# Patient Record
Sex: MALE | Race: WHITE | NOT HISPANIC OR LATINO | URBAN - METROPOLITAN AREA
[De-identification: names, ages, dates, MRNs, and addresses within clinical notes are randomized per-mention and may not be internally consistent; named-entity substitution may affect disease eponyms.]

---

## 2017-04-12 ENCOUNTER — INPATIENT (INPATIENT)
Facility: HOSPITAL | Age: 62
LOS: 0 days | Discharge: HOME | End: 2017-04-13
Attending: EMERGENCY MEDICINE

## 2017-06-27 DIAGNOSIS — Z74.3 NEED FOR CONTINUOUS SUPERVISION: ICD-10-CM

## 2017-06-27 DIAGNOSIS — J42 UNSPECIFIED CHRONIC BRONCHITIS: ICD-10-CM

## 2017-06-27 DIAGNOSIS — Z04.8 ENCOUNTER FOR EXAMINATION AND OBSERVATION FOR OTHER SPECIFIED REASONS: ICD-10-CM

## 2017-06-27 DIAGNOSIS — Z22.322 CARRIER OR SUSPECTED CARRIER OF METHICILLIN RESISTANT STAPHYLOCOCCUS AUREUS: ICD-10-CM

## 2017-06-27 DIAGNOSIS — Z99.81 DEPENDENCE ON SUPPLEMENTAL OXYGEN: ICD-10-CM

## 2017-12-12 ENCOUNTER — OUTPATIENT (OUTPATIENT)
Dept: OUTPATIENT SERVICES | Facility: HOSPITAL | Age: 62
LOS: 1 days | Discharge: HOME | End: 2017-12-12

## 2017-12-12 DIAGNOSIS — E78.5 HYPERLIPIDEMIA, UNSPECIFIED: ICD-10-CM

## 2017-12-12 DIAGNOSIS — J44.1 CHRONIC OBSTRUCTIVE PULMONARY DISEASE WITH (ACUTE) EXACERBATION: ICD-10-CM

## 2017-12-12 DIAGNOSIS — J44.9 CHRONIC OBSTRUCTIVE PULMONARY DISEASE, UNSPECIFIED: ICD-10-CM

## 2017-12-12 DIAGNOSIS — K21.9 GASTRO-ESOPHAGEAL REFLUX DISEASE WITHOUT ESOPHAGITIS: ICD-10-CM

## 2017-12-12 DIAGNOSIS — N18.3 CHRONIC KIDNEY DISEASE, STAGE 3 (MODERATE): ICD-10-CM

## 2017-12-12 DIAGNOSIS — J93.9 PNEUMOTHORAX, UNSPECIFIED: ICD-10-CM

## 2017-12-12 DIAGNOSIS — F41.9 ANXIETY DISORDER, UNSPECIFIED: ICD-10-CM

## 2018-01-19 ENCOUNTER — EMERGENCY (EMERGENCY)
Facility: HOSPITAL | Age: 63
LOS: 0 days | Discharge: HOME | End: 2018-01-19
Admitting: INTERNAL MEDICINE

## 2018-01-19 DIAGNOSIS — E78.00 PURE HYPERCHOLESTEROLEMIA, UNSPECIFIED: ICD-10-CM

## 2018-01-19 DIAGNOSIS — R06.02 SHORTNESS OF BREATH: ICD-10-CM

## 2018-01-19 DIAGNOSIS — J44.1 CHRONIC OBSTRUCTIVE PULMONARY DISEASE WITH (ACUTE) EXACERBATION: ICD-10-CM

## 2018-01-19 DIAGNOSIS — F41.9 ANXIETY DISORDER, UNSPECIFIED: ICD-10-CM

## 2018-01-19 DIAGNOSIS — K21.9 GASTRO-ESOPHAGEAL REFLUX DISEASE WITHOUT ESOPHAGITIS: ICD-10-CM

## 2018-01-19 DIAGNOSIS — J44.9 CHRONIC OBSTRUCTIVE PULMONARY DISEASE, UNSPECIFIED: ICD-10-CM

## 2018-01-19 DIAGNOSIS — Z79.51 LONG TERM (CURRENT) USE OF INHALED STEROIDS: ICD-10-CM

## 2018-01-19 DIAGNOSIS — R05 COUGH: ICD-10-CM

## 2018-01-19 DIAGNOSIS — Z79.899 OTHER LONG TERM (CURRENT) DRUG THERAPY: ICD-10-CM

## 2018-01-19 DIAGNOSIS — J93.9 PNEUMOTHORAX, UNSPECIFIED: ICD-10-CM

## 2018-02-07 ENCOUNTER — INPATIENT (INPATIENT)
Facility: HOSPITAL | Age: 63
LOS: 3 days | Discharge: HOME HEALTH | End: 2018-02-11
Attending: INTERNAL MEDICINE | Admitting: INTERNAL MEDICINE

## 2018-02-07 VITALS
OXYGEN SATURATION: 98 % | RESPIRATION RATE: 18 BRPM | TEMPERATURE: 98 F | SYSTOLIC BLOOD PRESSURE: 155 MMHG | HEART RATE: 60 BPM | DIASTOLIC BLOOD PRESSURE: 80 MMHG

## 2018-02-07 LAB
ALBUMIN SERPL ELPH-MCNC: 3.6 G/DL — SIGNIFICANT CHANGE UP (ref 3–5.5)
ALP SERPL-CCNC: 70 U/L — SIGNIFICANT CHANGE UP (ref 30–115)
ALT FLD-CCNC: 34 U/L — SIGNIFICANT CHANGE UP (ref 0–41)
ANION GAP SERPL CALC-SCNC: 11 MMOL/L — SIGNIFICANT CHANGE UP (ref 7–14)
APTT BLD: 20.9 SEC — CRITICAL LOW (ref 27–39.2)
AST SERPL-CCNC: 39 U/L — SIGNIFICANT CHANGE UP (ref 0–41)
B-TYPE NATRIURETIC PEPTIDE BNP RESULT: 32 PG/ML — SIGNIFICANT CHANGE UP (ref 0–99)
BASE EXCESS BLDV CALC-SCNC: 7.8 MMOL/L — HIGH (ref -2–2)
BASOPHILS # BLD AUTO: 0.04 K/UL — SIGNIFICANT CHANGE UP (ref 0–0.2)
BASOPHILS NFR BLD AUTO: 0.3 % — SIGNIFICANT CHANGE UP (ref 0–1)
BILIRUB SERPL-MCNC: 1.3 MG/DL — HIGH (ref 0.2–1.2)
BUN SERPL-MCNC: 13 MG/DL — SIGNIFICANT CHANGE UP (ref 10–20)
CA-I SERPL-SCNC: 1.15 MMOL/L — SIGNIFICANT CHANGE UP (ref 1.12–1.3)
CALCIUM SERPL-MCNC: 8.7 MG/DL — SIGNIFICANT CHANGE UP (ref 8.5–10.1)
CHLORIDE SERPL-SCNC: 94 MMOL/L — LOW (ref 98–110)
CK MB CFR SERPL CALC: 5.1 NG/ML — SIGNIFICANT CHANGE UP (ref 0.6–6.3)
CO2 SERPL-SCNC: 28 MMOL/L — SIGNIFICANT CHANGE UP (ref 17–32)
CREAT SERPL-MCNC: 1 MG/DL — SIGNIFICANT CHANGE UP (ref 0.7–1.5)
EOSINOPHIL # BLD AUTO: 0 K/UL — SIGNIFICANT CHANGE UP (ref 0–0.7)
EOSINOPHIL NFR BLD AUTO: 0 % — SIGNIFICANT CHANGE UP (ref 0–8)
FLU A RESULT: NEGATIVE — SIGNIFICANT CHANGE UP
FLU A RESULT: NEGATIVE — SIGNIFICANT CHANGE UP
FLUAV AG NPH QL: NEGATIVE — SIGNIFICANT CHANGE UP
FLUBV AG NPH QL: NEGATIVE — SIGNIFICANT CHANGE UP
GAS PNL BLDV: 132 MMOL/L — LOW (ref 136–145)
GAS PNL BLDV: SIGNIFICANT CHANGE UP
GLUCOSE SERPL-MCNC: 128 MG/DL — HIGH (ref 70–110)
HCO3 BLDV-SCNC: 34 MMOL/L — HIGH (ref 22–29)
HCT VFR BLD CALC: 40.5 % — LOW (ref 42–52)
HGB BLD-MCNC: 12.5 G/DL — LOW (ref 14–18)
IMM GRANULOCYTES NFR BLD AUTO: 4.7 % — HIGH (ref 0.1–0.3)
INR BLD: 0.95 RATIO — SIGNIFICANT CHANGE UP (ref 0.65–1.3)
LACTATE BLDV-MCNC: 1.6 MMOL/L — SIGNIFICANT CHANGE UP (ref 0.5–1.6)
LYMPHOCYTES # BLD AUTO: 0.32 K/UL — LOW (ref 1.2–3.4)
LYMPHOCYTES # BLD AUTO: 2.3 % — LOW (ref 20.5–51.1)
MAGNESIUM SERPL-MCNC: 2.4 MG/DL — SIGNIFICANT CHANGE UP (ref 1.8–2.4)
MCHC RBC-ENTMCNC: 27.1 PG — SIGNIFICANT CHANGE UP (ref 27–31)
MCHC RBC-ENTMCNC: 30.9 G/DL — LOW (ref 32–37)
MCV RBC AUTO: 87.7 FL — SIGNIFICANT CHANGE UP (ref 80–94)
MONOCYTES # BLD AUTO: 0.77 K/UL — HIGH (ref 0.1–0.6)
MONOCYTES NFR BLD AUTO: 5.5 % — SIGNIFICANT CHANGE UP (ref 1.7–9.3)
NEUTROPHILS # BLD AUTO: 12.13 K/UL — HIGH (ref 1.4–6.5)
NEUTROPHILS NFR BLD AUTO: 87.2 % — HIGH (ref 42.2–75.2)
NRBC # BLD: 0 /100 WBCS — SIGNIFICANT CHANGE UP (ref 0–0)
PCO2 BLDV: 51 MMHG — SIGNIFICANT CHANGE UP (ref 41–51)
PH BLDV: 7.42 — SIGNIFICANT CHANGE UP (ref 7.26–7.43)
PLATELET # BLD AUTO: 377 K/UL — SIGNIFICANT CHANGE UP (ref 130–400)
PO2 BLDV: 67 MMHG — HIGH (ref 20–40)
POTASSIUM BLDV-SCNC: 5 MMOL/L — SIGNIFICANT CHANGE UP (ref 3.3–5.6)
POTASSIUM SERPL-MCNC: 6.5 MMOL/L — CRITICAL HIGH (ref 3.5–5)
POTASSIUM SERPL-SCNC: 6.5 MMOL/L — CRITICAL HIGH (ref 3.5–5)
PROT SERPL-MCNC: 5.9 G/DL — LOW (ref 6–8)
PROTHROM AB SERPL-ACNC: 10.2 SEC — SIGNIFICANT CHANGE UP (ref 9.95–12.87)
RBC # BLD: 4.62 M/UL — LOW (ref 4.7–6.1)
RBC # FLD: 14.8 % — HIGH (ref 11.5–14.5)
SAO2 % BLDV: 93 % — SIGNIFICANT CHANGE UP
SODIUM SERPL-SCNC: 133 MMOL/L — LOW (ref 135–146)
TROPONIN I SERPL-MCNC: <0.02 NG/ML — SIGNIFICANT CHANGE UP (ref 0–0.05)
WBC # BLD: 13.91 K/UL — HIGH (ref 4.8–10.8)
WBC # FLD AUTO: 13.91 K/UL — HIGH (ref 4.8–10.8)

## 2018-02-07 RX ORDER — IPRATROPIUM/ALBUTEROL SULFATE 18-103MCG
3 AEROSOL WITH ADAPTER (GRAM) INHALATION
Qty: 0 | Refills: 0 | Status: COMPLETED | OUTPATIENT
Start: 2018-02-07 | End: 2018-02-07

## 2018-02-07 RX ORDER — HEPARIN SODIUM 5000 [USP'U]/ML
5000 INJECTION INTRAVENOUS; SUBCUTANEOUS EVERY 8 HOURS
Qty: 0 | Refills: 0 | Status: DISCONTINUED | OUTPATIENT
Start: 2018-02-07 | End: 2018-02-11

## 2018-02-07 RX ORDER — ALPRAZOLAM 0.25 MG
0 TABLET ORAL
Qty: 0 | Refills: 0 | COMMUNITY

## 2018-02-07 RX ORDER — AMLODIPINE BESYLATE 2.5 MG/1
10 TABLET ORAL DAILY
Qty: 0 | Refills: 0 | Status: DISCONTINUED | OUTPATIENT
Start: 2018-02-07 | End: 2018-02-11

## 2018-02-07 RX ORDER — SIMVASTATIN 20 MG/1
10 TABLET, FILM COATED ORAL AT BEDTIME
Qty: 0 | Refills: 0 | Status: DISCONTINUED | OUTPATIENT
Start: 2018-02-07 | End: 2018-02-11

## 2018-02-07 RX ORDER — TIOTROPIUM BROMIDE 18 UG/1
1 CAPSULE ORAL; RESPIRATORY (INHALATION) DAILY
Qty: 0 | Refills: 0 | Status: DISCONTINUED | OUTPATIENT
Start: 2018-02-07 | End: 2018-02-11

## 2018-02-07 RX ORDER — IPRATROPIUM BROMIDE 0.2 MG/ML
0 SOLUTION, NON-ORAL INHALATION
Qty: 0 | Refills: 0 | COMMUNITY

## 2018-02-07 RX ORDER — BUDESONIDE AND FORMOTEROL FUMARATE DIHYDRATE 160; 4.5 UG/1; UG/1
2 AEROSOL RESPIRATORY (INHALATION)
Qty: 0 | Refills: 0 | Status: DISCONTINUED | OUTPATIENT
Start: 2018-02-07 | End: 2018-02-11

## 2018-02-07 RX ORDER — ALPRAZOLAM 0.25 MG
1 TABLET ORAL
Qty: 0 | Refills: 0 | Status: DISCONTINUED | OUTPATIENT
Start: 2018-02-07 | End: 2018-02-09

## 2018-02-07 RX ORDER — IPRATROPIUM/ALBUTEROL SULFATE 18-103MCG
3 AEROSOL WITH ADAPTER (GRAM) INHALATION EVERY 6 HOURS
Qty: 0 | Refills: 0 | Status: DISCONTINUED | OUTPATIENT
Start: 2018-02-07 | End: 2018-02-11

## 2018-02-07 RX ADMIN — Medication 3 MILLILITER(S): at 23:43

## 2018-02-07 RX ADMIN — Medication 3 MILLILITER(S): at 15:06

## 2018-02-07 RX ADMIN — Medication 3 MILLILITER(S): at 14:43

## 2018-02-07 RX ADMIN — Medication 125 MILLIGRAM(S): at 14:43

## 2018-02-07 RX ADMIN — Medication 3 MILLILITER(S): at 14:46

## 2018-02-07 NOTE — H&P ADULT - NSHPPHYSICALEXAM_GEN_ALL_CORE
Vital Signs Last 24 Hrs  T(C): 35.9 (07 Feb 2018 23:22), Max: 36.7 (07 Feb 2018 13:13)  T(F): 96.6 (07 Feb 2018 23:22), Max: 98.1 (07 Feb 2018 13:13)  HR: 71 (07 Feb 2018 23:22) (60 - 88)  BP: 102/54 (07 Feb 2018 23:22) (102/54 - 155/80)  BP(mean): --  RR: 19 (07 Feb 2018 23:22) (18 - 19)  SpO2: 99% (07 Feb 2018 23:22) (97% - 99%)    Gen: NAD, AAOx3, appears older than stated age  CV:  RRR  Pulm: mild expiratory wheezes on right lung field  Abd: soft, ND, NT  Ext: no c/c/e Vital Signs Last 24 Hrs  T(C): 35.9 (07 Feb 2018 23:22), Max: 36.7 (07 Feb 2018 13:13)  T(F): 96.6 (07 Feb 2018 23:22), Max: 98.1 (07 Feb 2018 13:13)  HR: 71 (07 Feb 2018 23:22) (60 - 88)  BP: 102/54 (07 Feb 2018 23:22) (102/54 - 155/80)  BP(mean): --  RR: 19 (07 Feb 2018 23:22) (18 - 19)  SpO2: 99% (07 Feb 2018 23:22) (97% - 99%)    Gen: NAD, AAOx3, appears older than stated age  CV:  RRR  Pulm: mild expiratory wheezes on right lung field  Abd: soft, ND, NT  Ext: no c/c/e  : left scrotal swelling, nontender

## 2018-02-07 NOTE — H&P ADULT - ASSESSMENT
61 yo M PMHx COPD on home O2, HTN, HLD, anxiety sent in by VNA for wheezing.    1. COPD:   - cw prednisone taper, inhalers and nebulizers  - no need for Abtx  - cw supplemental oxygen to maintain SaO2 at or above 90%, no more than 92%  - BIPAP PRN    2. leukocytosis:  - likely 2/2 steroid use  - f/w cbc in am     2. left hydrocoele:  - scrotal ultrasound with about 350 cc fluid  - urology consult placed    3. HLD:   - cw zocor    4. HTN:  - cw norvasc     4. anxiety  - cw xanax prn

## 2018-02-07 NOTE — H&P ADULT - HISTORY OF PRESENT ILLNESS
63 yo M PMHx COPD on 4L NC/BIPAP PRN at home, HTN, HLD, anxiety presenting to the ED because his VNA found him to be wheezing on auscultation.  Patient was recently discharged from [facility] where he had a month long stay for COPD.  He was treated with antibiotics, steroids and sent home on a steroid taper.  At baseline, he has SOB on exertion, and a chronic cough with whitish (sometimes yellowish) mucoid sputum.  He denies any changes from his baseline since discharge as well as F/chills/palpitations/CP/orthopnea/LE edema.      He does note that his BIPAP was fixed yesterday at home and is working well.  And at [facility], he had a work up for left scrotal swelling, and was told to follow up with urologist as outpatient.  This scrotal swelling has been occurring for the past 6 months, and has been increasing in size.  Denies scrotal pain.  Reports intermittent urinary incontinence without dysuria.      He is an ex-smoker, quit 8 years ago, with a 60 pack year history. 63 yo M PMHx COPD on 4L NC/BIPAP PRN at home, HTN, HLD, anxiety presenting to the ED because his VNA found him to be wheezing on auscultation.  Patient was recently discharged from [facility] where he had a month long stay for COPD.  He was treated with antibiotics, steroids and sent home on a steroid taper.  At baseline, he has SOB on exertion, and a chronic cough with whitish (sometimes yellowish) mucoid sputum.  He denies any changes from his baseline since discharge as well as F/chills/palpitations/CP/orthopnea/LE edema, although when the visiting nurse told him he was wheezing and that she was concerned, he became very anxious and felt increasing SOB but for that moment.       He does note that his BIPAP was fixed yesterday at home and is working well.  And at [facility], he had a work up for left scrotal swelling, and was told to follow up with urologist as outpatient.  This scrotal swelling has been occurring for the past 6 months, and has been increasing in size.  Denies scrotal pain.  Reports intermittent urinary incontinence without dysuria.      He is an ex-smoker, quit 8 years ago, with a 60 pack year history.

## 2018-02-07 NOTE — H&P ADULT - ATTENDING COMMENTS
pt seen and examined independently, I have read and agree with above resident exam and plan of care  continue steroids, abx, inhalation therapy, check pullse ox  dvt proph  pud proph  pulm eval dr worthington

## 2018-02-07 NOTE — ED PROVIDER NOTE - NS ED ROS FT
Constitutional:  no fevers, no chills, + generalized malaise  Eyes:  No visual changes  ENMT: No neck pain or stiffness, no nasal congestion, no ear pain, no throat pain  Cardiac:  No chest pain  Respiratory:  see hpi  GI:  No nausea, vomiting, diarrhea or abdominal pain.  :  No dysuria, frequency or burning.  L scrotal swelling as in HPI  MS:  No back pain, no joint pain.  Neuro:  No headache, no dizziness, no change in mental status  Skin:  No skin rash  Except as documented in the HPI,  all other systems are negative

## 2018-02-07 NOTE — ED PROVIDER NOTE - PROGRESS NOTE DETAILS
Pt states he usually uses bipap from 1-3 pm daily, requesting bipap use now. respiratory to come and place bipap. Pt feeling much better after bipap, has now taken it off, using 4L O2.

## 2018-02-07 NOTE — H&P ADULT - NSHPLABSRESULTS_GEN_ALL_CORE
12.5   13.91 )-----------( 377      ( 07 Feb 2018 14:18 )             40.5     02-07    133<L>  |  94<L>  |  13  ----------------------------<  128<H>  6.5<HH>   |  28  |  1.0    Ca    8.7      07 Feb 2018 14:18  Mg     2.4     02-07    TPro  5.9<L>  /  Alb  3.6  /  TBili  1.3<H>  /  DBili  x   /  AST  39  /  ALT  34  /  AlkPhos  70  02-07      14:18 - VBG - pH: 7.42  | pCO2: 51    | pO2: 67    | Lactate: 1.6        CXR: ROBERT, f/u official report

## 2018-02-07 NOTE — ED PROVIDER NOTE - PHYSICAL EXAMINATION
CONSTITUTIONAL: Well-developed; well-nourished; in no acute distress, nontoxic appearing  SKIN: skin exam is warm and dry,  HEAD: Normocephalic; atraumatic.  EYES: PERRL, 3 mm bilateral, no nystagmus, EOM intact; conjunctiva and sclera clear.  ENT: MMM, no nasal congestion  NECK: Supple; non tender.+ full passive ROM in all directions. No JVD  CARD: S1, S2 normal, no murmur  RESP: No resp distress, speaking full sentences, + decreased bs b/l.   ABD: soft; non-distended; non-tender. No Rebound, No guarding  : + swelling to L scrotum, no tenderness  EXT: Normal ROM. No cyanosis or edema. Dp Pulses intact.   NEURO: awake, alert, following commands, oriented, grossly unremarkable. No Focal deficits. GCS 15.   PSYCH: Cooperative, appropriate.

## 2018-02-07 NOTE — H&P ADULT - PMH
Anxiety disorder, unspecified type    Chronic obstructive pulmonary disease, unspecified COPD type    High cholesterol    Hypertension, unspecified type    Other hydrocele

## 2018-02-07 NOTE — ED PROVIDER NOTE - OBJECTIVE STATEMENT
63 y/o male with h/o HTN, anxiety, COPD on 4L home O2 in ER with c/o SOB.  Pt states has been admitted multiple times over the past month or so to [facility], most recenlty d/c'd ~ a few days ago. states completed course of abx.  + cough with increased mucous production, white.  Pt uses biPAP at home at night and to nap during the day,states his machine broke ~ 4 am and has been having worsening sob since then. Denies f/c. no ha/dizziness/loc. no n/v/d. no abd pain.  no dysuria. pt states has had scrotal swelling x the past 6 months, had sono at [facility] ~ 10 days ago and was told there is fluid there, was supposed to f/u with gu as outpt, but has not.  denies any recent increase in swelling, no pain.

## 2018-02-08 RX ADMIN — Medication 60 MILLIGRAM(S): at 06:25

## 2018-02-08 RX ADMIN — Medication 3 MILLILITER(S): at 13:14

## 2018-02-08 RX ADMIN — SIMVASTATIN 10 MILLIGRAM(S): 20 TABLET, FILM COATED ORAL at 21:41

## 2018-02-08 RX ADMIN — HEPARIN SODIUM 5000 UNIT(S): 5000 INJECTION INTRAVENOUS; SUBCUTANEOUS at 13:14

## 2018-02-08 RX ADMIN — Medication 3 MILLILITER(S): at 08:53

## 2018-02-08 RX ADMIN — Medication 1 MILLIGRAM(S): at 06:25

## 2018-02-08 RX ADMIN — Medication 3 MILLILITER(S): at 02:48

## 2018-02-08 RX ADMIN — HEPARIN SODIUM 5000 UNIT(S): 5000 INJECTION INTRAVENOUS; SUBCUTANEOUS at 06:24

## 2018-02-08 RX ADMIN — Medication 600 MILLIGRAM(S): at 17:44

## 2018-02-08 RX ADMIN — TIOTROPIUM BROMIDE 1 CAPSULE(S): 18 CAPSULE ORAL; RESPIRATORY (INHALATION) at 08:53

## 2018-02-08 RX ADMIN — AMLODIPINE BESYLATE 10 MILLIGRAM(S): 2.5 TABLET ORAL at 06:24

## 2018-02-08 RX ADMIN — Medication 3 MILLILITER(S): at 22:41

## 2018-02-08 RX ADMIN — BUDESONIDE AND FORMOTEROL FUMARATE DIHYDRATE 2 PUFF(S): 160; 4.5 AEROSOL RESPIRATORY (INHALATION) at 08:53

## 2018-02-08 NOTE — CONSULT NOTE ADULT - ASSESSMENT
Chronic resp failure  sever copd  anxiety  htn  left complex symptomatic hydrocele    plan:  O2  nebs  chest pt  steroids  inhalers  guaifenesin  cont norvasc  pulm eval  scrotal support and compress  dvt prophylaxis   oob  admit to Dr. Wong's service

## 2018-02-08 NOTE — PROGRESS NOTE ADULT - ASSESSMENT
Assessment:  62y male with PMH listed presented for wheezing, after recent discharge from [facility] for COPD exacerbation requiring prolonged hospitalization.     Plan:  1.) COPD exacerbation      Michael Goldberg, MD.  Date/Time: 02-08-18 @ 11:04 Assessment:  62y male with PMH listed presented for wheezing, after recent discharge from [facility] for COPD exacerbation requiring prolonged hospitalization.     Plan:  1.) COPD exacerbation  - cw prednisone taper, inhalers and nebulizers  - no need for Abtx  - cw supplemental oxygen to maintain SaO2 at or above 90%, no more than 92%  - BIPAP PRN    2. leukocytosis:  - likely 2/2 steroid use  - f/w cbc in am     2. left hydrocoele:  - scrotal ultrasound with about 350 cc fluid  - urology consult placed    3. HLD:   - cw zocor    4. HTN:  - cw norvasc     4. anxiety  - cw xanax prn     continue steroids, abx, inhalation therapy, check pullse ox  dvt proph  pud proph  pulm eval dr worthington .    O2  nebs  chest pt  steroids  inhalers  guaifenesin  cont norvasc  pulm eval  scrotal support and compress  dvt prophylaxis   oob  admit to Dr. Wong's service    Michael Goldberg, MD.  Date/Time: 02-08-18 @ 11:04 Assessment:  62y male with PMH listed presented for wheezing, after recent discharge from [facility] for COPD exacerbation requiring prolonged hospitalization.     Plan:  1.) COPD exacerbation: Improving    - Continue home medication regimen: Prednisone taper, inhalers, and nebulizers.    - No indication for antibiotics at this time.     - Continue supplemental oxygen to maintain SaO2 90-94%.    - Continue bibpap at night and PRN    - Pulmonary consult pending    2.) Leukocytosis: secondary to steroids use.    - No clinical significance at this time.    3.) L. hydrocele:    - Elevate scrotum.    - Outpatient urology follow-up.    4.) HTN and HLD: Continue norvasc and simvastatin.    5.) Anxiety: xanax     6.) GI / DVT PPx: not indicated / heparin    7.) Disposition:    - Full Code.    - Anticipating discharge in 24-48 hours.        Michael Goldberg, MD.  Date/Time: 02-08-18 @ 11:04

## 2018-02-08 NOTE — PROGRESS NOTE ADULT - SUBJECTIVE AND OBJECTIVE BOX
ALETA SUAZO, male, 62y ([birth year]),   [id number]  Admit Date: 02-07-18 (1d)    Chief Complaint  Patient is a 62y old male who presents with a chief complaint of wheezing. (07 Feb 2018 22:59)    Past Medical and Surgical History  PAST MEDICAL & SURGICAL HISTORY:  Other hydrocele  Anxiety disorder, unspecified type  Hypertension, unspecified type  High cholesterol  Chronic obstructive pulmonary disease, unspecified COPD type  No significant past surgical history    Current Medications:  MEDICATIONS  (STANDING):  ALBUTerol/ipratropium for Nebulization 3 milliLiter(s) Nebulizer every 6 hours  amLODIPine   Tablet 10 milliGRAM(s) Oral daily  buDESOnide 160 MICROgram(s)/formoterol 4.5 MICROgram(s) Inhaler 2 Puff(s) Inhalation two times a day  guaiFENesin  milliGRAM(s) Oral every 12 hours  heparin  Injectable 5000 Unit(s) SubCutaneous every 8 hours  predniSONE   Tablet 60 milliGRAM(s) Oral daily  simvastatin 10 milliGRAM(s) Oral at bedtime  tiotropium 18 MICROgram(s) Capsule 1 Capsule(s) Inhalation daily    MEDICATIONS  (PRN):  ALPRAZolam 1 milliGRAM(s) Oral two times a day PRN anxiety    Interval History:  No acute events overnight. No complaints at this time.    Vital Signs:  T(F): 97.6 (02-08-18 @ 08:10), Max: 98.1 (02-07-18 @ 13:13)  HR: 80 (02-08-18 @ 08:10) (60 - 88)  BP: 121/65 (02-08-18 @ 08:10) (102/54 - 155/80)  RR: 18 (02-08-18 @ 08:10) (18 - 19)  SpO2: 100% (02-08-18 @ 08:10) (97% - 100%)    Physical Exam:  General: Not in distress.   HEENT: Moist mucus membranes. PERRLA.  Cardio: Regular rate and rhythm, S1, S2, no murmur, rub, or gallop.  Pulm: Clear to auscultation bilaterally. No wheezing, rales, or rhonchi  Abdomen: Soft, non-tender, non-distended. Normoactive bowel sounds  Extremities: No cyanosis or edema bilaterally. No calf tenderness to palpation  Neuro: A&O x3. No focal deficits. CN II - XII grossly intact.    Labs and Imaging:  CBC Full  -  ( 07 Feb 2018 14:18 )  WBC Count : 13.91 K/uL  Hemoglobin : 12.5 g/dL  Hematocrit : 40.5 %  Platelet Count - Automated : 377 K/uL  Mean Cell Volume : 87.7 fL  Mean Cell Hemoglobin : 27.1 pg  Mean Cell Hemoglobin Concentration : 30.9 g/dL  RDW: 14.8    PT/INR/PTT: 02-07-18 @ 14:18  10.20 | 20.9        ^      0.95    BMP: 02-07-18 @ 14:18  133 | 94 | 13   -----------------< 128  6.5  | 28 | 1.0  eGFR(AA): 88, eGFR (non-AA): 76  Ca 8.7, Mg 2.4, P --    LFTs: 02-07-18 @ 14:18  Ca  8.7  | 39 AST   -----------------  TP  5.9  | 34 ALT  -----------------  Alb 3.6  | 70 ALK             ^        1.3 TB    Home Medications:  Home Medications:  albuterol 2.5 mg/3 mL (0.083%) inhalation solution: inhaled every 6 hours, As Needed (07 Feb 2018 23:28)  ALPRAZolam 1 mg oral tablet: orally 2 times a day, As Needed (07 Feb 2018 23:28)  Brovana 15 mcg/2 mL inhalation solution: 2 milliliter(s) inhaled 2 times a day (07 Feb 2018 23:28)  Flovent  mcg/inh inhalation aerosol: inhaled once a day (07 Feb 2018 23:28)  ipratropium 18 mcg/inh inhalation aerosol: inhaled every 6 hours, As Needed (07 Feb 2018 23:28)  Mucinex 600 mg oral tablet, extended release: 1 tab(s) orally every 12 hours (07 Feb 2018 23:28)  Norvasc 10 mg oral tablet: 1 tab(s) orally once a day (07 Feb 2018 23:28)  predniSONE: 60 milligram(s) orally once a day (07 Feb 2018 23:28)  roflumilast 500 mcg oral tablet: 1 tab(s) orally once a day (07 Feb 2018 23:28)  simvastatin 10 mg oral tablet: 1 tab(s) orally once a day (at bedtime) (07 Feb 2018 23:28) ALETA SUAZO, male, 62y ([birth year]),   MRN-[id number]  Admit Date: 02-07-18 (1d)    Chief Complaint  Patient is a 62y old male who presents with a chief complaint of wheezing. (07 Feb 2018 22:59)    Past Medical and Surgical History  PAST MEDICAL & SURGICAL HISTORY:  Other hydrocele  Anxiety disorder, unspecified type  Hypertension, unspecified type  High cholesterol  Chronic obstructive pulmonary disease, unspecified COPD type  No significant past surgical history    Current Medications:  MEDICATIONS  (STANDING):  ALBUTerol/ipratropium for Nebulization 3 milliLiter(s) Nebulizer every 6 hours  amLODIPine   Tablet 10 milliGRAM(s) Oral daily  buDESOnide 160 MICROgram(s)/formoterol 4.5 MICROgram(s) Inhaler 2 Puff(s) Inhalation two times a day  guaiFENesin  milliGRAM(s) Oral every 12 hours  heparin  Injectable 5000 Unit(s) SubCutaneous every 8 hours  predniSONE   Tablet 60 milliGRAM(s) Oral daily  simvastatin 10 milliGRAM(s) Oral at bedtime  tiotropium 18 MICROgram(s) Capsule 1 Capsule(s) Inhalation daily    MEDICATIONS  (PRN):  ALPRAZolam 1 milliGRAM(s) Oral two times a day PRN anxiety    Interval History:  No acute events overnight. No complaints at this time.    Vital Signs:  T(F): 97.6 (02-08-18 @ 08:10), Max: 98.1 (02-07-18 @ 13:13)  HR: 80 (02-08-18 @ 08:10) (60 - 88)  BP: 121/65 (02-08-18 @ 08:10) (102/54 - 155/80)  RR: 18 (02-08-18 @ 08:10) (18 - 19)  SpO2: 100% (02-08-18 @ 08:10) (97% - 100%)    Physical Exam:  General: Not in distress.   HEENT: Moist mucus membranes. PERRLA.  Cardio: Regular rate and rhythm, S1, S2, no murmur, rub, or gallop.  Pulm: Clear to auscultation bilaterally. No wheezing, rales, or rhonchi  Abdomen: Soft, non-tender, non-distended. Normoactive bowel sounds  Extremities: No cyanosis or edema bilaterally. No calf tenderness to palpation  Neuro: A&O x3. No focal deficits. CN II - XII grossly intact.    Labs and Imaging:  CBC Full  -  ( 07 Feb 2018 14:18 )  WBC Count : 13.91 K/uL  Hemoglobin : 12.5 g/dL  Hematocrit : 40.5 %  Platelet Count - Automated : 377 K/uL  Mean Cell Volume : 87.7 fL  Mean Cell Hemoglobin : 27.1 pg  Mean Cell Hemoglobin Concentration : 30.9 g/dL  RDW: 14.8    PT/INR/PTT: 02-07-18 @ 14:18  10.20 | 20.9        ^      0.95    BMP: 02-07-18 @ 14:18  133 | 94 | 13   -----------------< 128  6.5  | 28 | 1.0  eGFR(AA): 88, eGFR (non-AA): 76  Ca 8.7, Mg 2.4, P --    LFTs: 02-07-18 @ 14:18  Ca  8.7  | 39 AST   -----------------  TP  5.9  | 34 ALT  -----------------  Alb 3.6  | 70 ALK             ^        1.3 TB    Home Medications:  albuterol 2.5 mg/3 mL (0.083%) inhalation solution: inhaled every 6 hours, As Needed (07 Feb 2018 23:28)  ALPRAZolam 1 mg oral tablet: orally 2 times a day, As Needed (07 Feb 2018 23:28)  Brovana 15 mcg/2 mL inhalation solution: 2 milliliter(s) inhaled 2 times a day (07 Feb 2018 23:28)  Flovent  mcg/inh inhalation aerosol: inhaled once a day (07 Feb 2018 23:28)  ipratropium 18 mcg/inh inhalation aerosol: inhaled every 6 hours, As Needed (07 Feb 2018 23:28)  Mucinex 600 mg oral tablet, extended release: 1 tab(s) orally every 12 hours (07 Feb 2018 23:28)  Norvasc 10 mg oral tablet: 1 tab(s) orally once a day (07 Feb 2018 23:28)  predniSONE: 60 milligram(s) orally once a day (07 Feb 2018 23:28)  roflumilast 500 mcg oral tablet: 1 tab(s) orally once a day (07 Feb 2018 23:28)  simvastatin 10 mg oral tablet: 1 tab(s) orally once a day (at bedtime) (07 Feb 2018 23:28) ALETA SUAZO, male, 62y ([birth year]),   [id number]  Admit Date: 02-07-18 (1d)    Chief Complaint  Patient is a 62y old male who presents with a chief complaint of wheezing. (07 Feb 2018 22:59)    Past Medical and Surgical History  PAST MEDICAL & SURGICAL HISTORY:  Other hydrocele  Anxiety disorder, unspecified type  Hypertension, unspecified type  High cholesterol  Chronic obstructive pulmonary disease, unspecified COPD type  No significant past surgical history    Current Medications:  MEDICATIONS  (STANDING):  ALBUTerol/ipratropium for Nebulization 3 milliLiter(s) Nebulizer every 6 hours  amLODIPine   Tablet 10 milliGRAM(s) Oral daily  buDESOnide 160 MICROgram(s)/formoterol 4.5 MICROgram(s) Inhaler 2 Puff(s) Inhalation two times a day  guaiFENesin  milliGRAM(s) Oral every 12 hours  heparin  Injectable 5000 Unit(s) SubCutaneous every 8 hours  predniSONE   Tablet 60 milliGRAM(s) Oral daily  simvastatin 10 milliGRAM(s) Oral at bedtime  tiotropium 18 MICROgram(s) Capsule 1 Capsule(s) Inhalation daily    MEDICATIONS  (PRN):  ALPRAZolam 1 milliGRAM(s) Oral two times a day PRN anxiety    Interval History:  No acute events overnight. No complaints at this time. States breathing has significantly improved from yesterday.    Vital Signs:  T(F): 97.6 (02-08-18 @ 08:10), Max: 98.1 (02-07-18 @ 13:13)  HR: 80 (02-08-18 @ 08:10) (60 - 88)  BP: 121/65 (02-08-18 @ 08:10) (102/54 - 155/80)  RR: 18 (02-08-18 @ 08:10) (18 - 19)  SpO2: 100% (02-08-18 @ 08:10) (97% - 100%)    Physical Exam:  General: Not in distress.   HEENT: Moist mucus membranes. PERRLA.  Cardio: Regular rate and rhythm, S1, S2, no murmur, rub, or gallop.  Pulm: Clear to auscultation bilaterally. Distant breath sounds. No wheezing, rales, or rhonchi  Abdomen: Soft, non-tender, non-distended. Normoactive bowel sounds  Extremities: No cyanosis or edema bilaterally. No calf tenderness to palpation  Neuro: A&O x3. No focal deficits. CN II - XII grossly intact.    Labs and Imaging:  CBC Full  -  ( 07 Feb 2018 14:18 )  WBC Count : 13.91 K/uL  Hemoglobin : 12.5 g/dL  Hematocrit : 40.5 %  Platelet Count - Automated : 377 K/uL  Mean Cell Volume : 87.7 fL  Mean Cell Hemoglobin : 27.1 pg  Mean Cell Hemoglobin Concentration : 30.9 g/dL  RDW: 14.8    PT/INR/PTT: 02-07-18 @ 14:18  10.20 | 20.9        ^      0.95    BMP: 02-07-18 @ 14:18  133 | 94 | 13   -----------------< 128  6.5  | 28 | 1.0  eGFR(AA): 88, eGFR (non-AA): 76  Ca 8.7, Mg 2.4, P --    LFTs: 02-07-18 @ 14:18  Ca  8.7  | 39 AST   -----------------  TP  5.9  | 34 ALT  -----------------  Alb 3.6  | 70 ALK             ^        1.3 TB    Home Medications:  albuterol 2.5 mg/3 mL (0.083%) inhalation solution: inhaled every 6 hours, As Needed (07 Feb 2018 23:28)  ALPRAZolam 1 mg oral tablet: orally 2 times a day, As Needed (07 Feb 2018 23:28)  Brovana 15 mcg/2 mL inhalation solution: 2 milliliter(s) inhaled 2 times a day (07 Feb 2018 23:28)  Flovent  mcg/inh inhalation aerosol: inhaled once a day (07 Feb 2018 23:28)  ipratropium 18 mcg/inh inhalation aerosol: inhaled every 6 hours, As Needed (07 Feb 2018 23:28)  Mucinex 600 mg oral tablet, extended release: 1 tab(s) orally every 12 hours (07 Feb 2018 23:28)  Norvasc 10 mg oral tablet: 1 tab(s) orally once a day (07 Feb 2018 23:28)  predniSONE: 60 milligram(s) orally once a day (07 Feb 2018 23:28)  roflumilast 500 mcg oral tablet: 1 tab(s) orally once a day (07 Feb 2018 23:28)  simvastatin 10 mg oral tablet: 1 tab(s) orally once a day (at bedtime) (07 Feb 2018 23:28) ALETA SUAZO, male, 62y ([birth year]),   MRN-[id number]  Admit Date: 02-07-18 (1d)    Chief Complaint  Patient is a 62y old male who presents with a chief complaint of wheezing. (07 Feb 2018 22:59)    Past Medical and Surgical History  PAST MEDICAL & SURGICAL HISTORY:  Other hydrocele  Anxiety disorder, unspecified type  Hypertension, unspecified type  High cholesterol  Chronic obstructive pulmonary disease, unspecified COPD type  No significant past surgical history    Current Medications:  MEDICATIONS  (STANDING):  ALBUTerol/ipratropium for Nebulization 3 milliLiter(s) Nebulizer every 6 hours  amLODIPine   Tablet 10 milliGRAM(s) Oral daily  buDESOnide 160 MICROgram(s)/formoterol 4.5 MICROgram(s) Inhaler 2 Puff(s) Inhalation two times a day  guaiFENesin  milliGRAM(s) Oral every 12 hours  heparin  Injectable 5000 Unit(s) SubCutaneous every 8 hours  predniSONE   Tablet 60 milliGRAM(s) Oral daily  simvastatin 10 milliGRAM(s) Oral at bedtime  tiotropium 18 MICROgram(s) Capsule 1 Capsule(s) Inhalation daily    MEDICATIONS  (PRN):  ALPRAZolam 1 milliGRAM(s) Oral two times a day PRN anxiety    Interval History:  No acute events overnight. No complaints at this time. States breathing has significantly improved from yesterday.    Vital Signs:  T(F): 97.6 (02-08-18 @ 08:10), Max: 98.1 (02-07-18 @ 13:13)  HR: 80 (02-08-18 @ 08:10) (60 - 88)  BP: 121/65 (02-08-18 @ 08:10) (102/54 - 155/80)  RR: 18 (02-08-18 @ 08:10) (18 - 19)  SpO2: 100% (02-08-18 @ 08:10) (97% - 100%)    Physical Exam:  General: Not in distress.   HEENT: Moist mucus membranes. PERRLA.  Cardio: Regular rate and rhythm, S1, S2, no murmur, rub, or gallop.  Pulm: Clear to auscultation bilaterally. Distant breath sounds. No wheezing, rales, or rhonchi  Abdomen: Soft, non-tender, non-distended. Normoactive bowel sounds  Extremities: No cyanosis or edema bilaterally. No calf tenderness to palpation  Neuro: A&O x3. No focal deficits. CN II - XII grossly intact.    Labs and Imaging:  CBC Full  -  ( 07 Feb 2018 14:18 )  WBC Count : 13.91 K/uL  Hemoglobin : 12.5 g/dL  Hematocrit : 40.5 %  Platelet Count - Automated : 377 K/uL  Mean Cell Volume : 87.7 fL  Mean Cell Hemoglobin : 27.1 pg  Mean Cell Hemoglobin Concentration : 30.9 g/dL  RDW: 14.8    PT/INR/PTT: 02-07-18 @ 14:18  10.20 | 20.9        ^      0.95    BMP: 02-07-18 @ 14:18  133 | 94 | 13   -----------------< 128  6.5  | 28 | 1.0  eGFR(AA): 88, eGFR (non-AA): 76  Ca 8.7, Mg 2.4, P --    LFTs: 02-07-18 @ 14:18  Ca  8.7  | 39 AST   -----------------  TP  5.9  | 34 ALT  -----------------  Alb 3.6  | 70 ALK             ^        1.3 TB    Influenza A/B negative      US Testicles (02.07.18 @ 20:46)  Large left-sided complex hydrocele measuring approximately 386 cc   containing mobile debris. Subcentimeter right epididymal cysts.     Xray Chest 1 View- PORTABLE-Urgent (02.07.18 @ 14:58)  No radiographic evidence of acute cardiopulmonary disease.      Home Medications:  albuterol 2.5 mg/3 mL (0.083%) inhalation solution: inhaled every 6 hours, As Needed (07 Feb 2018 23:28)  ALPRAZolam 1 mg oral tablet: orally 2 times a day, As Needed (07 Feb 2018 23:28)  Brovana 15 mcg/2 mL inhalation solution: 2 milliliter(s) inhaled 2 times a day (07 Feb 2018 23:28)  Flovent  mcg/inh inhalation aerosol: inhaled once a day (07 Feb 2018 23:28)  ipratropium 18 mcg/inh inhalation aerosol: inhaled every 6 hours, As Needed (07 Feb 2018 23:28)  Mucinex 600 mg oral tablet, extended release: 1 tab(s) orally every 12 hours (07 Feb 2018 23:28)  Norvasc 10 mg oral tablet: 1 tab(s) orally once a day (07 Feb 2018 23:28)  predniSONE: 60 milligram(s) orally once a day (07 Feb 2018 23:28)  roflumilast 500 mcg oral tablet: 1 tab(s) orally once a day (07 Feb 2018 23:28)  simvastatin 10 mg oral tablet: 1 tab(s) orally once a day (at bedtime) (07 Feb 2018 23:28)

## 2018-02-08 NOTE — CONSULT NOTE ADULT - SUBJECTIVE AND OBJECTIVE BOX
[facility]  INITIAL CONSULT NOTE  --------------------------------------------------------------------------------  63 yo wm, very well known to me as his pmd for almost 10 years, with chronic respiratory failure, copd on home o2, multiple exacerbations and very frequent hospitalizations and overutilization of inpatient resources presents with sob / cough / wheeze / issues with home bipap.  Pt just dced from [facility] after long hospitalization at that facility.  Pt now c/o sob, wheeze, cough, weakness and scrotal swelling.      PAST HISTORY  --------------------------------------------------------------------------------  PAST MEDICAL & SURGICAL HISTORY:  Other hydrocele  Anxiety disorder, unspecified type  Hypertension, unspecified type  High cholesterol  Chronic obstructive pulmonary disease, unspecified COPD type  No significant past surgical history    FAMILY HISTORY:  No pertinent family history in first degree relatives    PAST SOCIAL HISTORY:    ALLERGIES & MEDICATIONS  --------------------------------------------------------------------------------  Allergies    No Known Allergies    Intolerances      Standing Inpatient Medications  ALBUTerol/ipratropium for Nebulization 3 milliLiter(s) Nebulizer every 6 hours  amLODIPine   Tablet 10 milliGRAM(s) Oral daily  buDESOnide 160 MICROgram(s)/formoterol 4.5 MICROgram(s) Inhaler 2 Puff(s) Inhalation two times a day  guaiFENesin  milliGRAM(s) Oral every 12 hours  heparin  Injectable 5000 Unit(s) SubCutaneous every 8 hours  predniSONE   Tablet 60 milliGRAM(s) Oral daily  simvastatin 10 milliGRAM(s) Oral at bedtime  tiotropium 18 MICROgram(s) Capsule 1 Capsule(s) Inhalation daily    PRN Inpatient Medications  ALPRAZolam 1 milliGRAM(s) Oral two times a day PRN      REVIEW OF SYSTEMS  --------------------------------------------------------------------------------  As aboe, all else negative    All other systems were reviewed and are negative, except as noted.    VITALS/PHYSICAL EXAM  --------------------------------------------------------------------------------  T(C): 36.4 (02-08-18 @ 08:10), Max: 36.7 (02-07-18 @ 13:13)  HR: 80 (02-08-18 @ 08:10) (60 - 88)  BP: 121/65 (02-08-18 @ 08:10) (102/54 - 155/80)  RR: 18 (02-08-18 @ 08:10) (18 - 19)  SpO2: 94% (02-08-18 @ 11:36) (94% - 100%)  Wt(kg): --    Weight (kg): 136 (02-07-18 @ 21:07)      Physical Exam:  thin, ill appearing  disheveled  poor dentition  resp distress  decreased bl bs  distant hs   soft  no edema    LABS/STUDIES  --------------------------------------------------------------------------------              12.5   13.91 >-----------<  377      [02-07-18 @ 14:18]              40.5     133  |  94  |  13  ----------------------------<  128      [02-07-18 @ 14:18]  6.5   |  28  |  1.0        Ca     8.7     [02-07-18 @ 14:18]      Mg     2.4     [02-07-18 @ 14:18]    TPro  5.9  /  Alb  3.6  /  TBili  1.3  /  DBili  x   /  AST  39  /  ALT  34  /  AlkPhos  70  [02-07-18 @ 14:18]    PT/INR: PT 10.20, INR 0.95       [02-07-18 @ 14:18]  PTT: 20.9       [02-07-18 @ 14:18]    Troponin <0.02      [02-07-18 @ 14:18]    Creatinine Trend:  SCr 1.0 [02-07 @ 14:18]    xray - no infiltrate  scrotal sono - left complex hydrocele

## 2018-02-09 ENCOUNTER — TRANSCRIPTION ENCOUNTER (OUTPATIENT)
Age: 63
End: 2018-02-09

## 2018-02-09 DIAGNOSIS — N43.2 OTHER HYDROCELE: ICD-10-CM

## 2018-02-09 LAB
ANION GAP SERPL CALC-SCNC: 9 MMOL/L — SIGNIFICANT CHANGE UP (ref 7–14)
BUN SERPL-MCNC: 13 MG/DL — SIGNIFICANT CHANGE UP (ref 10–20)
CALCIUM SERPL-MCNC: 8.8 MG/DL — SIGNIFICANT CHANGE UP (ref 8.5–10.1)
CHLORIDE SERPL-SCNC: 97 MMOL/L — LOW (ref 98–110)
CO2 SERPL-SCNC: 30 MMOL/L — SIGNIFICANT CHANGE UP (ref 17–32)
CREAT SERPL-MCNC: 1 MG/DL — SIGNIFICANT CHANGE UP (ref 0.7–1.5)
GLUCOSE SERPL-MCNC: 100 MG/DL — SIGNIFICANT CHANGE UP (ref 70–110)
HCT VFR BLD CALC: 37.4 % — LOW (ref 42–52)
HGB BLD-MCNC: 11.6 G/DL — LOW (ref 14–18)
MCHC RBC-ENTMCNC: 26.9 PG — LOW (ref 27–31)
MCHC RBC-ENTMCNC: 31 G/DL — LOW (ref 32–37)
MCV RBC AUTO: 86.8 FL — SIGNIFICANT CHANGE UP (ref 80–94)
NRBC # BLD: 0 /100 WBCS — SIGNIFICANT CHANGE UP (ref 0–0)
PLATELET # BLD AUTO: 363 K/UL — SIGNIFICANT CHANGE UP (ref 130–400)
POTASSIUM SERPL-MCNC: 4.4 MMOL/L — SIGNIFICANT CHANGE UP (ref 3.5–5)
POTASSIUM SERPL-SCNC: 4.4 MMOL/L — SIGNIFICANT CHANGE UP (ref 3.5–5)
RBC # BLD: 4.31 M/UL — LOW (ref 4.7–6.1)
RBC # FLD: 14.9 % — HIGH (ref 11.5–14.5)
SODIUM SERPL-SCNC: 136 MMOL/L — SIGNIFICANT CHANGE UP (ref 135–146)
WBC # BLD: 19.43 K/UL — HIGH (ref 4.8–10.8)
WBC # FLD AUTO: 19.43 K/UL — HIGH (ref 4.8–10.8)

## 2018-02-09 RX ORDER — ALPRAZOLAM 0.25 MG
0.5 TABLET ORAL
Qty: 0 | Refills: 0 | Status: DISCONTINUED | OUTPATIENT
Start: 2018-02-09 | End: 2018-02-11

## 2018-02-09 RX ADMIN — HEPARIN SODIUM 5000 UNIT(S): 5000 INJECTION INTRAVENOUS; SUBCUTANEOUS at 13:36

## 2018-02-09 RX ADMIN — Medication 3 MILLILITER(S): at 07:46

## 2018-02-09 RX ADMIN — Medication 3 MILLILITER(S): at 19:36

## 2018-02-09 RX ADMIN — AMLODIPINE BESYLATE 10 MILLIGRAM(S): 2.5 TABLET ORAL at 05:21

## 2018-02-09 RX ADMIN — Medication 3 MILLILITER(S): at 13:03

## 2018-02-09 RX ADMIN — Medication 0.5 MILLIGRAM(S): at 11:31

## 2018-02-09 RX ADMIN — Medication 60 MILLIGRAM(S): at 05:21

## 2018-02-09 RX ADMIN — TIOTROPIUM BROMIDE 1 CAPSULE(S): 18 CAPSULE ORAL; RESPIRATORY (INHALATION) at 11:47

## 2018-02-09 RX ADMIN — Medication 200 MILLIGRAM(S): at 13:40

## 2018-02-09 NOTE — CONSULT NOTE ADULT - SUBJECTIVE AND OBJECTIVE BOX
HPI:  61 yo M PMHx COPD on 4L NC/BIPAP PRN at home, HTN, HLD, anxiety presenting to the ED due to be wheezing on auscultation.  Patient was recently discharged from [facility] where he had a month long stay for COPD.  He was treated with antibiotics, steroids and sent home on a steroid taper.  At baseline, he has SOB on exertion, and a chronic cough with whitish (sometimes yellowish) mucoid sputum.  He denies any changes from his baseline since discharge as well as F/chills/palpitations/CP/orthopnea/LE edema, although when the visiting nurse told him he was wheezing and that she was concerned, he became very anxious and felt increasing SOB.    P BIPAP was fixed yesterday at home and is working well.  And at [facility], he had a work up for left scrotal swelling, and was told to follow up with urologist as outpatient.  This scrotal swelling has been occurring for the past 6 months, and has been increasing in size.  Denies scrotal pain.  Reports intermittent urinary incontinence without dysuria.      He is an ex-smoker, quit 8 years ago, with a 60 pack year history. (07 Feb 2018 22:59)    PAST MEDICAL & SURGICAL HISTORY:  Other hydrocele  Anxiety disorder, unspecified type  Hypertension, unspecified type  High cholesterol  Chronic obstructive pulmonary disease, unspecified COPD type  No significant past surgical history        Home Medications:  albuterol 2.5 mg/3 mL (0.083%) inhalation solution: inhaled every 6 hours, As Needed (07 Feb 2018 23:28)  ALPRAZolam 1 mg oral tablet: orally 2 times a day, As Needed (07 Feb 2018 23:28)  Brovana 15 mcg/2 mL inhalation solution: 2 milliliter(s) inhaled 2 times a day (07 Feb 2018 23:28)  Flovent  mcg/inh inhalation aerosol: inhaled once a day (07 Feb 2018 23:28)  ipratropium 18 mcg/inh inhalation aerosol: inhaled every 6 hours, As Needed (07 Feb 2018 23:28)  Mucinex 600 mg oral tablet, extended release: 1 tab(s) orally every 12 hours (07 Feb 2018 23:28)  Norvasc 10 mg oral tablet: 1 tab(s) orally once a day (07 Feb 2018 23:28)  predniSONE: 60 milligram(s) orally once a day (07 Feb 2018 23:28)  roflumilast 500 mcg oral tablet: 1 tab(s) orally once a day (07 Feb 2018 23:28)  simvastatin 10 mg oral tablet: 1 tab(s) orally once a day (at bedtime) (07 Feb 2018 23:28)    Allergies    No Known Allergies  Vital Signs Last 24 Hrs    T(F): 97.4 (09 Feb 2018 13:13), Max: 98.6 (08 Feb 2018 21:16)  HR: 99 (09 Feb 2018 13:13) (64 - 99)  BP: 125/90 (09 Feb 2018 13:13) (109/57 - 139/77)    RR: 18 (09 Feb 2018 13:13) (17 - 22)  SpO2: 98% (09 Feb 2018 05:17) (97% - 98%)    PHYSICAL EXAM:      Constitutional:    Eyes:    ENMT:    Neck:    Breasts:    Back:    Respiratory:    Cardiovascular:    Gastrointestinal:    Genitourinary:    Rectal:    Extremities:    Vascular:    Neurological:    Skin:    Lymph Nodes:    Musculoskeletal:    Psychiatric:                            11.6   19.43 )-----------( 363      ( 09 Feb 2018 07:33 )             37.4     02-09    136  |  97<L>  |  13  ----------------------------<  100  4.4   |  30  |  1.0    Ca    8.8      09 Feb 2018 07:33  Mg     2.4     02-07    TPro  5.9<L>  /  Alb  3.6  /  TBili  1.3<H>  /  DBili  x   /  AST  39  /  ALT  34  /  AlkPhos  70  02-07      PT/INR - ( 07 Feb 2018 14:18 )   PT: 10.20 sec;   INR: 0.95 ratio         PTT - ( 07 Feb 2018 14:18 )  PTT:20.9     U/S SCROTUM    RIGHT: The right testis is homogeneous measuring 4.5 x 3.1 x 2.0 cm, without   an intrinsic mass. The right epididymal head measures 1.0 cm. and contains   multiple epididymal cysts measuring up to 0.4 cm. There is a complex   hydrocele measuring approximately 2 cc.     LEFT: The left testis is homogeneous measuring 5.7 x 3.5 x 2.2 cm, without   an intrinsic mass. The left epididymal head is not visualized on this exam.   There is a large left sided complex hydrocele measuring approximately 386 cc   containing mobile debris.     VASCULARITY: Normal symmetric vascular flow is identified in both testicles.     IMPRESSION:     Large left-sided complex hydrocele measuring approximately 386 cc containing   mobile debris.     Subcentimeter right epididymal cysts. HPI:  63 yo M PMHx COPD on 4L NC/BIPAP PRN at home, HTN, HLD, anxiety presenting to the ED due to be wheezing on auscultation.  Patient was recently discharged from [facility] where he had a month long stay for COPD.  He was treated with antibiotics, steroids and sent home on a steroid taper.  At baseline, he has SOB on exertion, and a chronic cough with whitish (sometimes yellowish) mucoid sputum.  He denies any changes from his baseline since discharge as well as F/chills/palpitations/CP/orthopnea/LE edema, although when the visiting nurse told him he was wheezing and that she was concerned, he became very anxious and felt increasing SOB.    P BIPAP was fixed yesterday at home and is working well.  And at [facility], he had a work up for left scrotal swelling, and was told to follow up with urologist as outpatient.  This scrotal swelling has been occurring for the past 6 months, and has been increasing in size.  Denies scrotal pain.  Reports intermittent urinary incontinence without dysuria.      He is an ex-smoker, quit 8 years ago, with a 60 pack year history. (07 Feb 2018 22:59)    PAST MEDICAL & SURGICAL HISTORY:  Other hydrocele  Anxiety disorder, unspecified type  Hypertension, unspecified type  High cholesterol  Chronic obstructive pulmonary disease, unspecified COPD type  No significant past surgical history        Home Medications:  albuterol 2.5 mg/3 mL (0.083%) inhalation solution: inhaled every 6 hours, As Needed (07 Feb 2018 23:28)  ALPRAZolam 1 mg oral tablet: orally 2 times a day, As Needed (07 Feb 2018 23:28)  Brovana 15 mcg/2 mL inhalation solution: 2 milliliter(s) inhaled 2 times a day (07 Feb 2018 23:28)  Flovent  mcg/inh inhalation aerosol: inhaled once a day (07 Feb 2018 23:28)  ipratropium 18 mcg/inh inhalation aerosol: inhaled every 6 hours, As Needed (07 Feb 2018 23:28)  Mucinex 600 mg oral tablet, extended release: 1 tab(s) orally every 12 hours (07 Feb 2018 23:28)  Norvasc 10 mg oral tablet: 1 tab(s) orally once a day (07 Feb 2018 23:28)  predniSONE: 60 milligram(s) orally once a day (07 Feb 2018 23:28)  roflumilast 500 mcg oral tablet: 1 tab(s) orally once a day (07 Feb 2018 23:28)  simvastatin 10 mg oral tablet: 1 tab(s) orally once a day (at bedtime) (07 Feb 2018 23:28)    Allergies    No Known Allergies  Vital Signs Last 24 Hrs    T(F): 97.4 (09 Feb 2018 13:13), Max: 98.6 (08 Feb 2018 21:16)  HR: 99 (09 Feb 2018 13:13) (64 - 99)  BP: 125/90 (09 Feb 2018 13:13) (109/57 - 139/77)    RR: 18 (09 Feb 2018 13:13) (17 - 22)  SpO2: 98% (09 Feb 2018 05:17) (97% - 98%)    PHYSICAL EXAM:      Constitutional:    Eyes:    ENMT:    Neck:    Breasts:    Back:    Respiratory:    Cardiovascular:    Gastrointestinal:    Genitourinary: safia sized left hydrocele no pain no tenderness    Rectal:    Extremities:    Vascular:    Neurological:    Skin:    Lymph Nodes:    Musculoskeletal:    Psychiatric:                            11.6   19.43 )-----------( 363      ( 09 Feb 2018 07:33 )             37.4     02-09    136  |  97<L>  |  13  ----------------------------<  100  4.4   |  30  |  1.0    Ca    8.8      09 Feb 2018 07:33  Mg     2.4     02-07    TPro  5.9<L>  /  Alb  3.6  /  TBili  1.3<H>  /  DBili  x   /  AST  39  /  ALT  34  /  AlkPhos  70  02-07      PT/INR - ( 07 Feb 2018 14:18 )   PT: 10.20 sec;   INR: 0.95 ratio         PTT - ( 07 Feb 2018 14:18 )  PTT:20.9     U/S SCROTUM    RIGHT: The right testis is homogeneous measuring 4.5 x 3.1 x 2.0 cm, without   an intrinsic mass. The right epididymal head measures 1.0 cm. and contains   multiple epididymal cysts measuring up to 0.4 cm. There is a complex   hydrocele measuring approximately 2 cc.     LEFT: The left testis is homogeneous measuring 5.7 x 3.5 x 2.2 cm, without   an intrinsic mass. The left epididymal head is not visualized on this exam.   There is a large left sided complex hydrocele measuring approximately 386 cc   containing mobile debris.     VASCULARITY: Normal symmetric vascular flow is identified in both testicles.           IMPRESSION:     Large left-sided complex hydrocele measuring approximately 386 cc containing   mobile debris.     Subcentimeter right epididymal cysts.

## 2018-02-09 NOTE — PROGRESS NOTE ADULT - ASSESSMENT
.) Acute on chronic COPD     - Continue home medication regimen: Prednisone taper, inhalers, and nebulizers.    - No indica.) HTN and HLD: Continue norvasc and simvastatin.ulmonary dr mcnamara appreciated  2.) Leukocytosis: secondary to steroids use.    - No clinical significance at this time.    3.) L. hydrocele:    - Elevate scrotum.    - Outpatient urology follow-up.    4.) HTN and HLD: Continue norvasc and simvastatin.      5.) Anxiety d/o NOS  -Xanax taper as inappropriate tx for long term anxiety and may cause reactive anxiety  -consider SSRI/ long term tx for anxietytion for antibiotics at this time.     - Continue supplemental oxygen to maintain SaO2 90-94%.    - Continue bibpap at night and PRN    - Pulmonary consult pending  6.) GI / DVT PPx: not indicated / heparin    7.) Disposition:    - Full Code.    - Anticipating discharge in 24-48 hour

## 2018-02-09 NOTE — PROGRESS NOTE ADULT - SUBJECTIVE AND OBJECTIVE BOX
61 yo M PMHx COPD on 4L NC/BIPAP PRN at home presenting to the ED because his VNA found him to be wheezing on auscultation.  Patient was recently discharged from [facility] where he had a month long stay for COPD.  He was treated with antibiotics, steroids and sent home on a steroid taper.  At baseline, he has SOB on exertion, and a chronic cough with sputum.  In addition pt has scrotal swelling occurring for the past 6 months, increasing in size that was evaluated at [facility], told to f/u OP.     Vital Signs Last 24 Hrs  T(C): 35.9 (09 Feb 2018 06:05), Max: 37 (08 Feb 2018 21:16)  T(F): 96.7 (09 Feb 2018 06:05), Max: 98.6 (08 Feb 2018 21:16)  HR: 91 (09 Feb 2018 06:05) (64 - 99)  BP: 139/77 (09 Feb 2018 06:05) (109/57 - 139/77)  BP(mean): --  RR: 17 (09 Feb 2018 06:05) (17 - 22)  SpO2: 98% (09 Feb 2018 05:17) (94% - 98%)  CAPILLARY BLOOD GLUCOSE    PAST MEDICAL & SURGICAL HISTORY:  Other hydrocele  Anxiety disorder, unspecified type  Hypertension, unspecified type  High cholesterol  Chronic obstructive pulmonary disease, unspecified COPD type  No significant past surgical history    MEDICATIONS  (STANDING):  ALBUTerol/ipratropium for Nebulization 3 milliLiter(s) Nebulizer every 6 hours  amLODIPine   Tablet 10 milliGRAM(s) Oral daily  buDESOnide 160 MICROgram(s)/formoterol 4.5 MICROgram(s) Inhaler 2 Puff(s) Inhalation two times a day  guaiFENesin  milliGRAM(s) Oral every 12 hours  heparin  Injectable 5000 Unit(s) SubCutaneous every 8 hours  predniSONE   Tablet 60 milliGRAM(s) Oral daily  simvastatin 10 milliGRAM(s) Oral at bedtime  tiotropium 18 MICROgram(s) Capsule 1 Capsule(s) Inhalation daily    MEDICATIONS  (PRN):  ALPRAZolam 1 milliGRAM(s) Oral two times a day PRN anxiety      Physical Exam:  General:  NAD  HEENT: PERRLA  Heart: RRR, S1, S2 noted  Lungs: decreased BS B/L   Abdomen: soft, non tender, + bowel sounds  Extremities: no C/C/E  Neuro: A&O x 3, no focal deficits  Skin: No rashes    Labs:                        12.5   13.91 )-----------( 377      ( 07 Feb 2018 14:18 )             40.5             02-07    133<L>  |  94<L>  |  13  ----------------------------<  128<H>  6.5<HH>   |  28  |  1.0    Ca    8.7      07 Feb 2018 14:18  Mg     2.4     02-07    TPro  5.9<L>  /  Alb  3.6  /  TBili  1.3<H>  /  DBili  x   /  AST  39  /  ALT  34  /  AlkPhos  70  02-07    LIVER FUNCTIONS - ( 07 Feb 2018 14:18 )  Alb: 3.6 g/dL / Pro: 5.9 g/dL / ALK PHOS: 70 U/L / ALT: 34 U/L / AST: 39 U/L / GGT: x                 PT/INR - ( 07 Feb 2018 14:18 )   PT: 10.20 sec;   INR: 0.95 ratio         PTT - ( 07 Feb 2018 14:18 )  PTT:20.9 sec  CARDIAC MARKERS ( 07 Feb 2018 14:18 )  <0.02 ng/mL / x     / x     / x     / 5.1 ng/mL    Culture - Blood (collected 07 Feb 2018 14:40)  Source: .Blood Blood  Preliminary Report (09 Feb 2018 03:01):    No growth to date.    Culture - Blood (collected 07 Feb 2018 13:00)  Source: .Blood Blood  Preliminary Report (09 Feb 2018 03:01):    No growth to date.    A/P    1.) Acute on chronic COPD     - Continue home medication regimen: Prednisone taper, inhalers, and nebulizers.    - No indication for antibiotics at this time.     - Continue supplemental oxygen to maintain SaO2 90-94%.    - Continue bibpap at night and PRN    - Pulmonary consult pending    2.) Leukocytosis: secondary to steroids use.    - No clinical significance at this time.    3.) L. hydrocele:    - Elevate scrotum.    - Outpatient urology follow-up.    4.) HTN and HLD: Continue norvasc and simvastatin.    5.) Anxiety d/o NOS  -Xanax taper as inappropriate tx for long term anxiety and may cause reactive anxiety  -consider SSRI/ long term tx for anxiety     6.) GI / DVT PPx: not indicated / heparin    7.) Disposition:    - Full Code.    - Anticipating discharge in 24-48 hours.

## 2018-02-09 NOTE — CONSULT NOTE ADULT - SUBJECTIVE AND OBJECTIVE BOX
Patient is a 62y old  Male who presents with a chief complaint of (told by VNA to come to ED) (07 Feb 2018 22:59)      HPI:  61 yo M PMHx COPD on 4L NC/BIPAP PRN at home, HTN, HLD, anxiety presenting to the ED because his VNA found him to be wheezing on auscultation.  Patient was recently discharged from [facility] where he had a month long stay for COPD.  He was treated with antibiotics, steroids and sent home on a steroid taper.  At baseline, he has SOB on exertion, and a chronic cough with whitish (sometimes yellowish) mucoid sputum.  He denies any changes from his baseline since discharge as well as F/chills/palpitations/CP/orthopnea/LE edema, although when the visiting nurse told him he was wheezing and that she was concerned, he became very anxious and felt increasing SOB but for that moment.       He does note that his BIPAP was fixed yesterday at home and is working well.  And at [facility], he had a work up for left scrotal swelling, and was told to follow up with urologist as outpatient.  This scrotal swelling has been occurring for the past 6 months, and has been increasing in size.  Denies scrotal pain.  Reports intermittent urinary incontinence without dysuria.      He is an ex-smoker, quit 8 years ago, with a 60 pack year history. (07 Feb 2018 22:59)      PAST MEDICAL & SURGICAL HISTORY:  Other hydrocele  Anxiety disorder, unspecified type  Hypertension, unspecified type  High cholesterol  Chronic obstructive pulmonary disease, unspecified COPD type  No significant past surgical history      Smoking History:  Ex smoker    Review of Systems:  Exertional dyspnea, daily cough and sputum  No chest pain  Denies fever chills  No GI or KATIUSKA complaints  No edema    Allergies:  No Known Allergies            MEDICATIONS  (STANDING):  ALBUTerol/ipratropium for Nebulization 3 milliLiter(s) Nebulizer every 6 hours  amLODIPine   Tablet 10 milliGRAM(s) Oral daily  buDESOnide 160 MICROgram(s)/formoterol 4.5 MICROgram(s) Inhaler 2 Puff(s) Inhalation two times a day  guaiFENesin  milliGRAM(s) Oral every 12 hours  heparin  Injectable 5000 Unit(s) SubCutaneous every 8 hours  predniSONE   Tablet 60 milliGRAM(s) Oral daily  simvastatin 10 milliGRAM(s) Oral at bedtime  tiotropium 18 MICROgram(s) Capsule 1 Capsule(s) Inhalation daily    MEDICATIONS  (PRN):  ALPRAZolam 1 milliGRAM(s) Oral two times a day PRN anxiety      PHYSICAL EXAM  Vital Signs Last 24 Hrs  T(C): 35.9 (09 Feb 2018 06:05), Max: 37 (08 Feb 2018 21:16)  T(F): 96.7 (09 Feb 2018 06:05), Max: 98.6 (08 Feb 2018 21:16)  HR: 91 (09 Feb 2018 06:05) (64 - 99)  BP: 139/77 (09 Feb 2018 06:05) (109/57 - 139/77)  BP(mean): --  RR: 17 (09 Feb 2018 06:05) (17 - 22)  SpO2: 98% (09 Feb 2018 05:17) (94% - 98%) on 4 lpm NC    Awake and alert NAD, on BIPAP  Neck supple, no LN  S1 and S2 no murmur  Lungs prolonged expiratory phase, mild expiratory wheeze  Abdomen is soft and non tender  There is no edema  Neurologically non focal            LABS:                        12.5   13.91 )-----------( 377      ( 07 Feb 2018 14:18 )             40.5                                               02-07    133<L>  |  94<L>  |  13  ----------------------------<  128<H>  6.5<HH>   |  28  |  1.0    Ca    8.7      07 Feb 2018 14:18  Mg     2.4     02-07    TPro  5.9<L>  /  Alb  3.6  /  TBili  1.3<H>  /  DBili  x   /  AST  39  /  ALT  34  /  AlkPhos  70  02-07      PT/INR - ( 07 Feb 2018 14:18 )   PT: 10.20 sec;   INR: 0.95 ratio         PTT - ( 07 Feb 2018 14:18 )  PTT:20.9 sec                                           CARDIAC MARKERS ( 07 Feb 2018 14:18 )  <0.02 ng/mL / x     / x     / x     / 5.1 ng/mL                                            LIVER FUNCTIONS - ( 07 Feb 2018 14:18 )  Alb: 3.6 g/dL / Pro: 5.9 g/dL / ALK PHOS: 70 U/L / ALT: 34 U/L / AST: 39 U/L / GGT: x                                                  Culture - Blood (collected 07 Feb 2018 14:40)  Source: .Blood Blood  Preliminary Report (09 Feb 2018 03:01):    No growth to date.    Culture - Blood (collected 07 Feb 2018 13:00)  Source: .Blood Blood  Preliminary Report (09 Feb 2018 03:01):    No growth to date.                                                      RADIOGRAPHS:  Chest 1 View- PORTABLE-Urgent (02.07.18 @ 14:58)   No radiographic evidence of acute cardiopulmonary disease.        IMPRESSION:  61 yo male with severe COPD, recently discharged from [facility] for COPD  Admitted with COPD exacerbation  No new CXR finidings  Chronic hypoxemia on O2    PLAN:  Continue O2 to maintain sat > 90%  Continue BiPAP qHS and prn  Steroids, taper   Continue bronchodilators  No indications for antibiotics at this time  Continue GI and DVT prophylaxis  OOB

## 2018-02-09 NOTE — DISCHARGE NOTE ADULT - PATIENT PORTAL LINK FT
You can access the [facility] Patient Portal, offered by [facility], by registering with the following website: [email or web address]

## 2018-02-09 NOTE — CONSULT NOTE ADULT - ASSESSMENT
400 cc left complex hydrocele NOT AN ACUTE ISSUE    if when medically cleared he can be scheduled ELECTIVELY for a repair off any bleeding induceing issues. it can be done under local with sedation. If he cant be cleared and is notr on bleeding inducing agents he can have it aspirated periodically and or wear a scrotal support.    Please recall when if he is a surgical candidate or follow up in the office / clinic as outpatient

## 2018-02-09 NOTE — DISCHARGE NOTE ADULT - CARE PLAN
Principal Discharge DX:	COPD exacerbation  Goal:	symptom management  Assessment and plan of treatment:	take all meds as prescribed, f/u with pmd and pulm

## 2018-02-09 NOTE — PROGRESS NOTE ADULT - SUBJECTIVE AND OBJECTIVE BOX
Patient was seen and examined. Spoke with RN. Chart reviewed.    No events overnight.  Vital Signs Last 24 Hrs  T(F): 96.7 (09 Feb 2018 06:05), Max: 98.6 (08 Feb 2018 21:16)  HR: 91 (09 Feb 2018 06:05) (64 - 99)  BP: 139/77 (09 Feb 2018 06:05) (109/57 - 139/77)  SpO2: 98% (09 Feb 2018 05:17) (97% - 98%)  MEDICATIONS  (STANDING):  ALBUTerol/ipratropium for Nebulization 3 milliLiter(s) Nebulizer every 6 hours  amLODIPine   Tablet 10 milliGRAM(s) Oral daily  buDESOnide 160 MICROgram(s)/formoterol 4.5 MICROgram(s) Inhaler 2 Puff(s) Inhalation two times a day  heparin  Injectable 5000 Unit(s) SubCutaneous every 8 hours  predniSONE   Tablet 60 milliGRAM(s) Oral daily  simvastatin 10 milliGRAM(s) Oral at bedtime  tiotropium 18 MICROgram(s) Capsule 1 Capsule(s) Inhalation daily    MEDICATIONS  (PRN):  ALPRAZolam 0.5 milliGRAM(s) Oral two times a day PRN anxiety, taper  guaiFENesin    Syrup 200 milliGRAM(s) Oral every 6 hours PRN Cough    Labs:                        11.6   19.43 )-----------( 363      ( 09 Feb 2018 07:33 )             37.4                         12.5   13.91 )-----------( 377      ( 07 Feb 2018 14:18 )             40.5     09 Feb 2018 07:33    136    |  97     |  13     ----------------------------<  100    4.4     |  30     |  1.0    07 Feb 2018 14:18    133    |  94     |  13     ----------------------------<  128    6.5     |  28     |  1.0      Ca    8.8        09 Feb 2018 07:33  Ca    8.7        07 Feb 2018 14:18  Mg     2.4       07 Feb 2018 14:18    TPro  5.9    /  Alb  3.6    /  TBili  1.3    /  DBili  x      /  AST  39     /  ALT  34     /  AlkPhos  70     07 Feb 2018 14:18    PT/INR - ( 07 Feb 2018 14:18 )   PT: 10.20 sec;   INR: 0.95 ratio         PTT - ( 07 Feb 2018 14:18 )  PTT:20.9 sec      Culture - Blood (collected 07 Feb 2018 14:40)  Source: .Blood Blood  Preliminary Report (09 Feb 2018 03:01):    No growth to date.    Culture - Blood (collected 07 Feb 2018 13:00)  Source: .Blood Blood  Preliminary Report (09 Feb 2018 03:01):    No growth to date.        Radiology:    General: comfortable, NAD using bipap  Neurology: A&Ox3, nonfocal  Head:  Normocephalic, atraumatic  ENT:  Mucosa moist, no ulcerations  Neck:  Supple, no JVD,   Skin: no breakdowns (as per RN)  Resp: bilateral exp rhonchi  CV: RRR, S1S2,   GI: Soft, NT, bowel sounds  MS: No edema, + peripheral pulses, FROM all 4 extremity

## 2018-02-09 NOTE — DISCHARGE NOTE ADULT - MEDICATION SUMMARY - MEDICATIONS TO TAKE
I will START or STAY ON the medications listed below when I get home from the hospital:    predniSONE  -- 60 milligram(s) by mouth once a day  -- Indication: For COPD EXACERBATION    simvastatin 10 mg oral tablet  -- 1 tab(s) by mouth once a day (at bedtime)  -- Indication: For High cholesterol    ALPRAZolam 1 mg oral tablet  -- orally 2 times a day, As Needed  -- Indication: For Anxiety disorder, unspecified type    albuterol 2.5 mg/3 mL (0.083%) inhalation solution  -- inhaled every 6 hours, As Needed  -- Indication: For COPD EXACERBATION    Brovana 15 mcg/2 mL inhalation solution  -- 2 milliliter(s) inhaled 2 times a day  -- Indication: For COPD EXACERBATION    Norvasc 10 mg oral tablet  -- 1 tab(s) by mouth once a day  -- Indication: For Hypertension, unspecified type    Mucinex 600 mg oral tablet, extended release  -- 1 tab(s) by mouth every 12 hours  -- Indication: For Chronic obstructive pulmonary disease, unspecified COPD type    Flovent  mcg/inh inhalation aerosol  -- inhaled once a day  -- Indication: For COPD EXACERBATION    roflumilast 500 mcg oral tablet  -- 1 tab(s) by mouth once a day  -- Indication: For COPD EXACERBATION

## 2018-02-10 RX ADMIN — Medication 3 MILLILITER(S): at 20:28

## 2018-02-10 RX ADMIN — AMLODIPINE BESYLATE 10 MILLIGRAM(S): 2.5 TABLET ORAL at 06:00

## 2018-02-10 RX ADMIN — HEPARIN SODIUM 5000 UNIT(S): 5000 INJECTION INTRAVENOUS; SUBCUTANEOUS at 05:05

## 2018-02-10 RX ADMIN — SIMVASTATIN 10 MILLIGRAM(S): 20 TABLET, FILM COATED ORAL at 22:49

## 2018-02-10 RX ADMIN — TIOTROPIUM BROMIDE 1 CAPSULE(S): 18 CAPSULE ORAL; RESPIRATORY (INHALATION) at 07:47

## 2018-02-10 RX ADMIN — Medication 60 MILLIGRAM(S): at 05:03

## 2018-02-10 RX ADMIN — Medication 0.5 MILLIGRAM(S): at 05:06

## 2018-02-10 RX ADMIN — Medication 0.5 MILLIGRAM(S): at 12:53

## 2018-02-10 NOTE — PROGRESS NOTE ADULT - SUBJECTIVE AND OBJECTIVE BOX
Patient was seen and examined. Spoke with RN. Chart reviewed. decreased sob, feels better    No events overnight.  Vital Signs Last 24 Hrs  T(F): 97.1 (10 Feb 2018 06:05), Max: 97.4 (09 Feb 2018 13:13)  HR: 68 (10 Feb 2018 06:05) (63 - 99)  BP: 109/50 (10 Feb 2018 06:05) (109/50 - 131/61)  SpO2: 98% (10 Feb 2018 05:40) (96% - 98%)  MEDICATIONS  (STANDING):  ALBUTerol/ipratropium for Nebulization 3 milliLiter(s) Nebulizer every 6 hours  amLODIPine   Tablet 10 milliGRAM(s) Oral daily  buDESOnide 160 MICROgram(s)/formoterol 4.5 MICROgram(s) Inhaler 2 Puff(s) Inhalation two times a day  heparin  Injectable 5000 Unit(s) SubCutaneous every 8 hours  predniSONE   Tablet 60 milliGRAM(s) Oral daily  simvastatin 10 milliGRAM(s) Oral at bedtime  tiotropium 18 MICROgram(s) Capsule 1 Capsule(s) Inhalation daily    MEDICATIONS  (PRN):  ALPRAZolam 0.5 milliGRAM(s) Oral two times a day PRN anxiety, taper  guaiFENesin    Syrup 200 milliGRAM(s) Oral every 6 hours PRN Cough    Labs:                        11.6   19.43 )-----------( 363      ( 09 Feb 2018 07:33 )             37.4     09 Feb 2018 07:33    136    |  97     |  13     ----------------------------<  100    4.4     |  30     |  1.0      Ca    8.8        09 Feb 2018 07:33            Culture - Blood (collected 07 Feb 2018 14:40)  Source: .Blood Blood  Preliminary Report (09 Feb 2018 03:01):    No growth to date.    Culture - Blood (collected 07 Feb 2018 13:00)  Source: .Blood Blood  Preliminary Report (09 Feb 2018 03:01):    No growth to date.        Radiology:    General: comfortable, NAD  Neurology: A&Ox3, nonfocal  Head:  Normocephalic, atraumatic  ENT:  Mucosa moist, no ulcerations  Neck:  Supple, no JVD,   Skin: no breakdowns (as per RN)  Resp: occ exp rhonchi  CV: RRR, S1S2,   GI: Soft, NT, bowel sounds  MS: No edema, + peripheral pulses, FROM all 4 extremity

## 2018-02-10 NOTE — PROGRESS NOTE ADULT - ASSESSMENT
acute on chronic hypoxic respiratory failure  copd exacerbation      continue low flow oxygen  AVAPS or BiPAP hs and prn  bronchodilators  prednisone taper  anxiolytic therapy  discharge planning  incentive spirometry  out of bed/dvt prophylaxis

## 2018-02-10 NOTE — PROGRESS NOTE ADULT - SUBJECTIVE AND OBJECTIVE BOX
ALETA SUAZO  62y, Male  Allergy: No Known Allergies      LAST 24-Hr EVENTS:  feels back to baseline  VITALS:  T(F): 97.1 (02-10-18 @ 06:05), Max: 97.1 (02-10-18 @ 06:05)  HR: 68 (02-10-18 @ 06:05)  BP: 109/50 (02-10-18 @ 06:05) (109/50 - 131/61)  RR: 18 (02-10-18 @ 06:05)  SpO2: 98% (02-10-18 @ 05:40)    PHYSICAL EXAM:    GENERAL: NAD, well-developed  HEAD:  Atraumatic, Normocephalic  NECK: Supple, No JVD  CHEST/LUNG: Clear to auscultation bilaterally; No wheeze  HEART: Regular rate and rhythm; No murmurs, rubs, or gallops  ABDOMEN: Soft, Nontender, Nondistended; Bowel sounds present  EXTREMITIES:  2+ Peripheral Pulses, No clubbing, cyanosis, or edema        TESTS & MEASUREMENTS:                          11.6   19.43 )-----------( 363      ( 09 Feb 2018 07:33 )             37.4       02-09    136  |  97<L>  |  13  ----------------------------<  100  4.4   |  30  |  1.0    Ca    8.8      09 Feb 2018 07:33              Culture - Blood (collected 02-07-18 @ 14:40)  Source: .Blood Blood  Preliminary Report (02-09-18 @ 03:01):    No growth to date.    Culture - Blood (collected 02-07-18 @ 13:00)  Source: .Blood Blood  Preliminary Report (02-09-18 @ 03:01):    No growth to date.          ABG & VENT SETTINGS: (when applicable)    n/a    RADIOLOGY & ADDITIONAL TESTS:  2/7 no acute disease  MEDICATIONS:  MEDICATIONS  (STANDING):  ALBUTerol/ipratropium for Nebulization 3 milliLiter(s) Nebulizer every 6 hours  amLODIPine   Tablet 10 milliGRAM(s) Oral daily  buDESOnide 160 MICROgram(s)/formoterol 4.5 MICROgram(s) Inhaler 2 Puff(s) Inhalation two times a day  heparin  Injectable 5000 Unit(s) SubCutaneous every 8 hours  predniSONE   Tablet 60 milliGRAM(s) Oral daily  simvastatin 10 milliGRAM(s) Oral at bedtime  tiotropium 18 MICROgram(s) Capsule 1 Capsule(s) Inhalation daily    MEDICATIONS  (PRN):  ALPRAZolam 0.5 milliGRAM(s) Oral two times a day PRN anxiety, taper  guaiFENesin    Syrup 200 milliGRAM(s) Oral every 6 hours PRN Cough

## 2018-02-11 VITALS
TEMPERATURE: 98 F | DIASTOLIC BLOOD PRESSURE: 72 MMHG | HEART RATE: 95 BPM | SYSTOLIC BLOOD PRESSURE: 126 MMHG | RESPIRATION RATE: 18 BRPM

## 2018-02-11 RX ORDER — INFLUENZA VIRUS VACCINE 15; 15; 15; 15 UG/.5ML; UG/.5ML; UG/.5ML; UG/.5ML
0.5 SUSPENSION INTRAMUSCULAR ONCE
Qty: 0 | Refills: 0 | Status: DISCONTINUED | OUTPATIENT
Start: 2018-02-11 | End: 2018-02-11

## 2018-02-11 RX ORDER — IPRATROPIUM BROMIDE 0.2 MG/ML
0 SOLUTION, NON-ORAL INHALATION
Qty: 0 | Refills: 0 | COMMUNITY

## 2018-02-11 RX ADMIN — Medication 60 MILLIGRAM(S): at 06:36

## 2018-02-11 RX ADMIN — AMLODIPINE BESYLATE 10 MILLIGRAM(S): 2.5 TABLET ORAL at 06:35

## 2018-02-11 RX ADMIN — Medication 3 MILLILITER(S): at 07:43

## 2018-02-11 RX ADMIN — Medication 3 MILLILITER(S): at 13:58

## 2018-02-11 RX ADMIN — Medication 200 MILLIGRAM(S): at 09:44

## 2018-02-11 RX ADMIN — Medication 0.5 MILLIGRAM(S): at 13:47

## 2018-02-11 NOTE — PROGRESS NOTE ADULT - SUBJECTIVE AND OBJECTIVE BOX
61 yo M PMHx COPD on 4L NC/BIPAP PRN at home presenting to the ED because his VNA found him to be wheezing on auscultation.  Patient was recently discharged from [facility] where he had a month long stay for COPD.  He was treated with antibiotics, steroids and sent home on a steroid taper.  At baseline, he has SOB on exertion, and a chronic cough with sputum.  In addition pt has scrotal swelling occurring for the past 6 months, increasing in size that was evaluated at [facility], told to f/u OP.     No acute events overnight.    Vital Signs Last 24 Hrs  T(C): 36.2 (11 Feb 2018 04:23), Max: 36.4 (10 Feb 2018 14:42)  T(F): 97.2 (11 Feb 2018 04:23), Max: 97.6 (10 Feb 2018 14:42)  HR: 75 (11 Feb 2018 04:23) (75 - 98)  BP: 119/56 (11 Feb 2018 04:23) (119/56 - 139/59)  BP(mean): --  RR: 18 (11 Feb 2018 04:23) (18 - 18)  SpO2: 98% (10 Feb 2018 14:42) (98% - 98%)  CAPILLARY BLOOD GLUCOSE    PAST MEDICAL & SURGICAL HISTORY:  Other hydrocele  Anxiety disorder, unspecified type  Hypertension, unspecified type  High cholesterol  Chronic obstructive pulmonary disease, unspecified COPD type  No significant past surgical history    MEDICATIONS  (STANDING):  ALBUTerol/ipratropium for Nebulization 3 milliLiter(s) Nebulizer every 6 hours  amLODIPine   Tablet 10 milliGRAM(s) Oral daily  buDESOnide 160 MICROgram(s)/formoterol 4.5 MICROgram(s) Inhaler 2 Puff(s) Inhalation two times a day  heparin  Injectable 5000 Unit(s) SubCutaneous every 8 hours  predniSONE   Tablet 60 milliGRAM(s) Oral daily  simvastatin 10 milliGRAM(s) Oral at bedtime  tiotropium 18 MICROgram(s) Capsule 1 Capsule(s) Inhalation daily    MEDICATIONS  (PRN):  ALPRAZolam 0.5 milliGRAM(s) Oral two times a day PRN anxiety, taper  guaiFENesin    Syrup 200 milliGRAM(s) Oral every 6 hours PRN Cough      Physical Exam:  General: WNWD, NAD  HEENT: Neck supple, no lymphadenopathy, PERRLA, EOMI  Heart: RRR, S1, S2 noted, no murmurs, rubs, gallops  Lungs: CTA b/l, no wheezes, rales, rhonchi   Abdomen: soft, non tender, non distended, + bowel sounds  Extremities: no C/C/E, full ROM in all extremities  Neuro: A&O x 3, no focal deficits  Skin: No rashes    A/P    1.) Acute on chronic COPD     - Continue home medication regimen: Prednisone taper, inhalers, and nebulizers.    - No indication for antibiotics at this time.     - Continue supplemental oxygen to maintain SaO2 90-94%.    - Continue bibpap at night and PRN    - Pulmonary consult appreciated    2.) Leukocytosis: secondary to steroids use.    - No clinical significance at this time.    3.) L. hydrocele:    - Elevate scrotum.    - Outpatient urology follow-up.    4.) HTN and HLD: Continue norvasc and simvastatin.    5.) Anxiety d/o NOS  -Xanax taper as inappropriate tx for long term anxiety and may cause reactive anxiety  -consider SSRI/ long term tx for anxiety     6.) GI / DVT PPx: not indicated / heparin    7.) Disposition:    - Full Code.

## 2018-02-11 NOTE — PROGRESS NOTE ADULT - ASSESSMENT
acute on chronic hypoxic respiratory failure, improved  copd exacerbation, improved        low flow oxygen at 4 lpm(has at home)  bipap hs and prn(has at home)  bronchodilators  inhaled corticosteroid  prednisone 40mg daily and taper by 10mg every 7 days but don't go below 20mg daily until reevaluated as outpatient  discharge okay from pulmonary viewpoint, outpatient pulmonary follow up with dr akira duffy in next 2 weeks or so acute on chronic hypoxic respiratory failure, improved  copd exacerbation, improved        low flow oxygen at 4 lpm(has at home)  bipap hs and prn(has at home)  bronchodilators  inhaled corticosteroid  prednisone 40mg daily and taper by 10mg every 7 days but don't go below 20mg daily until reevaluated as outpatient  increased wbc likely related to steroids, monitor as outpatient  discharge okay from pulmonary viewpoint, outpatient pulmonary follow up with dr akira duffy in next 2 weeks or so

## 2018-02-11 NOTE — PROGRESS NOTE ADULT - PROVIDER SPECIALTY LIST ADULT
Internal Medicine
Pulmonology
Pulmonology
Internal Medicine

## 2018-02-11 NOTE — PROGRESS NOTE ADULT - SUBJECTIVE AND OBJECTIVE BOX
ALETA SUAZO  62y, Male  Allergy: No Known Allergies      LAST 24-Hr EVENTS:  no complaints  VITALS:  T(F): 97.2 (02-11-18 @ 04:23), Max: 97.6 (02-10-18 @ 14:42)  HR: 75 (02-11-18 @ 04:23)  BP: 119/56 (02-11-18 @ 04:23) (119/56 - 139/59)  RR: 18 (02-11-18 @ 04:23)  SpO2: 97% (02-11-18 @ 09:43)    PHYSICAL EXAM:    GENERAL: NAD, well-developed  HEAD:  Atraumatic, Normocephalic  NECK: Supple, No JVD  CHEST/LUNG: Clear to auscultation bilaterally; No wheeze  HEART: Regular rate and rhythm; No murmurs, rubs, or gallops  ABDOMEN: Soft, Nontender, Nondistended; Bowel sounds present  EXTREMITIES:  2+ Peripheral Pulses, No clubbing, cyanosis, or edema        TESTS & MEASUREMENTS:                      Culture - Blood (collected 02-07-18 @ 14:40)  Source: .Blood Blood  Preliminary Report (02-09-18 @ 03:01):    No growth to date.    Culture - Blood (collected 02-07-18 @ 13:00)  Source: .Blood Blood  Preliminary Report (02-09-18 @ 03:01):    No growth to date.          ABG & VENT SETTINGS: (when applicable)  n/a      RADIOLOGY & ADDITIONAL TESTS:  n/a  MEDICATIONS:  MEDICATIONS  (STANDING):  ALBUTerol/ipratropium for Nebulization 3 milliLiter(s) Nebulizer every 6 hours  amLODIPine   Tablet 10 milliGRAM(s) Oral daily  buDESOnide 160 MICROgram(s)/formoterol 4.5 MICROgram(s) Inhaler 2 Puff(s) Inhalation two times a day  heparin  Injectable 5000 Unit(s) SubCutaneous every 8 hours  predniSONE   Tablet 40 milliGRAM(s) Oral daily  simvastatin 10 milliGRAM(s) Oral at bedtime  tiotropium 18 MICROgram(s) Capsule 1 Capsule(s) Inhalation daily    MEDICATIONS  (PRN):  ALPRAZolam 0.5 milliGRAM(s) Oral two times a day PRN anxiety, taper  guaiFENesin    Syrup 200 milliGRAM(s) Oral every 6 hours PRN Cough

## 2018-02-13 DIAGNOSIS — Z87.891 PERSONAL HISTORY OF NICOTINE DEPENDENCE: ICD-10-CM

## 2018-02-13 DIAGNOSIS — F41.9 ANXIETY DISORDER, UNSPECIFIED: ICD-10-CM

## 2018-02-13 DIAGNOSIS — N50.3 CYST OF EPIDIDYMIS: ICD-10-CM

## 2018-02-13 DIAGNOSIS — J44.1 CHRONIC OBSTRUCTIVE PULMONARY DISEASE WITH (ACUTE) EXACERBATION: ICD-10-CM

## 2018-02-13 DIAGNOSIS — N43.2 OTHER HYDROCELE: ICD-10-CM

## 2018-02-13 DIAGNOSIS — E78.5 HYPERLIPIDEMIA, UNSPECIFIED: ICD-10-CM

## 2018-02-13 DIAGNOSIS — T38.0X5A ADVERSE EFFECT OF GLUCOCORTICOIDS AND SYNTHETIC ANALOGUES, INITIAL ENCOUNTER: ICD-10-CM

## 2018-02-13 DIAGNOSIS — I10 ESSENTIAL (PRIMARY) HYPERTENSION: ICD-10-CM

## 2018-02-13 DIAGNOSIS — D72.828 OTHER ELEVATED WHITE BLOOD CELL COUNT: ICD-10-CM

## 2018-02-13 DIAGNOSIS — J96.21 ACUTE AND CHRONIC RESPIRATORY FAILURE WITH HYPOXIA: ICD-10-CM

## 2018-02-13 LAB
CULTURE RESULTS: SIGNIFICANT CHANGE UP
CULTURE RESULTS: SIGNIFICANT CHANGE UP
SPECIMEN SOURCE: SIGNIFICANT CHANGE UP
SPECIMEN SOURCE: SIGNIFICANT CHANGE UP

## 2018-06-01 ENCOUNTER — OUTPATIENT (OUTPATIENT)
Dept: OUTPATIENT SERVICES | Facility: HOSPITAL | Age: 63
LOS: 1 days | Discharge: HOME | End: 2018-06-01

## 2018-06-01 DIAGNOSIS — R05 COUGH: ICD-10-CM

## 2018-06-01 PROBLEM — E78.00 PURE HYPERCHOLESTEROLEMIA, UNSPECIFIED: Chronic | Status: ACTIVE | Noted: 2018-02-07

## 2018-06-01 PROBLEM — J44.9 CHRONIC OBSTRUCTIVE PULMONARY DISEASE, UNSPECIFIED: Chronic | Status: ACTIVE | Noted: 2018-02-07

## 2018-06-17 ENCOUNTER — INPATIENT (INPATIENT)
Facility: HOSPITAL | Age: 63
LOS: 7 days | Discharge: NURSING, REHAB OR LONG-TERM CARE | End: 2018-06-25
Attending: INTERNAL MEDICINE | Admitting: INTERNAL MEDICINE
Payer: MEDICARE

## 2018-06-17 VITALS
DIASTOLIC BLOOD PRESSURE: 74 MMHG | TEMPERATURE: 99 F | HEART RATE: 113 BPM | SYSTOLIC BLOOD PRESSURE: 153 MMHG | RESPIRATION RATE: 30 BRPM | OXYGEN SATURATION: 98 %

## 2018-06-17 DIAGNOSIS — Z93.1 GASTROSTOMY STATUS: Chronic | ICD-10-CM

## 2018-06-17 LAB
ALBUMIN SERPL ELPH-MCNC: 3.8 G/DL — SIGNIFICANT CHANGE UP (ref 3.5–5.2)
ALP SERPL-CCNC: 92 U/L — SIGNIFICANT CHANGE UP (ref 30–115)
ALT FLD-CCNC: 24 U/L — SIGNIFICANT CHANGE UP (ref 0–41)
ANION GAP SERPL CALC-SCNC: 16 MMOL/L — HIGH (ref 7–14)
AST SERPL-CCNC: 17 U/L — SIGNIFICANT CHANGE UP (ref 0–41)
BASE EXCESS BLDV CALC-SCNC: 6.3 MMOL/L — HIGH (ref -2–2)
BASOPHILS # BLD AUTO: 0.04 K/UL — SIGNIFICANT CHANGE UP (ref 0–0.2)
BASOPHILS NFR BLD AUTO: 0.3 % — SIGNIFICANT CHANGE UP (ref 0–1)
BILIRUB SERPL-MCNC: 0.2 MG/DL — SIGNIFICANT CHANGE UP (ref 0.2–1.2)
BLD GP AB SCN SERPL QL: SIGNIFICANT CHANGE UP
BUN SERPL-MCNC: 16 MG/DL — SIGNIFICANT CHANGE UP (ref 10–20)
CA-I SERPL-SCNC: 1.19 MMOL/L — SIGNIFICANT CHANGE UP (ref 1.12–1.3)
CALCIUM SERPL-MCNC: 9 MG/DL — SIGNIFICANT CHANGE UP (ref 8.5–10.1)
CHLORIDE SERPL-SCNC: 93 MMOL/L — LOW (ref 98–110)
CK MB CFR SERPL CALC: 2.3 NG/ML — SIGNIFICANT CHANGE UP (ref 0.6–6.3)
CK SERPL-CCNC: 31 U/L — SIGNIFICANT CHANGE UP (ref 0–225)
CO2 SERPL-SCNC: 28 MMOL/L — SIGNIFICANT CHANGE UP (ref 17–32)
CREAT SERPL-MCNC: 0.5 MG/DL — LOW (ref 0.7–1.5)
EOSINOPHIL # BLD AUTO: 0.01 K/UL — SIGNIFICANT CHANGE UP (ref 0–0.7)
EOSINOPHIL NFR BLD AUTO: 0.1 % — SIGNIFICANT CHANGE UP (ref 0–8)
GAS PNL BLDV: 137 MMOL/L — SIGNIFICANT CHANGE UP (ref 136–145)
GAS PNL BLDV: SIGNIFICANT CHANGE UP
GLUCOSE SERPL-MCNC: 112 MG/DL — HIGH (ref 70–99)
HCO3 BLDV-SCNC: 31 MMOL/L — HIGH (ref 22–29)
HCT VFR BLD CALC: 33.5 % — LOW (ref 42–52)
HCT VFR BLDA CALC: 13.1 % — LOW (ref 34–44)
HGB BLD CALC-MCNC: 4.3 G/DL — CRITICAL LOW (ref 14–18)
HGB BLD-MCNC: 10.5 G/DL — LOW (ref 14–18)
IMM GRANULOCYTES NFR BLD AUTO: 1.7 % — HIGH (ref 0.1–0.3)
LACTATE BLDV-MCNC: 0.8 MMOL/L — SIGNIFICANT CHANGE UP (ref 0.5–1.6)
LYMPHOCYTES # BLD AUTO: 0.7 K/UL — LOW (ref 1.2–3.4)
LYMPHOCYTES # BLD AUTO: 4.7 % — LOW (ref 20.5–51.1)
MCHC RBC-ENTMCNC: 27.3 PG — SIGNIFICANT CHANGE UP (ref 27–31)
MCHC RBC-ENTMCNC: 31.3 G/DL — LOW (ref 32–37)
MCV RBC AUTO: 87.2 FL — SIGNIFICANT CHANGE UP (ref 80–94)
MONOCYTES # BLD AUTO: 0.41 K/UL — SIGNIFICANT CHANGE UP (ref 0.1–0.6)
MONOCYTES NFR BLD AUTO: 2.7 % — SIGNIFICANT CHANGE UP (ref 1.7–9.3)
NEUTROPHILS # BLD AUTO: 13.55 K/UL — HIGH (ref 1.4–6.5)
NEUTROPHILS NFR BLD AUTO: 90.5 % — HIGH (ref 42.2–75.2)
NRBC # BLD: 0 /100 WBCS — SIGNIFICANT CHANGE UP (ref 0–0)
NT-PROBNP SERPL-SCNC: 72 PG/ML — SIGNIFICANT CHANGE UP (ref 0–300)
PCO2 BLDV: 47 MMHG — SIGNIFICANT CHANGE UP (ref 41–51)
PH BLDV: 7.43 — SIGNIFICANT CHANGE UP (ref 7.26–7.43)
PLATELET # BLD AUTO: 462 K/UL — HIGH (ref 130–400)
PO2 BLDV: 31 MMHG — SIGNIFICANT CHANGE UP (ref 20–40)
POTASSIUM BLDV-SCNC: 4.6 MMOL/L — SIGNIFICANT CHANGE UP (ref 3.3–5.6)
POTASSIUM SERPL-MCNC: 5 MMOL/L — SIGNIFICANT CHANGE UP (ref 3.5–5)
POTASSIUM SERPL-SCNC: 5 MMOL/L — SIGNIFICANT CHANGE UP (ref 3.5–5)
PROT SERPL-MCNC: 7 G/DL — SIGNIFICANT CHANGE UP (ref 6–8)
RBC # BLD: 3.84 M/UL — LOW (ref 4.7–6.1)
RBC # FLD: 16.2 % — HIGH (ref 11.5–14.5)
SAO2 % BLDV: 56 % — SIGNIFICANT CHANGE UP
SODIUM SERPL-SCNC: 137 MMOL/L — SIGNIFICANT CHANGE UP (ref 135–146)
TROPONIN T SERPL-MCNC: 0.03 NG/ML — CRITICAL HIGH
TYPE + AB SCN PNL BLD: SIGNIFICANT CHANGE UP
WBC # BLD: 14.97 K/UL — HIGH (ref 4.8–10.8)
WBC # FLD AUTO: 14.97 K/UL — HIGH (ref 4.8–10.8)

## 2018-06-17 RX ORDER — ZINC OXIDE 200 MG/G
1 OINTMENT TOPICAL DAILY
Qty: 0 | Refills: 0 | Status: DISCONTINUED | OUTPATIENT
Start: 2018-06-17 | End: 2018-06-25

## 2018-06-17 RX ORDER — ALPRAZOLAM 0.25 MG
1 TABLET ORAL
Qty: 0 | Refills: 0 | Status: DISCONTINUED | OUTPATIENT
Start: 2018-06-17 | End: 2018-06-17

## 2018-06-17 RX ORDER — SODIUM CHLORIDE 9 MG/ML
3 INJECTION INTRAMUSCULAR; INTRAVENOUS; SUBCUTANEOUS EVERY 8 HOURS
Qty: 0 | Refills: 0 | Status: DISCONTINUED | OUTPATIENT
Start: 2018-06-17 | End: 2018-06-25

## 2018-06-17 RX ORDER — AMLODIPINE BESYLATE 2.5 MG/1
10 TABLET ORAL DAILY
Qty: 0 | Refills: 0 | Status: DISCONTINUED | OUTPATIENT
Start: 2018-06-17 | End: 2018-06-25

## 2018-06-17 RX ORDER — AMLODIPINE BESYLATE 2.5 MG/1
10 TABLET ORAL DAILY
Qty: 0 | Refills: 0 | Status: DISCONTINUED | OUTPATIENT
Start: 2018-06-17 | End: 2018-06-17

## 2018-06-17 RX ORDER — ENOXAPARIN SODIUM 100 MG/ML
40 INJECTION SUBCUTANEOUS DAILY
Qty: 0 | Refills: 0 | Status: DISCONTINUED | OUTPATIENT
Start: 2018-06-18 | End: 2018-06-25

## 2018-06-17 RX ORDER — CEFTRIAXONE 500 MG/1
1 INJECTION, POWDER, FOR SOLUTION INTRAMUSCULAR; INTRAVENOUS EVERY 24 HOURS
Qty: 0 | Refills: 0 | Status: DISCONTINUED | OUTPATIENT
Start: 2018-06-17 | End: 2018-06-18

## 2018-06-17 RX ORDER — ALPRAZOLAM 0.25 MG
1 TABLET ORAL
Qty: 0 | Refills: 0 | Status: DISCONTINUED | OUTPATIENT
Start: 2018-06-17 | End: 2018-06-24

## 2018-06-17 RX ORDER — AZITHROMYCIN 500 MG/1
500 TABLET, FILM COATED ORAL ONCE
Qty: 0 | Refills: 0 | Status: COMPLETED | OUTPATIENT
Start: 2018-06-17 | End: 2018-06-17

## 2018-06-17 RX ORDER — SIMVASTATIN 20 MG/1
10 TABLET, FILM COATED ORAL AT BEDTIME
Qty: 0 | Refills: 0 | Status: DISCONTINUED | OUTPATIENT
Start: 2018-06-17 | End: 2018-06-25

## 2018-06-17 RX ORDER — ACETAMINOPHEN 500 MG
650 TABLET ORAL EVERY 6 HOURS
Qty: 0 | Refills: 0 | Status: DISCONTINUED | OUTPATIENT
Start: 2018-06-17 | End: 2018-06-25

## 2018-06-17 RX ORDER — SIMVASTATIN 20 MG/1
10 TABLET, FILM COATED ORAL AT BEDTIME
Qty: 0 | Refills: 0 | Status: DISCONTINUED | OUTPATIENT
Start: 2018-06-17 | End: 2018-06-17

## 2018-06-17 RX ORDER — IPRATROPIUM/ALBUTEROL SULFATE 18-103MCG
3 AEROSOL WITH ADAPTER (GRAM) INHALATION
Qty: 0 | Refills: 0 | Status: COMPLETED | OUTPATIENT
Start: 2018-06-17 | End: 2018-06-17

## 2018-06-17 RX ORDER — CEFEPIME 1 G/1
1000 INJECTION, POWDER, FOR SOLUTION INTRAMUSCULAR; INTRAVENOUS EVERY 12 HOURS
Qty: 0 | Refills: 0 | Status: DISCONTINUED | OUTPATIENT
Start: 2018-06-17 | End: 2018-06-18

## 2018-06-17 RX ORDER — BUDESONIDE AND FORMOTEROL FUMARATE DIHYDRATE 160; 4.5 UG/1; UG/1
2 AEROSOL RESPIRATORY (INHALATION)
Qty: 0 | Refills: 0 | Status: DISCONTINUED | OUTPATIENT
Start: 2018-06-17 | End: 2018-06-25

## 2018-06-17 RX ORDER — PANTOPRAZOLE SODIUM 20 MG/1
40 TABLET, DELAYED RELEASE ORAL
Qty: 0 | Refills: 0 | Status: DISCONTINUED | OUTPATIENT
Start: 2018-06-17 | End: 2018-06-20

## 2018-06-17 RX ORDER — IPRATROPIUM/ALBUTEROL SULFATE 18-103MCG
3 AEROSOL WITH ADAPTER (GRAM) INHALATION EVERY 6 HOURS
Qty: 0 | Refills: 0 | Status: COMPLETED | OUTPATIENT
Start: 2018-06-17 | End: 2018-06-22

## 2018-06-17 RX ORDER — SALICYLIC ACID 0.5 %
1 CLEANSER (GRAM) TOPICAL
Qty: 0 | Refills: 0 | Status: DISCONTINUED | OUTPATIENT
Start: 2018-06-17 | End: 2018-06-25

## 2018-06-17 RX ORDER — LORATADINE 10 MG/1
10 TABLET ORAL DAILY
Qty: 0 | Refills: 0 | Status: DISCONTINUED | OUTPATIENT
Start: 2018-06-17 | End: 2018-06-25

## 2018-06-17 RX ORDER — AZITHROMYCIN 500 MG/1
500 TABLET, FILM COATED ORAL EVERY 24 HOURS
Qty: 0 | Refills: 0 | Status: DISCONTINUED | OUTPATIENT
Start: 2018-06-17 | End: 2018-06-18

## 2018-06-17 RX ADMIN — Medication 60 MILLIGRAM(S): at 17:37

## 2018-06-17 RX ADMIN — Medication 60 MILLIGRAM(S): at 21:32

## 2018-06-17 RX ADMIN — Medication 3 MILLILITER(S): at 17:30

## 2018-06-17 RX ADMIN — Medication 3 MILLILITER(S): at 17:39

## 2018-06-17 RX ADMIN — SODIUM CHLORIDE 3 MILLILITER(S): 9 INJECTION INTRAMUSCULAR; INTRAVENOUS; SUBCUTANEOUS at 17:47

## 2018-06-17 RX ADMIN — AZITHROMYCIN 255 MILLIGRAM(S): 500 TABLET, FILM COATED ORAL at 17:38

## 2018-06-17 RX ADMIN — CEFTRIAXONE 100 GRAM(S): 500 INJECTION, POWDER, FOR SOLUTION INTRAMUSCULAR; INTRAVENOUS at 21:32

## 2018-06-17 RX ADMIN — CEFEPIME 100 MILLIGRAM(S): 1 INJECTION, POWDER, FOR SOLUTION INTRAMUSCULAR; INTRAVENOUS at 17:47

## 2018-06-17 NOTE — ED PROVIDER NOTE - MEDICAL DECISION MAKING DETAILS
hx of copd on 4L nc on bipap prn states for last 2 nights has not been able to use his bipap and states for this amount of time increased trouble breathing with coughing and sub fevers yesterday. he denies any blood in stool or vomit on exam initially he was tachypnic and sob, but not hypoxic, lungs were tight with accessory muscle use so bipap was started. On vbg he was actually found to be anemic considerably from his previous hgb. he was guaic negative, we will treat him for pneumonia and bipap for wob but also transfuse as his sob can likely be from severe anemia.

## 2018-06-17 NOTE — ED ADULT NURSE NOTE - OBJECTIVE STATEMENT
pt is a 63 y/o male c/o SOB. BIBA from [facility]. states he was asking for his bipap but wasn't provided it. denies chest pain. tachypneic, using accessory muscles, productive cough with moderate amount of productive clear sputum. wheezing noted. placed on bipap by respiratory and received Combivent.

## 2018-06-17 NOTE — ED PROVIDER NOTE - PHYSICAL EXAMINATION
Physical Exam    Vital Signs: I have reviewed the initial vital signs.  Constitutional: well-nourished, appears stated age, no acute distress  Eyes: PERRLA, EOM intact, RAPD absent, conjunctiva clear and symmetrical lids.  ENT: neck supple with no adenopathy, moist MM.  Cardiovascular: +S1/S2, no murmurs, regular rate, regular rhythm, well-perfused extremities  Respiratory: unlabored respiratory effort, clear to auscultation bilaterally, speaks in full sentences  Gastrointestinal: soft, non-tender abdomen, no pulsatile mass  Neurologic: awake, alert, cranial nerves II-XII grossly intact, extremities’ motor and sensory functions grossly intact Physical Exam    Vital Signs: I have reviewed the initial vital signs.  Constitutional: well-nourished, appears stated age, no acute distress  Eyes: PERRLA, EOM intact, RAPD absent, conjunctiva clear and symmetrical lids.  ENT: neck supple with no adenopathy, moist MM.  Cardiovascular: +S1/S2, no murmurs, regular rate, regular rhythm, well-perfused extremities  Respiratory: (+) late expiratory wheezing moving air poorly in the lungs b/l. 98% on NC. Tachypnea 32 bpm. Accessory muscle use scalenes.   Gastrointestinal: soft, non-tender abdomen, no pulsatile mass  Neurologic: awake, alert, cranial nerves II-XII grossly intact, extremities’ motor and sensory functions grossly intact

## 2018-06-17 NOTE — H&P ADULT - HISTORY OF PRESENT ILLNESS
62 m from [facility], comes for sob , cough with mild white sputum, and fever today , has COPD , on bipap at night, was sent to ED, [place] [id number], and was tachypneic, was given abx, iv steroids, and admitted for copd exacerbtion/pneumonia. cxr did not show consolidation or infiltrate, bnp was normal    patient reports that he had peg tube placed 2 months ago for difficulty swallowing, but no records here, called [facility] and was told that patient came to [facility] from [facility]. he gets peg feed for 16 hours and eats mechanical soft diet, pureed veg. he denies cp/palpitations,n/v/d/c/dizziness. 62 m from [facility], comes for sob , cough with mild white sputum, and subjective fever today , has COPD , on bipap at night, was sent to ED, [place] [id number], and was tachypneic, was given abx, iv steroids, and admitted for copd exacerbtion/pneumonia. cxr did not show consolidation or infiltrate, bnp was normal    patient reports that he had peg tube placed 2 months ago for difficulty swallowing, but no records here, called [facility] and was told that patient came to [facility] from [facility]. he gets peg feed for 16 hours and eats mechanical soft diet, pureed veg. he denies cp/palpitations,n/v/d/c/dizziness. 62 m from [facility], comes for sob , cough with mild white sputum, and subjective fever today , has COPD , on bipap at night, was sent to ED, [place] [id number], and was tachypneic, was given abx, iv steroids, and admitted for copd exacerbtion/pneumonia. cxr did not show consolidation or infiltrate, bnp was normal    patient reports that he had peg tube placed 2 months ago for difficulty swallowing, but no records here, [facility] and was told that patient came to [facility] from [facility]. he gets peg feed for 16 hours and eats mechanical soft diet, pureed veg. he denies cp/palpitations,n/v/d/c/dizziness. at SNF, patient reports thathe walks with a walker, per snf, he is 1 person assist

## 2018-06-17 NOTE — ED PROVIDER NOTE - NS ED ROS FT
Review of Systems    Constitutional: (-) fever  Eyes/ENT: (-) change in vision, (-) sore throat, (-) ear pain  Cardiovascular: (-) chest pain, (-) palpitation   Respiratory: See HPI  Gastrointestinal: (-) abdominal pain (-) vomiting, (-) diarrhea  Musculoskeletal: (-) neck pain, (-) back pain, (-) joint pain  Integumentary: (-) rash, (-) edema  Neurological: (-) headache, (-) altered mental status  Heme/Lymph: (-) easy bruising (-) easy bleeding  Allergic/Immunologic: (-) pruritus

## 2018-06-17 NOTE — ED PROVIDER NOTE - OBJECTIVE STATEMENT
61 yo M pmhx sig for HTN, COPD reports with SOB 2 d. in duration that began after assisted living facility he resides in did not provide Bi PAP for the pt. Reports that SOB is associated with a fever and a productive clear colored sputium cough. Denies CP, palpitations, SOB, N/V, abd pain.    I have reviewed available current nursing and previous documentation of past medical, surgical, family, and/or social history.

## 2018-06-17 NOTE — H&P ADULT - ASSESSMENT
62 M from [facility] admitted for cob, cough, sputum, leucocytosis    1- copd exacerbation/ possible pneumonia  abx- azax plus rocephin  bipap hs and prn  o2 prn  iv steroids solumedrol 60 q12  symbicort  duoneb prn  (hold brovana and flonase for now)    2- HTN  c/w amlodipine    3- dld- statin    4- anxiety-   alprax and loratidine    5- dvt ppx- lovenox 40 q24    out of bed to chair  peg tube feed- for 16 hrs 60 ml/hr from 6 pm to 10 am- [facility]  pureed diet po 62 M from [facility] admitted for cob, cough, sputum, leucocytosis    1- copd exacerbation/ possible pneumonia/acute on chronic respiratory failure on bipap for many eyars  abx- azax plus rocephin  bipap hs and prn- 12/5, 40 fio2 (snf settings)  o2 prn, goal >90 %  iv steroids solumedrol 60 q12  symbicort q12  duoneb q6hr  (hold brovana and flonase for now)    2- HTN  c/w amlodipine    3- dld- statin    4- anxiety-   alprax and loratidine    5- dvt ppx- lovenox 40 q24    out of bed to chair  peg tube feed- for 16 hrs 60 ml/hr from 6 pm to 10 am- [facility]  pureed diet with ice chips po(not on oral liquids as per snf, called and confirmed)  all meds via peg tube

## 2018-06-17 NOTE — H&P ADULT - NSHPPHYSICALEXAM_GEN_ALL_CORE
T(F): 100.2 (06-17-18 @ 17:17), Max: 100.2 (06-17-18 @ 17:17)  HR: 106 (06-17-18 @ 17:53) (106 - 116)  BP: 138/78 (06-17-18 @ 17:53) (138/78 - 153/74)  RR: 30 (06-17-18 @ 16:50) (30 - 30)  SpO2: 98% (06-17-18 @ 17:33) (98% - 98%)    PHYSICAL EXAM:  GENERAL: NAD, well-developed  HEAD:  Atraumatic, Normocephalic  EYES: EOMI, PERRLA, conjunctiva and sclera clear  NECK: Supple, No JVD  CHEST/LUNG: Clear to auscultation bilaterally; No wheeze  HEART: Regular rate and rhythm; No murmurs, rubs, or gallops  ABDOMEN: Soft, Nontender, Nondistended; Bowel sounds present  EXTREMITIES:  2+ Peripheral Pulses, No clubbing, cyanosis, or edema  PSYCH: AAOx3  NEUROLOGY: non-focal  SKIN: No rashes or lesions T(F): 100.2 (06-17-18 @ 17:17), Max: 100.2 (06-17-18 @ 17:17)  HR: 106 (06-17-18 @ 17:53) (106 - 116)  BP: 138/78 (06-17-18 @ 17:53) (138/78 - 153/74)  RR: 30 (06-17-18 @ 16:50) (30 - 30)  SpO2: 98% (06-17-18 @ 17:33) (98% - 98%)    PHYSICAL EXAM:  GENERAL: NAD, well-developed  HEAD:  Atraumatic, Normocephalic  EYESconjunctiva and sclera clear  NECK: Supple  CHEST/LUNG: insp wheezing heard, no crackles  HEART: Regular rate and rhythm; No murmurs, rubs, or gallops  ABDOMEN: Soft, Nontender, Nondistended; Bowel sounds present  EXTREMITIES:  2+ Peripheral Pulses, No clubbing, cyanosis, or edema  PSYCH: AAOx3  NEUROLOGY: non-focal  SKIN: No rashes or lesions

## 2018-06-17 NOTE — PATIENT PROFILE ADULT. - MEDICATIONS BROUGHT TO HOSPITAL, PROFILE
Spoke with patient, doing much better since course of prednisone. Did have some side effects to the prednisone, but pain much improved. Recommend no additional prednisone at this time. We'll give it a couple weeks and see how he does MRI scan showed moderate to severe right and moderate left neuroforaminal stenosis at L4-5. Other options include epidural injections will see how he does   no

## 2018-06-17 NOTE — H&P ADULT - NSHPLABSRESULTS_GEN_ALL_CORE
10.5   14.97 )-----------( 462      ( 17 Jun 2018 17:45 )             33.5       06-17    137  |  93<L>  |  16  ----------------------------<  112<H>  5.0   |  28  |  0.5<L>    Ca    9.0      17 Jun 2018 17:45    TPro  7.0  /  Alb  3.8  /  TBili  0.2  /  DBili  x   /  AST  17  /  ALT  24  /  AlkPhos  92  06-17          CARDIAC MARKERS ( 17 Jun 2018 17:45 )  x     / 0.03 ng/mL / 31 U/L / x     / 2.3 ng/mL

## 2018-06-18 ENCOUNTER — TRANSCRIPTION ENCOUNTER (OUTPATIENT)
Age: 63
End: 2018-06-18

## 2018-06-18 LAB
ANION GAP SERPL CALC-SCNC: 17 MMOL/L — HIGH (ref 7–14)
BUN SERPL-MCNC: 18 MG/DL — SIGNIFICANT CHANGE UP (ref 10–20)
CALCIUM SERPL-MCNC: 8.8 MG/DL — SIGNIFICANT CHANGE UP (ref 8.5–10.1)
CHLORIDE SERPL-SCNC: 94 MMOL/L — LOW (ref 98–110)
CO2 SERPL-SCNC: 27 MMOL/L — SIGNIFICANT CHANGE UP (ref 17–32)
CREAT SERPL-MCNC: 0.5 MG/DL — LOW (ref 0.7–1.5)
GLUCOSE SERPL-MCNC: 175 MG/DL — HIGH (ref 70–99)
HCT VFR BLD CALC: 31.6 % — LOW (ref 42–52)
HGB BLD-MCNC: 10.1 G/DL — LOW (ref 14–18)
MCHC RBC-ENTMCNC: 27.6 PG — SIGNIFICANT CHANGE UP (ref 27–31)
MCHC RBC-ENTMCNC: 32 G/DL — SIGNIFICANT CHANGE UP (ref 32–37)
MCV RBC AUTO: 86.3 FL — SIGNIFICANT CHANGE UP (ref 80–94)
NRBC # BLD: 0 /100 WBCS — SIGNIFICANT CHANGE UP (ref 0–0)
PLATELET # BLD AUTO: 440 K/UL — HIGH (ref 130–400)
POTASSIUM SERPL-MCNC: 4.8 MMOL/L — SIGNIFICANT CHANGE UP (ref 3.5–5)
POTASSIUM SERPL-SCNC: 4.8 MMOL/L — SIGNIFICANT CHANGE UP (ref 3.5–5)
RBC # BLD: 3.66 M/UL — LOW (ref 4.7–6.1)
RBC # FLD: 15.7 % — HIGH (ref 11.5–14.5)
SODIUM SERPL-SCNC: 138 MMOL/L — SIGNIFICANT CHANGE UP (ref 135–146)
WBC # BLD: 13.24 K/UL — HIGH (ref 4.8–10.8)
WBC # FLD AUTO: 13.24 K/UL — HIGH (ref 4.8–10.8)

## 2018-06-18 RX ADMIN — Medication 1 APPLICATION(S): at 18:43

## 2018-06-18 RX ADMIN — Medication 3 MILLILITER(S): at 14:11

## 2018-06-18 RX ADMIN — SODIUM CHLORIDE 3 MILLILITER(S): 9 INJECTION INTRAMUSCULAR; INTRAVENOUS; SUBCUTANEOUS at 22:30

## 2018-06-18 RX ADMIN — Medication 1 APPLICATION(S): at 05:25

## 2018-06-18 RX ADMIN — SODIUM CHLORIDE 3 MILLILITER(S): 9 INJECTION INTRAMUSCULAR; INTRAVENOUS; SUBCUTANEOUS at 05:25

## 2018-06-18 RX ADMIN — CEFEPIME 100 MILLIGRAM(S): 1 INJECTION, POWDER, FOR SOLUTION INTRAMUSCULAR; INTRAVENOUS at 05:43

## 2018-06-18 RX ADMIN — Medication 1 MILLIGRAM(S): at 23:15

## 2018-06-18 RX ADMIN — SIMVASTATIN 10 MILLIGRAM(S): 20 TABLET, FILM COATED ORAL at 00:10

## 2018-06-18 RX ADMIN — BUDESONIDE AND FORMOTEROL FUMARATE DIHYDRATE 2 PUFF(S): 160; 4.5 AEROSOL RESPIRATORY (INHALATION) at 07:50

## 2018-06-18 RX ADMIN — ENOXAPARIN SODIUM 40 MILLIGRAM(S): 100 INJECTION SUBCUTANEOUS at 15:51

## 2018-06-18 RX ADMIN — SODIUM CHLORIDE 3 MILLILITER(S): 9 INJECTION INTRAMUSCULAR; INTRAVENOUS; SUBCUTANEOUS at 15:23

## 2018-06-18 RX ADMIN — SIMVASTATIN 10 MILLIGRAM(S): 20 TABLET, FILM COATED ORAL at 23:15

## 2018-06-18 RX ADMIN — Medication 60 MILLIGRAM(S): at 06:07

## 2018-06-18 RX ADMIN — AZITHROMYCIN 255 MILLIGRAM(S): 500 TABLET, FILM COATED ORAL at 05:43

## 2018-06-18 RX ADMIN — ZINC OXIDE 1 APPLICATION(S): 200 OINTMENT TOPICAL at 15:56

## 2018-06-18 RX ADMIN — Medication 3 MILLILITER(S): at 20:12

## 2018-06-18 RX ADMIN — Medication 40 MILLIGRAM(S): at 15:55

## 2018-06-18 RX ADMIN — AMLODIPINE BESYLATE 10 MILLIGRAM(S): 2.5 TABLET ORAL at 05:43

## 2018-06-18 RX ADMIN — LORATADINE 10 MILLIGRAM(S): 10 TABLET ORAL at 15:49

## 2018-06-18 RX ADMIN — Medication 3 MILLILITER(S): at 08:13

## 2018-06-18 NOTE — DISCHARGE NOTE ADULT - HOSPITAL COURSE
The patient is a 62 yo M with COPD, chronic respiratory failure on BIPAP, HTN, DLD, who presented from [facility] with a COPD exacerbation. Since admission he has been managed with BIPAP, O2, prednisone taper and Symbicort, and COPD exacerbation has improved. Pt is s/p 7 days of Levaquin and Prednisone course. Pt can continue with 2L oxygen NC.    As per reports obtained from [facility], patient has had a PEG tube since 5/25/18 in the setting of resistant esophageal candidiasis and inability to swallow. As per Speech and Swallow evaluation and VFSS, patient has profound pharyngeal dysphagia and poor clearance through UES. Patient remains NPO.  Additionally, CT of chest reveals multiple left lung nodules. Pt is instructed to follow up with outpatient pulmonologist Dr. Almonte and is aware that he would need a PET/CT    Patient has been followed by GI, Pulm, and ENT, and no inpatient intervention has been recommended. The patient is stable for discharge to nursing home.

## 2018-06-18 NOTE — DISCHARGE NOTE ADULT - INSTRUCTIONS
DIET: NPO AND TUBE FEED. Pt is allowed single ice chips.  Tube feeding: Jevity 1.2. 250ml every 4 hours. 6am,10am, 2PM, 6PM and 10PM. Skip 2AM.  Please flash 50cc free fluid pre and post feed.

## 2018-06-18 NOTE — PROGRESS NOTE ADULT - ASSESSMENT
Assessment and Plan:   · Assessment		  62 M w/ PMHx COPD, HTN, DLD from [facility] admitted for cob, cough, sputum, leukocytosis admitted for COPD exacerbation. His problems are being managed as follows --     #COPD exacerbation  - pulm c/s appreciate recs   - deescalate steroids to pred 40 PO qd x 5d  - levaquin 500mg qd  - bipap hs and prn- 12/5, 40 fio2 (snf settings)  - o2 prn, goal >90 %  - symbicort q12, duoneb q6hr    #HTN  - c/w amlodipine    #DLD  - c/w statin    #anxiety-   - c/w alprax and loratidine    #dvt ppx- lovenox     #out of bed to chair    #Diet: pending speech and swallow recs, PEG tube feeds     #Dispo: back to NH pending resolution of COPD exacerbation

## 2018-06-18 NOTE — PROGRESS NOTE ADULT - SUBJECTIVE AND OBJECTIVE BOX
Patient was seen and examined. Spoke with RN. Chart reviewed.    No events overnight.  Vital Signs Last 24 Hrs  T(F): 96.7 (18 Jun 2018 04:44), Max: 100.2 (17 Jun 2018 17:17)  HR: 88 (18 Jun 2018 04:44) (88 - 116)  BP: 125/77 (18 Jun 2018 04:44) (119/75 - 153/74)  SpO2: 98% (17 Jun 2018 21:15) (98% - 98%)  MEDICATIONS  (STANDING):  ALBUTerol/ipratropium for Nebulization 3 milliLiter(s) Nebulizer every 6 hours  amLODIPine   Tablet 10 milliGRAM(s) Oral daily  buDESOnide 160 MICROgram(s)/formoterol 4.5 MICROgram(s) Inhaler 2 Puff(s) Inhalation two times a day  enoxaparin Injectable 40 milliGRAM(s) SubCutaneous daily  levoFLOXacin IVPB 500 milliGRAM(s) IV Intermittent every 24 hours  loratadine 10 milliGRAM(s) Oral daily  pantoprazole    Tablet 40 milliGRAM(s) Oral before breakfast  predniSONE   Tablet 40 milliGRAM(s) Oral daily  simvastatin 10 milliGRAM(s) Oral at bedtime  sodium chloride 0.9% lock flush 3 milliLiter(s) IV Push every 8 hours  vitamin A &amp; D Ointment 1 Application(s) Topical two times a day  zinc oxide 20% Ointment 1 Application(s) Topical daily    MEDICATIONS  (PRN):  acetaminophen   Tablet 650 milliGRAM(s) Oral every 6 hours PRN For Temp greater than 38 C (100.4 F)  ALPRAZolam 1 milliGRAM(s) Oral two times a day PRN anxiety  guaiFENesin    Syrup 200 milliGRAM(s) Oral every 6 hours PRN Cough    Labs:                        10.1   13.24 )-----------( 440      ( 18 Jun 2018 08:53 )             31.6                         10.5   14.97 )-----------( 462      ( 17 Jun 2018 17:45 )             33.5     18 Jun 2018 08:53    138    |  94     |  18     ----------------------------<  175    4.8     |  27     |  0.5    17 Jun 2018 17:45    137    |  93     |  16     ----------------------------<  112    5.0     |  28     |  0.5      Ca    8.8        18 Jun 2018 08:53  Ca    9.0        17 Jun 2018 17:45    TPro  7.0    /  Alb  3.8    /  TBili  0.2    /  DBili  x      /  AST  17     /  ALT  24     /  AlkPhos  92     17 Jun 2018 17:45            Radiology:    General: comfortable, NAD  Neurology: A&Ox3, nonfocal  Head:  Normocephalic, atraumatic  ENT:  Mucosa moist, no ulcerations  Neck:  Supple, no JVD,   Skin: no breakdowns (as per RN)  Resp: CTA B/L  CV: RRR, S1S2,   GI: Soft, NT, bowel sounds  MS: No edema, + peripheral pulses, FROM all 4 extremity

## 2018-06-18 NOTE — DISCHARGE NOTE ADULT - MEDICATION SUMMARY - MEDICATIONS TO TAKE
I will START or STAY ON the medications listed below when I get home from the hospital:    predniSONE 10 mg oral tablet  -- 1 tab(s) by gastrostomy tube once a day  -- Indication: For Chronic respiratory failure    acetaminophen 325 mg oral tablet  -- 2 tab(s) by mouth every 6 hours, As needed, For Temp greater than 38 C (100.4 F)  -- Indication: For mild pain    loratadine 10 mg oral tablet  -- 1 tab(s) by mouth once a day peg tube  -- Indication: For allergy    simvastatin 10 mg oral tablet  -- 1 tab(s) by mouth once a day (at bedtime)  -- Indication: For DLD    ALPRAZolam 1 mg oral tablet  -- orally 2 times a day, As Needed  -- Indication: For Anxiety    albuterol 2.5 mg/3 mL (0.083%) inhalation solution  -- inhaled every 6 hours, As Needed  -- Indication: For COPD    Brovana 15 mcg/2 mL inhalation solution  -- 2 milliliter(s) inhaled 2 times a day  -- Indication: For COPD    Norvasc 10 mg oral tablet  -- 1 tab(s) by mouth once a day  -- Indication: For HTN    vitamin A & D topical cream  -- Apply on skin to affected area 2 times a day legs  -- Indication: For skin lesion    Balmex Adult Care 11.3% topical cream  -- Apply on skin to affected area once a day sacrum  -- Indication: For skin lesion     nystatin 100,000 units/g topical cream  -- Apply on skin to affected area 2 times a day left abdomen  -- Indication: For skin lesion    Mucinex  -- 200 milligram(s) by mouth every 4 hours via peg tube  -- Indication: For COugh    docusate sodium 10 mg/mL oral liquid  -- 10 milliliter(s) by mouth 2 times a day  -- Indication: For CONSTIPATION     senna oral tablet  -- 2 tab(s) by mouth once a day (at bedtime)  -- Indication: For CONSTIPATION     omeprazole 20 mg oral delayed release capsule  -- 1 cap(s) by mouth once a day via peg  -- Indication: For GERD    Flovent  mcg/inh inhalation aerosol  -- inhaled once a day  -- Indication: For COPD    roflumilast 500 mcg oral tablet  -- 1 tab(s) by mouth once a day  -- Indication: For COPD

## 2018-06-18 NOTE — PROGRESS NOTE ADULT - ASSESSMENT
62 M w/ PMHx COPD, HTN, DLD from [facility] admitted for cob, cough, sputum, leukocytosis admitted for COPD exacerbation. His problems are being managed as follows --     #COPD exacerbation  - pulm c/s appreciate recs   - deescalate steroids to pred 40 PO qd x 5d  - levaquin 500mg qd  - bipap hs and prn- 12/5, 40 fio2 (snf settings)  - o2 prn, goal >90 %  - symbicort q12, duoneb q6hr    #HTN  - c/w amlodipine    #DLD  - c/w statin    #anxiety-   - c/w alprax and loratidine    #dvt ppx- lovenox     #out of bed to chair    #Diet: pending speech and swallow recs, PEG tube feeds     #Dispo: back to NH pending resolution of COPD exacerbation

## 2018-06-18 NOTE — PROGRESS NOTE ADULT - SUBJECTIVE AND OBJECTIVE BOX
SUBJECTIVE:    Patient is a 62y old Male who presents with a chief complaint of shortness of breath, cough and mild sputum, fever (17 Jun 2018 20:35)    Currently admitted to medicine with the primary diagnosis of COPD exacerbation     Today is hospital day 1d. This morning he is resting comfortably in bed and reports no new issues or overnight events.     PAST MEDICAL & SURGICAL HISTORY  Other hydrocele  Anxiety disorder, unspecified type  Hypertension, unspecified type  High cholesterol  Chronic obstructive pulmonary disease, unspecified COPD type  PEG (percutaneous endoscopic gastrostomy) status    SOCIAL HISTORY:  Negative for smoking/alcohol/drug use.     ALLERGIES:  No Known Allergies    MEDICATIONS:  STANDING MEDICATIONS  ALBUTerol/ipratropium for Nebulization 3 milliLiter(s) Nebulizer every 6 hours  amLODIPine   Tablet 10 milliGRAM(s) Oral daily  buDESOnide 160 MICROgram(s)/formoterol 4.5 MICROgram(s) Inhaler 2 Puff(s) Inhalation two times a day  enoxaparin Injectable 40 milliGRAM(s) SubCutaneous daily  levoFLOXacin IVPB 500 milliGRAM(s) IV Intermittent every 24 hours  loratadine 10 milliGRAM(s) Oral daily  pantoprazole    Tablet 40 milliGRAM(s) Oral before breakfast  predniSONE   Tablet 40 milliGRAM(s) Oral daily  simvastatin 10 milliGRAM(s) Oral at bedtime  sodium chloride 0.9% lock flush 3 milliLiter(s) IV Push every 8 hours  vitamin A &amp; D Ointment 1 Application(s) Topical two times a day  zinc oxide 20% Ointment 1 Application(s) Topical daily    PRN MEDICATIONS  acetaminophen   Tablet 650 milliGRAM(s) Oral every 6 hours PRN  ALPRAZolam 1 milliGRAM(s) Oral two times a day PRN  guaiFENesin    Syrup 200 milliGRAM(s) Oral every 6 hours PRN    VITALS:   T(F): 96.7  HR: 88  BP: 125/77  RR: 19  SpO2: 98%    LABS:                        10.1   13.24 )-----------( 440      ( 18 Jun 2018 08:53 )             31.6     06-18    138  |  94<L>  |  18  ----------------------------<  175<H>  4.8   |  27  |  0.5<L>    Ca    8.8      18 Jun 2018 08:53    TPro  7.0  /  Alb  3.8  /  TBili  0.2  /  DBili  x   /  AST  17  /  ALT  24  /  AlkPhos  92  06-17          Troponin T, Serum: 0.03 ng/mL <HH> (06-17-18 @ 17:45)  Creatine Kinase, Serum: 31 U/L (06-17-18 @ 17:45)      CARDIAC MARKERS ( 17 Jun 2018 17:45 )  x     / 0.03 ng/mL / 31 U/L / x     / 2.3 ng/mL    PHYSICAL EXAM:  GEN: No acute distress  LUNGS: Clear to auscultation bilaterally   HEART: S1/S2 present. RRR.   ABD: Soft, non-tender, non-distended. Bowel sounds present  EXT: NC/NC/NE/2+PP/LOONEY  NEURO: AAOX3

## 2018-06-18 NOTE — CONSULT NOTE ADULT - ASSESSMENT
CRF  COPD exac  Anxiety  Dysphagia s/p peg tube    02 via nc  albuterol prn  symbicort 160/4.5 2 puff bid (in lieu of brovana and pulmicort)  albuterol prn  prednisone 40mg for 5 days then taper to basline dose  cont daliresp, mucinex  s/s eval  dvt/gi px  d/w primary team

## 2018-06-18 NOTE — CONSULT NOTE ADULT - ASSESSMENT
chronic resp failure  COPD exac  HTN  anxiety  s/p PEG  debility  hoarse voice    plan:  transfer service to dr rosales  pulmoshe consult dr worthington  prednisone taper  abx  o2 / bipap / nebs  peg feeds  S + S eval  may need ent eval  con t norvasc  no renal issues, but will follow

## 2018-06-18 NOTE — DISCHARGE NOTE ADULT - CARE PLAN
Principal Discharge DX:	Chronic obstructive pulmonary disease, unspecified COPD type  Goal:	symptoms stabilization  Assessment and plan of treatment:	Please follow up with PMD and Pulm  Secondary Diagnosis:	Pulmonary nodule  Goal:	Maintenance  Assessment and plan of treatment:	Pt needs outpatient PET/CT. Please follow up with Pulmonologist, Dr. Almonte  Secondary Diagnosis:	Hypertension, unspecified type  Goal:	control BP  Assessment and plan of treatment:	Please take medications as directed.

## 2018-06-18 NOTE — DISCHARGE NOTE ADULT - PATIENT PORTAL LINK FT
You can access the [facility] Patient Portal, offered by [employer], by registering with the following website: [email or web address]

## 2018-06-18 NOTE — DISCHARGE NOTE ADULT - PLAN OF CARE
symptoms stabilization Please follow up with PMD and Pulm Maintenance Pt needs outpatient PET/CT. Please follow up with Pulmonologist, Dr. Almonte control BP Please take medications as directed.

## 2018-06-18 NOTE — CONSULT NOTE ADULT - SUBJECTIVE AND OBJECTIVE BOX
ALETA SUAZO  MRN-[id number]    HISTORY OF PRESENT ILLNESS:    62 m NH resident with known CRF/COPD/ dysohagia s/p peg tube sent from [facility] here with sob , cough with mild white sputum, and subjective fever today ,was sent to ED, [place] [id number], and was tachypneic, was given abx, iv steroids, a  patient reports that he had peg tube placed 2 months ago for difficulty swallowing, but no records here, called [facility] [place] and was told that patient came to [facility] from [facility]. he gets peg feed for 16 hours and eats mechanical soft diet, pureed veg. he denies cp/palpitations,n/v/d/c/dizziness. at SNF, patient reports thathe walks with a walker, per snf, he is 1 person assist (17 Jun 2018 20:35)      PMH/PSH:  PAST MEDICAL & SURGICAL HISTORY:  Other hydrocele  Anxiety disorder, unspecified type  Hypertension, unspecified type  High cholesterol  Chronic obstructive pulmonary disease, unspecified COPD type  PEG (percutaneous endoscopic gastrostomy) status    ALLERGIES:  Allergies    No Known Allergies    Intolerances      SOCIAL HABITS:  ex smoker    FAMILY HISTORY:     No pertinent family history in first degree relatives      REVIEW OF SYSTEM:  Elements of review of systems are negative or non-applicable except as noted above in HPI section.       HOME MEDICATIONS:  albuterol 2.5 mg/3 mL (0.083%) inhalation solution  ALPRAZolam 1 mg oral tablet  Balmex Adult Care 11.3% topical cream  Brovana 15 mcg/2 mL inhalation solution  Flovent  mcg/inh inhalation aerosol  loratadine 10 mg oral tablet  Mucinex  Norvasc 10 mg oral tablet  nystatin 100,000 units/g topical cream  omeprazole 20 mg oral delayed release capsule  predniSONE 10 mg oral tablet  roflumilast 500 mcg oral tablet  simvastatin 10 mg oral tablet  vitamin A &amp; D topical cream    MEDICATIONS:  MEDICATIONS  (STANDING):  ALBUTerol/ipratropium for Nebulization 3 milliLiter(s) Nebulizer every 6 hours  amLODIPine   Tablet 10 milliGRAM(s) Oral daily  buDESOnide 160 MICROgram(s)/formoterol 4.5 MICROgram(s) Inhaler 2 Puff(s) Inhalation two times a day  enoxaparin Injectable 40 milliGRAM(s) SubCutaneous daily  levoFLOXacin IVPB 500 milliGRAM(s) IV Intermittent every 24 hours  loratadine 10 milliGRAM(s) Oral daily  pantoprazole    Tablet 40 milliGRAM(s) Oral before breakfast  predniSONE   Tablet 40 milliGRAM(s) Oral daily  simvastatin 10 milliGRAM(s) Oral at bedtime  sodium chloride 0.9% lock flush 3 milliLiter(s) IV Push every 8 hours  vitamin A &amp; D Ointment 1 Application(s) Topical two times a day  zinc oxide 20% Ointment 1 Application(s) Topical daily    MEDICATIONS  (PRN):  acetaminophen   Tablet 650 milliGRAM(s) Oral every 6 hours PRN For Temp greater than 38 C (100.4 F)  ALPRAZolam 1 milliGRAM(s) Oral two times a day PRN anxiety  guaiFENesin    Syrup 200 milliGRAM(s) Oral every 6 hours PRN Cough        VITALS:   Vital Signs Last 24 Hrs  T(C): 35.9 (18 Jun 2018 04:44), Max: 37.9 (17 Jun 2018 17:17)  T(F): 96.7 (18 Jun 2018 04:44), Max: 100.2 (17 Jun 2018 17:17)  HR: 88 (18 Jun 2018 04:44) (88 - 116)  BP: 125/77 (18 Jun 2018 04:44) (119/75 - 153/74)  BP(mean): --  RR: 19 (18 Jun 2018 04:44) (18 - 30)  SpO2: 98% (17 Jun 2018 21:15) (98% - 98%)  170.18  52.4  18.1      PHYSICAL EXAM:    GENERAL: NAD, well-developed  HEAD:  Atraumatic, Normocephalic  NECK: Supple, No JVD  CHEST/LUNG: no wheeze  HEART: Regular rate and rhythm  ABDOMEN: Soft, Nontender, Nondistended; Bowel sounds present peg tube  EXTREMITIES:  Good peripheral Pulses, No clubbing, cyanosis, or edema      LABS:                        10.1   13.24 )-----------( 440      ( 18 Jun 2018 08:53 )             31.6     06-18    138  |  94<L>  |  18  ----------------------------<  175<H>  4.8   |  27  |  0.5<L>    Ca    8.8      18 Jun 2018 08:53    TPro  7.0  /  Alb  3.8  /  TBili  0.2  /  DBili  x   /  AST  17  /  ALT  24  /  AlkPhos  92  06-17    LIVER FUNCTIONS - ( 17 Jun 2018 17:45 )  Alb: 3.8 g/dL / Pro: 7.0 g/dL / ALK PHOS: 92 U/L / ALT: 24 U/L / AST: 17 U/L / GGT: x           CARDIAC MARKERS ( 17 Jun 2018 17:45 )  x     / 0.03 ng/mL / 31 U/L / x     / 2.3 ng/mL        DIAGNOSTIC STUDIES:      < from: Xray Chest 1 View AP/PA (06.17.18 @ 18:18) >  Impression:      No focal consolidation or pleural effusion.    < end of copied text >

## 2018-06-18 NOTE — CONSULT NOTE ADULT - SUBJECTIVE AND OBJECTIVE BOX
NEPHROLOGY CONSULTATION NOTE    61 yo wm with pmh as below, well known to me, my primary care patient for many years, recently hospitalized at [facility], s/p PEG, now at [facility], p/w fever.  Pt with severe copd and frequent exacerbations requiring hospitalizations.  Now c/o usual SOB, though no fevers.    PAST MEDICAL & SURGICAL HISTORY:  Other hydrocele  Anxiety disorder, unspecified type  Hypertension, unspecified type  High cholesterol  Chronic obstructive pulmonary disease, unspecified COPD type  PEG (percutaneous endoscopic gastrostomy) status    Allergies:  No Known Allergies    Home Medications Reviewed    SOCIAL HISTORY:  Denies ETOH,Smoking,   FAMILY HISTORY:  No pertinent family history in first degree relatives        REVIEW OF SYSTEMS:  All other review of systems is negative unless indicated above.    PHYSICAL EXAM:  NAD  moistmm  no jvd  cta b/l decreased bs bl  rrr  soft  + peg  no edema    Hospital Medications:   MEDICATIONS  (STANDING):  ALBUTerol/ipratropium for Nebulization 3 milliLiter(s) Nebulizer every 6 hours  amLODIPine   Tablet 10 milliGRAM(s) Oral daily  buDESOnide 160 MICROgram(s)/formoterol 4.5 MICROgram(s) Inhaler 2 Puff(s) Inhalation two times a day  enoxaparin Injectable 40 milliGRAM(s) SubCutaneous daily  levoFLOXacin IVPB 500 milliGRAM(s) IV Intermittent every 24 hours  loratadine 10 milliGRAM(s) Oral daily  pantoprazole    Tablet 40 milliGRAM(s) Oral before breakfast  predniSONE   Tablet 40 milliGRAM(s) Oral daily  simvastatin 10 milliGRAM(s) Oral at bedtime  sodium chloride 0.9% lock flush 3 milliLiter(s) IV Push every 8 hours  vitamin A &amp; D Ointment 1 Application(s) Topical two times a day  zinc oxide 20% Ointment 1 Application(s) Topical daily        VITALS:  T(F): 98.3 (06-18-18 @ 14:18), Max: 98.9 (06-17-18 @ 21:15)  HR: 97 (06-18-18 @ 14:18)  BP: 112/78 (06-18-18 @ 14:18)  RR: 18 (06-18-18 @ 14:18)  SpO2: 98% (06-17-18 @ 21:15)  Wt(kg): --    06-18 @ 07:01  -  06-18 @ 20:08  --------------------------------------------------------  IN: 160 mL / OUT: 850 mL / NET: -690 mL      Height (cm): 170.18 (06-17 @ 23:31)  Weight (kg): 52.4 (06-17 @ 23:31)  BMI (kg/m2): 18.1 (06-17 @ 23:31)  BSA (m2): 1.6 (06-17 @ 23:31)    LABS:  06-18    138  |  94<L>  |  18  ----------------------------<  175<H>  4.8   |  27  |  0.5<L>    Ca    8.8      18 Jun 2018 08:53    TPro  7.0  /  Alb  3.8  /  TBili  0.2  /  DBili      /  AST  17  /  ALT  24  /  AlkPhos  92  06-17                          10.1   13.24 )-----------( 440      ( 18 Jun 2018 08:53 )             31.6       Urine Studies:        RADIOLOGY & ADDITIONAL STUDIES:

## 2018-06-19 LAB
ANION GAP SERPL CALC-SCNC: 13 MMOL/L — SIGNIFICANT CHANGE UP (ref 7–14)
BUN SERPL-MCNC: 26 MG/DL — HIGH (ref 10–20)
CALCIUM SERPL-MCNC: 9.2 MG/DL — SIGNIFICANT CHANGE UP (ref 8.5–10.1)
CHLORIDE SERPL-SCNC: 96 MMOL/L — LOW (ref 98–110)
CO2 SERPL-SCNC: 29 MMOL/L — SIGNIFICANT CHANGE UP (ref 17–32)
CREAT SERPL-MCNC: 0.6 MG/DL — LOW (ref 0.7–1.5)
GLUCOSE SERPL-MCNC: 135 MG/DL — HIGH (ref 70–99)
HCT VFR BLD CALC: 28.9 % — LOW (ref 42–52)
HGB BLD-MCNC: 9.1 G/DL — LOW (ref 14–18)
MAGNESIUM SERPL-MCNC: 2.1 MG/DL — SIGNIFICANT CHANGE UP (ref 1.8–2.4)
MCHC RBC-ENTMCNC: 27.3 PG — SIGNIFICANT CHANGE UP (ref 27–31)
MCHC RBC-ENTMCNC: 31.5 G/DL — LOW (ref 32–37)
MCV RBC AUTO: 86.8 FL — SIGNIFICANT CHANGE UP (ref 80–94)
NRBC # BLD: 0 /100 WBCS — SIGNIFICANT CHANGE UP (ref 0–0)
PLATELET # BLD AUTO: 473 K/UL — HIGH (ref 130–400)
POTASSIUM SERPL-MCNC: 4.7 MMOL/L — SIGNIFICANT CHANGE UP (ref 3.5–5)
POTASSIUM SERPL-SCNC: 4.7 MMOL/L — SIGNIFICANT CHANGE UP (ref 3.5–5)
RBC # BLD: 3.33 M/UL — LOW (ref 4.7–6.1)
RBC # FLD: 15.9 % — HIGH (ref 11.5–14.5)
SODIUM SERPL-SCNC: 138 MMOL/L — SIGNIFICANT CHANGE UP (ref 135–146)
WBC # BLD: 17.19 K/UL — HIGH (ref 4.8–10.8)
WBC # FLD AUTO: 17.19 K/UL — HIGH (ref 4.8–10.8)

## 2018-06-19 PROCEDURE — 99223 1ST HOSP IP/OBS HIGH 75: CPT | Mod: 25

## 2018-06-19 PROCEDURE — 31575 DIAGNOSTIC LARYNGOSCOPY: CPT

## 2018-06-19 RX ADMIN — PANTOPRAZOLE SODIUM 40 MILLIGRAM(S): 20 TABLET, DELAYED RELEASE ORAL at 18:32

## 2018-06-19 RX ADMIN — Medication 3 MILLILITER(S): at 14:17

## 2018-06-19 RX ADMIN — BUDESONIDE AND FORMOTEROL FUMARATE DIHYDRATE 2 PUFF(S): 160; 4.5 AEROSOL RESPIRATORY (INHALATION) at 19:40

## 2018-06-19 RX ADMIN — LORATADINE 10 MILLIGRAM(S): 10 TABLET ORAL at 12:54

## 2018-06-19 RX ADMIN — Medication 1 APPLICATION(S): at 06:46

## 2018-06-19 RX ADMIN — Medication 3 MILLILITER(S): at 19:58

## 2018-06-19 RX ADMIN — SODIUM CHLORIDE 3 MILLILITER(S): 9 INJECTION INTRAMUSCULAR; INTRAVENOUS; SUBCUTANEOUS at 21:19

## 2018-06-19 RX ADMIN — SODIUM CHLORIDE 3 MILLILITER(S): 9 INJECTION INTRAMUSCULAR; INTRAVENOUS; SUBCUTANEOUS at 15:02

## 2018-06-19 RX ADMIN — ZINC OXIDE 1 APPLICATION(S): 200 OINTMENT TOPICAL at 12:53

## 2018-06-19 RX ADMIN — SODIUM CHLORIDE 3 MILLILITER(S): 9 INJECTION INTRAMUSCULAR; INTRAVENOUS; SUBCUTANEOUS at 06:42

## 2018-06-19 RX ADMIN — Medication 3 MILLILITER(S): at 09:25

## 2018-06-19 RX ADMIN — ENOXAPARIN SODIUM 40 MILLIGRAM(S): 100 INJECTION SUBCUTANEOUS at 12:54

## 2018-06-19 RX ADMIN — Medication 40 MILLIGRAM(S): at 06:46

## 2018-06-19 RX ADMIN — Medication 1 APPLICATION(S): at 18:32

## 2018-06-19 RX ADMIN — SIMVASTATIN 10 MILLIGRAM(S): 20 TABLET, FILM COATED ORAL at 21:24

## 2018-06-19 RX ADMIN — AMLODIPINE BESYLATE 10 MILLIGRAM(S): 2.5 TABLET ORAL at 06:46

## 2018-06-19 NOTE — DIETITIAN INITIAL EVALUATION ADULT. - NS AS NUTRI INTERV ENTERAL NUTRITION3
Continuous feeds of Osmolite 1.5 increase 10mL/hr to goal rate of 75mL/hr x 16 hrs per day. TF at goal rate will provide total volume 1200mL/day, 1800kcal/day, 75 g/day protein, 915 mL/day free H2O. Pre/post free H2O flush as per LIP. Continuous feeds of Osmolite 1.5 @ 60mL/hr increasing 10mL/hr to GOAL RATE of 75mL/hr x 16 hrs per day. TF at goal rate will provide total volume 1200mL/day, 1800kcal/day, 75 g/day protein, 915 mL/day free H2O. Pre/post free H2O flush as per LIP. Continuous feeds of Osmolite 1.5 @ 60mL/hr increasing 10mL/hr to GOAL RATE of 75mL/hr x 16 hrs per day. TF at goal rate will provide total volume 1200mL/day, 1800kcal/day, 75 g/day protein, 915 mL/day free H2O and 100% RDIs. Pre/post free H2O flush as per LIP.

## 2018-06-19 NOTE — DIETITIAN INITIAL EVALUATION ADULT. - OTHER INFO
Pt presented from NH with SOB, productive cough with sputum and fever. Primary dx: COPD exacerbation. Hospital course complicated by copd exacerbation/ possible pneumonia/acute on chronic respiratory failure-BiPAP for many years PTA, NC in place for supplemental O2 and BiPAP PRN. SLP following-recc continue solely PEG feeds, failed VFSS/MBS this morning. ENT consult for 2 week hx of voice change. HTN. DLD. Anxiety. Reason for assessment: TF in place on admission, BMI <18 and stage II pressure ulcer. Pt meets criteria for moderate malnutrition in the setting of chronic illness.

## 2018-06-19 NOTE — SWALLOW VFSS/MBS ASSESSMENT ADULT - ROSENBEK'S PENETRATION ASPIRATION SCALE
inconsistent cough response to aspiration/(7) contrast passes glottis, visible subglottic residue remains despite patient’s response (aspiration) (8) contrast passes glottis, visible subglottic residue remains, absent patient response (aspiration)/inconsistent response to aspiration (7) contrast passes glottis, visible subglottic residue remains despite patient’s response (aspiration)

## 2018-06-19 NOTE — SWALLOW VFSS/MBS ASSESSMENT ADULT - ESOPHAGEAL STAGE
Minimal bolus clearance through the UES, improves slightly w/ consecutive swallows, head turn chin tuck. Minimal UES opening w/ resistance to bolus clearance despite use of head turn/chin tuck and consecutive swallows. Minimal clearance of puree through the UES despite attempts to use chin tuck and head turns L/R w/ consecutive swallows, Strategies less effective w/ heavier bolus

## 2018-06-19 NOTE — PROGRESS NOTE ADULT - ASSESSMENT
CRF  COPD exac  Anxiety  Dysphagia s/p peg tube    02 via nc  albuterol prn  symbicort 160/4.5 2 puff bid (in lieu of brovana and pulmicort)  albuterol prn  prednisone 40mg for 5 days then taper to basline dose  cont daliresp, mucinex  dvt/gi px  oob - chair

## 2018-06-19 NOTE — PROGRESS NOTE ADULT - SUBJECTIVE AND OBJECTIVE BOX
NEPHROLOGY FOLLOW UP:    no fever, tolerating peg feeds, S&S input noted, still hoarse, no change in chronic dyspnea    PAST MEDICAL & SURGICAL HISTORY:  Other hydrocele  Anxiety disorder, unspecified type  Hypertension, unspecified type  High cholesterol  Chronic obstructive pulmonary disease, unspecified COPD type  PEG (percutaneous endoscopic gastrostomy) status    Allergies:  No Known Allergies    Home Medications Reviewed    SOCIAL HISTORY:  Denies ETOH,Smoking,   FAMILY HISTORY:  No pertinent family history in first degree relatives        REVIEW OF SYSTEMS:  All other review of systems is negative unless indicated above.    PHYSICAL EXAM:  NAD  moistmm  no jvd  cta b/l decreased bs bl  rrr  soft  + peg  no edema    Hospital Medications:   MEDICATIONS  (STANDING):  ALBUTerol/ipratropium for Nebulization 3 milliLiter(s) Nebulizer every 6 hours  amLODIPine   Tablet 10 milliGRAM(s) Oral daily  buDESOnide 160 MICROgram(s)/formoterol 4.5 MICROgram(s) Inhaler 2 Puff(s) Inhalation two times a day  enoxaparin Injectable 40 milliGRAM(s) SubCutaneous daily  levoFLOXacin IVPB 500 milliGRAM(s) IV Intermittent every 24 hours  loratadine 10 milliGRAM(s) Oral daily  pantoprazole    Tablet 40 milliGRAM(s) Oral before breakfast  predniSONE   Tablet 40 milliGRAM(s) Oral daily  simvastatin 10 milliGRAM(s) Oral at bedtime  sodium chloride 0.9% lock flush 3 milliLiter(s) IV Push every 8 hours  vitamin A &amp; D Ointment 1 Application(s) Topical two times a day  zinc oxide 20% Ointment 1 Application(s) Topical daily        VITALS:  T(F): 96.2 (06-19-18 @ 06:20), Max: 98.3 (06-18-18 @ 14:18)  HR: 79 (06-19-18 @ 06:20)  BP: 151/58 (06-19-18 @ 06:20)  RR: 20 (06-18-18 @ 21:19)  SpO2: 79% (06-19-18 @ 06:20)  Wt(kg): --    06-18 @ 07:01  -  06-19 @ 07:00  --------------------------------------------------------  IN: 880 mL / OUT: 1850 mL / NET: -970 mL          LABS:  06-18    138  |  94<L>  |  18  ----------------------------<  175<H>  4.8   |  27  |  0.5<L>    Ca    8.8      18 Jun 2018 08:53    TPro  7.0  /  Alb  3.8  /  TBili  0.2  /  DBili      /  AST  17  /  ALT  24  /  AlkPhos  92  06-17                          10.1   13.24 )-----------( 440      ( 18 Jun 2018 08:53 )             31.6       Urine Studies:        RADIOLOGY & ADDITIONAL STUDIES: NEPHROLOGY FOLLOW UP:    no fever, tolerating peg feeds, S&S input noted, still hoarse, no change in chronic dyspnea    PAST MEDICAL & SURGICAL HISTORY:  Other hydrocele  Anxiety disorder, unspecified type  Hypertension, unspecified type  High cholesterol  Chronic obstructive pulmonary disease, unspecified COPD type  PEG (percutaneous endoscopic gastrostomy) status    Allergies:  No Known Allergies    Home Medications Reviewed    SOCIAL HISTORY:  Denies ETOH,Smoking,   FAMILY HISTORY:  No pertinent family history in first degree relatives    advance care planning and advance care directives reviewed and d/w pt in detail    REVIEW OF SYSTEMS:  All other review of systems is negative unless indicated above.    PHYSICAL EXAM:  NAD  moistmm  no jvd  cta b/l decreased bs bl  rrr  soft  + peg  no edema    Hospital Medications:   MEDICATIONS  (STANDING):  ALBUTerol/ipratropium for Nebulization 3 milliLiter(s) Nebulizer every 6 hours  amLODIPine   Tablet 10 milliGRAM(s) Oral daily  buDESOnide 160 MICROgram(s)/formoterol 4.5 MICROgram(s) Inhaler 2 Puff(s) Inhalation two times a day  enoxaparin Injectable 40 milliGRAM(s) SubCutaneous daily  levoFLOXacin IVPB 500 milliGRAM(s) IV Intermittent every 24 hours  loratadine 10 milliGRAM(s) Oral daily  pantoprazole    Tablet 40 milliGRAM(s) Oral before breakfast  predniSONE   Tablet 40 milliGRAM(s) Oral daily  simvastatin 10 milliGRAM(s) Oral at bedtime  sodium chloride 0.9% lock flush 3 milliLiter(s) IV Push every 8 hours  vitamin A &amp; D Ointment 1 Application(s) Topical two times a day  zinc oxide 20% Ointment 1 Application(s) Topical daily        VITALS:  T(F): 96.2 (06-19-18 @ 06:20), Max: 98.3 (06-18-18 @ 14:18)  HR: 79 (06-19-18 @ 06:20)  BP: 151/58 (06-19-18 @ 06:20)  RR: 20 (06-18-18 @ 21:19)  SpO2: 79% (06-19-18 @ 06:20)  Wt(kg): --    06-18 @ 07:01  -  06-19 @ 07:00  --------------------------------------------------------  IN: 880 mL / OUT: 1850 mL / NET: -970 mL          LABS:  06-18    138  |  94<L>  |  18  ----------------------------<  175<H>  4.8   |  27  |  0.5<L>    Ca    8.8      18 Jun 2018 08:53    TPro  7.0  /  Alb  3.8  /  TBili  0.2  /  DBili      /  AST  17  /  ALT  24  /  AlkPhos  92  06-17                          10.1   13.24 )-----------( 440      ( 18 Jun 2018 08:53 )             31.6       Urine Studies:        RADIOLOGY & ADDITIONAL STUDIES:

## 2018-06-19 NOTE — CONSULT NOTE ADULT - SUBJECTIVE AND OBJECTIVE BOX
Patient is a 62y old  Male who presents with a chief complaint of shortness of breath, cough and mild sputum, fever (18 Jun 2018 16:40)    HPI:  62 m from [facility], comes for sob , cough with mild white sputum, and subjective fever today , has COPD , on bipap at night, was sent to ED, [place] [address], and was tachypneic, was given abx, iv steroids, and admitted for copd exacerbtion/pneumonia. cxr did not show consolidation or infiltrate, bnp was normal    patient reports that he had peg tube placed 2 months ago for difficulty swallowing, but no records here, [facility] and was told that patient came to [facility] from [facility]. he gets peg feed for 16 hours and eats mechanical soft diet, pureed veg. he denies cp/palpitations,n/v/d/c/dizziness. at SNF, patient reports thathe walks with a walker, per snf, he is 1 person assist (17 Jun 2018 20:35)      PAST MEDICAL & SURGICAL HISTORY:  Other hydrocele  Anxiety disorder, unspecified type  Hypertension, unspecified type  High cholesterol  Chronic obstructive pulmonary disease, unspecified COPD type  PEG (percutaneous endoscopic gastrostomy) status      Hospital Course: seen by speech- NPO- continue PEG feeds-  copd exacerbation/ possible pneumonia/acute on chronic respiratory failure on bipap for many years  abx- azax plus rocephin  bipap hs and prn- 12/5, 40 fio2 (snf settings)  o2 prn, goal >90 %  iv steroids solumedrol 60 q12  symbicort q12  duoneb q6hr  TODAY'S SUBJECTIVE & REVIEW OF SYMPTOMS:     Constitutional Weakness   Cardio WNL   Resp sob   GI WNL  Heme WNL  Endo WNL  Skin WNL  MSK WNL  Neuro tremors  Cognitive WNL  Psych WNL      MEDICATIONS  (STANDING):  ALBUTerol/ipratropium for Nebulization 3 milliLiter(s) Nebulizer every 6 hours  amLODIPine   Tablet 10 milliGRAM(s) Oral daily  buDESOnide 160 MICROgram(s)/formoterol 4.5 MICROgram(s) Inhaler 2 Puff(s) Inhalation two times a day  enoxaparin Injectable 40 milliGRAM(s) SubCutaneous daily  levoFLOXacin IVPB 500 milliGRAM(s) IV Intermittent every 24 hours  loratadine 10 milliGRAM(s) Oral daily  pantoprazole    Tablet 40 milliGRAM(s) Oral before breakfast  predniSONE   Tablet 40 milliGRAM(s) Oral daily  simvastatin 10 milliGRAM(s) Oral at bedtime  sodium chloride 0.9% lock flush 3 milliLiter(s) IV Push every 8 hours  vitamin A &amp; D Ointment 1 Application(s) Topical two times a day  zinc oxide 20% Ointment 1 Application(s) Topical daily    MEDICATIONS  (PRN):  acetaminophen   Tablet 650 milliGRAM(s) Oral every 6 hours PRN For Temp greater than 38 C (100.4 F)  ALPRAZolam 1 milliGRAM(s) Oral two times a day PRN anxiety  guaiFENesin    Syrup 200 milliGRAM(s) Oral every 6 hours PRN Cough      FAMILY HISTORY:  No pertinent family history in first degree relatives      Allergies    No Known Allergies    Intolerances        SOCIAL HISTORY:    [    ] Etoh  [    ] Smoking  [    ] Substance abuse     Home Environment:  [    ] Home Alone  [    ] Lives with Family  [    ] Home Health Aid    Dwelling:  [    ] Apartment  [    ] Private House  [    ] Adult Home  [   x ] Skilled Nursing Facility      [  x  ] Short Term  [  x  ] Long Term?  [    ] Stairs                           [    ] Elevator     FUNCTIONAL STATUS PTA: (Check all that apply)  Ambulation: [     ]Independent    [  x  ] Dependent     [    ] Non-Ambulatory  Arementssistive Device: [    ] SA Cane  [    ]  Q Cane  [    ] Walker  [ x   ]  Wheelchair  ADL : [    ] Independent  [x    ]  Dependent   secondary to O2 requirements    Vital Signs Last 24 Hrs  T(C): 36.3 (19 Jun 2018 12:00), Max: 36.3 (19 Jun 2018 12:00)  T(F): 97.3 (19 Jun 2018 12:00), Max: 97.3 (19 Jun 2018 12:00)  HR: 93 (19 Jun 2018 12:00) (79 - 93)  BP: 112/65 (19 Jun 2018 12:00) (112/65 - 151/58)  BP(mean): --  RR: 18 (19 Jun 2018 12:00) (18 - 20)  SpO2: 79% (19 Jun 2018 06:20) (79% - 100%)      PHYSICAL EXAM: Alert & Oriented X3 on O2 severe tremors ext  GENERAL: NAD, well-groomed, well-developed  HEAD:  Atraumatic, Normocephalic  EYES: EOMI, PERRLA, conjunctiva and sclera clear  NECK: Supple, No JVD, Normal thyroid  CHEST/LUNG: Diminished BS/ mild wheeze  HEART: Regular rate and rhythm; No murmurs, rubs, or gallops  ABDOMEN: Soft, Nontender, Nondistended; Bowel sounds present  EXTREMITIES:  2+ Peripheral Pulses, No clubbing, cyanosis, or edema    NERVOUS SYSTEM:  Cranial Nerves 2-12 intact [  x  ] Abnormal  [    ]  ROM: WFL all extremities [   x ]  Abnormal [     ]  Motor Strength: WFL all extremities  [  x  ]  Abnormal [    ] 4/5  Sensation: intact to light touch [  x  ] Abnormal [    ]  Reflexes: Symmetric [    ]  Abnormal [    ]    FUNCTIONAL STATUS:  Bed Mobility: [   ]  Independent [    ]  Supervision [x    ]  Needs Assistance [  ]  N/A  Transfers: [    ]  Independent [    ]  Supervision [ x   ]  Needs Assistance [    ]  N/A    Ambulation:  [    ]  Independent [    ]  Supervision [    ]  Needs Assistance [x    ]  N/A   ADL:  [    ]   Independent [  x  ] Requires Assistance [    ] N/A       LABS:                        9.1    17.19 )-----------( 473      ( 19 Jun 2018 11:29 )             28.9     06-19    138  |  96<L>  |  26<H>  ----------------------------<  135<H>  4.7   |  29  |  0.6<L>    Ca    9.2      19 Jun 2018 11:29  Mg     2.1     06-19    TPro  7.0  /  Alb  3.8  /  TBili  0.2  /  DBili  x   /  AST  17  /  ALT  24  /  AlkPhos  92  06-17          RADIOLOGY & ADDITIONAL STUDIES:

## 2018-06-19 NOTE — DIETITIAN INITIAL EVALUATION ADULT. - PHYSICAL APPEARANCE
BMI 18.1. Nutrition focused physical exam completed and observed mild-moderate subcutaneous fat loss in mid-upper arm region. Stage II pressure ulcer L hip.

## 2018-06-19 NOTE — CONSULT NOTE ADULT - ASSESSMENT
62 y.o male with change in voice x 2 weeks 62 y.o male with change in voice x 2 weeks - L vocal cord paralysis    ·	cont PEG feeds for nutrition  ·	Cont SLP recs  ·	w/d with attng

## 2018-06-19 NOTE — CONSULT NOTE ADULT - ASSESSMENT
IMPRESSION: Rehab of debilitation/ COPD/ Dysphagia    PRECAUTIONS: [   x ] Cardiac  [  x  ] Respiratory  [    ] Seizures [    ] Contact Isolation  [    ] Droplet Isolation  [    ] Other    Weight Bearing Status:     RECOMMENDATION:    Out of Bed to Chair     DVT/Decubiti Prophylaxis    REHAB PLAN:     [ x    ] Bedside P/T 3-5 times a week   [     ] Bedside O/T  2-3 times a week   [     ] No Rehab Therapy Indicated   [     ]  Speech Therapy   Conditioning/ROM                                 ADL  Bed Mobility                                            Conditioning/ROM  Transfers                                                  Bed Mobility  Sitting /Standing Balance                      Transfers                                        Gait Training                                            Sitting/Standing Balance  Stair Training [   ]Applicable                 Home equipment Eval                                                                     Splinting  [   ] Only      GOALS:   ADL   [    ]   Independent         Transfers  [    ] Independent            Ambulation  [     ] Independent     [    x ] With device                            [ x   ]  CG                                               [  x  ]  CG                                                    [ x    ] CG                            [    ] Min A                                          [    ] Min A                                                [     ] Min  A          DISCHARGE PLAN:   [     ]  Good candidate for Intensive Rehabilitation/Hospital based-[facility]A [facility]                                             Will tolerate 3hrs Intensive Rehab Daily                                       [   x   ]  Short Term Rehab in Skilled Nursing Facility MAY NOT WANT TO RETURN TO [facility]                                       [      ]  Home with Outpatient or VN services                                         [      ]  Possible Candidate for Intensive Hospital based Rehab    PATIENT WANTS TO EAT DIET AND GO HOME WITH 24X7 HHA, O2 WC AS BEFORE

## 2018-06-19 NOTE — PROGRESS NOTE ADULT - SUBJECTIVE AND OBJECTIVE BOX
Patient was seen and examined. Spoke with RN. Chart reviewed.    No events overnight.  Vital Signs Last 24 Hrs  T(F): 97.3 (19 Jun 2018 12:00), Max: 98.3 (18 Jun 2018 14:18)  HR: 93 (19 Jun 2018 12:00) (79 - 97)  BP: 112/65 (19 Jun 2018 12:00) (112/65 - 151/58)  SpO2: 79% (19 Jun 2018 06:20) (79% - 100%)  MEDICATIONS  (STANDING):  ALBUTerol/ipratropium for Nebulization 3 milliLiter(s) Nebulizer every 6 hours  amLODIPine   Tablet 10 milliGRAM(s) Oral daily  buDESOnide 160 MICROgram(s)/formoterol 4.5 MICROgram(s) Inhaler 2 Puff(s) Inhalation two times a day  enoxaparin Injectable 40 milliGRAM(s) SubCutaneous daily  levoFLOXacin IVPB 500 milliGRAM(s) IV Intermittent every 24 hours  loratadine 10 milliGRAM(s) Oral daily  pantoprazole    Tablet 40 milliGRAM(s) Oral before breakfast  predniSONE   Tablet 40 milliGRAM(s) Oral daily  simvastatin 10 milliGRAM(s) Oral at bedtime  sodium chloride 0.9% lock flush 3 milliLiter(s) IV Push every 8 hours  vitamin A &amp; D Ointment 1 Application(s) Topical two times a day  zinc oxide 20% Ointment 1 Application(s) Topical daily    MEDICATIONS  (PRN):  acetaminophen   Tablet 650 milliGRAM(s) Oral every 6 hours PRN For Temp greater than 38 C (100.4 F)  ALPRAZolam 1 milliGRAM(s) Oral two times a day PRN anxiety  guaiFENesin    Syrup 200 milliGRAM(s) Oral every 6 hours PRN Cough    Labs:                        9.1    17.19 )-----------( 473      ( 19 Jun 2018 11:29 )             28.9                         10.1   13.24 )-----------( 440      ( 18 Jun 2018 08:53 )             31.6     18 Jun 2018 08:53    138    |  94     |  18     ----------------------------<  175    4.8     |  27     |  0.5    17 Jun 2018 17:45    137    |  93     |  16     ----------------------------<  112    5.0     |  28     |  0.5      Ca    8.8        18 Jun 2018 08:53  Ca    9.0        17 Jun 2018 17:45    TPro  7.0    /  Alb  3.8    /  TBili  0.2    /  DBili  x      /  AST  17     /  ALT  24     /  AlkPhos  92     17 Jun 2018 17:45            Radiology:    General: comfortable, NAD  Neurology: A&Ox3, nonfocal  Head:  Normocephalic, atraumatic  ENT:  Mucosa moist, no ulcerations  Neck:  Supple, no JVD,   Skin: no breakdowns (as per RN)  Resp: coarse br  CV: RRR, S1S2,   GI: Soft, NT, bowel sounds  MS: No edema, + peripheral pulses, FROM all 4 extremity

## 2018-06-19 NOTE — CONSULT NOTE ADULT - ATTENDING COMMENTS
Flexible laryngoscopy: WNL with exception of left vocal fold immobility and vocal fold atrophy. Etiology unclear. Recommend CT neck with contrast.

## 2018-06-19 NOTE — SWALLOW VFSS/MBS ASSESSMENT ADULT - DIAGNOSTIC IMPRESSIONS
Severe to profound pharyngeal dysphagia w/o epiglottic inversion, decreased laryngeal elevation and absent laryngeal excursion. Minimal UES opening during the swallow resulting in significant pyriform sinus residue which increased w/ heavier bolus'. A head turn chin tuck L/R was most effective w/ thins in clearing residue and was not effective in clearing nectar/puree residue. Despite clearance of thins from pyriform sinus w/ strategies, aspiration was observed.

## 2018-06-19 NOTE — DIETITIAN INITIAL EVALUATION ADULT. - INDICATOR
RD will monitor tube feed regimen, energy intake, wt trends, NFPF (fat loss, GI abnormalities) RD will monitor tube feed regimen, energy intake, wt trends, NFPF (fat loss, skin integrity)

## 2018-06-19 NOTE — PROGRESS NOTE ADULT - ASSESSMENT
chronic resp failure  COPD exac  HTN  anxiety  s/p PEG  debility  hoarse voice / vocal cord dysfuntion    plan:    cont norvasc  steroid taper  abx  o2 / bipap / nebs  peg feeds  MBS / FEES  ENT consult    no renal issues, but will follow chronic resp failure  COPD exac  HTN  anxiety  s/p PEG  debility  hoarse voice / vocal cord dysfuntion    plan:    cont norvasc  steroid taper  abx  o2 / bipap / nebs  peg feeds  MBS / FEES  ENT consult  full code

## 2018-06-19 NOTE — SWALLOW VFSS/MBS ASSESSMENT ADULT - UNSUCCESSFUL STRATEGIES TRIALED DURING PROCEDURE
head turn to the right/head turn to the left/consecutive swallows/chin tuck head turn to the left/chin tuck/head turn to the right/less effective w/ heavier bolus head turn to the left/consecutive/chin tuck/head turn to the right

## 2018-06-19 NOTE — PROGRESS NOTE ADULT - SUBJECTIVE AND OBJECTIVE BOX
SUBJECTIVE:    Patient is a 62y old  Male who presents with a chief complaint of shortness of breath, cough and mild sputum, fever (18 Jun 2018 16:40)    Currently admitted to medicine with the primary diagnosis of COPD exacerbation     Today is hospital day 2d. No new issues or overnight events.     PAST MEDICAL & SURGICAL HISTORY  PAST MEDICAL & SURGICAL HISTORY:  Other hydrocele  Anxiety disorder, unspecified type  Hypertension, unspecified type  High cholesterol  Chronic obstructive pulmonary disease, unspecified COPD type  PEG (percutaneous endoscopic gastrostomy) status    SOCIAL HISTORY:    ALLERGIES:  No Known Allergies    MEDICATIONS:  STANDING MEDICATIONS  ALBUTerol/ipratropium for Nebulization 3 milliLiter(s) Nebulizer every 6 hours  amLODIPine   Tablet 10 milliGRAM(s) Oral daily  buDESOnide 160 MICROgram(s)/formoterol 4.5 MICROgram(s) Inhaler 2 Puff(s) Inhalation two times a day  enoxaparin Injectable 40 milliGRAM(s) SubCutaneous daily  levoFLOXacin IVPB 500 milliGRAM(s) IV Intermittent every 24 hours  loratadine 10 milliGRAM(s) Oral daily  pantoprazole    Tablet 40 milliGRAM(s) Oral before breakfast  predniSONE   Tablet 40 milliGRAM(s) Oral daily  simvastatin 10 milliGRAM(s) Oral at bedtime  sodium chloride 0.9% lock flush 3 milliLiter(s) IV Push every 8 hours  vitamin A &amp; D Ointment 1 Application(s) Topical two times a day  zinc oxide 20% Ointment 1 Application(s) Topical daily    PRN MEDICATIONS  acetaminophen   Tablet 650 milliGRAM(s) Oral every 6 hours PRN  ALPRAZolam 1 milliGRAM(s) Oral two times a day PRN  guaiFENesin    Syrup 200 milliGRAM(s) Oral every 6 hours PRN    VITALS:   T(F): 96.2  HR: 79  BP: 151/58  RR: 20  SpO2: 79%    HOME MEDS: </u  albuterol 2.5 mg/3 mL (0.083%) inhalation solution: inhaled every 6 hours, As Needed  ALPRAZolam 1 mg oral tablet: orally 2 times a day, As Needed  Balmex Adult Care 11.3% topical cream: Apply topically to affected area once a day sacrum  Brovana 15 mcg/2 mL inhalation solution: 2 milliliter(s) inhaled 2 times a day  Flovent  mcg/inh inhalation aerosol: inhaled once a day  loratadine 10 mg oral tablet: 1 tab(s) orally once a day peg tube  Mucinex: 200 milligram(s) orally every 4 hours via peg tube  Norvasc 10 mg oral tablet: 1 tab(s) orally once a day  nystatin 100,000 units/g topical cream: Apply topically to affected area 2 times a day left abdomen  omeprazole 20 mg oral delayed release capsule: 1 cap(s) orally once a day via peg  predniSONE 10 mg oral tablet: 1 tab(s) by gastrostomy tube once a day  roflumilast 500 mcg oral tablet: 1 tab(s) orally once a day  simvastatin 10 mg oral tablet: 1 tab(s) orally once a day (at bedtime)  vitamin A &amp; D topical cream: Apply topically to affected area 2 times a day legs      LABS:                        10.1   13.24 )-----------( 440      ( 18 Jun 2018 08:53 )             31.6     06-18    138  |  94<L>  |  18  ----------------------------<  175<H>  4.8   |  27  |  0.5<L>    Ca    8.8      18 Jun 2018 08:53    TPro  7.0  /  Alb  3.8  /  TBili  0.2  /  DBili  x   /  AST  17  /  ALT  24  /  AlkPhos  92  06-17      CARDIAC MARKERS ( 17 Jun 2018 17:45 )  x     / 0.03 ng/mL / 31 U/L / x     / 2.3 ng/mL      RADIOLOGY:  reviewed    PHYSICAL EXAM:  GEN: No acute distress  HEENT: on 2L N.C.  LUNGS: Clear to auscultation bilaterally   HEART: S1/S2 present. RRR.   ABD: Soft, non-tender, non-distended. Bowel sounds present  EXT: no LE edema  NEURO: AAOX3 SUBJECTIVE:    Patient is a 62y old  Male who presents with a chief complaint of shortness of breath, cough and mild sputum, fever (18 Jun 2018 16:40)    Currently admitted to medicine with the primary diagnosis of COPD exacerbation     Today is hospital day 2d. No new issues or overnight events.     PAST MEDICAL & SURGICAL HISTORY  PAST MEDICAL & SURGICAL HISTORY:  Other hydrocele  Anxiety disorder, unspecified type  Hypertension, unspecified type  High cholesterol  Chronic obstructive pulmonary disease, unspecified COPD type  PEG (percutaneous endoscopic gastrostomy) status    SOCIAL HISTORY:    ALLERGIES:  No Known Allergies    MEDICATIONS:  STANDING MEDICATIONS  ALBUTerol/ipratropium for Nebulization 3 milliLiter(s) Nebulizer every 6 hours  amLODIPine   Tablet 10 milliGRAM(s) Oral daily  buDESOnide 160 MICROgram(s)/formoterol 4.5 MICROgram(s) Inhaler 2 Puff(s) Inhalation two times a day  enoxaparin Injectable 40 milliGRAM(s) SubCutaneous daily  levoFLOXacin IVPB 500 milliGRAM(s) IV Intermittent every 24 hours  loratadine 10 milliGRAM(s) Oral daily  pantoprazole    Tablet 40 milliGRAM(s) Oral before breakfast  predniSONE   Tablet 40 milliGRAM(s) Oral daily  simvastatin 10 milliGRAM(s) Oral at bedtime  sodium chloride 0.9% lock flush 3 milliLiter(s) IV Push every 8 hours  vitamin A &amp; D Ointment 1 Application(s) Topical two times a day  zinc oxide 20% Ointment 1 Application(s) Topical daily    PRN MEDICATIONS  acetaminophen   Tablet 650 milliGRAM(s) Oral every 6 hours PRN  ALPRAZolam 1 milliGRAM(s) Oral two times a day PRN  guaiFENesin    Syrup 200 milliGRAM(s) Oral every 6 hours PRN    VITALS:   T(F): 96.2  HR: 79  BP: 151/58  RR: 20  SpO2: 79%    HOME MEDS: </u  albuterol 2.5 mg/3 mL (0.083%) inhalation solution: inhaled every 6 hours, As Needed  ALPRAZolam 1 mg oral tablet: orally 2 times a day, As Needed  Balmex Adult Care 11.3% topical cream: Apply topically to affected area once a day sacrum  Brovana 15 mcg/2 mL inhalation solution: 2 milliliter(s) inhaled 2 times a day  Flovent  mcg/inh inhalation aerosol: inhaled once a day  loratadine 10 mg oral tablet: 1 tab(s) orally once a day peg tube  Mucinex: 200 milligram(s) orally every 4 hours via peg tube  Norvasc 10 mg oral tablet: 1 tab(s) orally once a day  nystatin 100,000 units/g topical cream: Apply topically to affected area 2 times a day left abdomen  omeprazole 20 mg oral delayed release capsule: 1 cap(s) orally once a day via peg  predniSONE 10 mg oral tablet: 1 tab(s) by gastrostomy tube once a day  roflumilast 500 mcg oral tablet: 1 tab(s) orally once a day  simvastatin 10 mg oral tablet: 1 tab(s) orally once a day (at bedtime)  vitamin A &amp; D topical cream: Apply topically to affected area 2 times a day legs      LABS:                        10.1   13.24 )-----------( 440      ( 18 Jun 2018 08:53 )             31.6     06-18    138  |  94<L>  |  18  ----------------------------<  175<H>  4.8   |  27  |  0.5<L>    Ca    8.8      18 Jun 2018 08:53    TPro  7.0  /  Alb  3.8  /  TBili  0.2  /  DBili  x   /  AST  17  /  ALT  24  /  AlkPhos  92  06-17      CARDIAC MARKERS ( 17 Jun 2018 17:45 )  x     / 0.03 ng/mL / 31 U/L / x     / 2.3 ng/mL      RADIOLOGY:  reviewed    PHYSICAL EXAM:  GEN: No acute distress  HEENT: on 2L N.C.  LUNGS: mild b/l wheezing  HEART: S1/S2 present. RRR.   ABD: Soft, non-tender, non-distended. Bowel sounds present  EXT: no LE edema  NEURO: AAOX3

## 2018-06-19 NOTE — DIETITIAN INITIAL EVALUATION ADULT. - ORAL INTAKE PTA
Pt presented with PEG in place. Detailed nutrition hx obtained from [facility] paper chart. Pt was receiving continuous feeds of Pulmocare @ 60mL/hr x 16hrs/day with 150cc flushes Q4H  (1440kcal, 60g protein). As per records pt was also receiving PO feeds of mechanical soft, no liquids + 30mL/day prostat. ?ability to tolerate PO feeds as pt with h/o odynophagia 2/2 severe adrian-pharyngeal thrush. Reported # in February 2018. NKFA.

## 2018-06-19 NOTE — CHART NOTE - NSCHARTNOTEFT_GEN_A_CORE
Upon Nutritional Assessment by the Registered Dietitian your patient was determined to meet criteria / has evidence of the following diagnosis/diagnoses:          [ ]  Mild Protein Calorie Malnutrition        [x]  Moderate Protein Calorie Malnutrition        [ ] Severe Protein Calorie Malnutrition        [ ] Unspecified Protein Calorie Malnutrition        [x] Underweight / BMI <19        [ ] Morbid Obesity / BMI > 40    Underweight BMI: 18.1     Findings as based on:  •  Comprehensive nutrition assessment and consultation  •  Food and nutrition related history   •  Nutrition focused physical exam     Moderate Protein Calorie Malnutrition Indicators:  1. Mild-moderate subcutaneous fat loss (mid-upper arm region).  2. 14.7% (20#) unintentional weight loss x 4 months.     Treatment:    The following diet has been recommended:  Continuous feeds of Osmolite 1.5 @ 60mL/hr increasing 10mL/hr to GOAL RATE of 75mL/hr x 16 hrs per day.   -TF at goal rate will provide total volume 1200mL/day, 1800kcal/day, 75 g/day protein, 915 mL/day free H2O and 100% RDIs.   -Pre/post free H2O flush per LIP.    PROVIDER Section:     By signing this assessment you are acknowledging and agree with the diagnosis/diagnoses assigned by the Registered Dietitian    Comments:

## 2018-06-19 NOTE — DIETITIAN INITIAL EVALUATION ADULT. - SIGNS/SYMPTOMS
mild-moderate subcutaneous fat loss mid-upper arm region, 20# (14.7%) wt loss x 4 months current TF regimen meeting 78% calorie needs and  76% protein needs, 20# wt loss x 4 months

## 2018-06-19 NOTE — DIETITIAN INITIAL EVALUATION ADULT. - FACTORS AFF FOOD INTAKE
other (specify)/Significant cough with sputum. SLP reccs 6/18 NPO with tube feed via PEG. +LBM 6/19 per pt./difficulty swallowing

## 2018-06-19 NOTE — PROGRESS NOTE ADULT - ASSESSMENT
62 M from [facility] admitted for cob, cough, sputum, leucocytosis    # copd exacerbation/ possible pneumonia/acute on chronic respiratory failure on bipap for many years  abx- azax plus rocephin  bipap hs and prn- 12/5, 40 fio2 (snf settings)  o2 prn, goal >90 %  iv steroids solumedrol 60 q12  symbicort q12  duoneb q6hr  (hold brovana and flonase for now)  Speech/swallow: f/u  results of VFSS/MSB on 6/19, ENT consult for 2 week hx of voice change  pulm consult dr worthington  prednisone taper  f/u ENT eval      # HTN  c/w amlodipine    # dld- statin    # anxiety-   alprax and loratidine    dvt ppx- lovenox 40 q24  out of bed to chair  peg tube feed- for 16 hrs 60 ml/hr from 6 pm to 10 am- pulmocare  pureed diet with ice chips po(not on oral liquids as per snf, called and confirmed)  all meds via peg tube

## 2018-06-19 NOTE — PROGRESS NOTE ADULT - ASSESSMENT
2 M from [facility] admitted for cob, cough, sputum, leucocytosis    # copd exacerbation/ possible pneumonia/acute on chronic respiratory failure on bipap for many years  abx- azax plus rocephin  bipap hs and prn- 12/5, 40 fio2 (snf settings)  o2 prn, goal >90 %  iv steroids solumedrol 60 q12  symbicort q12  duoneb q6hr  (hold brovana and flonase for now)  Speech/swallow: f/u  results of VFSS/MSB on 6/19, ENT consult for 2 week hx of voice change  pulm consult dr worthington  prednisone taper  f/u ENT eval      # HTN  c/w amlodipine    # dld- statin    # anxiety-   alprax and loratidine    dvt ppx- lovenox 40 q24  out of bed to chair  peg tube feed- for 16 hrs 60 ml/hr from 6 pm to 10 am- pulmocare  pureed diet with ice chips po(not on oral liquids as per snf, called and confirmed)  all meds via peg tube

## 2018-06-19 NOTE — PROGRESS NOTE ADULT - SUBJECTIVE AND OBJECTIVE BOX
ALETA SUAZO  62y, Male  Allergy: No Known Allergies    PMH/PSH:  PAST MEDICAL & SURGICAL HISTORY:  Other hydrocele  Anxiety disorder, unspecified type  Hypertension, unspecified type  High cholesterol  Chronic obstructive pulmonary disease, unspecified COPD type  PEG (percutaneous endoscopic gastrostomy) status      LAST 24-Hr EVENTS:  pt seen examined  laying comfortable in bed  sob improved at baseline    VITALS:  T(F): 97.3 (06-19-18 @ 12:00), Max: 97.3 (06-19-18 @ 12:00)  HR: 93 (06-19-18 @ 12:00)  BP: 112/65 (06-19-18 @ 12:00) (112/65 - 151/58)  RR: 18 (06-19-18 @ 12:00)  SpO2: 98% (06-19-18 @ 16:55)    PHYSICAL EXAM:    GENERAL: NAD  NECK: Supple, No JVD  CHEST/LUNG: distant breath sounds  HEART: Regular rate and rhythm; No murmurs.  ABDOMEN: Soft, Nontender, Nondistended; Bowel sounds present peg tube  EXTREMITIES:  No clubbing, edema absent        TESTS & MEASUREMENTS:    IN: 880 mL / OUT: 1850 mL / NET: -970 mL                            9.1    17.19 )-----------( 473      ( 19 Jun 2018 11:29 )             28.9       06-19    138  |  96<L>  |  26<H>  ----------------------------<  135<H>  4.7   |  29  |  0.6<L>    Ca    9.2      19 Jun 2018 11:29  Mg     2.1     06-19                MEDICATIONS:  MEDICATIONS  (STANDING):  ALBUTerol/ipratropium for Nebulization 3 milliLiter(s) Nebulizer every 6 hours  amLODIPine   Tablet 10 milliGRAM(s) Oral daily  buDESOnide 160 MICROgram(s)/formoterol 4.5 MICROgram(s) Inhaler 2 Puff(s) Inhalation two times a day  enoxaparin Injectable 40 milliGRAM(s) SubCutaneous daily  levoFLOXacin IVPB 500 milliGRAM(s) IV Intermittent every 24 hours  loratadine 10 milliGRAM(s) Oral daily  pantoprazole    Tablet 40 milliGRAM(s) Oral before breakfast  predniSONE   Tablet 40 milliGRAM(s) Oral daily  simvastatin 10 milliGRAM(s) Oral at bedtime  sodium chloride 0.9% lock flush 3 milliLiter(s) IV Push every 8 hours  vitamin A &amp; D Ointment 1 Application(s) Topical two times a day  zinc oxide 20% Ointment 1 Application(s) Topical daily    MEDICATIONS  (PRN):  acetaminophen   Tablet 650 milliGRAM(s) Oral every 6 hours PRN For Temp greater than 38 C (100.4 F)  ALPRAZolam 1 milliGRAM(s) Oral two times a day PRN anxiety  guaiFENesin    Syrup 200 milliGRAM(s) Oral every 6 hours PRN Cough

## 2018-06-19 NOTE — SWALLOW VFSS/MBS ASSESSMENT ADULT - SLP PERTINENT HISTORY OF CURRENT PROBLEM
Pt admitted from [facility] for SOB, cough, +sputum. PMH: COPD on bipap at night. H/o PEG tube at [facility]. Conflicting info provided from [facility] SLP re: po intake. Pt reportedly w/ h/o odynophagia 2' severe adrian-pharyngeal thrush, resulting in decreased po intake and PEG placement.

## 2018-06-19 NOTE — SWALLOW VFSS/MBS ASSESSMENT ADULT - INTEGRITY OF CRICOPHARYNGEAL OPENING
minimal opening w/ poor clearance/No minimal UES opening w/ increased pyriform sinus residue w/ heavier consistencies/No No/minimal clearance of puree through the UES

## 2018-06-19 NOTE — CONSULT NOTE ADULT - SUBJECTIVE AND OBJECTIVE BOX
PT is a 62 y.o male admitted with COPD exacerbation - called to evaluate his change in voice.     PAST MEDICAL: Other hydrocele, Anxiety disorder, HTN,  High cholesterol, COPD   SURGICAL HISTORY: PEG (percutaneous endoscopic gastrostomy) status  MEDICATIONS (HOME): Albuterol , ALPRAZolam, Balmex, Brovana , Flovent HFA, Loratadine, Mucinex, Norvasc, Nystatin, Omeprazole, PredniSONe, Roflumilast, Simvastatin, Vitamin A&D   MEDICATIONS  (STANDING):  ALBUTerol/ipratropium for Nebulization 3 milliLiter(s) Nebulizer every 6 hours  amLODIPine Tablet 10 milliGRAM(s) Oral daily  buDESOnide 160 MICROgram(s)/formoterol 4.5 MICROgram(s) Inhaler 2 Puff(s) Inhalation two times a day  enoxaparin Injectable 40 milliGRAM(s) SubCutaneous daily  levoFLOXacin IVPB 500 milliGRAM(s) IV Intermittent every 24 hours  loratadine 10 milliGRAM(s) Oral daily  pantoprazole Tablet 40 milliGRAM(s) Oral before breakfast  predniSONE Tablet 40 milliGRAM(s) Oral daily  simvastatin 10 milliGRAM(s) Oral at bedtime  sodium chloride 0.9% lock flush 3 milliLiter(s) IV Push every 8 hours  vitamin A &amp; D Ointment 1 Application(s) Topical two times a day  zinc oxide 20% Ointment 1 Application(s) Topical daily  ALLERGIES: NKDA    Vital Signs: T(F): 97.3 (19 Jun 2018 12:00), Max: 97.3 (19 Jun 2018 12:00), HR: 93, BP: 112/65, RR: 18, SpO2: 79%  GEN:   HEENT:                          9.1    17.19 )-----------( 473      ( 19 Jun 2018 11:29 )             28.9     138  |  96<L>  |  26<H>  ----------------------------<  135<H>  4.7   |  29  |  0.6<L>    Ca    9.2      19 Jun 2018 11:29  Mg     2.1     06-19    TPro  7.0  /  Alb  3.8  /  TBili  0.2  /  DBili  x   /  AST  17  /  ALT  24  /  AlkPhos  92  06-17 PT is a 62 y.o male admitted with COPD exacerbation - called to evaluate his change in voice. Pt states he had PEG placed at [facility] and after that developed change in his voice. Pt wants to have PO, is upset that no one is giving him food/water. PT denies any pain to throat, no difficulty swallowing or painful swallowing. No active SOB - does wear bipap overnight.    PAST MEDICAL: Other hydrocele, Anxiety disorder, HTN,  High cholesterol, COPD   SURGICAL HISTORY: PEG (percutaneous endoscopic gastrostomy) status  MEDICATIONS (HOME): Albuterol , ALPRAZolam, Balmex, Brovana , Flovent HFA, Loratadine, Mucinex, Norvasc, Nystatin, Omeprazole, PredniSONe, Roflumilast, Simvastatin, Vitamin A&D   MEDICATIONS  (STANDING):  ALBUTerol/ipratropium for Nebulization 3 milliLiter(s) Nebulizer every 6 hours  amLODIPine Tablet 10 milliGRAM(s) Oral daily  buDESOnide 160 MICROgram(s)/formoterol 4.5 MICROgram(s) Inhaler 2 Puff(s) Inhalation two times a day  enoxaparin Injectable 40 milliGRAM(s) SubCutaneous daily  levoFLOXacin IVPB 500 milliGRAM(s) IV Intermittent every 24 hours  loratadine 10 milliGRAM(s) Oral daily  pantoprazole Tablet 40 milliGRAM(s) Oral before breakfast  predniSONE Tablet 40 milliGRAM(s) Oral daily  simvastatin 10 milliGRAM(s) Oral at bedtime  sodium chloride 0.9% lock flush 3 milliLiter(s) IV Push every 8 hours  vitamin A &amp; D Ointment 1 Application(s) Topical two times a day  zinc oxide 20% Ointment 1 Application(s) Topical daily  ALLERGIES: NKDA    Vital Signs: T(F): 97.3 (19 Jun 2018 12:00), Max: 97.3 (19 Jun 2018 12:00), HR: 93, BP: 112/65, RR: 18, SpO2: 79%  GEN: NAD, awake and alert  HEENT: NC/AT; oral mucosa pink, no erythema/edema, uvula midline; neck supple  FFL : airway patent, + L TVC paralysis noted.                          9.1    17.19 )-----------( 473      ( 19 Jun 2018 11:29 )             28.9     138  |  96<L>  |  26<H>  ----------------------------<  135<H>  4.7   |  29  |  0.6<L>    Ca    9.2      19 Jun 2018 11:29  Mg     2.1     06-19    TPro  7.0  /  Alb  3.8  /  TBili  0.2  /  DBili  x   /  AST  17  /  ALT  24  /  AlkPhos  92  06-17

## 2018-06-19 NOTE — DIETITIAN INITIAL EVALUATION ADULT. - DIET TYPE
NPO with tube feedings/Currently receiving continuous feeds of Osmolite 1.5 @ goal rate of 60mL/hr x 16hrs per day. TF at goal rate providing 1440kcal, 60g protein and 730 mL free H2O. Pt appears very anxious and expressing concern for PEG feeds. States "he doesn't need the tube and can eat regular food". Briefly discussed feeding recommendations with pt as pts RN also discussed case with pt at bedside. Pt requires tube feed to meet caloric needs at this time. SLP reccs NPO, non-oral means of nutrition and hydration. See Tube feeding recommendations below.

## 2018-06-19 NOTE — DIETITIAN INITIAL EVALUATION ADULT. - NS FNS REASON FOR WEIGHT CHANG
decreased po intake/other (specify)/2/2 h/o odynophagia 2/2 severe adrian-pharyngeal thrush, resulting in decreased po intake and PEG placement.

## 2018-06-20 LAB
ANION GAP SERPL CALC-SCNC: 13 MMOL/L — SIGNIFICANT CHANGE UP (ref 7–14)
BASOPHILS # BLD AUTO: 0.01 K/UL — SIGNIFICANT CHANGE UP (ref 0–0.2)
BASOPHILS NFR BLD AUTO: 0.1 % — SIGNIFICANT CHANGE UP (ref 0–1)
BUN SERPL-MCNC: 22 MG/DL — HIGH (ref 10–20)
CALCIUM SERPL-MCNC: 9.3 MG/DL — SIGNIFICANT CHANGE UP (ref 8.5–10.1)
CHLORIDE SERPL-SCNC: 97 MMOL/L — LOW (ref 98–110)
CO2 SERPL-SCNC: 30 MMOL/L — SIGNIFICANT CHANGE UP (ref 17–32)
CREAT SERPL-MCNC: 0.6 MG/DL — LOW (ref 0.7–1.5)
EOSINOPHIL # BLD AUTO: 0 K/UL — SIGNIFICANT CHANGE UP (ref 0–0.7)
EOSINOPHIL NFR BLD AUTO: 0 % — SIGNIFICANT CHANGE UP (ref 0–8)
GLUCOSE SERPL-MCNC: 149 MG/DL — HIGH (ref 70–99)
HCT VFR BLD CALC: 29.2 % — LOW (ref 42–52)
HGB BLD-MCNC: 9.1 G/DL — LOW (ref 14–18)
IMM GRANULOCYTES NFR BLD AUTO: 1.2 % — HIGH (ref 0.1–0.3)
LYMPHOCYTES # BLD AUTO: 0.59 K/UL — LOW (ref 1.2–3.4)
LYMPHOCYTES # BLD AUTO: 3.8 % — LOW (ref 20.5–51.1)
MAGNESIUM SERPL-MCNC: 2 MG/DL — SIGNIFICANT CHANGE UP (ref 1.8–2.4)
MCHC RBC-ENTMCNC: 27.6 PG — SIGNIFICANT CHANGE UP (ref 27–31)
MCHC RBC-ENTMCNC: 31.2 G/DL — LOW (ref 32–37)
MCV RBC AUTO: 88.5 FL — SIGNIFICANT CHANGE UP (ref 80–94)
MONOCYTES # BLD AUTO: 0.54 K/UL — SIGNIFICANT CHANGE UP (ref 0.1–0.6)
MONOCYTES NFR BLD AUTO: 3.4 % — SIGNIFICANT CHANGE UP (ref 1.7–9.3)
NEUTROPHILS # BLD AUTO: 14.39 K/UL — HIGH (ref 1.4–6.5)
NEUTROPHILS NFR BLD AUTO: 91.5 % — HIGH (ref 42.2–75.2)
NRBC # BLD: 0 /100 WBCS — SIGNIFICANT CHANGE UP (ref 0–0)
PLATELET # BLD AUTO: 448 K/UL — HIGH (ref 130–400)
POTASSIUM SERPL-MCNC: 4.4 MMOL/L — SIGNIFICANT CHANGE UP (ref 3.5–5)
POTASSIUM SERPL-SCNC: 4.4 MMOL/L — SIGNIFICANT CHANGE UP (ref 3.5–5)
RBC # BLD: 3.3 M/UL — LOW (ref 4.7–6.1)
RBC # FLD: 16.1 % — HIGH (ref 11.5–14.5)
SODIUM SERPL-SCNC: 140 MMOL/L — SIGNIFICANT CHANGE UP (ref 135–146)
WBC # BLD: 15.72 K/UL — HIGH (ref 4.8–10.8)
WBC # FLD AUTO: 15.72 K/UL — HIGH (ref 4.8–10.8)

## 2018-06-20 RX ORDER — PANTOPRAZOLE SODIUM 20 MG/1
40 TABLET, DELAYED RELEASE ORAL
Qty: 0 | Refills: 0 | Status: DISCONTINUED | OUTPATIENT
Start: 2018-06-20 | End: 2018-06-25

## 2018-06-20 RX ORDER — CEFTRIAXONE 500 MG/1
1 INJECTION, POWDER, FOR SOLUTION INTRAMUSCULAR; INTRAVENOUS EVERY 24 HOURS
Qty: 0 | Refills: 0 | Status: DISCONTINUED | OUTPATIENT
Start: 2018-06-21 | End: 2018-06-21

## 2018-06-20 RX ORDER — CEFTRIAXONE 500 MG/1
INJECTION, POWDER, FOR SOLUTION INTRAMUSCULAR; INTRAVENOUS
Qty: 0 | Refills: 0 | Status: DISCONTINUED | OUTPATIENT
Start: 2018-06-20 | End: 2018-06-21

## 2018-06-20 RX ORDER — CEFTRIAXONE 500 MG/1
1 INJECTION, POWDER, FOR SOLUTION INTRAMUSCULAR; INTRAVENOUS ONCE
Qty: 0 | Refills: 0 | Status: COMPLETED | OUTPATIENT
Start: 2018-06-20 | End: 2018-06-20

## 2018-06-20 RX ADMIN — Medication 1 MILLIGRAM(S): at 22:04

## 2018-06-20 RX ADMIN — Medication 40 MILLIGRAM(S): at 05:35

## 2018-06-20 RX ADMIN — AMLODIPINE BESYLATE 10 MILLIGRAM(S): 2.5 TABLET ORAL at 05:35

## 2018-06-20 RX ADMIN — LORATADINE 10 MILLIGRAM(S): 10 TABLET ORAL at 12:17

## 2018-06-20 RX ADMIN — CEFTRIAXONE 100 GRAM(S): 500 INJECTION, POWDER, FOR SOLUTION INTRAMUSCULAR; INTRAVENOUS at 10:16

## 2018-06-20 RX ADMIN — SODIUM CHLORIDE 3 MILLILITER(S): 9 INJECTION INTRAMUSCULAR; INTRAVENOUS; SUBCUTANEOUS at 10:00

## 2018-06-20 RX ADMIN — Medication 3 MILLILITER(S): at 21:00

## 2018-06-20 RX ADMIN — Medication 1 APPLICATION(S): at 05:37

## 2018-06-20 RX ADMIN — Medication 3 MILLILITER(S): at 14:21

## 2018-06-20 RX ADMIN — Medication 3 MILLILITER(S): at 08:13

## 2018-06-20 RX ADMIN — PANTOPRAZOLE SODIUM 40 MILLIGRAM(S): 20 TABLET, DELAYED RELEASE ORAL at 17:50

## 2018-06-20 RX ADMIN — ENOXAPARIN SODIUM 40 MILLIGRAM(S): 100 INJECTION SUBCUTANEOUS at 12:17

## 2018-06-20 RX ADMIN — ZINC OXIDE 1 APPLICATION(S): 200 OINTMENT TOPICAL at 13:25

## 2018-06-20 RX ADMIN — SODIUM CHLORIDE 3 MILLILITER(S): 9 INJECTION INTRAMUSCULAR; INTRAVENOUS; SUBCUTANEOUS at 21:28

## 2018-06-20 RX ADMIN — SODIUM CHLORIDE 3 MILLILITER(S): 9 INJECTION INTRAMUSCULAR; INTRAVENOUS; SUBCUTANEOUS at 13:25

## 2018-06-20 RX ADMIN — SIMVASTATIN 10 MILLIGRAM(S): 20 TABLET, FILM COATED ORAL at 22:04

## 2018-06-20 NOTE — PROGRESS NOTE ADULT - SUBJECTIVE AND OBJECTIVE BOX
NEPHROLOGY FOLLOW UP:    multiple consultants input noted.  pt still with dysphonia.  tolerating peg feeds, no fever, no change in usual sob    PAST MEDICAL & SURGICAL HISTORY:  Other hydrocele  Anxiety disorder, unspecified type  Hypertension, unspecified type  High cholesterol  Chronic obstructive pulmonary disease, unspecified COPD type  PEG (percutaneous endoscopic gastrostomy) status    Allergies:  No Known Allergies    Home Medications Reviewed    SOCIAL HISTORY:  Denies ETOH,Smoking,   FAMILY HISTORY:  No pertinent family history in first degree relatives      REVIEW OF SYSTEMS:  All other review of systems is negative unless indicated above.    PHYSICAL EXAM:  NAD  moistmm  no jvd  cta b/l decreased bs bl  rrr  soft  + peg  no edema    Hospital Medications:   MEDICATIONS  (STANDING):  ALBUTerol/ipratropium for Nebulization 3 milliLiter(s) Nebulizer every 6 hours  amLODIPine   Tablet 10 milliGRAM(s) Oral daily  buDESOnide 160 MICROgram(s)/formoterol 4.5 MICROgram(s) Inhaler 2 Puff(s) Inhalation two times a day  cefTRIAXone   IVPB      enoxaparin Injectable 40 milliGRAM(s) SubCutaneous daily  levoFLOXacin IVPB 500 milliGRAM(s) IV Intermittent every 24 hours  loratadine 10 milliGRAM(s) Oral daily  pantoprazole   Suspension 40 milliGRAM(s) Oral before breakfast  predniSONE   Tablet 40 milliGRAM(s) Oral daily  simvastatin 10 milliGRAM(s) Oral at bedtime  sodium chloride 0.9% lock flush 3 milliLiter(s) IV Push every 8 hours  vitamin A &amp; D Ointment 1 Application(s) Topical two times a day  zinc oxide 20% Ointment 1 Application(s) Topical daily        VITALS:  T(F): 97.3 (06-20-18 @ 13:12), Max: 97.6 (06-19-18 @ 21:54)  HR: 112 (06-20-18 @ 13:12)  BP: 130/56 (06-20-18 @ 13:12)  RR: 20 (06-20-18 @ 13:12)  SpO2: 99% (06-20-18 @ 02:00)  Wt(kg): --    06-18 @ 07:01  -  06-19 @ 07:00  --------------------------------------------------------  IN: 880 mL / OUT: 1850 mL / NET: -970 mL    06-19 @ 07:01 - 06-20 @ 07:00  --------------------------------------------------------  IN: 1280 mL / OUT: 0 mL / NET: 1280 mL    06-20 @ 07:01 - 06-20 @ 17:17  --------------------------------------------------------  IN: 0 mL / OUT: 500 mL / NET: -500 mL          LABS:  06-20    140  |  97<L>  |  22<H>  ----------------------------<  149<H>  4.4   |  30  |  0.6<L>    Ca    9.3      20 Jun 2018 09:04  Mg     2.0     06-20                            9.1    15.72 )-----------( 448      ( 20 Jun 2018 09:04 )             29.2       Urine Studies:        RADIOLOGY & ADDITIONAL STUDIES:

## 2018-06-20 NOTE — CONSULT NOTE ADULT - SUBJECTIVE AND OBJECTIVE BOX
Gastroenterology Consultation    62y year old  Male with :  Patient is a 62y old  Male who presents with a chief complaint of shortness of breath, cough and mild sputum, fever (18 Jun 2018 16:40)    HPI:  62 m from [facility], comes for sob , cough with mild white sputum, and subjective fever today , has COPD , on bipap at night, was sent to ED, [place]bc [place] [address], and was tachypneic, was given abx, iv steroids, and admitted for copd exacerbtion/pneumonia. cxr did not show consolidation or infiltrate, bnp was normal    patient reports that he had peg tube placed 2 months ago for difficulty swallowing, but no records here, called [facility] and was told that patient came to [facility] from [facility]. he gets peg feed for 16 hours and eats mechanical soft diet, pureed veg. he denies cp/palpitations,n/v/d/c/dizziness. at SNF, patient reports thathe walks with a walker, per snf, he is 1 person assist (17 Jun 2018 20:35)      GI Hx:  63 yo M known to have a PMhx significant for COPD, ?Dysphagia? Transfer vs esophageal admitted for COPD exacerbation. After S&S and ENT eval GI was called to investigate "UES not opening as per S&S.  ENT eval revealed vocal cord paralysis.  Patient has dysphonia.  Endorses Bolus in chest and dysphagia prior to placement of the PEG.  Denies weight loss  Denies hematemesis  Denies bright red blood per rectum  Denies melena      Previous colonoscopy(ies): 4-5 years ago ([facility]) normal  Previous EGD(s): None on files denies    Review of Systems: noncontributory other than listed in Admission H & P  Family Hx:  Social Hx: Ex smoker (quit 8 years ago)     PAST MEDICAL & SURGICAL HISTORY:  Other hydrocele  Anxiety disorder, unspecified type  Hypertension, unspecified type  High cholesterol  Chronic obstructive pulmonary disease, unspecified COPD type  PEG (percutaneous endoscopic gastrostomy) status      albuterol 2.5 mg/3 mL (0.083%) inhalation solution: inhaled every 6 hours, As Needed  ALPRAZolam 1 mg oral tablet: orally 2 times a day, As Needed  Balmex Adult Care 11.3% topical cream: Apply topically to affected area once a day sacrum  Brovana 15 mcg/2 mL inhalation solution: 2 milliliter(s) inhaled 2 times a day  Flovent  mcg/inh inhalation aerosol: inhaled once a day  loratadine 10 mg oral tablet: 1 tab(s) orally once a day peg tube  Mucinex: 200 milligram(s) orally every 4 hours via peg tube  Norvasc 10 mg oral tablet: 1 tab(s) orally once a day  nystatin 100,000 units/g topical cream: Apply topically to affected area 2 times a day left abdomen  omeprazole 20 mg oral delayed release capsule: 1 cap(s) orally once a day via peg  predniSONE 10 mg oral tablet: 1 tab(s) by gastrostomy tube once a day  roflumilast 500 mcg oral tablet: 1 tab(s) orally once a day  simvastatin 10 mg oral tablet: 1 tab(s) orally once a day (at bedtime)  vitamin A &amp; D topical cream: Apply topically to affected area 2 times a day legs      Allergies: No Known Allergies    Medications:   acetaminophen   Tablet 650 milliGRAM(s) Oral every 6 hours PRN  ALBUTerol/ipratropium for Nebulization 3 milliLiter(s) Nebulizer every 6 hours  ALPRAZolam 1 milliGRAM(s) Oral two times a day PRN  amLODIPine   Tablet 10 milliGRAM(s) Oral daily  buDESOnide 160 MICROgram(s)/formoterol 4.5 MICROgram(s) Inhaler 2 Puff(s) Inhalation two times a day  cefTRIAXone   IVPB      enoxaparin Injectable 40 milliGRAM(s) SubCutaneous daily  guaiFENesin    Syrup 200 milliGRAM(s) Oral every 6 hours PRN  levoFLOXacin IVPB 500 milliGRAM(s) IV Intermittent every 24 hours  loratadine 10 milliGRAM(s) Oral daily  pantoprazole    Tablet 40 milliGRAM(s) Oral before breakfast  predniSONE   Tablet 40 milliGRAM(s) Oral daily  simvastatin 10 milliGRAM(s) Oral at bedtime  sodium chloride 0.9% lock flush 3 milliLiter(s) IV Push every 8 hours  vitamin A &amp; D Ointment 1 Application(s) Topical two times a day  zinc oxide 20% Ointment 1 Application(s) Topical daily        Physical Examination:  T(C): 35.8 (06-20-18 @ 04:56), Max: 36.4 (06-19-18 @ 21:54)  HR: 75 (06-20-18 @ 04:56) (75 - 93)  BP: 110/67 (06-20-18 @ 04:56) (103/54 - 112/65)  RR: 22 (06-20-18 @ 04:56) (18 - 22)  SpO2: 99% (06-20-18 @ 02:00) (98% - 99%)    GA: NAD  HEENT: PERRLA  Heart: Regular  Lungs: CTA  Abdomen:Soft (+) PEG with balloon port ?sutured to the abdominal wall?surgically placed?    Data:                        9.1    15.72 )-----------( 448      ( 20 Jun 2018 09:04 )             29.2     MCV 88.5 (06-20-18)  86.8 (06-19-18)    RDW 16.1 (06-20-18)  15.9 (06-19-18)    9.1  06-20-18 @ 09:04  9.1  06-19-18 @ 11:29  10.1  06-18-18 @ 08:53  10.5  06-17-18 @ 17:45        06-20    140  |  97<L>  |  22<H>  ----------------------------<  149<H>  4.4   |  30  |  0.6<L>    Ca    9.3      20 Jun 2018 09:04  Mg     2.0     06-20      Liver panel trend:  TBili 0.2   /   AST 17   /   ALT 24   /   AlkP 92   /   Tptn 7.0   /   Alb 3.8    /   DBili --      06-17    As per S&S:  · Diagnosis	+dysphonic, breathy voice, +expectorates clear sputum prior to po trials. +cough w/ expectoration of sputum laced w/ po trials after thin, nectar and puree	  · Recommended Consistencies	Maintain PEG feeds	  · Recommended Diagnostics	VFSS/MBS; to further assess adrian-pharyngeal swallow integrity	  · Demonstrates Need for Referral to Another Service	ENT; 2' voice change reportedly X2 weeks (unclear) Gastroenterology Consultation    62y year old  Male with Dysphagia.  Patient is a 62y old  Male who presents with a chief complaint of shortness of breath, cough and mild sputum, fever (18 Jun 2018 16:40)    HPI:  62 m from [facility], comes for sob , cough with mild white sputum, and subjective fever today , has COPD , on bipap at night, was sent to ED, had [place]bc of 702[address], and was tachypneic, was given abx, iv steroids, and admitted for copd exacerbtion/pneumonia. cxr did not show consolidation or infiltrate, bnp was normal    patient reports that he had peg tube placed 2 months ago for difficulty swallowing, but no records here, called [facility] and was told that patient came to [facility] from [facility]. he gets peg feed for 16 hours and eats mechanical soft diet, pureed veg. he denies cp/palpitations,n/v/d/c/dizziness. at SNF, patient reports thathe walks with a walker, per snf, he is 1 person assist (17 Jun 2018 20:35)      GI Hx:  61 yo M known to have a PMhx significant for COPD, ?Dysphagia? Transfer vs esophageal admitted for COPD exacerbation. After S&S and ENT eval GI was called to investigate "UES not opening as per S&S.  ENT eval revealed vocal cord paralysis.  Patient has dysphonia.  Endorses Bolus in chest and dysphagia prior to placement of the PEG.  Denies weight loss  Denies hematemesis  Denies bright red blood per rectum  Denies melena      Previous colonoscopy(ies): 4-5 years ago ([facility]) normal  Previous EGD(s): None on files denies    Review of Systems: noncontributory other than listed in Admission H & P  Family Hx:  Social Hx: Ex smoker (quit 8 years ago)     PAST MEDICAL & SURGICAL HISTORY:  Other hydrocele  Anxiety disorder, unspecified type  Hypertension, unspecified type  High cholesterol  Chronic obstructive pulmonary disease, unspecified COPD type  PEG (percutaneous endoscopic gastrostomy) status      albuterol 2.5 mg/3 mL (0.083%) inhalation solution: inhaled every 6 hours, As Needed  ALPRAZolam 1 mg oral tablet: orally 2 times a day, As Needed  Balmex Adult Care 11.3% topical cream: Apply topically to affected area once a day sacrum  Brovana 15 mcg/2 mL inhalation solution: 2 milliliter(s) inhaled 2 times a day  Flovent  mcg/inh inhalation aerosol: inhaled once a day  loratadine 10 mg oral tablet: 1 tab(s) orally once a day peg tube  Mucinex: 200 milligram(s) orally every 4 hours via peg tube  Norvasc 10 mg oral tablet: 1 tab(s) orally once a day  nystatin 100,000 units/g topical cream: Apply topically to affected area 2 times a day left abdomen  omeprazole 20 mg oral delayed release capsule: 1 cap(s) orally once a day via peg  predniSONE 10 mg oral tablet: 1 tab(s) by gastrostomy tube once a day  roflumilast 500 mcg oral tablet: 1 tab(s) orally once a day  simvastatin 10 mg oral tablet: 1 tab(s) orally once a day (at bedtime)  vitamin A &amp; D topical cream: Apply topically to affected area 2 times a day legs      Allergies: No Known Allergies    Medications:   acetaminophen   Tablet 650 milliGRAM(s) Oral every 6 hours PRN  ALBUTerol/ipratropium for Nebulization 3 milliLiter(s) Nebulizer every 6 hours  ALPRAZolam 1 milliGRAM(s) Oral two times a day PRN  amLODIPine   Tablet 10 milliGRAM(s) Oral daily  buDESOnide 160 MICROgram(s)/formoterol 4.5 MICROgram(s) Inhaler 2 Puff(s) Inhalation two times a day  cefTRIAXone   IVPB      enoxaparin Injectable 40 milliGRAM(s) SubCutaneous daily  guaiFENesin    Syrup 200 milliGRAM(s) Oral every 6 hours PRN  levoFLOXacin IVPB 500 milliGRAM(s) IV Intermittent every 24 hours  loratadine 10 milliGRAM(s) Oral daily  pantoprazole    Tablet 40 milliGRAM(s) Oral before breakfast  predniSONE   Tablet 40 milliGRAM(s) Oral daily  simvastatin 10 milliGRAM(s) Oral at bedtime  sodium chloride 0.9% lock flush 3 milliLiter(s) IV Push every 8 hours  vitamin A &amp; D Ointment 1 Application(s) Topical two times a day  zinc oxide 20% Ointment 1 Application(s) Topical daily        Physical Examination:  T(C): 35.8 (06-20-18 @ 04:56), Max: 36.4 (06-19-18 @ 21:54)  HR: 75 (06-20-18 @ 04:56) (75 - 93)  BP: 110/67 (06-20-18 @ 04:56) (103/54 - 112/65)  RR: 22 (06-20-18 @ 04:56) (18 - 22)  SpO2: 99% (06-20-18 @ 02:00) (98% - 99%)    GA: NAD  HEENT: PERRLA  Heart: Regular  Lungs: CTA  Abdomen:Soft (+) PEG with balloon port ?sutured to the abdominal wall?surgically placed?    Data:                        9.1    15.72 )-----------( 448      ( 20 Jun 2018 09:04 )             29.2     MCV 88.5 (06-20-18)  86.8 (06-19-18)    RDW 16.1 (06-20-18)  15.9 (06-19-18)    9.1  06-20-18 @ 09:04  9.1  06-19-18 @ 11:29  10.1  06-18-18 @ 08:53  10.5  06-17-18 @ 17:45        06-20    140  |  97<L>  |  22<H>  ----------------------------<  149<H>  4.4   |  30  |  0.6<L>    Ca    9.3      20 Jun 2018 09:04  Mg     2.0     06-20      Liver panel trend:  TBili 0.2   /   AST 17   /   ALT 24   /   AlkP 92   /   Tptn 7.0   /   Alb 3.8    /   DBili --      06-17    As per S&S:  · Diagnosis	+dysphonic, breathy voice, +expectorates clear sputum prior to po trials. +cough w/ expectoration of sputum laced w/ po trials after thin, nectar and puree	  · Recommended Consistencies	Maintain PEG feeds	  · Recommended Diagnostics	VFSS/MBS; to further assess adrian-pharyngeal swallow integrity	  · Demonstrates Need for Referral to Another Service	ENT; 2' voice change reportedly X2 weeks (unclear)

## 2018-06-20 NOTE — PROGRESS NOTE ADULT - SUBJECTIVE AND OBJECTIVE BOX
ELIZABETHALETA FERGUSON  62y, Male  Allergy: No Known Allergies    PMH/PSH:  PAST MEDICAL & SURGICAL HISTORY:  Other hydrocele  Anxiety disorder, unspecified type  Hypertension, unspecified type  High cholesterol  Chronic obstructive pulmonary disease, unspecified COPD type  PEG (percutaneous endoscopic gastrostomy) status        LAST 24-Hr EVENTS:  c/o cough  sob    VITALS:  T(F): 96.5 (06-21-18 @ 05:15), Max: 99.1 (06-20-18 @ 21:34)  HR: 81 (06-21-18 @ 05:15)  BP: 117/74 (06-21-18 @ 05:15) (106/73 - 130/56)  RR: 20 (06-21-18 @ 05:15)  SpO2: 99% (06-20-18 @ 22:04)    PHYSICAL EXAM:    GENERAL: NAD  NECK: Supple, No JVD  CHEST/LUNG: wheeze  HEART: Regular rate and rhythm; No murmurs.  ABDOMEN: Soft, Nontender, Nondistended; Bowel sounds present  EXTREMITIES:  No clubbing, edema absent        TESTS & MEASUREMENTS:    IN: 1280 mL / OUT: 0 mL / NET: 1280 mL    IN: 1200 mL / OUT: 850 mL / NET: 350 mL                            9.1    15.72 )-----------( 448      ( 20 Jun 2018 09:04 )             29.2       06-20    140  |  97<L>  |  22<H>  ----------------------------<  149<H>  4.4   |  30  |  0.6<L>    Ca    9.3      20 Jun 2018 09:04  Mg     2.0     06-20        MEDICATIONS:  MEDICATIONS  (STANDING):  ALBUTerol/ipratropium for Nebulization 3 milliLiter(s) Nebulizer every 6 hours  amLODIPine   Tablet 10 milliGRAM(s) Oral daily  buDESOnide 160 MICROgram(s)/formoterol 4.5 MICROgram(s) Inhaler 2 Puff(s) Inhalation two times a day  cefTRIAXone   IVPB 1 Gram(s) IV Intermittent every 24 hours  cefTRIAXone   IVPB      enoxaparin Injectable 40 milliGRAM(s) SubCutaneous daily  levoFLOXacin IVPB 500 milliGRAM(s) IV Intermittent every 24 hours  loratadine 10 milliGRAM(s) Oral daily  pantoprazole   Suspension 40 milliGRAM(s) Oral before breakfast  predniSONE   Tablet 40 milliGRAM(s) Oral daily  simvastatin 10 milliGRAM(s) Oral at bedtime  sodium chloride 0.9% lock flush 3 milliLiter(s) IV Push every 8 hours  vitamin A &amp; D Ointment 1 Application(s) Topical two times a day  zinc oxide 20% Ointment 1 Application(s) Topical daily    MEDICATIONS  (PRN):  acetaminophen   Tablet 650 milliGRAM(s) Oral every 6 hours PRN For Temp greater than 38 C (100.4 F)  ALPRAZolam 1 milliGRAM(s) Oral two times a day PRN anxiety  guaiFENesin    Syrup 200 milliGRAM(s) Oral every 6 hours PRN Cough

## 2018-06-20 NOTE — CONSULT NOTE ADULT - ASSESSMENT
61 yo M 63 yo M with likely oropharyngeal +/- esophageal dysphagia.    1)COPD exacerbation  2)Transfer Dysphagia, Dysphonia  3)RO esophageal dysphagia etiology: Adenoca, peptic stricture, achalasia other less common etiologies  4)PEG tube (1 month ago) functional  5)Reportedly up to date with CRC surveillance/screening    Rec:  1)Treated COPD  2)Continue PEG feeds  3)OBTAIN RECORDS FROM [facility] (PEG REPORT/EGDs IF DONE)  4)If reports unrevealing outpatient EGD +/- Manometry  Can follow up at the GI clinic :[phone]    Recall 63 yo M with likely oropharyngeal +/- esophageal dysphagia.    1)COPD exacerbation  2)Transfer Dysphagia, Dysphonia  3)RO esophageal dysphagia etiology: Adenoca, peptic stricture, achalasia other less common etiologies  4)PEG tube (1 month ago) functional  5)Reportedly up to date with CRC surveillance/screening    Rec:  1)Treated COPD  2)Continue PEG feeds  3)OBTAIN RECORDS FROM [facility] (PEG REPORT/EGDs IF DONE)  4)If reports unrevealing outpatient EGD +/- Manometry  Can follow up at the GI clinic :[phone]    Recall as needed

## 2018-06-20 NOTE — PHYSICAL THERAPY INITIAL EVALUATION ADULT - GENERAL OBSERVATIONS, REHAB EVAL
1115 - 1142 Pt rec in b/s chair with +NC 5 L/m, +chair alarm, in NAD. S/p amb, pt's SPO2 96% on NC 5 L/m,  bpm, decreased to 105 bpm after ~3 min. DAVONTE Lee notified and awre.

## 2018-06-20 NOTE — PROGRESS NOTE ADULT - ASSESSMENT
chronic resp failure  COPD exac  HTN  anxiety  s/p PEG  debility  dysphonia / vocal cord dysfuntion  dysphagia    plan:    cont norvasc  steroid taper  abx  o2 / bipap / nebs  peg feeds  NPO  dc planning to SNF

## 2018-06-20 NOTE — PROGRESS NOTE ADULT - SUBJECTIVE AND OBJECTIVE BOX
ALETA SUAZO, male, 62y ([birth year]),   [id number]  Admit Date: 06-17-18 (3d)    Chief Complaint:  Patient is a 62y old  Male who presents with a chief complaint of shortness of breath, cough with mild sputum, and fever (18 Jun 2018 16:40)      Past Medical and Surgical History:  PAST MEDICAL & SURGICAL HISTORY:  Other hydrocele  Anxiety disorder, unspecified type  Hypertension, unspecified type  High cholesterol  Chronic obstructive pulmonary disease, unspecified COPD type  PEG (percutaneous endoscopic gastrostomy) status      Current Medications:  MEDICATIONS  (STANDING):  ALBUTerol/ipratropium for Nebulization 3 milliLiter(s) Nebulizer every 6 hours  amLODIPine   Tablet 10 milliGRAM(s) Oral daily  buDESOnide 160 MICROgram(s)/formoterol 4.5 MICROgram(s) Inhaler 2 Puff(s) Inhalation two times a day  enoxaparin Injectable 40 milliGRAM(s) SubCutaneous daily  levoFLOXacin IVPB 500 milliGRAM(s) IV Intermittent every 24 hours  loratadine 10 milliGRAM(s) Oral daily  pantoprazole    Tablet 40 milliGRAM(s) Oral before breakfast  predniSONE   Tablet 40 milliGRAM(s) Oral daily  simvastatin 10 milliGRAM(s) Oral at bedtime  sodium chloride 0.9% lock flush 3 milliLiter(s) IV Push every 8 hours  vitamin A &amp; D Ointment 1 Application(s) Topical two times a day  zinc oxide 20% Ointment 1 Application(s) Topical daily    MEDICATIONS  (PRN):  acetaminophen   Tablet 650 milliGRAM(s) Oral every 6 hours PRN For Temp greater than 38 C (100.4 F)  ALPRAZolam 1 milliGRAM(s) Oral two times a day PRN anxiety  guaiFENesin    Syrup 200 milliGRAM(s) Oral every 6 hours PRN Cough      Interval History:  No acute events overnight. No complaints at this time.    Vital Signs:  T(F): 96.4 (06-20-18 @ 04:56), Max: 98.3 (06-18-18 @ 14:18)  HR: 75 (06-20-18 @ 04:56) (75 - 97)  BP: 110/67 (06-20-18 @ 04:56) (103/54 - 151/58)  RR: 22 (06-20-18 @ 04:56) (18 - 22)  SpO2: 99% (06-20-18 @ 02:00) (98% - 100%)  CAPILLARY BLOOD GLUCOSE          Physical Exam:  General: Not in distress.   HEENT: Moist mucus membranes. PERRLA.  Cardio: Regular rate and rhythm, S1, S2, no murmur, rub, or gallop.  Pulm: Clear to auscultation bilaterally. No wheezing, rales, or rhonchi.  Abdomen: Soft, non-tender, non-distended. Normoactive bowel sounds.  Extremities: No cyanosis or edema bilaterally. No calf tenderness to palpation.  Neuro: A&O x3. No focal deficits. CN II - XII grossly intact.    Labs and Imaging:  CBC Full  -  ( 19 Jun 2018 11:29 )  WBC Count : 17.19 K/uL  Hemoglobin : 9.1 g/dL  Hematocrit : 28.9 %  Platelet Count - Automated : 473 K/uL  Mean Cell Volume : 86.8 fL  Mean Cell Hemoglobin : 27.3 pg  Mean Cell Hemoglobin Concentration : 31.5 g/dL  Auto Neutrophil # : x  Auto Lymphocyte # : x  Auto Monocyte # : x  Auto Eosinophil # : x  Auto Basophil # : x  Auto Neutrophil % : x  Auto Lymphocyte % : x  Auto Monocyte % : x  Auto Eosinophil % : x  Auto Basophil % : x    RDW: 15.9      BMP: 06-19-18 @ 11:29  138 | 96 | 26   -----------------< 135  4.7  | 29 | 0.6  eGFR(AA): 123, eGFR (non-AA): 106  Ca 9.2, Mg 2.1, P --        LFTs: 06-19-18 @ 11:29  Ca  9.2  | -- AST   -----------------  TP  --  | -- ALT  -----------------  Alb --  | -- ALK          ^        --         TB      Cardiac Enzymes:    Urinalysis:    Cultures:      Home Medications:  Home Medications:  albuterol 2.5 mg/3 mL (0.083%) inhalation solution: inhaled every 6 hours, As Needed (17 Jun 2018 21:00)  ALPRAZolam 1 mg oral tablet: orally 2 times a day, As Needed (17 Jun 2018 21:00)  Balmex Adult Care 11.3% topical cream: Apply topically to affected area once a day sacrum (17 Jun 2018 21:00)  Brovana 15 mcg/2 mL inhalation solution: 2 milliliter(s) inhaled 2 times a day (17 Jun 2018 21:00)  Flovent  mcg/inh inhalation aerosol: inhaled once a day (17 Jun 2018 21:00)  loratadine 10 mg oral tablet: 1 tab(s) orally once a day peg tube (17 Jun 2018 21:00)  Mucinex: 200 milligram(s) orally every 4 hours via peg tube (17 Jun 2018 21:00)  Norvasc 10 mg oral tablet: 1 tab(s) orally once a day (17 Jun 2018 21:00)  nystatin 100,000 units/g topical cream: Apply topically to affected area 2 times a day left abdomen (17 Jun 2018 21:00)  omeprazole 20 mg oral delayed release capsule: 1 cap(s) orally once a day via peg (17 Jun 2018 21:00)  predniSONE 10 mg oral tablet: 1 tab(s) by gastrostomy tube once a day (17 Jun 2018 21:00)  roflumilast 500 mcg oral tablet: 1 tab(s) orally once a day (17 Jun 2018 21:00)  simvastatin 10 mg oral tablet: 1 tab(s) orally once a day (at bedtime) (17 Jun 2018 21:00)  vitamin A &amp; D topical cream: Apply topically to affected area 2 times a day legs (17 Jun 2018 21:00)

## 2018-06-20 NOTE — PROGRESS NOTE ADULT - ASSESSMENT
CRF  COPD exac  Anxiety  Dysphagia s/p peg tube    02 via nc  albuterol prn  symbicort 160/4.5 2 puff bid (in lieu of brovana and pulmicort)  albuterol prn  prednisone 40mg for 5 days then taper to basline dose  cont daliresp, mucinex  dvt/gi px  oob - chair   stable from pulmonary stanspoint

## 2018-06-20 NOTE — PROGRESS NOTE ADULT - SUBJECTIVE AND OBJECTIVE BOX
Patient was seen and examined. Spoke with RN. Chart reviewed.    No events overnight.  Vital Signs Last 24 Hrs  T(F): 97.3 (20 Jun 2018 13:12), Max: 97.6 (19 Jun 2018 21:54)  HR: 112 (20 Jun 2018 13:12) (75 - 112)  BP: 130/56 (20 Jun 2018 13:12) (103/54 - 130/56)  SpO2: 99% (20 Jun 2018 02:00) (98% - 99%)  MEDICATIONS  (STANDING):  ALBUTerol/ipratropium for Nebulization 3 milliLiter(s) Nebulizer every 6 hours  amLODIPine   Tablet 10 milliGRAM(s) Oral daily  buDESOnide 160 MICROgram(s)/formoterol 4.5 MICROgram(s) Inhaler 2 Puff(s) Inhalation two times a day  cefTRIAXone   IVPB      enoxaparin Injectable 40 milliGRAM(s) SubCutaneous daily  levoFLOXacin IVPB 500 milliGRAM(s) IV Intermittent every 24 hours  loratadine 10 milliGRAM(s) Oral daily  pantoprazole   Suspension 40 milliGRAM(s) Oral before breakfast  predniSONE   Tablet 40 milliGRAM(s) Oral daily  simvastatin 10 milliGRAM(s) Oral at bedtime  sodium chloride 0.9% lock flush 3 milliLiter(s) IV Push every 8 hours  vitamin A &amp; D Ointment 1 Application(s) Topical two times a day  zinc oxide 20% Ointment 1 Application(s) Topical daily    MEDICATIONS  (PRN):  acetaminophen   Tablet 650 milliGRAM(s) Oral every 6 hours PRN For Temp greater than 38 C (100.4 F)  ALPRAZolam 1 milliGRAM(s) Oral two times a day PRN anxiety  guaiFENesin    Syrup 200 milliGRAM(s) Oral every 6 hours PRN Cough    Labs:                        9.1    15.72 )-----------( 448      ( 20 Jun 2018 09:04 )             29.2                         9.1    17.19 )-----------( 473      ( 19 Jun 2018 11:29 )             28.9     20 Jun 2018 09:04    140    |  97     |  22     ----------------------------<  149    4.4     |  30     |  0.6    19 Jun 2018 11:29    138    |  96     |  26     ----------------------------<  135    4.7     |  29     |  0.6      Ca    9.3        20 Jun 2018 09:04  Ca    9.2        19 Jun 2018 11:29  Mg     2.0       20 Jun 2018 09:04  Mg     2.1       19 Jun 2018 11:29              Radiology:    General: comfortable, NAD  Neurology: A&Ox3, nonfocal  Head:  Normocephalic, atraumatic  ENT:  Mucosa moist, no ulcerations  Neck:  Supple, no JVD,   Skin: no breakdowns (as per RN)  Resp: CTA B/L  CV: RRR, S1S2,   GI: Soft, NT, bowel sounds  MS: No edema, + peripheral pulses, FROM all 4 extremity

## 2018-06-20 NOTE — PROGRESS NOTE ADULT - ASSESSMENT
yo sent from [facility] for complaint of shortness of breath, productive cough of whitish sputum and subjective fevers on day of presentation.     #COPD exacerbation and concurrent Chronic Respiratory Failure   Continue Ceftriaxone and Levaquin  Continue BIPAP  o2 prn, goal >90 %  Continue prednisone taper day 3 of 5 days   continue symbicort   pulmonary following    #Change in Voice for 2 weeks likely secondary to left vocal cord paralysis   ENT attending to see  continue PEG feeds     #HTN, controlled  c/w amlodipine    #DLD  c/w statin    #Anxiety   c/w alprax and loratidine    Disposition: Patient may not want to return to [facility], awaiting PT and placement      Electronic Signatures:

## 2018-06-20 NOTE — PROVIDER CONTACT NOTE (OTHER) - SITUATION
Pt assessed. PEG tube continuous pump constantly beeping. Pt abdomen is soft- no tenderness noted. Pt denies nausea/vomitting. No residual present. PEG tube is patent- flushes easily. No pain.

## 2018-06-20 NOTE — PROVIDER CONTACT NOTE (OTHER) - BACKGROUND
LBM= 6/17. Pt repositioned multiple times. Pump changed. Tubing changed. Staff development instructor Melissa Goodwin comes to assess situation as well. Management aware. GI fellow aware.

## 2018-06-20 NOTE — CHART NOTE - NSCHARTNOTEFT_GEN_A_CORE
Registered Dietitian Limited Note:    -Discussed pending TF recommendations with LIP. LIP states TF adjusted from continuous feeds to bolus feeds. Adjusted TF recommendations are as follows:  Bolus feeds of Osmolite 1.5 @ 240mL/hr Q4H HOLDING 2AM FEEDS (feed at 6am, 10am, 2pm, 6pm, 10pm)  -TF at goal rate will provide total volume 1200mL/day, 1800kcal/day, 75g protein, 912mL free H2O and 100% RDIs.  -free H2O flushes per LIP

## 2018-06-20 NOTE — PROGRESS NOTE ADULT - ASSESSMENT
61 yo sent from [facility] for complaint of shortness of breath, productive cough of whitish sputum and subjective fevers on day of presentation.     #COPD exacerbation and concurrent Chronic Respiratory Failure   Continue Ceftriaxone and Levaquin  Continue BIPAP  o2 prn, goal >90 %  Continue prednisone taper day 3 of 5 days   continue symbicort   pulmonary following    #Change in Voice for 2 weeks likely secondary to left vocal cord paralysis   ENT attending to see  continue PEG feeds     #HTN, controlled  c/w amlodipine    #DLD  c/w statin    #Anxiety   c/w alprax and loratidine    Disposition: Patient may not want to return to [facility], awaiting PT and placement

## 2018-06-20 NOTE — CHART NOTE - NSCHARTNOTEFT_GEN_A_CORE
spoke with ENT, recommended to proceed with CT chest and neck w/wo contrast to rule out intrathoracic pathology of vocal cord dysfunction. spoke with ENT, recommended to proceed with CT chest and neck w/wo contrast to rule out intrathoracic pathology of vocal cord dysfunction, however on discussion with patient, he states that he already had CT Chest and Neck completed at [facility] obtain records pertaining to CT, EGD if available and PEG placement report.

## 2018-06-21 LAB
ANION GAP SERPL CALC-SCNC: 16 MMOL/L — HIGH (ref 7–14)
BUN SERPL-MCNC: 21 MG/DL — HIGH (ref 10–20)
CALCIUM SERPL-MCNC: 9 MG/DL — SIGNIFICANT CHANGE UP (ref 8.5–10.1)
CHLORIDE SERPL-SCNC: 99 MMOL/L — SIGNIFICANT CHANGE UP (ref 98–110)
CO2 SERPL-SCNC: 27 MMOL/L — SIGNIFICANT CHANGE UP (ref 17–32)
CREAT SERPL-MCNC: 0.6 MG/DL — LOW (ref 0.7–1.5)
GLUCOSE SERPL-MCNC: 132 MG/DL — HIGH (ref 70–99)
HCT VFR BLD CALC: 33.2 % — LOW (ref 42–52)
HGB BLD-MCNC: 10.3 G/DL — LOW (ref 14–18)
MAGNESIUM SERPL-MCNC: 2 MG/DL — SIGNIFICANT CHANGE UP (ref 1.8–2.4)
MCHC RBC-ENTMCNC: 28 PG — SIGNIFICANT CHANGE UP (ref 27–31)
MCHC RBC-ENTMCNC: 31 G/DL — LOW (ref 32–37)
MCV RBC AUTO: 90.2 FL — SIGNIFICANT CHANGE UP (ref 80–94)
NRBC # BLD: 0 /100 WBCS — SIGNIFICANT CHANGE UP (ref 0–0)
PLATELET # BLD AUTO: 507 K/UL — HIGH (ref 130–400)
POTASSIUM SERPL-MCNC: 4.6 MMOL/L — SIGNIFICANT CHANGE UP (ref 3.5–5)
POTASSIUM SERPL-SCNC: 4.6 MMOL/L — SIGNIFICANT CHANGE UP (ref 3.5–5)
RBC # BLD: 3.68 M/UL — LOW (ref 4.7–6.1)
RBC # FLD: 16.1 % — HIGH (ref 11.5–14.5)
SODIUM SERPL-SCNC: 142 MMOL/L — SIGNIFICANT CHANGE UP (ref 135–146)
WBC # BLD: 17.23 K/UL — HIGH (ref 4.8–10.8)
WBC # FLD AUTO: 17.23 K/UL — HIGH (ref 4.8–10.8)

## 2018-06-21 RX ORDER — DOCUSATE SODIUM 100 MG
100 CAPSULE ORAL DAILY
Qty: 0 | Refills: 0 | Status: DISCONTINUED | OUTPATIENT
Start: 2018-06-21 | End: 2018-06-22

## 2018-06-21 RX ORDER — FLUTICASONE PROPIONATE 220 MCG
1 AEROSOL WITH ADAPTER (GRAM) INHALATION
Qty: 0 | Refills: 0 | Status: DISCONTINUED | OUTPATIENT
Start: 2018-06-21 | End: 2018-06-25

## 2018-06-21 RX ORDER — SENNA PLUS 8.6 MG/1
2 TABLET ORAL AT BEDTIME
Qty: 0 | Refills: 0 | Status: DISCONTINUED | OUTPATIENT
Start: 2018-06-21 | End: 2018-06-22

## 2018-06-21 RX ORDER — ROFLUMILAST 500 UG/1
500 TABLET ORAL DAILY
Qty: 0 | Refills: 0 | Status: DISCONTINUED | OUTPATIENT
Start: 2018-06-21 | End: 2018-06-25

## 2018-06-21 RX ADMIN — SENNA PLUS 2 TABLET(S): 8.6 TABLET ORAL at 21:14

## 2018-06-21 RX ADMIN — ENOXAPARIN SODIUM 40 MILLIGRAM(S): 100 INJECTION SUBCUTANEOUS at 12:40

## 2018-06-21 RX ADMIN — SIMVASTATIN 10 MILLIGRAM(S): 20 TABLET, FILM COATED ORAL at 21:14

## 2018-06-21 RX ADMIN — Medication 1 MILLIGRAM(S): at 09:43

## 2018-06-21 RX ADMIN — AMLODIPINE BESYLATE 10 MILLIGRAM(S): 2.5 TABLET ORAL at 06:06

## 2018-06-21 RX ADMIN — Medication 3 MILLILITER(S): at 08:33

## 2018-06-21 RX ADMIN — Medication 3 MILLILITER(S): at 14:31

## 2018-06-21 RX ADMIN — PANTOPRAZOLE SODIUM 40 MILLIGRAM(S): 20 TABLET, DELAYED RELEASE ORAL at 08:19

## 2018-06-21 RX ADMIN — CEFTRIAXONE 100 GRAM(S): 500 INJECTION, POWDER, FOR SOLUTION INTRAMUSCULAR; INTRAVENOUS at 08:24

## 2018-06-21 RX ADMIN — Medication 1 APPLICATION(S): at 17:54

## 2018-06-21 RX ADMIN — BUDESONIDE AND FORMOTEROL FUMARATE DIHYDRATE 2 PUFF(S): 160; 4.5 AEROSOL RESPIRATORY (INHALATION) at 08:20

## 2018-06-21 RX ADMIN — SODIUM CHLORIDE 3 MILLILITER(S): 9 INJECTION INTRAMUSCULAR; INTRAVENOUS; SUBCUTANEOUS at 06:05

## 2018-06-21 RX ADMIN — ZINC OXIDE 1 APPLICATION(S): 200 OINTMENT TOPICAL at 12:37

## 2018-06-21 RX ADMIN — Medication 200 MILLIGRAM(S): at 09:44

## 2018-06-21 RX ADMIN — LORATADINE 10 MILLIGRAM(S): 10 TABLET ORAL at 12:40

## 2018-06-21 RX ADMIN — ROFLUMILAST 500 MICROGRAM(S): 500 TABLET ORAL at 12:39

## 2018-06-21 RX ADMIN — SODIUM CHLORIDE 3 MILLILITER(S): 9 INJECTION INTRAMUSCULAR; INTRAVENOUS; SUBCUTANEOUS at 21:22

## 2018-06-21 RX ADMIN — Medication 1 PUFF(S): at 20:00

## 2018-06-21 RX ADMIN — Medication 40 MILLIGRAM(S): at 06:06

## 2018-06-21 RX ADMIN — SODIUM CHLORIDE 3 MILLILITER(S): 9 INJECTION INTRAMUSCULAR; INTRAVENOUS; SUBCUTANEOUS at 12:37

## 2018-06-21 NOTE — PROGRESS NOTE ADULT - SUBJECTIVE AND OBJECTIVE BOX
ALETA SUAZO  63y, Male  Allergy: No Known Allergies    PMH/PSH:  PAST MEDICAL & SURGICAL HISTORY:  Other hydrocele  Anxiety disorder, unspecified type  Hypertension, unspecified type  High cholesterol  Chronic obstructive pulmonary disease, unspecified COPD type  PEG (percutaneous endoscopic gastrostomy) status      LAST 24-Hr EVENTS:  cough sob improved  laying in bed comfortable    VITALS:  T(F): 97.2 (06-22-18 @ 05:02), Max: 98.8 (06-21-18 @ 20:19)  HR: 75 (06-22-18 @ 05:02)  BP: 124/75 (06-22-18 @ 05:02) (102/63 - 124/75)  RR: 22 (06-22-18 @ 05:02)  SpO2: 99% (06-21-18 @ 20:19)    PHYSICAL EXAM:    GENERAL: NAD  NECK: Supple, No JVD  CHEST/LUNG: minimal wheeze  HEART: Regular rate and rhythm  ABDOMEN: Soft, Nontender, Nondistended; Bowel sounds present peg tube  EXTREMITIES:  No clubbing, edema absent        TESTS & MEASUREMENTS:    IN: 1280 mL / OUT: 0 mL / NET: 1280 mL    IN: 1200 mL / OUT: 850 mL / NET: 350 mL    IN: 680 mL / OUT: 100 mL / NET: 580 mL                            10.3   17.23 )-----------( 507      ( 21 Jun 2018 09:56 )             33.2       06-21    142  |  99  |  21<H>  ----------------------------<  132<H>  4.6   |  27  |  0.6<L>    Ca    9.0      21 Jun 2018 09:56  Mg     2.0     06-21                    MEDICATIONS:  MEDICATIONS  (STANDING):  ALBUTerol/ipratropium for Nebulization 3 milliLiter(s) Nebulizer every 6 hours  amLODIPine   Tablet 10 milliGRAM(s) Oral daily  buDESOnide 160 MICROgram(s)/formoterol 4.5 MICROgram(s) Inhaler 2 Puff(s) Inhalation two times a day  docusate sodium 100 milliGRAM(s) Oral daily  enoxaparin Injectable 40 milliGRAM(s) SubCutaneous daily  fluticasone propionate   220 MICROgram(s) HFA Inhaler 1 Puff(s) Inhalation <User Schedule>  levoFLOXacin IVPB 500 milliGRAM(s) IV Intermittent every 24 hours  loratadine 10 milliGRAM(s) Oral daily  pantoprazole   Suspension 40 milliGRAM(s) Oral before breakfast  predniSONE   Tablet 40 milliGRAM(s) Oral daily  roflumilast 500 MICROGram(s) Oral daily  senna 2 Tablet(s) Oral at bedtime  simvastatin 10 milliGRAM(s) Oral at bedtime  sodium chloride 0.9% lock flush 3 milliLiter(s) IV Push every 8 hours  vitamin A &amp; D Ointment 1 Application(s) Topical two times a day  zinc oxide 20% Ointment 1 Application(s) Topical daily    MEDICATIONS  (PRN):  acetaminophen   Tablet 650 milliGRAM(s) Oral every 6 hours PRN For Temp greater than 38 C (100.4 F)  ALPRAZolam 1 milliGRAM(s) Oral two times a day PRN anxiety  guaiFENesin    Syrup 200 milliGRAM(s) Oral every 6 hours PRN Cough

## 2018-06-21 NOTE — PROGRESS NOTE ADULT - ASSESSMENT
CRF  COPD exac  Anxiety  Dysphagia s/p peg tube    02 via nc  albuterol prn  symbicort 160/4.5 2 puff bid (in lieu of brovana and pulmicort)  albuterol prn  prednisone 40mg for 5 days then taper to basline dose  cont daliresp, mucinex  ent follow up re vcd  dvt/gi px  oob - chair   d/w primary team

## 2018-06-21 NOTE — MEDICAL STUDENT PROGRESS NOTE(EDUCATION) - NS MD HP STUD ASPLAN ASSES FT
61 yo sent from [facility] for complaint of shortness of breath, productive cough of whitish sputum and subjective fevers on day of presentation; admitted for COPD exacerbation. Patient was also found to have vocal cord paralysis, minimal UES opening and pharyngeal dysphagia; currently on PEG feeds.

## 2018-06-21 NOTE — MEDICAL STUDENT PROGRESS NOTE(EDUCATION) - NS MD HP STUD ASPLAN PLAN FT
#COPD exacerbation and concurrent Chronic Respiratory Failure   -Will speak to PMD about d/c Ceftriaxone and Levaquin in the absence of any obvious source of infection  -Continue BIPAP  -o2 prn, goal >90 %  -Continue prednisone taper day 3 of 5 days   -continue symbicort   -pulmonary following  -REPORT OBTAINED FROM [facility]: Portable chest X-ray on 5/17/18 impression- 1) COPD 2) Left suprahilar/upper lobe mass  -obtain CT chest and neck    #Change in Voice for 2 weeks likely secondary to left vocal cord paralysis   -ENT attending to see  -continue PEG feeds   -REPORT OBTAINED FROM [facility]: 14 [place] percutaneous gastrostomy tube insertion on 5/25/18 in the setting of resistant esophageal candidiasis, unable to swallow with deteriorating nutritional status.    #HTN, controlled  c/w amlodipine    #DLD  c/w statin    #Anxiety   c/w alprax and loratidine    Disposition: Patient may not want to return to [facility], awaiting PT and placement 61 yo sent from [facility] for complaint of shortness of breath, productive cough of whitish sputum and subjective fevers on day of presentation; admitted for COPD exacerbation. Patient was also found to have vocal cord paralysis, minimal UES, and pharyngeal dysphagia.    #COPD exacerbation and concurrent Chronic Respiratory Failure   -Will speak to PMD about discontinuing Ceftriaxone and Levaquin in the absence of any obvious source of infection  -Continue BIPAP  -o2 prn, goal >90 %  -Continue prednisone taper day 4 of 5 days   -continue symbicort, cleared from pulmonary standpoint  -REPORT OBTAINED FROM [facility]: Portable chest X-ray on 5/17/18 impression- 1) COPD 2) Left suprahilar/upper lobe mass, last CT Chest in 2000, no report available, discussed with ENT, will still need CT Chest and Neck if patient willing to undergo    #Change in Voice for 2 weeks likely secondary to left vocal cord paralysis   -ENT attending to see  -continue PEG feeds, NPO  -REPORT OBTAINED FROM [facility]: 14 Swedish percutaneous gastrostomy tube insertion on 5/25/18 in the setting of resistant esophageal candidiasis, unable to swallow with deteriorating nutritional status, placed via Interventional Radiology, discussed with GI, no further intervention, f/u as outpatient.    #HTN, controlled  c/w amlodipine    #DLD  c/w statin    #Anxiety   c/w alprax and loratidine    Disposition: Patient may not want to return to [facility], awaiting PT and placement 61 yo sent from [facility] for complaint of shortness of breath, productive cough of whitish sputum and subjective fevers on day of presentation; admitted for COPD exacerbation. Patient was also found to have vocal cord paralysis, minimal UES, and pharyngeal dysphagia.    #COPD exacerbation and concurrent Chronic Respiratory Failure   -Will speak to PMD about discontinuing Ceftriaxone and Levaquin in the absence of any obvious source of infection  -Continue BIPAP  -o2 prn, goal >90 %  -Continue prednisone taper day 4 of 5 days   -continue symbicort, cleared from pulmonary standpoint  -REPORT OBTAINED FROM [facility]: Portable chest X-ray on 5/17/18 impression- 1) COPD 2) Left suprahilar/upper lobe mass, last CT Chest in 2000, no report available, discussed with ENT, will still need CT Chest and Neck discussed with patient, he is willing to undergo    #Change in Voice for 2 weeks likely secondary to left vocal cord paralysis   -ENT attending to see  -continue PEG feeds, NPO  -REPORT OBTAINED FROM [facility]: 14 English percutaneous gastrostomy tube insertion on 5/25/18 in the setting of resistant esophageal candidiasis, unable to swallow with deteriorating nutritional status, placed via Interventional Radiology, discussed with GI, no further intervention, f/u as outpatient.    #HTN, controlled  c/w amlodipine    #DLD  c/w statin    #Anxiety   c/w alprax and loratidine    Disposition: Patient may not want to return to [facility], awaiting PT and placement

## 2018-06-21 NOTE — PROGRESS NOTE ADULT - ASSESSMENT
chronic resp failure  COPD exac  HTN  anxiety  s/p PEG  debility  dysphonia / vocal cord dysfuntion  dysphagia    plan:    cont norvasc  steroid taper  abx  o2 / bipap / nebs  peg feeds  ppi  NPO  dvt prophylaxis  dc planning to SNF

## 2018-06-21 NOTE — PROGRESS NOTE ADULT - SUBJECTIVE AND OBJECTIVE BOX
Patient was seen and examined. Spoke with RN. Chart reviewed.    No events overnight.  Vital Signs Last 24 Hrs  T(F): 96.5 (21 Jun 2018 05:15), Max: 99.1 (20 Jun 2018 21:34)  HR: 81 (21 Jun 2018 05:15) (64 - 112)  BP: 117/74 (21 Jun 2018 05:15) (106/73 - 130/56)  SpO2: 96% (21 Jun 2018 11:32) (96% - 99%)  MEDICATIONS  (STANDING):  ALBUTerol/ipratropium for Nebulization 3 milliLiter(s) Nebulizer every 6 hours  amLODIPine   Tablet 10 milliGRAM(s) Oral daily  buDESOnide 160 MICROgram(s)/formoterol 4.5 MICROgram(s) Inhaler 2 Puff(s) Inhalation two times a day  docusate sodium 100 milliGRAM(s) Oral daily  enoxaparin Injectable 40 milliGRAM(s) SubCutaneous daily  fluticasone propionate   220 MICROgram(s) HFA Inhaler 1 Puff(s) Inhalation <User Schedule>  levoFLOXacin IVPB 500 milliGRAM(s) IV Intermittent every 24 hours  loratadine 10 milliGRAM(s) Oral daily  pantoprazole   Suspension 40 milliGRAM(s) Oral before breakfast  predniSONE   Tablet 40 milliGRAM(s) Oral daily  roflumilast 500 MICROGram(s) Oral daily  senna 2 Tablet(s) Oral at bedtime  simvastatin 10 milliGRAM(s) Oral at bedtime  sodium chloride 0.9% lock flush 3 milliLiter(s) IV Push every 8 hours  vitamin A &amp; D Ointment 1 Application(s) Topical two times a day  zinc oxide 20% Ointment 1 Application(s) Topical daily    MEDICATIONS  (PRN):  acetaminophen   Tablet 650 milliGRAM(s) Oral every 6 hours PRN For Temp greater than 38 C (100.4 F)  ALPRAZolam 1 milliGRAM(s) Oral two times a day PRN anxiety  guaiFENesin    Syrup 200 milliGRAM(s) Oral every 6 hours PRN Cough    Labs:                        10.3   17.23 )-----------( 507      ( 21 Jun 2018 09:56 )             33.2                         9.1    15.72 )-----------( 448      ( 20 Jun 2018 09:04 )             29.2     21 Jun 2018 09:56    142    |  99     |  21     ----------------------------<  132    4.6     |  27     |  0.6    20 Jun 2018 09:04    140    |  97     |  22     ----------------------------<  149    4.4     |  30     |  0.6      Ca    9.0        21 Jun 2018 09:56  Ca    9.3        20 Jun 2018 09:04  Mg     2.0       21 Jun 2018 09:56  Mg     2.0       20 Jun 2018 09:04              Radiology:    General: comfortable, NAD  Neurology: A&Ox3, nonfocal  Head:  Normocephalic, atraumatic  ENT:  Mucosa moist, no ulcerations  Neck:  Supple, no JVD,   Skin: no breakdowns (as per RN)  Resp: CTA B/L  CV: RRR, S1S2,   GI: Soft, NT, bowel sounds  MS: No edema, + peripheral pulses, FROM all 4 extremity

## 2018-06-21 NOTE — MEDICAL STUDENT PROGRESS NOTE(EDUCATION) - SUBJECTIVE AND OBJECTIVE BOX
ALETA SUAZO, male, 62y ([birth year]),   [id number]  Admit Date: 06-17-18 (3d)    Chief Complaint:  Patient is a 62y old  Male who presents with a chief complaint of shortness of breath, cough with mild sputum, and fever (18 Jun 2018 16:40)      Past Medical and Surgical History:  PAST MEDICAL & SURGICAL HISTORY:  Other hydrocele  Anxiety disorder, unspecified type  Hypertension, unspecified type  High cholesterol  Chronic obstructive pulmonary disease, unspecified COPD type  PEG (percutaneous endoscopic gastrostomy) status      Current Medications:  MEDICATIONS  (STANDING):  ALBUTerol/ipratropium for Nebulization 3 milliLiter(s) Nebulizer every 6 hours  amLODIPine   Tablet 10 milliGRAM(s) Oral daily  buDESOnide 160 MICROgram(s)/formoterol 4.5 MICROgram(s) Inhaler 2 Puff(s) Inhalation two times a day  enoxaparin Injectable 40 milliGRAM(s) SubCutaneous daily  levoFLOXacin IVPB 500 milliGRAM(s) IV Intermittent every 24 hours  loratadine 10 milliGRAM(s) Oral daily  pantoprazole    Tablet 40 milliGRAM(s) Oral before breakfast  predniSONE   Tablet 40 milliGRAM(s) Oral daily  simvastatin 10 milliGRAM(s) Oral at bedtime  sodium chloride 0.9% lock flush 3 milliLiter(s) IV Push every 8 hours  vitamin A &amp; D Ointment 1 Application(s) Topical two times a day  zinc oxide 20% Ointment 1 Application(s) Topical daily    MEDICATIONS  (PRN):  acetaminophen   Tablet 650 milliGRAM(s) Oral every 6 hours PRN For Temp greater than 38 C (100.4 F)  ALPRAZolam 1 milliGRAM(s) Oral two times a day PRN anxiety  guaiFENesin    Syrup 200 milliGRAM(s) Oral every 6 hours PRN Cough      Interval History:  No acute events overnight. No complaints at this time.    Vital Signs:  T(F): 96.4 (06-20-18 @ 04:56), Max: 98.3 (06-18-18 @ 14:18)  HR: 75 (06-20-18 @ 04:56) (75 - 97)  BP: 110/67 (06-20-18 @ 04:56) (103/54 - 151/58)  RR: 22 (06-20-18 @ 04:56) (18 - 22)  SpO2: 99% (06-20-18 @ 02:00) (98% - 100%)  CAPILLARY BLOOD GLUCOSE          Physical Exam:  General: No acute distress.   Cardio: Heart sounds are difficult to auscultate (likely due to COPD)  Pulm: Chest appears to be barrel-shaped. Crackles appreciated.   Abdomen: Soft, non-tender, non-distended. PEG tube noted.  Extremities: No cyanosis or edema bilaterally. No calf tenderness to palpation.  Neuro: A&O x3.     Labs and Imaging:  CBC Full  -  ( 19 Jun 2018 11:29 )  WBC Count : 17.19 K/uL  Hemoglobin : 9.1 g/dL  Hematocrit : 28.9 %  Platelet Count - Automated : 473 K/uL  Mean Cell Volume : 86.8 fL  Mean Cell Hemoglobin : 27.3 pg  Mean Cell Hemoglobin Concentration : 31.5 g/dL  Auto Neutrophil # : x  Auto Lymphocyte # : x  Auto Monocyte # : x  Auto Eosinophil # : x  Auto Basophil # : x  Auto Neutrophil % : x  Auto Lymphocyte % : x  Auto Monocyte % : x  Auto Eosinophil % : x  Auto Basophil % : x    RDW: 15.9      BMP: 06-19-18 @ 11:29  138 | 96 | 26   -----------------< 135  4.7  | 29 | 0.6  eGFR(AA): 123, eGFR (non-AA): 106  Ca 9.2, Mg 2.1, P --        LFTs: 06-19-18 @ 11:29  Ca  9.2  | -- AST   -----------------  TP  --  | -- ALT  -----------------  Alb --  | -- ALK          ^        --         TB      Cardiac Enzymes:    Urinalysis:    Cultures:      Home Medications:  Home Medications:  albuterol 2.5 mg/3 mL (0.083%) inhalation solution: inhaled every 6 hours, As Needed (17 Jun 2018 21:00)  ALPRAZolam 1 mg oral tablet: orally 2 times a day, As Needed (17 Jun 2018 21:00)  Balmex Adult Care 11.3% topical cream: Apply topically to affected area once a day sacrum (17 Jun 2018 21:00)  Brovana 15 mcg/2 mL inhalation solution: 2 milliliter(s) inhaled 2 times a day (17 Jun 2018 21:00)  Flovent  mcg/inh inhalation aerosol: inhaled once a day (17 Jun 2018 21:00)  loratadine 10 mg oral tablet: 1 tab(s) orally once a day peg tube (17 Jun 2018 21:00)  Mucinex: 200 milligram(s) orally every 4 hours via peg tube (17 Jun 2018 21:00)  Norvasc 10 mg oral tablet: 1 tab(s) orally once a day (17 Jun 2018 21:00)  nystatin 100,000 units/g topical cream: Apply topically to affected area 2 times a day left abdomen (17 Jun 2018 21:00)  omeprazole 20 mg oral delayed release capsule: 1 cap(s) orally once a day via peg (17 Jun 2018 21:00)  predniSONE 10 mg oral tablet: 1 tab(s) by gastrostomy tube once a day (17 Jun 2018 21:00)  roflumilast 500 mcg oral tablet: 1 tab(s) orally once a day (17 Jun 2018 21:00)  simvastatin 10 mg oral tablet: 1 tab(s) orally once a day (at bedtime) (17 Jun 2018 21:00)  vitamin A &amp; D topical cream: Apply topically to affected area 2 times a day legs (17 Jun 2018 21:00) ALETA SUAZO, male, 62y ([birth year]),   MRN-[id number]  Admit Date: 06-17-18 (4d)    Chief Complaint:  Patient is a 62y old  Male who presents with a chief complaint of shortness of breath, cough and mild sputum, fever (18 Jun 2018 16:40)      Past Medical and Surgical History:  PAST MEDICAL & SURGICAL HISTORY:  Other hydrocele  Anxiety disorder, unspecified type  Hypertension, unspecified type  High cholesterol  Chronic obstructive pulmonary disease, unspecified COPD type  PEG (percutaneous endoscopic gastrostomy) status      Current Medications:  MEDICATIONS  (STANDING):  ALBUTerol/ipratropium for Nebulization 3 milliLiter(s) Nebulizer every 6 hours  amLODIPine   Tablet 10 milliGRAM(s) Oral daily  buDESOnide 160 MICROgram(s)/formoterol 4.5 MICROgram(s) Inhaler 2 Puff(s) Inhalation two times a day  docusate sodium 100 milliGRAM(s) Oral daily  enoxaparin Injectable 40 milliGRAM(s) SubCutaneous daily  fluticasone propionate   220 MICROgram(s) HFA Inhaler 1 Puff(s) Inhalation <User Schedule>  levoFLOXacin IVPB 500 milliGRAM(s) IV Intermittent every 24 hours  loratadine 10 milliGRAM(s) Oral daily  pantoprazole   Suspension 40 milliGRAM(s) Oral before breakfast  predniSONE   Tablet 40 milliGRAM(s) Oral daily  roflumilast 500 MICROGram(s) Oral daily  senna 2 Tablet(s) Oral at bedtime  simvastatin 10 milliGRAM(s) Oral at bedtime  sodium chloride 0.9% lock flush 3 milliLiter(s) IV Push every 8 hours  vitamin A &amp; D Ointment 1 Application(s) Topical two times a day  zinc oxide 20% Ointment 1 Application(s) Topical daily    MEDICATIONS  (PRN):  acetaminophen   Tablet 650 milliGRAM(s) Oral every 6 hours PRN For Temp greater than 38 C (100.4 F)  ALPRAZolam 1 milliGRAM(s) Oral two times a day PRN anxiety  guaiFENesin    Syrup 200 milliGRAM(s) Oral every 6 hours PRN Cough      Interval History:  No acute events overnight. No complaints at this time.    Vital Signs:  T(F): 96.5 (06-21-18 @ 05:15), Max: 99.1 (06-20-18 @ 21:34)  HR: 81 (06-21-18 @ 05:15) (64 - 112)  BP: 117/74 (06-21-18 @ 05:15) (103/54 - 130/56)  RR: 20 (06-21-18 @ 05:15) (18 - 22)  SpO2: 99% (06-20-18 @ 22:04) (98% - 99%)  CAPILLARY BLOOD GLUCOSE      Physical Exam:  General: Not in distress. Patient able to whisper and softly vocalize.  HEENT: Moist mucus membranes. PERRLA.  Cardio: Regular rate and rhythm, S1, S2, no murmur, rub, or gallop.  Pulm: Decreased bilateral breath sounds, positive bibasilar rales  Abdomen: Soft, non-tender, non-distended. Normoactive bowel sounds.  Extremities: No cyanosis or edema bilaterally. No calf tenderness to palpation.  Neuro: A&O x3.    Labs and Imaging:  CBC Full  -  ( 21 Jun 2018 09:56 )  WBC Count : 17.23 K/uL  Hemoglobin : 10.3 g/dL  Hematocrit : 33.2 %  Platelet Count - Automated : 507 K/uL  Mean Cell Volume : 90.2 fL  Mean Cell Hemoglobin : 28.0 pg  Mean Cell Hemoglobin Concentration : 31.0 g/dL  Auto Neutrophil # : x  Auto Lymphocyte # : x  Auto Monocyte # : x  Auto Eosinophil # : x  Auto Basophil # : x  Auto Neutrophil % : x  Auto Lymphocyte % : x  Auto Monocyte % : x  Auto Eosinophil % : x  Auto Basophil % : x    RDW: 16.1  Cardiac Enzymes:    Urinalysis:    Cultures:      Home Medications:  Home Medications:  albuterol 2.5 mg/3 mL (0.083%) inhalation solution: inhaled every 6 hours, As Needed (17 Jun 2018 21:00)  ALPRAZolam 1 mg oral tablet: orally 2 times a day, As Needed (17 Jun 2018 21:00)  Balmex Adult Care 11.3% topical cream: Apply topically to affected area once a day sacrum (17 Jun 2018 21:00)  Brovana 15 mcg/2 mL inhalation solution: 2 milliliter(s) inhaled 2 times a day (17 Jun 2018 21:00)  Flovent  mcg/inh inhalation aerosol: inhaled once a day (17 Jun 2018 21:00)  loratadine 10 mg oral tablet: 1 tab(s) orally once a day peg tube (17 Jun 2018 21:00)  Mucinex: 200 milligram(s) orally every 4 hours via peg tube (17 Jun 2018 21:00)  Norvasc 10 mg oral tablet: 1 tab(s) orally once a day (17 Jun 2018 21:00)  nystatin 100,000 units/g topical cream: Apply topically to affected area 2 times a day left abdomen (17 Jun 2018 21:00)  omeprazole 20 mg oral delayed release capsule: 1 cap(s) orally once a day via peg (17 Jun 2018 21:00)  predniSONE 10 mg oral tablet: 1 tab(s) by gastrostomy tube once a day (17 Jun 2018 21:00)  roflumilast 500 mcg oral tablet: 1 tab(s) orally once a day (17 Jun 2018 21:00)  simvastatin 10 mg oral tablet: 1 tab(s) orally once a day (at bedtime) (17 Jun 2018 21:00)  vitamin A &amp; D topical cream: Apply topically to affected area 2 times a day legs (17 Jun 2018 21:00)

## 2018-06-21 NOTE — PROGRESS NOTE ADULT - ASSESSMENT
· Assessment		   yo sent from [facility] for complaint of shortness of breath, productive cough of whitish sputum and subjective fevers on day of presentation.     #COPD exacerbation and concurrent Chronic Respiratory Failure   Continue Ceftriaxone and Levaquin  Continue BIPAP  o2 prn, goal >90 %  Continue prednisone taper day 3 of 5 days   continue symbicort   pulmonary following    #Change in Voice for 2 weeks likely secondary to left vocal cord paralysis   ENT attending to see  continue PEG feeds     #HTN, controlled  c/w amlodipine    #DLD  c/w statin    #Anxiety   c/w alprax and loratidine    Disposition: Patient may not want to return to [facility], awaiting PT and placement

## 2018-06-22 LAB
ANION GAP SERPL CALC-SCNC: 11 MMOL/L — SIGNIFICANT CHANGE UP (ref 7–14)
BUN SERPL-MCNC: 17 MG/DL — SIGNIFICANT CHANGE UP (ref 10–20)
CALCIUM SERPL-MCNC: 8.9 MG/DL — SIGNIFICANT CHANGE UP (ref 8.5–10.1)
CHLORIDE SERPL-SCNC: 101 MMOL/L — SIGNIFICANT CHANGE UP (ref 98–110)
CO2 SERPL-SCNC: 29 MMOL/L — SIGNIFICANT CHANGE UP (ref 17–32)
CREAT SERPL-MCNC: 0.6 MG/DL — LOW (ref 0.7–1.5)
GLUCOSE SERPL-MCNC: 161 MG/DL — HIGH (ref 70–99)
HCT VFR BLD CALC: 31.6 % — LOW (ref 42–52)
HGB BLD-MCNC: 9.7 G/DL — LOW (ref 14–18)
MAGNESIUM SERPL-MCNC: 2 MG/DL — SIGNIFICANT CHANGE UP (ref 1.8–2.4)
MCHC RBC-ENTMCNC: 27.7 PG — SIGNIFICANT CHANGE UP (ref 27–31)
MCHC RBC-ENTMCNC: 30.7 G/DL — LOW (ref 32–37)
MCV RBC AUTO: 90.3 FL — SIGNIFICANT CHANGE UP (ref 80–94)
NRBC # BLD: 0 /100 WBCS — SIGNIFICANT CHANGE UP (ref 0–0)
PLATELET # BLD AUTO: 455 K/UL — HIGH (ref 130–400)
POTASSIUM SERPL-MCNC: 4.1 MMOL/L — SIGNIFICANT CHANGE UP (ref 3.5–5)
POTASSIUM SERPL-SCNC: 4.1 MMOL/L — SIGNIFICANT CHANGE UP (ref 3.5–5)
RBC # BLD: 3.5 M/UL — LOW (ref 4.7–6.1)
RBC # FLD: 16.3 % — HIGH (ref 11.5–14.5)
SODIUM SERPL-SCNC: 141 MMOL/L — SIGNIFICANT CHANGE UP (ref 135–146)
WBC # BLD: 14.09 K/UL — HIGH (ref 4.8–10.8)
WBC # FLD AUTO: 14.09 K/UL — HIGH (ref 4.8–10.8)

## 2018-06-22 RX ORDER — DOCUSATE SODIUM 100 MG
100 CAPSULE ORAL
Qty: 0 | Refills: 0 | Status: DISCONTINUED | OUTPATIENT
Start: 2018-06-22 | End: 2018-06-25

## 2018-06-22 RX ORDER — SENNA PLUS 8.6 MG/1
2 TABLET ORAL AT BEDTIME
Qty: 0 | Refills: 0 | Status: DISCONTINUED | OUTPATIENT
Start: 2018-06-22 | End: 2018-06-25

## 2018-06-22 RX ADMIN — SODIUM CHLORIDE 3 MILLILITER(S): 9 INJECTION INTRAMUSCULAR; INTRAVENOUS; SUBCUTANEOUS at 21:44

## 2018-06-22 RX ADMIN — Medication 100 MILLIGRAM(S): at 21:46

## 2018-06-22 RX ADMIN — Medication 200 MILLIGRAM(S): at 12:58

## 2018-06-22 RX ADMIN — Medication 3 MILLILITER(S): at 08:33

## 2018-06-22 RX ADMIN — ZINC OXIDE 1 APPLICATION(S): 200 OINTMENT TOPICAL at 12:58

## 2018-06-22 RX ADMIN — Medication 1 APPLICATION(S): at 17:15

## 2018-06-22 RX ADMIN — SODIUM CHLORIDE 3 MILLILITER(S): 9 INJECTION INTRAMUSCULAR; INTRAVENOUS; SUBCUTANEOUS at 05:43

## 2018-06-22 RX ADMIN — PANTOPRAZOLE SODIUM 40 MILLIGRAM(S): 20 TABLET, DELAYED RELEASE ORAL at 05:42

## 2018-06-22 RX ADMIN — Medication 3 MILLILITER(S): at 20:16

## 2018-06-22 RX ADMIN — Medication 3 MILLILITER(S): at 13:48

## 2018-06-22 RX ADMIN — ENOXAPARIN SODIUM 40 MILLIGRAM(S): 100 INJECTION SUBCUTANEOUS at 12:59

## 2018-06-22 RX ADMIN — SIMVASTATIN 10 MILLIGRAM(S): 20 TABLET, FILM COATED ORAL at 21:46

## 2018-06-22 RX ADMIN — SENNA PLUS 2 TABLET(S): 8.6 TABLET ORAL at 21:46

## 2018-06-22 RX ADMIN — Medication 1 PUFF(S): at 07:55

## 2018-06-22 RX ADMIN — SODIUM CHLORIDE 3 MILLILITER(S): 9 INJECTION INTRAMUSCULAR; INTRAVENOUS; SUBCUTANEOUS at 12:59

## 2018-06-22 RX ADMIN — LORATADINE 10 MILLIGRAM(S): 10 TABLET ORAL at 12:59

## 2018-06-22 RX ADMIN — Medication 1 APPLICATION(S): at 05:43

## 2018-06-22 RX ADMIN — Medication 1 MILLIGRAM(S): at 13:22

## 2018-06-22 RX ADMIN — Medication 40 MILLIGRAM(S): at 05:42

## 2018-06-22 RX ADMIN — ROFLUMILAST 500 MICROGRAM(S): 500 TABLET ORAL at 12:58

## 2018-06-22 RX ADMIN — AMLODIPINE BESYLATE 10 MILLIGRAM(S): 2.5 TABLET ORAL at 05:42

## 2018-06-22 NOTE — PROGRESS NOTE ADULT - SUBJECTIVE AND OBJECTIVE BOX
ALETA SUAZO 63y Male  MRN#: [id number]       SUBJECTIVE  Patient is a 63y old Male who presents with a chief complaint of shortness of breath, cough and mild sputum, fever (18 Jun 2018 16:40)  Currently admitted to medicine with the primary diagnosis of COPD exacerbation  Today is hospital day 5d, and this morning he reports pain is well controlled. Denies CP, SOB and GI pain. No overnight events.         OBJECTIVE  PAST MEDICAL & SURGICAL HISTORY  Other hydrocele  Anxiety disorder, unspecified type  Hypertension, unspecified type  High cholesterol  Chronic obstructive pulmonary disease, unspecified COPD type  PEG (percutaneous endoscopic gastrostomy) status    ALLERGIES:  No Known Allergies    MEDICATIONS:  STANDING MEDICATIONS  amLODIPine   Tablet 10 milliGRAM(s) Oral daily  buDESOnide 160 MICROgram(s)/formoterol 4.5 MICROgram(s) Inhaler 2 Puff(s) Inhalation two times a day  docusate sodium Liquid 100 milliGRAM(s) Oral two times a day  enoxaparin Injectable 40 milliGRAM(s) SubCutaneous daily  fluticasone propionate   220 MICROgram(s) HFA Inhaler 1 Puff(s) Inhalation <User Schedule>  levoFLOXacin IVPB 500 milliGRAM(s) IV Intermittent every 24 hours  loratadine 10 milliGRAM(s) Oral daily  pantoprazole   Suspension 40 milliGRAM(s) Oral before breakfast  predniSONE   Tablet 40 milliGRAM(s) Oral daily  roflumilast 500 MICROGram(s) Oral daily  senna 2 Tablet(s) Oral at bedtime  simvastatin 10 milliGRAM(s) Oral at bedtime  sodium chloride 0.9% lock flush 3 milliLiter(s) IV Push every 8 hours  vitamin A &amp; D Ointment 1 Application(s) Topical two times a day  zinc oxide 20% Ointment 1 Application(s) Topical daily    PRN MEDICATIONS  acetaminophen   Tablet 650 milliGRAM(s) Oral every 6 hours PRN  ALPRAZolam 1 milliGRAM(s) Oral two times a day PRN  guaiFENesin    Syrup 200 milliGRAM(s) Oral every 6 hours PRN      VITAL SIGNS: Last 24 Hours  T(C): 36.1 (22 Jun 2018 14:30), Max: 36.2 (22 Jun 2018 05:02)  T(F): 97 (22 Jun 2018 14:30), Max: 97.2 (22 Jun 2018 05:02)  HR: 89 (22 Jun 2018 14:30) (75 - 89)  BP: 90/70 (22 Jun 2018 14:30) (90/70 - 124/75)  BP(mean): --  RR: 20 (22 Jun 2018 14:30) (20 - 22)  SpO2: 97% (22 Jun 2018 19:13) (97% - 97%)    LABS:                        9.7    14.09 )-----------( 455      ( 22 Jun 2018 07:59 )             31.6     06-22    141  |  101  |  17  ----------------------------<  161<H>  4.1   |  29  |  0.6<L>    Ca    8.9      22 Jun 2018 07:59  Mg     2.0     06-22                    RADIOLOGY:  CT Neck IV cont  1. Mild fatty atrophy at the left tongue base with laxity. No definite   neck mass identified.    CT Chest IV cont    Left lung nodules as described.    Left lower lobe opacifications, postinflammatory appearance.    Significant emphysematous changes.    Consider correlation with PET CT scanning to determine physiologic   activity.       PHYSICAL EXAM:    General: Not in distress. Patient able to whisper and softly vocalize.  HEENT: Moist mucus membranes. PERRLA.  Cardio: Regular rate and rhythm, S1, S2, no murmur, rub, or gallop.  Pulm: Decreased bilateral breath sounds, positive bibasilar rales  Abdomen: Soft, non-tender, non-distended. Normoactive bowel sounds.  Extremities: No cyanosis or edema bilaterally. No calf tenderness to palpation.  Neuro: A&O x3.

## 2018-06-22 NOTE — SWALLOW BEDSIDE ASSESSMENT ADULT - SLP GENERAL OBSERVATIONS
Pt received OOB to chair awake and alert on O2 nasal cannula. Ongoing education re: dysphagia and reccs as well as oral care and aspiration risks
Pt received in bed awake and alert on O2 nasal cannula. Results of VFSS discussed at length w/ pt.
Pt received in bed awake, on 3l O2 nasal cannula, dysphonic vocal quality, breathy whisper.

## 2018-06-22 NOTE — SWALLOW BEDSIDE ASSESSMENT ADULT - NS SPL SWALLOW CLINIC TRIAL FT
+overt s/s of pharyngeal dysphagia, however pt requesting ice for pleasure
+overt s/s of pharyngeal dysphagia

## 2018-06-22 NOTE — PROGRESS NOTE ADULT - SUBJECTIVE AND OBJECTIVE BOX
NEPHROLOGY FOLLOW UP:    spoke with team, remains NPO, no new events, complaints similair    PAST MEDICAL & SURGICAL HISTORY:  Other hydrocele  Anxiety disorder, unspecified type  Hypertension, unspecified type  High cholesterol  Chronic obstructive pulmonary disease, unspecified COPD type  PEG (percutaneous endoscopic gastrostomy) status    Allergies:  No Known Allergies    Home Medications Reviewed    SOCIAL HISTORY:  Denies ETOH,Smoking,   FAMILY HISTORY:  No pertinent family history in first degree relatives      REVIEW OF SYSTEMS:  All other review of systems is negative unless indicated above.    PHYSICAL EXAM:  NAD  moistmm  no jvd  cta b/l decreased bs bl  rrr  soft  + peg  no edema    Hospital Medications:   MEDICATIONS  (STANDING):  ALBUTerol/ipratropium for Nebulization 3 milliLiter(s) Nebulizer every 6 hours  amLODIPine   Tablet 10 milliGRAM(s) Oral daily  buDESOnide 160 MICROgram(s)/formoterol 4.5 MICROgram(s) Inhaler 2 Puff(s) Inhalation two times a day  docusate sodium 100 milliGRAM(s) Oral daily  enoxaparin Injectable 40 milliGRAM(s) SubCutaneous daily  fluticasone propionate   220 MICROgram(s) HFA Inhaler 1 Puff(s) Inhalation <User Schedule>  levoFLOXacin IVPB 500 milliGRAM(s) IV Intermittent every 24 hours  loratadine 10 milliGRAM(s) Oral daily  pantoprazole   Suspension 40 milliGRAM(s) Oral before breakfast  predniSONE   Tablet 40 milliGRAM(s) Oral daily  roflumilast 500 MICROGram(s) Oral daily  senna 2 Tablet(s) Oral at bedtime  simvastatin 10 milliGRAM(s) Oral at bedtime  sodium chloride 0.9% lock flush 3 milliLiter(s) IV Push every 8 hours  vitamin A &amp; D Ointment 1 Application(s) Topical two times a day  zinc oxide 20% Ointment 1 Application(s) Topical daily        VITALS:  T(F): 97.2 (06-22-18 @ 05:02), Max: 98.8 (06-21-18 @ 20:19)  HR: 75 (06-22-18 @ 05:02)  BP: 124/75 (06-22-18 @ 05:02)  RR: 22 (06-22-18 @ 05:02)  SpO2: 99% (06-21-18 @ 20:19)  Wt(kg): --    06-20 @ 07:01  -  06-21 @ 07:00  --------------------------------------------------------  IN: 1200 mL / OUT: 850 mL / NET: 350 mL    06-21 @ 07:01 - 06-22 @ 07:00  --------------------------------------------------------  IN: 1020 mL / OUT: 350 mL / NET: 670 mL    06-22 @ 07:01 - 06-22 @ 12:17  --------------------------------------------------------  IN: 340 mL / OUT: 0 mL / NET: 340 mL          LABS:  06-22    141  |  101  |  17  ----------------------------<  161<H>  4.1   |  29  |  0.6<L>    Ca    8.9      22 Jun 2018 07:59  Mg     2.0     06-22                            9.7    14.09 )-----------( 455      ( 22 Jun 2018 07:59 )             31.6       Urine Studies:        RADIOLOGY & ADDITIONAL STUDIES:        RADIOLOGY & ADDITIONAL STUDIES:

## 2018-06-22 NOTE — PROGRESS NOTE ADULT - SUBJECTIVE AND OBJECTIVE BOX
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 CT neck and chest done - reviewed films with Dr Parr. Would request pulmonary follow up to review CT chest images and discuss further plan as pt may have other films at [facility] (stable vs new nodules). No acute ENT intervention, continue SLP and PEG/nutrition. Recall PRN.

## 2018-06-22 NOTE — PROGRESS NOTE ADULT - ASSESSMENT
chronic resp failure  COPD exac  HTN  anxiety  s/p PEG  debility  dysphonia / vocal cord dysfunction  dysphagia  esophageal dysmotility  lung mass    plan:    cont norvasc  steroid taper  abx  o2 / bipap / nebs  peg feeds  ppi  NPO  dvt prophylaxis  dc planning to SNF

## 2018-06-22 NOTE — SWALLOW BEDSIDE ASSESSMENT ADULT - SLP PERTINENT HISTORY OF CURRENT PROBLEM
Pt admitted from [facility] for SOB, cough, +sputum. PMH: COPD on bipap at night. H/o PEG tube at [facility], SLP dept contacted pt was on puree w/ thin, no VFSS, calorie count, Pt reports his pulmonologist wanted PEG 2' poor intake when he has COPD exacerbations.  Pt is a poor historian. [facility] orders for diet are Soft w/ NO LIQUIDS.
Pt admitted from [facility] for SOB, cough, +sputum. PMH: COPD on bipap at night. H/o PEG tube at [facility]. Conflicting info provided from [facility] SLP re: po intake. Pt reportedly w/ h/o odynophagia 2' severe adrian-pharyngeal thrush, resulting in decreased po intake and PEG placement.
Pt admitted from [facility] for SOB, cough, +sputum. PMH: COPD on bipap at night. H/o PEG tube at [facility]. Conflicting info provided from [facility] SLP re: po intake. Pt reportedly w/ h/o odynophagia 2' severe adrian-pharyngeal thrush, resulting in decreased po intake and PEG placement.

## 2018-06-22 NOTE — PROGRESS NOTE ADULT - ASSESSMENT
63 yo sent from [facility] for complaint of shortness of breath, productive cough of whitish sputum and subjective fevers on day of presentation; admitted for COPD exacerbation. Patient was also found to have vocal cord paralysis, minimal UES, and pharyngeal dysphagia.    #COPD exacerbation and concurrent Chronic Respiratory Failure   -Will speak to PMD about discontinuing abx in the absence of any obvious source of infection  -Continue BIPAP  -o2 prn, goal >90 %  -Continue prednisone taper   -continue symbicort, cleared from pulmonary standpoint  -REPORT OBTAINED FROM [facility]: Portable chest X-ray on 5/17/18 impression- 1) COPD 2) Left suprahilar/upper lobe mass, last CT Chest in 2000, no report available, discussed with ENT, will still need CT Chest and Neck discussed with patient, he is willing to undergo    #Change in Voice for 2 weeks likely secondary to left vocal cord paralysis   -ENT attending reviewed CT neck and chest- no intervention from ENT   -continue PEG feeds, NPO  -REPORT OBTAINED FROM [facility]: 14 Cypriot percutaneous gastrostomy tube insertion on 5/25/18 in the setting of resistant esophageal candidiasis, unable to swallow with deteriorating nutritional status, placed via Interventional Radiology, discussed with GI, no further intervention, f/u as outpatient.  -F/u pulm for lung nodules on CT chest  -Pt allowed single ice chip per speech and swallow    #HTN, controlled  c/w amlodipine    #DLD  c/w statin    #Anxiety   c/w alprax and loratidine    Disposition: Patient may not want to return to [facility], awaiting PT and placement

## 2018-06-22 NOTE — SWALLOW BEDSIDE ASSESSMENT ADULT - COMMENTS
s/p VFSS 6/19, severe to profound pharyngeal dysphagia w/ poor clearance of bolus through the UES. PT deemed inappropriate for po diet at this time. Pt s/p ENT consult +L TVC paralysis. GI reccs:   outpatient EGD +/- Manometry  Can follow up at the GI clinic :[phone]
s/p VFSS 6/19, severe to profound pharyngeal dysphagia w/ poor clearance of bolus through the UES. PT deemed inappropriate for po diet at this time. Pt s/p ENT consult +L TVC paralysis. GI reccs: Rec: 1)Treated COPD 2)Continue PEG feeds 3)OBTAIN RECORDS FROM [facility] (PEG REPORT/EGDs IF DONE) 4)If reports unrevealing outpatient EGD +/- Manometry  Can follow up at the GI clinic :[phone]
+overt s/s of pharyngeal dysphagia

## 2018-06-22 NOTE — SWALLOW BEDSIDE ASSESSMENT ADULT - SWALLOW EVAL: DIAGNOSIS
+dysphonic, breathy voice, +expectorates clear sputum prior to po trials. +cough w/ expectoration of sputum laced w/ po trials after thin, nectar and puree
Results of VFSS discussed at length w/ pt. Pt w/ severe to profound pharyngeal dysphagia and poor clearance of bolus through the UES.
+remains dysphonic, requesting po, educated on oral hygiene +accepted ice chips, +cough w/ expectoration of sputum/ice

## 2018-06-22 NOTE — CHART NOTE - NSCHARTNOTEFT_GEN_A_CORE
Registered Dietitian Follow-Up     Patient Profile Reviewed                           Yes [x]   No []     Nutrition History Previously Obtained        Yes [x]  No []       Pertinent Subjective Information:  -Pt OOB in chair at time of assessment. Pt very anxious and tearful about current medical condition and need for tube feeds. Pt not understanding need for PEG feeds and free H2O flushes despite daily explanations from multiple members of medical team. Again discussed need for tube feed with pt at bedside. SLP allowing pt to have ice chips for comfort. TF running at goal rate and tolerating well. No BM since admission. See TF adjustment recommendations below.       Pertinent Medical Interventions:  COPD exacerbation and concurrent Chronic Respiratory Failure-supplemental O2 via NC, BiPap PRN, steroids in place   Change in Voice for 2 weeks likely secondary to left vocal cord paralysis-no GI interventions as inpatient; SLP allowing single ice chips for comfort at this time     Diet order: bolus feeds of Osmolite 1.5 @ 240mL/hr Q4H holding 2am feeds (TV 1200 mL/day, 1800 kcal, 75g protein).     Anthropometrics:  - Ht.  - Wt.: 52.4kg   - %wt change: no new wt   - BMI  - IBW     Pertinent Lab Data: (3/22/18)  -WBC 14.09  -RBC 3.50  -H/H 9.7/31.6  -Cr 0.6  -glucose 161     Pertinent Meds: lovenox, colace, senna, protonix, prednisone, albuterol, amlodipine, simvastatin, NS 0.9% lock flush      Physical Findings:  - Appearance: AAO, anxious/tearful   - GI function: constipation +LBM 6/17   - Tubes: PEG in place   - Oral/Mouth cavity: SLP reccs 6/20 NPO with PEG feeds, pt can have ice chips for comfort   - Skin: stage II L ischium      Nutrition Requirements  Weight Used: 52.4kg      Estimated Energy Needs    Continue [x] (continued from RD note on 6/19)  6611-1638 kcal/day     Estimated Protein Needs    Continue [x] (continued from RD note on 6/19)  66-79 g/day      Estimated Fluid Needs        Continue [x]  (continued from RD note on 6/19)  per LIP    Nutrient Intake:  -TF meeting estimated kcal/protein needs      [x] Previous Nutrition Diagnosis: Inadequate enteral nutrition infusion            [] Ongoing          [x] Resolved  [x] Previous Nutrition Diagnosis: Severe PCM: ongoing but TF regimen meeting needs and no further wt loss noted             [x] Ongoing          [] Resolved    New Nutrition Diagnosis:  Problem: Altered GI function  Etiology: acuteness of illness, inadequate dietary fiber intake    Signs/symptoms: no BM x 5 days      Nutrition Intervention   1. enteral nutrition   2. nutrition related medication management     Reccs:  1. Adjust tube feed to Jevity 1.2 @ 240mL/hr Q4H   -TF at goal rate will provide total volume 1440mL/day, 1728 kcal/day, 80g protein, 1166mL free H2O, 24g fiber and 100% RDIs.  -pre/post free H2O flushes per LIP  2. Consider increasing bowel regimen as no BM since admission.      Goal/Expected Outcome:  EN to meet >85% but not exceed 105% estimated kcal/protein needs.      Indicator/Monitoring:  RD will monitor diet order, energy intake, wt trends, nutrition related lab values, NFPF (TF tolerance, BMs). Registered Dietitian Follow-Up     Patient Profile Reviewed                           Yes [x]   No []     Nutrition History Previously Obtained        Yes [x]  No []       Pertinent Subjective Information:  -Pt OOB in chair at time of assessment. Pt very anxious and tearful about current medical condition and need for tube feeds. Pt not understanding need for PEG feeds and free H2O flushes despite daily explanations from multiple members of medical team. Again discussed need for tube feed with pt at bedside. SLP allowing pt to have ice chips for comfort. TF running at goal rate and tolerating well. No BM since admission. See TF adjustment recommendations below.       Pertinent Medical Interventions:  COPD exacerbation and concurrent Chronic Respiratory Failure-supplemental O2 via NC, BiPap PRN, steroids in place   Change in Voice for 2 weeks likely secondary to left vocal cord paralysis-no GI interventions as inpatient; SLP allowing single ice chips for comfort at this time     Diet order: bolus feeds of Osmolite 1.5 @ 240mL/hr Q4H holding 2am feeds (TV 1200 mL/day, 1800 kcal, 75g protein).     Anthropometrics:  - Ht.  - Wt.: 52.4kg   - %wt change: no new wt   - BMI  - IBW     Pertinent Lab Data: (3/22/18)  -WBC 14.09  -RBC 3.50  -H/H 9.7/31.6  -Cr 0.6  -glucose 161     Pertinent Meds: lovenox, colace, senna, protonix, prednisone, albuterol, amlodipine, simvastatin, NS 0.9% lock flush      Physical Findings:  - Appearance: AAO, anxious/tearful   - GI function: constipation +LBM 6/17   - Tubes: PEG in place   - Oral/Mouth cavity: SLP reccs 6/20 NPO with PEG feeds, pt can have ice chips for comfort   - Skin: stage II L ischium      Nutrition Requirements  Weight Used: 52.4kg      Estimated Energy Needs    Continue [x] (continued from RD note on 6/19)  9081-9271 kcal/day     Estimated Protein Needs    Continue [x] (continued from RD note on 6/19)  66-79 g/day      Estimated Fluid Needs        Continue [x]  (continued from RD note on 6/19)  per LIP    Nutrient Intake:  -TF meeting estimated kcal/protein needs      [x] Previous Nutrition Diagnosis: Inadequate enteral nutrition infusion            [] Ongoing          [x] Resolved  [x] Previous Nutrition Diagnosis: Severe PCM: ongoing but TF regimen meeting needs and no further wt loss noted             [x] Ongoing          [] Resolved    New Nutrition Diagnosis:  Problem: Altered GI function  Etiology: acuteness of illness, inadequate dietary fiber intake    Signs/symptoms: no BM x 5 days      Nutrition Intervention   1. enteral nutrition   2. nutrition related medication management     Reccs:  1. Adjust tube feed to Jevity 1.2 @ 240mL/hr Q4H   -TF at goal rate will provide total volume 1440mL/day, 1728 kcal/day, 80g protein, 1166mL free H2O, 24g fiber and 100% RDIs.  -pre/post free H2O flushes per LIP  2. Consider increasing bowel regimen as no BM since admission.      Goal/Expected Outcome:  EN to meet >85% but not exceed 105% estimated kcal/protein needs x 4 days.      Indicator/Monitoring:  RD will monitor diet order, energy intake, wt trends, nutrition related lab values, NFPF (TF tolerance, BMs).

## 2018-06-23 RX ORDER — IPRATROPIUM/ALBUTEROL SULFATE 18-103MCG
3 AEROSOL WITH ADAPTER (GRAM) INHALATION EVERY 6 HOURS
Qty: 0 | Refills: 0 | Status: DISCONTINUED | OUTPATIENT
Start: 2018-06-23 | End: 2018-06-25

## 2018-06-23 RX ADMIN — PANTOPRAZOLE SODIUM 40 MILLIGRAM(S): 20 TABLET, DELAYED RELEASE ORAL at 10:47

## 2018-06-23 RX ADMIN — Medication 3 MILLILITER(S): at 14:03

## 2018-06-23 RX ADMIN — SIMVASTATIN 10 MILLIGRAM(S): 20 TABLET, FILM COATED ORAL at 21:32

## 2018-06-23 RX ADMIN — Medication 40 MILLIGRAM(S): at 05:41

## 2018-06-23 RX ADMIN — SODIUM CHLORIDE 3 MILLILITER(S): 9 INJECTION INTRAMUSCULAR; INTRAVENOUS; SUBCUTANEOUS at 05:44

## 2018-06-23 RX ADMIN — Medication 100 MILLIGRAM(S): at 05:42

## 2018-06-23 RX ADMIN — SODIUM CHLORIDE 3 MILLILITER(S): 9 INJECTION INTRAMUSCULAR; INTRAVENOUS; SUBCUTANEOUS at 21:35

## 2018-06-23 RX ADMIN — Medication 3 MILLILITER(S): at 20:20

## 2018-06-23 RX ADMIN — ZINC OXIDE 1 APPLICATION(S): 200 OINTMENT TOPICAL at 13:32

## 2018-06-23 RX ADMIN — BUDESONIDE AND FORMOTEROL FUMARATE DIHYDRATE 2 PUFF(S): 160; 4.5 AEROSOL RESPIRATORY (INHALATION) at 21:33

## 2018-06-23 RX ADMIN — Medication 1 MILLIGRAM(S): at 21:32

## 2018-06-23 RX ADMIN — Medication 1 PUFF(S): at 21:34

## 2018-06-23 RX ADMIN — ROFLUMILAST 500 MICROGRAM(S): 500 TABLET ORAL at 13:31

## 2018-06-23 RX ADMIN — ENOXAPARIN SODIUM 40 MILLIGRAM(S): 100 INJECTION SUBCUTANEOUS at 13:32

## 2018-06-23 RX ADMIN — Medication 1 APPLICATION(S): at 17:35

## 2018-06-23 RX ADMIN — SODIUM CHLORIDE 3 MILLILITER(S): 9 INJECTION INTRAMUSCULAR; INTRAVENOUS; SUBCUTANEOUS at 13:32

## 2018-06-23 RX ADMIN — Medication 1 APPLICATION(S): at 05:42

## 2018-06-23 RX ADMIN — Medication 1 MILLIGRAM(S): at 13:30

## 2018-06-23 RX ADMIN — SENNA PLUS 2 TABLET(S): 8.6 TABLET ORAL at 21:32

## 2018-06-23 RX ADMIN — AMLODIPINE BESYLATE 10 MILLIGRAM(S): 2.5 TABLET ORAL at 05:41

## 2018-06-23 NOTE — PROGRESS NOTE ADULT - ASSESSMENT
CRF  COPD exac improving  Multiple pulmonary nodule with suspicion for underlying malignancy  Anxiety  Dysphagia s/p peg tube    02 via nc  albuterol prn  symbicort 160/4.5 2 puff bid (in lieu of brovana and pulmicort)  albuterol prn  prednisone taper to baseline   cont daliresp, mucinex  pt will need to have follow up imaging as out pt including a pet scan  discussed with patient regarding suspicon of underlying malignancy, pt agrees to follow up with primary pulmonologist Dr Almonte  dvt/gi px  oob - chair   d/w primary team

## 2018-06-23 NOTE — PROGRESS NOTE ADULT - ASSESSMENT
61 yo sent from [facility] for complaint of SOB, productive cough of whitish sputum and subjective fevers on day of presentation; admitted for COPD exacerbation. Patient was also found to have vocal cord paralysis, minimal UES, and pharyngeal dysphagia.    #COPD exacerbation and concurrent Chronic Respiratory Failure   -Will speak to PMD about discontinuing abx in the absence of any obvious source of infection. Called Dr. Caraballo today and call was not answered.  -Spoke with Pulm Dr. Dumont. She reviewed CT chest and will see patient later today.  -Continue BIPAP  -o2 prn, goal >90 %  -Continue prednisone taper   -continue symbicort, cleared from pulmonary standpoint  -REPORT OBTAINED FROM [facility]: Portable chest X-ray on 5/17/18 impression- 1) COPD 2) Left suprahilar/upper lobe mass, last CT Chest in 2000, no report available, discussed with ENT, will still need CT Chest and Neck discussed with patient, he is willing to undergo    #Change in Voice for 2 weeks likely secondary to left vocal cord paralysis   -ENT attending reviewed CT neck and chest- no intervention from ENT   -continue PEG feeds, NPO. Pt allowed single ice chip per speech and swallow  -REPORT OBTAINED FROM [facility]: 14 Portuguese percutaneous gastrostomy tube insertion on 5/25/18 in the setting of resistant esophageal candidiasis, unable to swallow with deteriorating nutritional status, placed via Interventional Radiology, discussed with GI, no further intervention, f/u as outpatient.  -F/u pulm for lung nodules on CT chest      #HTN, controlled  c/w amlodipine    #DLD  c/w statin    #Anxiety   c/w alprax and loratidine    Disposition: Patient may not want to return to [facility], awaiting PT and placement

## 2018-06-23 NOTE — PROGRESS NOTE ADULT - SUBJECTIVE AND OBJECTIVE BOX
ALETA SUAZO 63y Male  MRN#: [id number]       SUBJECTIVE  Patient is a 63y old Male who presents with a chief complaint of shortness of breath, cough and mild sputum, fever (18 Jun 2018 16:40)  Currently admitted to medicine with the primary diagnosis of COPD exacerbation  Today is hospital day 6d, and this morning he reports SOB, O2 97% and was given oxygen. Denies CP and GI pain. No overnight events.         OBJECTIVE  PAST MEDICAL & SURGICAL HISTORY  Other hydrocele  Anxiety disorder, unspecified type  Hypertension, unspecified type  High cholesterol  Chronic obstructive pulmonary disease, unspecified COPD type  PEG (percutaneous endoscopic gastrostomy) status    ALLERGIES:  No Known Allergies    MEDICATIONS:  STANDING MEDICATIONS  amLODIPine   Tablet 10 milliGRAM(s) Oral daily  buDESOnide 160 MICROgram(s)/formoterol 4.5 MICROgram(s) Inhaler 2 Puff(s) Inhalation two times a day  docusate sodium Liquid 100 milliGRAM(s) Oral two times a day  enoxaparin Injectable 40 milliGRAM(s) SubCutaneous daily  fluticasone propionate   220 MICROgram(s) HFA Inhaler 1 Puff(s) Inhalation <User Schedule>  levoFLOXacin IVPB 500 milliGRAM(s) IV Intermittent every 24 hours  loratadine 10 milliGRAM(s) Oral daily  pantoprazole   Suspension 40 milliGRAM(s) Oral before breakfast  predniSONE   Tablet 40 milliGRAM(s) Oral daily  roflumilast 500 MICROGram(s) Oral daily  senna 2 Tablet(s) Oral at bedtime  simvastatin 10 milliGRAM(s) Oral at bedtime  sodium chloride 0.9% lock flush 3 milliLiter(s) IV Push every 8 hours  vitamin A &amp; D Ointment 1 Application(s) Topical two times a day  zinc oxide 20% Ointment 1 Application(s) Topical daily    PRN MEDICATIONS  acetaminophen   Tablet 650 milliGRAM(s) Oral every 6 hours PRN  ALPRAZolam 1 milliGRAM(s) Oral two times a day PRN  guaiFENesin    Syrup 200 milliGRAM(s) Oral every 6 hours PRN      VITAL SIGNS: Last 24 Hours  T(C): 35.8 (23 Jun 2018 05:18), Max: 36.1 (22 Jun 2018 14:30)  T(F): 96.5 (23 Jun 2018 05:18), Max: 97 (22 Jun 2018 14:30)  HR: 86 (23 Jun 2018 05:18) (86 - 95)  BP: 113/77 (23 Jun 2018 05:18) (90/70 - 115/65)  BP(mean): --  RR: 20 (23 Jun 2018 05:18) (20 - 22)  SpO2: 97% (22 Jun 2018 19:13) (97% - 97%)    LABS:                        9.7    14.09 )-----------( 455      ( 22 Jun 2018 07:59 )             31.6     06-22    141  |  101  |  17  ----------------------------<  161<H>  4.1   |  29  |  0.6<L>    Ca    8.9      22 Jun 2018 07:59  Mg     2.0     06-22        RADIOLOGY:  CT Neck IV cont 6/21/18  1. Mild fatty atrophy at the left tongue base with laxity. No definite   neck mass identified.    CT Chest IV cont 6/21/18    Left lung nodules as described.    Left lower lobe opacifications, postinflammatory appearance.    Significant emphysematous changes.    Consider correlation with PET CT scanning to determine physiologic   activity.       PHYSICAL EXAM:    General: Not in distress. Patient able to whisper and softly vocalize.  HEENT: Moist mucus membranes. PERRLA.  Cardio: Regular rate and rhythm, S1, S2, no murmur, rub, or gallop.  Pulm: Decreased bilateral breath sounds, positive bibasilar rales  Abdomen: Soft, non-tender, non-distended. Normoactive bowel sounds.  Extremities: No cyanosis or edema bilaterally. No calf tenderness to palpation.  Neuro: A&O x3.

## 2018-06-23 NOTE — PROGRESS NOTE ADULT - ASSESSMENT
61 yo sent from [facility] for complaint of SOB, productive cough of whitish sputum and subjective fevers on day of presentation; admitted for COPD exacerbation. Patient was also found to have vocal cord paralysis, minimal UES, and pharyngeal dysphagia.    #COPD exacerbation and concurrent Chronic Respiratory Failure   -Will speak to PMD about discontinuing abx in the absence of any obvious source of infection. Called Dr. Caraballo today and call was not answered.  -Spoke with Pulm Dr. Dumont. She reviewed CT chest and will see patient later today.  -Continue BIPAP  -o2 prn, goal >90 %  -Continue prednisone taper   -continue symbicort, cleared from pulmonary standpoint  -REPORT OBTAINED FROM [facility]: Portable chest X-ray on 5/17/18 impression- 1) COPD 2) Left suprahilar/upper lobe mass, last CT Chest in 2000, no report available, discussed with ENT, will still need CT Chest and Neck discussed with patient, he is willing to undergo    #Change in Voice for 2 weeks likely secondary to left vocal cord paralysis   -ENT attending reviewed CT neck and chest- no intervention from ENT   -continue PEG feeds, NPO. Pt allowed single ice chip per speech and swallow  -REPORT OBTAINED FROM [facility]: 14 Romansh percutaneous gastrostomy tube insertion on 5/25/18 in the setting of resistant esophageal candidiasis, unable to swallow with deteriorating nutritional status, placed via Interventional Radiology, discussed with GI, no further intervention, f/u as outpatient.  -F/u pulm for lung nodules on CT chest      #HTN, controlled  c/w amlodipine    #DLD  c/w statin    #Anxiety   c/w alprax and loratidine    Disposition: Patient may not want to return to [facility], awaiting PT and placement

## 2018-06-23 NOTE — PROGRESS NOTE ADULT - SUBJECTIVE AND OBJECTIVE BOX
ALETA SUAZO  63y  Male    Patient is a 63y old  Male who presents with a chief complaint of shortness of breath, cough and mild sputum, fever (18 Jun 2018 16:40)    ALLERGIES:  No Known Allergies      INTERVAL HPI/OVERNIGHT EVENTS:    VITALS:  T(F): 96.5 (06-23-18 @ 05:18), Max: 97 (06-22-18 @ 14:30)  HR: 86 (06-23-18 @ 05:18) (86 - 95)  BP: 113/77 (06-23-18 @ 05:18) (90/70 - 115/65)  RR: 20 (06-23-18 @ 05:18) (20 - 22)  SpO2: 97% (06-22-18 @ 19:13) (97% - 97%)    LABS:  06-22    141  |  101  |  17  ----------------------------<  161<H>  4.1   |  29  |  0.6<L>    Ca    8.9      22 Jun 2018 07:59  Mg     2.0     06-22      MICROBIOLOGY:    MEDICATION:  docusate sodium Liquid 100 milliGRAM(s) Oral two times a day  fluticasone propionate   220 MICROgram(s) HFA Inhaler 1 Puff(s) Inhalation <User Schedule>  pantoprazole   Suspension 40 milliGRAM(s) Oral before breakfast  roflumilast 500 MICROGram(s) Oral daily  senna 2 Tablet(s) Oral at bedtime    RADIOLOGY & ADDITIONAL TESTS:

## 2018-06-23 NOTE — PROGRESS NOTE ADULT - SUBJECTIVE AND OBJECTIVE BOX
ALETA SUAZO  63y, Male  Allergy: No Known Allergies    PMH/PSH:  PAST MEDICAL & SURGICAL HISTORY:  Other hydrocele  Anxiety disorder, unspecified type  Hypertension, unspecified type  High cholesterol  Chronic obstructive pulmonary disease, unspecified COPD type  PEG (percutaneous endoscopic gastrostomy) status    ALLERGIES:  Allergies    No Known Allergies    Intolerances      SOCIAL HABITS:  Male  FAMILY HISTORY:   FAMILY HISTORY:  No pertinent family history in first degree relatives      LAST 24-Hr EVENTS:    VITALS:  T(F): 97.9 (06-24-18 @ 06:11), Max: 97.9 (06-24-18 @ 06:11)  HR: 92 (06-23-18 @ 21:23)  BP: 101/65 (06-24-18 @ 06:11) (101/65 - 117/72)  RR: 20 (06-24-18 @ 06:11)  SpO2: 98% (06-23-18 @ 11:00)    PHYSICAL EXAM:    GENERAL: NAD  NECK: Supple, No JVD  CHEST/LUNG: distant breath sunds  HEART: Regular rate and rhythm; No murmurs.  ABDOMEN: Soft, Nontender, Nondistended; Bowel sounds present  EXTREMITIES:   edema absent        TESTS & MEASUREMENTS:    IN: 2040 mL / OUT: 280 mL / NET: 1760 mL    IN: 0 mL / OUT: 300 mL / NET: -300 mL                            9.1    13.35 )-----------( 400      ( 24 Jun 2018 07:16 )             29.2                             RADIOLOGY & ADDITIONAL TESTS:  < from: CT Chest w/ IV Cont (06.21.18 @ 19:16) >  here is left apical pleural-based thickening. There is a spiculated   nodule within the left upper lung, series 6 #78 and measures 8 mm x 7 mm.   On series 6 number image #85 there is a 5 mm x 6 mm thick nodule. On   series 6 #92 there is a 7 mm x 8 mm nodule. On series 6 image #109 there   is a 2 cm x 1 cm nodule. There are branching nodules within the left   lower lung field that have a postinflammatory appearance with   peribronchial thickening. On series 6 image #262 within the left lower   lung there is a pleural-based nodule that measures 1.7 cm x 9 mm.    Limited evaluation of the upper abdomen demonstrates the patient is   status post gastrostomy placement.    The bones are within normal limits.    Impression:    Left lung nodules as described.    Left lower lobe opacifications, postinflammatory appearance.    Significant emphysematous changes.    Consider correlation with PET CT scanning to determine physiologic   activity.     < end of copied text >        MEDICATIONS:  MEDICATIONS  (STANDING):  ALBUTerol/ipratropium for Nebulization 3 milliLiter(s) Nebulizer every 6 hours  amLODIPine   Tablet 10 milliGRAM(s) Oral daily  buDESOnide 160 MICROgram(s)/formoterol 4.5 MICROgram(s) Inhaler 2 Puff(s) Inhalation two times a day  docusate sodium Liquid 100 milliGRAM(s) Oral two times a day  enoxaparin Injectable 40 milliGRAM(s) SubCutaneous daily  fluticasone propionate   220 MICROgram(s) HFA Inhaler 1 Puff(s) Inhalation <User Schedule>  levoFLOXacin IVPB 500 milliGRAM(s) IV Intermittent every 24 hours  loratadine 10 milliGRAM(s) Oral daily  pantoprazole   Suspension 40 milliGRAM(s) Oral before breakfast  predniSONE   Tablet 40 milliGRAM(s) Oral daily  roflumilast 500 MICROGram(s) Oral daily  senna 2 Tablet(s) Oral at bedtime  simvastatin 10 milliGRAM(s) Oral at bedtime  sodium chloride 0.9% lock flush 3 milliLiter(s) IV Push every 8 hours  vitamin A &amp; D Ointment 1 Application(s) Topical two times a day  zinc oxide 20% Ointment 1 Application(s) Topical daily    MEDICATIONS  (PRN):  acetaminophen   Tablet 650 milliGRAM(s) Oral every 6 hours PRN For Temp greater than 38 C (100.4 F)  ALPRAZolam 1 milliGRAM(s) Oral two times a day PRN anxiety  guaiFENesin    Syrup 200 milliGRAM(s) Oral every 6 hours PRN Cough

## 2018-06-24 LAB
ANION GAP SERPL CALC-SCNC: 11 MMOL/L — SIGNIFICANT CHANGE UP (ref 7–14)
BUN SERPL-MCNC: 14 MG/DL — SIGNIFICANT CHANGE UP (ref 10–20)
CALCIUM SERPL-MCNC: 9 MG/DL — SIGNIFICANT CHANGE UP (ref 8.5–10.1)
CHLORIDE SERPL-SCNC: 98 MMOL/L — SIGNIFICANT CHANGE UP (ref 98–110)
CO2 SERPL-SCNC: 30 MMOL/L — SIGNIFICANT CHANGE UP (ref 17–32)
CREAT SERPL-MCNC: 0.6 MG/DL — LOW (ref 0.7–1.5)
GLUCOSE SERPL-MCNC: 107 MG/DL — HIGH (ref 70–99)
HCT VFR BLD CALC: 29.2 % — LOW (ref 42–52)
HGB BLD-MCNC: 9.1 G/DL — LOW (ref 14–18)
MAGNESIUM SERPL-MCNC: 2 MG/DL — SIGNIFICANT CHANGE UP (ref 1.8–2.4)
MCHC RBC-ENTMCNC: 27.7 PG — SIGNIFICANT CHANGE UP (ref 27–31)
MCHC RBC-ENTMCNC: 31.2 G/DL — LOW (ref 32–37)
MCV RBC AUTO: 88.8 FL — SIGNIFICANT CHANGE UP (ref 80–94)
NRBC # BLD: 0 /100 WBCS — SIGNIFICANT CHANGE UP (ref 0–0)
PLATELET # BLD AUTO: 400 K/UL — SIGNIFICANT CHANGE UP (ref 130–400)
POTASSIUM SERPL-MCNC: 4.4 MMOL/L — SIGNIFICANT CHANGE UP (ref 3.5–5)
POTASSIUM SERPL-SCNC: 4.4 MMOL/L — SIGNIFICANT CHANGE UP (ref 3.5–5)
RBC # BLD: 3.29 M/UL — LOW (ref 4.7–6.1)
RBC # FLD: 16.1 % — HIGH (ref 11.5–14.5)
SODIUM SERPL-SCNC: 139 MMOL/L — SIGNIFICANT CHANGE UP (ref 135–146)
WBC # BLD: 13.35 K/UL — HIGH (ref 4.8–10.8)
WBC # FLD AUTO: 13.35 K/UL — HIGH (ref 4.8–10.8)

## 2018-06-24 RX ORDER — ALPRAZOLAM 0.25 MG
0.25 TABLET ORAL ONCE
Qty: 0 | Refills: 0 | Status: DISCONTINUED | OUTPATIENT
Start: 2018-06-24 | End: 2018-06-24

## 2018-06-24 RX ORDER — ALPRAZOLAM 0.25 MG
1 TABLET ORAL
Qty: 0 | Refills: 0 | Status: DISCONTINUED | OUTPATIENT
Start: 2018-06-24 | End: 2018-06-25

## 2018-06-24 RX ADMIN — SODIUM CHLORIDE 3 MILLILITER(S): 9 INJECTION INTRAMUSCULAR; INTRAVENOUS; SUBCUTANEOUS at 21:12

## 2018-06-24 RX ADMIN — ZINC OXIDE 1 APPLICATION(S): 200 OINTMENT TOPICAL at 13:43

## 2018-06-24 RX ADMIN — Medication 3 MILLILITER(S): at 20:17

## 2018-06-24 RX ADMIN — AMLODIPINE BESYLATE 10 MILLIGRAM(S): 2.5 TABLET ORAL at 05:12

## 2018-06-24 RX ADMIN — ROFLUMILAST 500 MICROGRAM(S): 500 TABLET ORAL at 13:50

## 2018-06-24 RX ADMIN — SIMVASTATIN 10 MILLIGRAM(S): 20 TABLET, FILM COATED ORAL at 21:12

## 2018-06-24 RX ADMIN — ENOXAPARIN SODIUM 40 MILLIGRAM(S): 100 INJECTION SUBCUTANEOUS at 13:50

## 2018-06-24 RX ADMIN — Medication 100 MILLIGRAM(S): at 05:12

## 2018-06-24 RX ADMIN — Medication 1 MILLIGRAM(S): at 13:53

## 2018-06-24 RX ADMIN — SODIUM CHLORIDE 3 MILLILITER(S): 9 INJECTION INTRAMUSCULAR; INTRAVENOUS; SUBCUTANEOUS at 05:14

## 2018-06-24 RX ADMIN — Medication 40 MILLIGRAM(S): at 05:12

## 2018-06-24 RX ADMIN — Medication 3 MILLILITER(S): at 12:13

## 2018-06-24 RX ADMIN — Medication 1 MILLIGRAM(S): at 05:12

## 2018-06-24 RX ADMIN — Medication 1 APPLICATION(S): at 05:13

## 2018-06-24 RX ADMIN — SODIUM CHLORIDE 3 MILLILITER(S): 9 INJECTION INTRAMUSCULAR; INTRAVENOUS; SUBCUTANEOUS at 13:43

## 2018-06-24 RX ADMIN — SENNA PLUS 2 TABLET(S): 8.6 TABLET ORAL at 21:11

## 2018-06-24 RX ADMIN — PANTOPRAZOLE SODIUM 40 MILLIGRAM(S): 20 TABLET, DELAYED RELEASE ORAL at 05:10

## 2018-06-24 RX ADMIN — Medication 1 APPLICATION(S): at 17:38

## 2018-06-24 NOTE — PROGRESS NOTE ADULT - SUBJECTIVE AND OBJECTIVE BOX
Patient was seen and examined. Spoke with RN. Chart reviewed.  No events overnight.  pt still with no voice, peg is clogged - nurse is trying to get material to declog  no new issues    Vital Signs Last 24 Hrs  T(F): 97.9 (24 Jun 2018 06:11), Max: 97.9 (24 Jun 2018 06:11)  HR: 92 (23 Jun 2018 21:23) (92 - 95)  BP: 101/65 (24 Jun 2018 06:11) (101/65 - 117/72)  SpO2: 98% (23 Jun 2018 11:00) (98% - 98%)  MEDICATIONS  (STANDING):  ALBUTerol/ipratropium for Nebulization 3 milliLiter(s) Nebulizer every 6 hours  amLODIPine   Tablet 10 milliGRAM(s) Oral daily  buDESOnide 160 MICROgram(s)/formoterol 4.5 MICROgram(s) Inhaler 2 Puff(s) Inhalation two times a day  declogging kit 1 Each 1 Each Implant once  docusate sodium Liquid 100 milliGRAM(s) Oral two times a day  enoxaparin Injectable 40 milliGRAM(s) SubCutaneous daily  fluticasone propionate   220 MICROgram(s) HFA Inhaler 1 Puff(s) Inhalation <User Schedule>  levoFLOXacin IVPB 500 milliGRAM(s) IV Intermittent every 24 hours  loratadine 10 milliGRAM(s) Oral daily  pantoprazole   Suspension 40 milliGRAM(s) Oral before breakfast  predniSONE   Tablet 40 milliGRAM(s) Oral daily  roflumilast 500 MICROGram(s) Oral daily  senna 2 Tablet(s) Oral at bedtime  simvastatin 10 milliGRAM(s) Oral at bedtime  sodium chloride 0.9% lock flush 3 milliLiter(s) IV Push every 8 hours  vitamin A &amp; D Ointment 1 Application(s) Topical two times a day  zinc oxide 20% Ointment 1 Application(s) Topical daily    MEDICATIONS  (PRN):  acetaminophen   Tablet 650 milliGRAM(s) Oral every 6 hours PRN For Temp greater than 38 C (100.4 F)  ALPRAZolam 1 milliGRAM(s) Oral two times a day PRN anxiety  guaiFENesin    Syrup 200 milliGRAM(s) Oral every 6 hours PRN Cough    Labs:  no new labs              General: comfortable, NAD  Neurology: A&Ox3, nonfocal  Head:  Normocephalic, atraumatic  ENT:  Mucosa moist, no ulcerations  Neck:  Supple, no JVD,   Skin: no breakdowns (as per RN)  Resp: CTA B/L  CV: RRR, S1S2,   GI: Soft, NT, PEG  MS: No edema,      A/P:  V62 yo sent from [facility] for complaint of SOB, productive cough of whitish sputum and subjective fevers on day of presentation; admitted for COPD exacerbation. Patient was also found to have vocal cord paralysis, minimal UES, and pharyngeal dysphagia.    #COPD exacerbation and concurrent Chronic Respiratory Failure   -Will speak to PMD about discontinuing abx in the absence of any obvious source of infection. Called Dr. Caraballo today and call was not answered.  -Spoke with Pulm Dr. Dumont. She reviewed CT chest and will see patient later today.  -Continue BIPAP  -o2 prn, goal >90 %  -Continue prednisone taper   -continue symbicort, cleared from pulmonary standpoint  -REPORT OBTAINED FROM [facility]: Portable chest X-ray on 5/17/18 impression- 1) COPD 2) Left suprahilar/upper lobe mass, last CT Chest in 2000, no report available, discussed with ENT, will still need CT Chest and Neck discussed with patient, he is willing to undergo    #Change in Voice for 2 weeks likely secondary to left vocal cord paralysis   -ENT attending reviewed CT neck and chest- no intervention from ENT   -continue PEG feeds, NPO. Pt allowed single ice chip per speech and swallow  -REPORT OBTAINED FROM [facility]: 14 Sami percutaneous gastrostomy tube insertion on 5/25/18 in the setting of resistant esophageal candidiasis, unable to swallow with deteriorating nutritional status, placed via Interventional Radiology, discussed with GI, no further intervention, f/u as outpatient.  -F/u pulm for lung nodules on CT chest  - monitor for peg use and function    #HTN, controlled  c/w amlodipine    #DLD  c/w statin    #Anxiety   c/w alprax and loratidine    Disposition: Patient may not want to return to [facility], awaiting PT and placement    DVT prophylaxis  Decubitus prevention- all measures as per RN protocol  Please call or text me with any questions or updates

## 2018-06-24 NOTE — PROGRESS NOTE ADULT - SUBJECTIVE AND OBJECTIVE BOX
CHIEF COMPLAINT:    Patient is a 63y old Male who presents with a chief complaint of shortness of breath, cough and mild sputum, fever (18 Jun 2018 16:40)    Currently admitted to medicine with the primary diagnosis of COPD exacerbation     Today is hospital day 7d. This morning he is resting comfortably in bed and reports no new issues or overnight events.     PAST MEDICAL & SURGICAL HISTORY  Other hydrocele  Anxiety disorder, unspecified type  Hypertension, unspecified type  High cholesterol  Chronic obstructive pulmonary disease, unspecified COPD type  PEG (percutaneous endoscopic gastrostomy) status    SOCIAL HISTORY:  Negative for smoking/alcohol/drug use.     ALLERGIES:  No Known Allergies    MEDICATIONS:  STANDING MEDICATIONS  ALBUTerol/ipratropium for Nebulization 3 milliLiter(s) Nebulizer every 6 hours  amLODIPine   Tablet 10 milliGRAM(s) Oral daily  buDESOnide 160 MICROgram(s)/formoterol 4.5 MICROgram(s) Inhaler 2 Puff(s) Inhalation two times a day  docusate sodium Liquid 100 milliGRAM(s) Oral two times a day  enoxaparin Injectable 40 milliGRAM(s) SubCutaneous daily  fluticasone propionate   220 MICROgram(s) HFA Inhaler 1 Puff(s) Inhalation <User Schedule>  levoFLOXacin IVPB 500 milliGRAM(s) IV Intermittent every 24 hours  loratadine 10 milliGRAM(s) Oral daily  pantoprazole   Suspension 40 milliGRAM(s) Oral before breakfast  predniSONE   Tablet 40 milliGRAM(s) Oral daily  roflumilast 500 MICROGram(s) Oral daily  senna 2 Tablet(s) Oral at bedtime  simvastatin 10 milliGRAM(s) Oral at bedtime  sodium chloride 0.9% lock flush 3 milliLiter(s) IV Push every 8 hours  vitamin A &amp; D Ointment 1 Application(s) Topical two times a day  zinc oxide 20% Ointment 1 Application(s) Topical daily    PRN MEDICATIONS  acetaminophen   Tablet 650 milliGRAM(s) Oral every 6 hours PRN  guaiFENesin    Syrup 200 milliGRAM(s) Oral every 6 hours PRN    VITALS:   T(F): 96  HR: 90  BP: 112/65  RR: 20  SpO2: 98%    LABS:                        9.1    13.35 )-----------( 400      ( 24 Jun 2018 07:16 )             29.2     06-24    139  |  98  |  14  ----------------------------<  107<H>  4.4   |  30  |  0.6<L>    Ca    9.0      24 Jun 2018 07:16  Mg     2.0     06-24    RADIOLOGY:    PHYSICAL EXAM:  GEN: No acute distress  LUNGS: Clear to auscultation bilaterally   HEART: S1/S2 present. RRR.   ABD: Soft, non-tender, non-distended. Bowel sounds present  EXT: NC/NC/NE/2+PP/LOONEY  NEURO: AAOX3

## 2018-06-24 NOTE — PROGRESS NOTE ADULT - ASSESSMENT
61 yo sent from [facility] for complaint of SOB, productive cough of whitish sputum and subjective fevers on day of presentation; admitted for COPD exacerbation. Patient was also found to have vocal cord paralysis, minimal UES, and pharyngeal dysphagia.    #COPD exacerbation and concurrent Chronic Respiratory Failure   -Will speak to PMD about discontinuing abx in the absence of any obvious source of infection. Called Dr. Caraballo today and call was not answered.  -Spoke with Pulm Dr. Dumont. She reviewed CT chest and will see patient later today.  -Continue BIPAP  -o2 prn, goal >90 %  -Continue prednisone taper   -continue symbicort, cleared from pulmonary standpoint  -REPORT OBTAINED FROM [facility]: Portable chest X-ray on 5/17/18 impression- 1) COPD 2) Left suprahilar/upper lobe mass, last CT Chest in 2000, no report available, discussed with ENT, will still need CT Chest and Neck discussed with patient, he is willing to undergo    #Change in Voice for 2 weeks likely secondary to left vocal cord paralysis   -ENT attending reviewed CT neck and chest- no intervention from ENT   -continue PEG feeds, NPO. Pt allowed single ice chip per speech and swallow  -REPORT OBTAINED FROM [facility]: 14 Belarusian percutaneous gastrostomy tube insertion on 5/25/18 in the setting of resistant esophageal candidiasis, unable to swallow with deteriorating nutritional status, placed via Interventional Radiology, discussed with GI, no further intervention, f/u as outpatient.  -F/u pulm for lung nodules on CT chest  - monitor for peg use and function    #HTN, controlled  c/w amlodipine    #DLD  c/w statin    #Anxiety   c/w alprax and loratidine    Disposition: Patient may not want to return to [facility], awaiting PT and placement    DVT prophylaxis  Decubitus prevention- all measures as per RN protocol  Please call or text me with any questions or updates

## 2018-06-25 VITALS
DIASTOLIC BLOOD PRESSURE: 58 MMHG | SYSTOLIC BLOOD PRESSURE: 99 MMHG | TEMPERATURE: 98 F | RESPIRATION RATE: 20 BRPM | HEART RATE: 90 BPM

## 2018-06-25 LAB
ANION GAP SERPL CALC-SCNC: 12 MMOL/L — SIGNIFICANT CHANGE UP (ref 7–14)
BUN SERPL-MCNC: 16 MG/DL — SIGNIFICANT CHANGE UP (ref 10–20)
CALCIUM SERPL-MCNC: 8.7 MG/DL — SIGNIFICANT CHANGE UP (ref 8.5–10.1)
CHLORIDE SERPL-SCNC: 95 MMOL/L — LOW (ref 98–110)
CO2 SERPL-SCNC: 29 MMOL/L — SIGNIFICANT CHANGE UP (ref 17–32)
CREAT SERPL-MCNC: 0.6 MG/DL — LOW (ref 0.7–1.5)
GLUCOSE SERPL-MCNC: 238 MG/DL — HIGH (ref 70–99)
HCT VFR BLD CALC: 30.4 % — LOW (ref 42–52)
HGB BLD-MCNC: 9.6 G/DL — LOW (ref 14–18)
MAGNESIUM SERPL-MCNC: 1.9 MG/DL — SIGNIFICANT CHANGE UP (ref 1.8–2.4)
MCHC RBC-ENTMCNC: 27.9 PG — SIGNIFICANT CHANGE UP (ref 27–31)
MCHC RBC-ENTMCNC: 31.6 G/DL — LOW (ref 32–37)
MCV RBC AUTO: 88.4 FL — SIGNIFICANT CHANGE UP (ref 80–94)
NRBC # BLD: 0 /100 WBCS — SIGNIFICANT CHANGE UP (ref 0–0)
PLATELET # BLD AUTO: 392 K/UL — SIGNIFICANT CHANGE UP (ref 130–400)
POTASSIUM SERPL-MCNC: 4.3 MMOL/L — SIGNIFICANT CHANGE UP (ref 3.5–5)
POTASSIUM SERPL-SCNC: 4.3 MMOL/L — SIGNIFICANT CHANGE UP (ref 3.5–5)
RBC # BLD: 3.44 M/UL — LOW (ref 4.7–6.1)
RBC # FLD: 16.4 % — HIGH (ref 11.5–14.5)
SODIUM SERPL-SCNC: 136 MMOL/L — SIGNIFICANT CHANGE UP (ref 135–146)
WBC # BLD: 22.1 K/UL — HIGH (ref 4.8–10.8)
WBC # FLD AUTO: 22.1 K/UL — HIGH (ref 4.8–10.8)

## 2018-06-25 RX ORDER — DOCUSATE SODIUM 100 MG
10 CAPSULE ORAL
Qty: 0 | Refills: 0 | COMMUNITY
Start: 2018-06-25

## 2018-06-25 RX ORDER — SENNA PLUS 8.6 MG/1
2 TABLET ORAL
Qty: 0 | Refills: 0 | COMMUNITY
Start: 2018-06-25

## 2018-06-25 RX ORDER — ACETAMINOPHEN 500 MG
2 TABLET ORAL
Qty: 0 | Refills: 0 | COMMUNITY
Start: 2018-06-25

## 2018-06-25 RX ADMIN — ZINC OXIDE 1 APPLICATION(S): 200 OINTMENT TOPICAL at 11:09

## 2018-06-25 RX ADMIN — Medication 1 APPLICATION(S): at 17:54

## 2018-06-25 RX ADMIN — Medication 3 MILLILITER(S): at 14:38

## 2018-06-25 RX ADMIN — ENOXAPARIN SODIUM 40 MILLIGRAM(S): 100 INJECTION SUBCUTANEOUS at 11:24

## 2018-06-25 RX ADMIN — Medication 3 MILLILITER(S): at 21:17

## 2018-06-25 RX ADMIN — Medication 40 MILLIGRAM(S): at 05:04

## 2018-06-25 RX ADMIN — Medication 1 APPLICATION(S): at 05:04

## 2018-06-25 RX ADMIN — Medication 1 MILLIGRAM(S): at 05:11

## 2018-06-25 RX ADMIN — ROFLUMILAST 500 MICROGRAM(S): 500 TABLET ORAL at 11:25

## 2018-06-25 RX ADMIN — Medication 3 MILLILITER(S): at 08:35

## 2018-06-25 RX ADMIN — SODIUM CHLORIDE 3 MILLILITER(S): 9 INJECTION INTRAMUSCULAR; INTRAVENOUS; SUBCUTANEOUS at 13:28

## 2018-06-25 RX ADMIN — SODIUM CHLORIDE 3 MILLILITER(S): 9 INJECTION INTRAMUSCULAR; INTRAVENOUS; SUBCUTANEOUS at 05:12

## 2018-06-25 RX ADMIN — Medication 1 PUFF(S): at 08:45

## 2018-06-25 RX ADMIN — AMLODIPINE BESYLATE 10 MILLIGRAM(S): 2.5 TABLET ORAL at 05:04

## 2018-06-25 RX ADMIN — Medication 100 MILLIGRAM(S): at 17:54

## 2018-06-25 RX ADMIN — PANTOPRAZOLE SODIUM 40 MILLIGRAM(S): 20 TABLET, DELAYED RELEASE ORAL at 05:13

## 2018-06-25 RX ADMIN — Medication 100 MILLIGRAM(S): at 05:04

## 2018-06-25 NOTE — PROGRESS NOTE ADULT - ASSESSMENT
chronic resp failure  COPD exac  lung nodules - r/o CA  HTN  anxiety  s/p PEG  debility  dysphonia / vocal cord dysfunction  dysphagia  esophageal dysmotility  protein calorie malnutrition      cont norvasc  steroid taper  abx  o2 / bipap / nebs  peg feeds  ppi  NPO  dvt prophylaxis  oupt GI f/u  oupt PET scan  dc planning to SNF

## 2018-06-25 NOTE — CONSULT NOTE ADULT - SUBJECTIVE AND OBJECTIVE BOX
61 yo sent from [facility] for complaint of SOB, productive cough of whitish sputum and subjective fevers on day of presentation; admitted for COPD exacerbation. Patient had also c/o hoarse voice for ? a few weeks, and has been found to have vocal cord paralysis, minimal UES, and pharyngeal dysphagia.  pt thinks he has lost weight, but doesn't know how much, over the past > 6 months. At the NH? rehab he was fed by drip/pump at 60 ml/h into the gastrostomy tube for 16 hour a day.    -REPORT OBTAINED FROM [facility]: 14 Telugu percutaneous gastrostomy tube insertion on 5/25/18 in the setting of resistant esophageal candidiasis, unable to swallow with deteriorating nutritional status, placed via Interventional Radiology.    since admission pt has at various times been fed Osmolite 1.0, and Osmolite 1.5, and Jevity 1.2, at 60 ml/h from 6p-10a, at 240 ml q6h and at 240 ml q4h.    EXAM:  A&O, no voice at all  skin turgor ok, mucous memb moist  + bitemporal and infraclavicular wasting, + evident loss of quad and calf muscles, pt says he used a walker prior to admission to the Rehab (so some of this wasting is chronic)  abd soft, ND, NT, L side G-tube with all sutures still in, +crusting around G tube and the bumper  no LE edema  52.4 kg on adm, nothing since and no height documented at all    MEDICATIONS  (STANDING):  ALBUTerol/ipratropium for Nebulization 3 milliLiter(s) Nebulizer every 6 hours  amLODIPine   Tablet 10 milliGRAM(s) Oral daily  buDESOnide 160 MICROgram(s)/formoterol 4.5 MICROgram(s) Inhaler 2 Puff(s) Inhalation two times a day  docusate sodium Liquid 100 milliGRAM(s) Oral two times a day  enoxaparin Injectable 40 milliGRAM(s) SubCutaneous daily  fluticasone propionate   220 MICROgram(s) HFA Inhaler 1 Puff(s) Inhalation <User Schedule>  levoFLOXacin IVPB 500 milliGRAM(s) IV Intermittent every 24 hours  loratadine 10 milliGRAM(s) Oral daily  pantoprazole   Suspension 40 milliGRAM(s) Oral before breakfast  predniSONE   Tablet 40 milliGRAM(s) Oral daily  roflumilast 500 MICROGram(s) Oral daily  senna 2 Tablet(s) Oral at bedtime  simvastatin 10 milliGRAM(s) Oral at bedtime  vitamin A &amp; D Ointment 1 Application(s) Topical two times a day  zinc oxide 20% Ointment 1 Application(s) Topical daily                        9.6    22.10 )-----------( 392      ( 25 Jun 2018 09:19 )             30.4   06-25    136  |  95<L>  |  16  ----------------------------<  238<H>  4.3   |  29  |  0.6<L>    Ca    8.7      25 Jun 2018 09:19  Mg     1.9     06-25

## 2018-06-25 NOTE — PROGRESS NOTE ADULT - PROVIDER SPECIALTY LIST ADULT
ENT
Internal Medicine
Nephrology
Pulmonology
Internal Medicine

## 2018-06-25 NOTE — PROGRESS NOTE ADULT - SUBJECTIVE AND OBJECTIVE BOX
ALETA SUAZO  63y, Male  Allergy: No Known Allergies      LAST 24-Hr EVENTS:  respiratory status at baseline  VITALS:  T(F): 98.3 (06-25-18 @ 14:15), Max: 98.3 (06-25-18 @ 14:15)  HR: 90 (06-25-18 @ 14:15)  BP: 99/58 (06-25-18 @ 14:15) (93/58 - 112/65)  RR: 20 (06-25-18 @ 14:15)  SpO2: 93% (06-25-18 @ 00:00)on low flow oxygen    PHYSICAL EXAM:    GENERAL: NAD, well-developed  HEAD:  Atraumatic, Normocephalic  NECK: Supple, No JVD  CHEST/LUNG: Clear to auscultation bilaterally; No wheeze; decreased breath sounds bilaterally  HEART: Regular rate and rhythm; No murmurs, rubs, or gallops  ABDOMEN: Soft, Nontender, Nondistended; Bowel sounds present  EXTREMITIES:  2+ Peripheral Pulses, No clubbing, cyanosis, or edema        TESTS & MEASUREMENTS:    IN: 0 mL / OUT: 300 mL / NET: -300 mL    IN: 0 mL / OUT: 300 mL / NET: -300 mL                            9.6    22.10 )-----------( 392      ( 25 Jun 2018 09:19 )             30.4       06-25    136  |  95<L>  |  16  ----------------------------<  238<H>  4.3   |  29  |  0.6<L>    Ca    8.7      25 Jun 2018 09:19  Mg     1.9     06-25                  ABG & VENT SETTINGS: (when applicable)  n/a      RADIOLOGY & ADDITIONAL TESTS:    MEDICATIONS:  MEDICATIONS  (STANDING):  ALBUTerol/ipratropium for Nebulization 3 milliLiter(s) Nebulizer every 6 hours  amLODIPine   Tablet 10 milliGRAM(s) Oral daily  buDESOnide 160 MICROgram(s)/formoterol 4.5 MICROgram(s) Inhaler 2 Puff(s) Inhalation two times a day  docusate sodium Liquid 100 milliGRAM(s) Oral two times a day  enoxaparin Injectable 40 milliGRAM(s) SubCutaneous daily  fluticasone propionate   220 MICROgram(s) HFA Inhaler 1 Puff(s) Inhalation <User Schedule>  levoFLOXacin IVPB 500 milliGRAM(s) IV Intermittent every 24 hours  loratadine 10 milliGRAM(s) Oral daily  pantoprazole   Suspension 40 milliGRAM(s) Oral before breakfast  predniSONE   Tablet 40 milliGRAM(s) Oral daily  roflumilast 500 MICROGram(s) Oral daily  senna 2 Tablet(s) Oral at bedtime  simvastatin 10 milliGRAM(s) Oral at bedtime  sodium chloride 0.9% lock flush 3 milliLiter(s) IV Push every 8 hours  vitamin A &amp; D Ointment 1 Application(s) Topical two times a day  zinc oxide 20% Ointment 1 Application(s) Topical daily    MEDICATIONS  (PRN):  acetaminophen   Tablet 650 milliGRAM(s) Oral every 6 hours PRN For Temp greater than 38 C (100.4 F)  ALPRAZolam 1 milliGRAM(s) Oral two times a day PRN anxiety  guaiFENesin    Syrup 200 milliGRAM(s) Oral every 6 hours PRN Cough

## 2018-06-25 NOTE — PROGRESS NOTE ADULT - ASSESSMENT
acute on chronic hypoxic and hypercapnic respiratory failure, improved  copd exacerbation  multiple pulmonary nodules      continue supplemental oxygen  bipap hs and prn  bronchodilators  incentive spirometry  prednisone 40mg daily and decrease by 10mg every 3 days until off  out of bed/physical therapy  anxiolytic therapy  enteric feeds/aspiration precautions  can discontinue levofloxacin  agree with discharge planning, sees dr duffy as outpatient, defer work up of lung nodules to outpatient venue, consider whole body pet-ct, given overall and pulmonary status he is not a good candidate for any treatment surgical or chemotherapy if malignancy is found

## 2018-06-25 NOTE — PROGRESS NOTE ADULT - SUBJECTIVE AND OBJECTIVE BOX
Patient was seen and examined. Spoke with RN. Chart reviewed.  No events overnight.  Vital Signs Last 24 Hrs  T(F): 96 (25 Jun 2018 04:10), Max: 97 (24 Jun 2018 14:09)  HR: 76 (25 Jun 2018 04:10) (76 - 90)  BP: 93/58 (25 Jun 2018 04:10) (93/58 - 112/65)  SpO2: 93% (25 Jun 2018 00:00) (93% - 98%)  MEDICATIONS  (STANDING):  ALBUTerol/ipratropium for Nebulization 3 milliLiter(s) Nebulizer every 6 hours  amLODIPine   Tablet 10 milliGRAM(s) Oral daily  buDESOnide 160 MICROgram(s)/formoterol 4.5 MICROgram(s) Inhaler 2 Puff(s) Inhalation two times a day  docusate sodium Liquid 100 milliGRAM(s) Oral two times a day  enoxaparin Injectable 40 milliGRAM(s) SubCutaneous daily  fluticasone propionate   220 MICROgram(s) HFA Inhaler 1 Puff(s) Inhalation <User Schedule>  levoFLOXacin IVPB 500 milliGRAM(s) IV Intermittent every 24 hours  loratadine 10 milliGRAM(s) Oral daily  pantoprazole   Suspension 40 milliGRAM(s) Oral before breakfast  predniSONE   Tablet 40 milliGRAM(s) Oral daily  roflumilast 500 MICROGram(s) Oral daily  senna 2 Tablet(s) Oral at bedtime  simvastatin 10 milliGRAM(s) Oral at bedtime  sodium chloride 0.9% lock flush 3 milliLiter(s) IV Push every 8 hours  vitamin A &amp; D Ointment 1 Application(s) Topical two times a day  zinc oxide 20% Ointment 1 Application(s) Topical daily    MEDICATIONS  (PRN):  acetaminophen   Tablet 650 milliGRAM(s) Oral every 6 hours PRN For Temp greater than 38 C (100.4 F)  ALPRAZolam 1 milliGRAM(s) Oral two times a day PRN anxiety  guaiFENesin    Syrup 200 milliGRAM(s) Oral every 6 hours PRN Cough    Labs:                        9.1    13.35 )-----------( 400      ( 24 Jun 2018 07:16 )             29.2     24 Jun 2018 07:16    139    |  98     |  14     ----------------------------<  107    4.4     |  30     |  0.6      Ca    9.0        24 Jun 2018 07:16  Mg     2.0       24 Jun 2018 07:16            General: uncomfortable, NAD  Neurology: A&Ox3, nonfocal  Head:  Normocephalic, atraumatic  ENT:  Mucosa moist, no ulcerations  Neck:  Supple, no JVD,   Skin: no breakdowns (as per RN)  Resp: decreased breath sounds B/L  CV: RRR, S1S2,   GI: Soft, NT, bowel sounds  MS: No edema, + peripheral pulses, FROM all 4 extremity      A/P:  63 yo sent from [facility] for complaint of SOB, productive cough of whitish sputum and subjective fevers on day of presentation; admitted for COPD exacerbation. Patient was also found to have vocal cord paralysis, minimal UES, and pharyngeal dysphagia.    #COPD exacerbation and concurrent Chronic Respiratory Failure     -call Pulm Dr. Dumont for f/u   -Continue BIPAP  -o2 prn, goal >90 %  -Continue prednisone taper   -continue symbicort,       #Change in Voice for 2 weeks likely secondary to left vocal cord paralysis   -ENT attending reviewed CT neck and chest- no intervention from ENT   -continue PEG feeds, NPO. Pt allowed single ice chip per speech and swallow  -REPORT OBTAINED FROM [facility]: 14 P[address] percutaneous gastrostomy tube insertion on 5/25/18 in the setting of resistant esophageal candidiasis, unable to swallow with deteriorating nutritional status, placed via Interventional Radiology, discussed with GI, no further intervention, f/u as outpatient.  - monitor for peg use and function    #HTN, controlled  c/w amlodipine    #DLD  c/w statin    #Anxiety   c/w alprax and loratidine    Please call nutrition Dr Isaac LOYD planning when ok with pulm and ENT    DVT prophylaxis  Decubitus prevention- all measures as per RN protocol  Please call or text me with any questions or updates

## 2018-06-25 NOTE — CONSULT NOTE ADULT - ASSESSMENT
IMP:  - COPD exacerbation  - reported esophageal Candidiasis with resultant (either) dysphagia or odynophagia or inadequate intake  - current Speech eval + pharyngeal dysphagia  - vocal cord paralysis  - anemia  - hyperglycemia  - severe protein calorie malnutrition with LBM wasting in excess of that expected for age - he's 63 yo!    SUGGEST:  - Jevity 1.2  360 ml x 4 meal-pattern feeds daily, which provides 1710 kcal and 79.2 gm protein/d  - MEASURE pt's height and weight  - check fingerstick glucose prior to every feed x 48 h   - check HbA1c  - check inorganic phos level with next routine labs  - cleanse G-tube site with diluted peroxide followed by warm water cleanse once daily x 2 days

## 2018-06-27 ENCOUNTER — OUTPATIENT (OUTPATIENT)
Dept: OUTPATIENT SERVICES | Facility: HOSPITAL | Age: 63
LOS: 1 days | Discharge: HOME | End: 2018-06-27

## 2018-06-27 DIAGNOSIS — L89.152 PRESSURE ULCER OF SACRAL REGION, STAGE 2: ICD-10-CM

## 2018-06-27 DIAGNOSIS — Z93.1 GASTROSTOMY STATUS: Chronic | ICD-10-CM

## 2018-06-29 ENCOUNTER — OUTPATIENT (OUTPATIENT)
Dept: OUTPATIENT SERVICES | Facility: HOSPITAL | Age: 63
LOS: 1 days | Discharge: HOME | End: 2018-06-29

## 2018-06-29 DIAGNOSIS — R49.0 DYSPHONIA: ICD-10-CM

## 2018-06-29 DIAGNOSIS — E78.5 HYPERLIPIDEMIA, UNSPECIFIED: ICD-10-CM

## 2018-06-29 DIAGNOSIS — R13.10 DYSPHAGIA, UNSPECIFIED: ICD-10-CM

## 2018-06-29 DIAGNOSIS — J38.01 PARALYSIS OF VOCAL CORDS AND LARYNX, UNILATERAL: ICD-10-CM

## 2018-06-29 DIAGNOSIS — F41.9 ANXIETY DISORDER, UNSPECIFIED: ICD-10-CM

## 2018-06-29 DIAGNOSIS — J44.1 CHRONIC OBSTRUCTIVE PULMONARY DISEASE WITH (ACUTE) EXACERBATION: ICD-10-CM

## 2018-06-29 DIAGNOSIS — Z93.1 GASTROSTOMY STATUS: ICD-10-CM

## 2018-06-29 DIAGNOSIS — B37.81 CANDIDAL ESOPHAGITIS: ICD-10-CM

## 2018-06-29 DIAGNOSIS — D64.9 ANEMIA, UNSPECIFIED: ICD-10-CM

## 2018-06-29 DIAGNOSIS — K22.4 DYSKINESIA OF ESOPHAGUS: ICD-10-CM

## 2018-06-29 DIAGNOSIS — R73.9 HYPERGLYCEMIA, UNSPECIFIED: ICD-10-CM

## 2018-06-29 DIAGNOSIS — Z93.1 GASTROSTOMY STATUS: Chronic | ICD-10-CM

## 2018-06-29 DIAGNOSIS — L89.222 PRESSURE ULCER OF LEFT HIP, STAGE 2: ICD-10-CM

## 2018-06-29 DIAGNOSIS — J96.20 ACUTE AND CHRONIC RESPIRATORY FAILURE, UNSPECIFIED WHETHER WITH HYPOXIA OR HYPERCAPNIA: ICD-10-CM

## 2018-06-29 DIAGNOSIS — B37.0 CANDIDAL STOMATITIS: ICD-10-CM

## 2018-06-29 DIAGNOSIS — E44.0 MODERATE PROTEIN-CALORIE MALNUTRITION: ICD-10-CM

## 2018-07-26 ENCOUNTER — OUTPATIENT (OUTPATIENT)
Dept: OUTPATIENT SERVICES | Facility: HOSPITAL | Age: 63
LOS: 1 days | Discharge: HOME | End: 2018-07-26

## 2018-07-26 DIAGNOSIS — R79.9 ABNORMAL FINDING OF BLOOD CHEMISTRY, UNSPECIFIED: ICD-10-CM

## 2018-07-26 DIAGNOSIS — R13.10 DYSPHAGIA, UNSPECIFIED: ICD-10-CM

## 2018-07-26 DIAGNOSIS — Z93.1 GASTROSTOMY STATUS: Chronic | ICD-10-CM

## 2018-07-26 PROBLEM — F41.9 ANXIETY DISORDER, UNSPECIFIED: Chronic | Status: ACTIVE | Noted: 2018-02-07

## 2018-07-26 PROBLEM — N43.2 OTHER HYDROCELE: Chronic | Status: ACTIVE | Noted: 2018-02-07

## 2018-07-26 PROBLEM — I10 ESSENTIAL (PRIMARY) HYPERTENSION: Chronic | Status: ACTIVE | Noted: 2018-02-07

## 2018-08-20 ENCOUNTER — OUTPATIENT (OUTPATIENT)
Dept: OUTPATIENT SERVICES | Facility: HOSPITAL | Age: 63
LOS: 1 days | Discharge: HOME | End: 2018-08-20

## 2018-08-20 DIAGNOSIS — Z93.1 GASTROSTOMY STATUS: Chronic | ICD-10-CM

## 2018-08-20 DIAGNOSIS — R50.9 FEVER, UNSPECIFIED: ICD-10-CM

## 2018-08-21 ENCOUNTER — OUTPATIENT (OUTPATIENT)
Dept: OUTPATIENT SERVICES | Facility: HOSPITAL | Age: 63
LOS: 1 days | Discharge: HOME | End: 2018-08-21

## 2018-08-21 DIAGNOSIS — R50.9 FEVER, UNSPECIFIED: ICD-10-CM

## 2018-08-21 DIAGNOSIS — Z93.1 GASTROSTOMY STATUS: Chronic | ICD-10-CM

## 2018-08-25 ENCOUNTER — OUTPATIENT (OUTPATIENT)
Dept: OUTPATIENT SERVICES | Facility: HOSPITAL | Age: 63
LOS: 1 days | Discharge: HOME | End: 2018-08-25

## 2018-08-25 DIAGNOSIS — Z93.1 GASTROSTOMY STATUS: Chronic | ICD-10-CM

## 2018-08-25 DIAGNOSIS — J44.9 CHRONIC OBSTRUCTIVE PULMONARY DISEASE, UNSPECIFIED: ICD-10-CM

## 2018-09-06 ENCOUNTER — OUTPATIENT (OUTPATIENT)
Dept: OUTPATIENT SERVICES | Facility: HOSPITAL | Age: 63
LOS: 1 days | Discharge: HOME | End: 2018-09-06

## 2018-09-06 DIAGNOSIS — Z93.1 GASTROSTOMY STATUS: Chronic | ICD-10-CM

## 2018-09-06 DIAGNOSIS — R91.1 SOLITARY PULMONARY NODULE: ICD-10-CM

## 2018-09-06 DIAGNOSIS — L89.202 PRESSURE ULCER OF UNSPECIFIED HIP, STAGE 2: ICD-10-CM

## 2018-09-26 ENCOUNTER — OUTPATIENT (OUTPATIENT)
Dept: OUTPATIENT SERVICES | Facility: HOSPITAL | Age: 63
LOS: 1 days | Discharge: HOME | End: 2018-09-26

## 2018-09-26 DIAGNOSIS — Z93.1 GASTROSTOMY STATUS: Chronic | ICD-10-CM

## 2018-09-26 DIAGNOSIS — Z43.1 ENCOUNTER FOR ATTENTION TO GASTROSTOMY: ICD-10-CM

## 2018-10-13 ENCOUNTER — INPATIENT (INPATIENT)
Facility: HOSPITAL | Age: 63
LOS: 30 days | Discharge: HOME HEALTH | End: 2018-11-13
Attending: INTERNAL MEDICINE | Admitting: INTERNAL MEDICINE

## 2018-10-13 VITALS
OXYGEN SATURATION: 94 % | HEART RATE: 153 BPM | SYSTOLIC BLOOD PRESSURE: 146 MMHG | DIASTOLIC BLOOD PRESSURE: 71 MMHG | TEMPERATURE: 103 F | RESPIRATION RATE: 36 BRPM

## 2018-10-13 DIAGNOSIS — Z93.1 GASTROSTOMY STATUS: Chronic | ICD-10-CM

## 2018-10-13 LAB
ALBUMIN SERPL ELPH-MCNC: 4.3 G/DL — SIGNIFICANT CHANGE UP (ref 3.5–5.2)
ALP SERPL-CCNC: 99 U/L — SIGNIFICANT CHANGE UP (ref 30–115)
ALT FLD-CCNC: 15 U/L — SIGNIFICANT CHANGE UP (ref 0–41)
ANION GAP SERPL CALC-SCNC: 16 MMOL/L — HIGH (ref 7–14)
APTT BLD: 29 SEC — SIGNIFICANT CHANGE UP (ref 27–39.2)
AST SERPL-CCNC: 17 U/L — SIGNIFICANT CHANGE UP (ref 0–41)
BASE EXCESS BLDV CALC-SCNC: 8.2 MMOL/L — HIGH (ref -2–2)
BASOPHILS # BLD AUTO: 0.14 K/UL — SIGNIFICANT CHANGE UP (ref 0–0.2)
BASOPHILS NFR BLD AUTO: 0.6 % — SIGNIFICANT CHANGE UP (ref 0–1)
BILIRUB SERPL-MCNC: <0.2 MG/DL — SIGNIFICANT CHANGE UP (ref 0.2–1.2)
BUN SERPL-MCNC: 22 MG/DL — HIGH (ref 10–20)
CA-I SERPL-SCNC: 1.23 MMOL/L — SIGNIFICANT CHANGE UP (ref 1.12–1.3)
CALCIUM SERPL-MCNC: 10.2 MG/DL — HIGH (ref 8.5–10.1)
CHLORIDE SERPL-SCNC: 96 MMOL/L — LOW (ref 98–110)
CO2 SERPL-SCNC: 29 MMOL/L — SIGNIFICANT CHANGE UP (ref 17–32)
CREAT SERPL-MCNC: 0.9 MG/DL — SIGNIFICANT CHANGE UP (ref 0.7–1.5)
EOSINOPHIL # BLD AUTO: 0.93 K/UL — HIGH (ref 0–0.7)
EOSINOPHIL NFR BLD AUTO: 4.1 % — SIGNIFICANT CHANGE UP (ref 0–8)
FLU A RESULT: NEGATIVE — SIGNIFICANT CHANGE UP
FLU A RESULT: NEGATIVE — SIGNIFICANT CHANGE UP
FLUAV AG NPH QL: NEGATIVE — SIGNIFICANT CHANGE UP
FLUBV AG NPH QL: NEGATIVE — SIGNIFICANT CHANGE UP
GAS PNL BLDA: SIGNIFICANT CHANGE UP
GAS PNL BLDV: 143 MMOL/L — SIGNIFICANT CHANGE UP (ref 136–145)
GAS PNL BLDV: SIGNIFICANT CHANGE UP
GLUCOSE SERPL-MCNC: 177 MG/DL — HIGH (ref 70–99)
HCO3 BLDV-SCNC: 34 MMOL/L — HIGH (ref 22–29)
HCT VFR BLD CALC: 31 % — LOW (ref 42–52)
HCT VFR BLDA CALC: 26.7 % — LOW (ref 34–44)
HGB BLD CALC-MCNC: 8.7 G/DL — LOW (ref 14–18)
HGB BLD-MCNC: 9.7 G/DL — LOW (ref 14–18)
IMM GRANULOCYTES NFR BLD AUTO: 0.3 % — SIGNIFICANT CHANGE UP (ref 0.1–0.3)
INR BLD: 1.06 RATIO — SIGNIFICANT CHANGE UP (ref 0.65–1.3)
LACTATE BLDV-MCNC: 2.1 MMOL/L — HIGH (ref 0.5–1.6)
LYMPHOCYTES # BLD AUTO: 2.09 K/UL — SIGNIFICANT CHANGE UP (ref 1.2–3.4)
LYMPHOCYTES # BLD AUTO: 9.1 % — LOW (ref 20.5–51.1)
MCHC RBC-ENTMCNC: 27.8 PG — SIGNIFICANT CHANGE UP (ref 27–31)
MCHC RBC-ENTMCNC: 31.3 G/DL — LOW (ref 32–37)
MCV RBC AUTO: 88.8 FL — SIGNIFICANT CHANGE UP (ref 80–94)
MONOCYTES # BLD AUTO: 1.89 K/UL — HIGH (ref 0.1–0.6)
MONOCYTES NFR BLD AUTO: 8.3 % — SIGNIFICANT CHANGE UP (ref 1.7–9.3)
NEUTROPHILS # BLD AUTO: 17.74 K/UL — HIGH (ref 1.4–6.5)
NEUTROPHILS NFR BLD AUTO: 77.6 % — HIGH (ref 42.2–75.2)
NRBC # BLD: 0 /100 WBCS — SIGNIFICANT CHANGE UP (ref 0–0)
PCO2 BLDV: 58 MMHG — HIGH (ref 41–51)
PH BLDV: 7.38 — SIGNIFICANT CHANGE UP (ref 7.26–7.43)
PLATELET # BLD AUTO: 377 K/UL — SIGNIFICANT CHANGE UP (ref 130–400)
PO2 BLDV: 56 MMHG — HIGH (ref 20–40)
POTASSIUM BLDV-SCNC: 4.3 MMOL/L — SIGNIFICANT CHANGE UP (ref 3.3–5.6)
POTASSIUM SERPL-MCNC: 5 MMOL/L — SIGNIFICANT CHANGE UP (ref 3.5–5)
POTASSIUM SERPL-SCNC: 5 MMOL/L — SIGNIFICANT CHANGE UP (ref 3.5–5)
PROT SERPL-MCNC: 7.6 G/DL — SIGNIFICANT CHANGE UP (ref 6–8)
PROTHROM AB SERPL-ACNC: 12.2 SEC — SIGNIFICANT CHANGE UP (ref 9.95–12.87)
RBC # BLD: 3.49 M/UL — LOW (ref 4.7–6.1)
RBC # FLD: 13.6 % — SIGNIFICANT CHANGE UP (ref 11.5–14.5)
RSV RESULT: NEGATIVE — SIGNIFICANT CHANGE UP
RSV RNA RESP QL NAA+PROBE: NEGATIVE — SIGNIFICANT CHANGE UP
SAO2 % BLDV: 85 % — SIGNIFICANT CHANGE UP
SODIUM SERPL-SCNC: 141 MMOL/L — SIGNIFICANT CHANGE UP (ref 135–146)
WBC # BLD: 22.86 K/UL — HIGH (ref 4.8–10.8)
WBC # FLD AUTO: 22.86 K/UL — HIGH (ref 4.8–10.8)

## 2018-10-13 RX ORDER — CEFEPIME 1 G/1
INJECTION, POWDER, FOR SOLUTION INTRAMUSCULAR; INTRAVENOUS
Qty: 0 | Refills: 0 | Status: DISCONTINUED | OUTPATIENT
Start: 2018-10-13 | End: 2018-10-16

## 2018-10-13 RX ORDER — IPRATROPIUM/ALBUTEROL SULFATE 18-103MCG
3 AEROSOL WITH ADAPTER (GRAM) INHALATION ONCE
Qty: 0 | Refills: 0 | Status: COMPLETED | OUTPATIENT
Start: 2018-10-13 | End: 2018-10-13

## 2018-10-13 RX ORDER — ACETAMINOPHEN 500 MG
650 TABLET ORAL ONCE
Qty: 0 | Refills: 0 | Status: COMPLETED | OUTPATIENT
Start: 2018-10-13 | End: 2018-10-13

## 2018-10-13 RX ORDER — SODIUM CHLORIDE 9 MG/ML
1850 INJECTION, SOLUTION INTRAVENOUS ONCE
Qty: 0 | Refills: 0 | Status: COMPLETED | OUTPATIENT
Start: 2018-10-13 | End: 2018-10-13

## 2018-10-13 RX ORDER — CEFEPIME 1 G/1
2000 INJECTION, POWDER, FOR SOLUTION INTRAMUSCULAR; INTRAVENOUS EVERY 8 HOURS
Qty: 0 | Refills: 0 | Status: DISCONTINUED | OUTPATIENT
Start: 2018-10-14 | End: 2018-10-16

## 2018-10-13 RX ORDER — CEFEPIME 1 G/1
2000 INJECTION, POWDER, FOR SOLUTION INTRAMUSCULAR; INTRAVENOUS ONCE
Qty: 0 | Refills: 0 | Status: COMPLETED | OUTPATIENT
Start: 2018-10-13 | End: 2018-10-13

## 2018-10-13 RX ADMIN — Medication 650 MILLIGRAM(S): at 22:00

## 2018-10-13 RX ADMIN — SODIUM CHLORIDE 1850 MILLILITER(S): 9 INJECTION, SOLUTION INTRAVENOUS at 20:45

## 2018-10-13 RX ADMIN — Medication 650 MILLIGRAM(S): at 21:05

## 2018-10-13 RX ADMIN — Medication 125 MILLIGRAM(S): at 20:45

## 2018-10-13 RX ADMIN — Medication 3 MILLILITER(S): at 20:50

## 2018-10-13 RX ADMIN — Medication 3 MILLILITER(S): at 21:12

## 2018-10-13 RX ADMIN — CEFEPIME 100 MILLIGRAM(S): 1 INJECTION, POWDER, FOR SOLUTION INTRAMUSCULAR; INTRAVENOUS at 21:59

## 2018-10-13 RX ADMIN — Medication 3 MILLILITER(S): at 20:45

## 2018-10-13 NOTE — ED PROVIDER NOTE - ATTENDING CONTRIBUTION TO CARE
63 year old man PMH COPD p/w fever. From [facility], pt says he has had fever, cough productive of white sputum, worsening SOB despite nebs so he called EMS. Pt unable to provide much history due to shortness of breath. On exam tachypneic, speaking 1 word at a time in moderate respiratory distress, coughing whitish sputum, dry oral mucosa, lungs +wheezing, heart tachy/regular, abd s/ntnd, neuro nonfocal, ext no edema or calf tenderness. A/p: COPD exac +sepsis - labs, fluids, abx, cxr, nippv, admit.

## 2018-10-13 NOTE — ED ADULT NURSE NOTE - NSIMPLEMENTINTERV_GEN_ALL_ED
Implemented All Fall with Harm Risk Interventions:  [place] to call system. Call bell, personal items and telephone within reach. Instruct patient to call for assistance. Room bathroom lighting operational. Non-slip footwear when patient is off stretcher. Physically safe environment: no spills, clutter or unnecessary equipment. Stretcher in lowest position, wheels locked, appropriate side rails in place. Provide visual cue, wrist band, yellow gown, etc. Monitor gait and stability. Monitor for mental status changes and reorient to person, place, and time. Review medications for side effects contributing to fall risk. Reinforce activity limits and safety measures with patient and family. Provide visual clues: red socks.

## 2018-10-13 NOTE — ED PROVIDER NOTE - PROGRESS NOTE DETAILS
Sepsis suspected at this time. Clinically improving on bipap, still tachy but fluids not yet done. Will reassess Started on bipap shortly after arrival. Clinically improving on bipap, still tachy but fluids not yet done. Will reassess Spoke with Dr. Mcallister approved to ICU under dr. chavira Sepsis was suspected and he was covered with broad spectrum abx, cxr does not show any infiltrate, viral pending. Repeat lactate uptrending but hemodynamically stable, ICU consulted.

## 2018-10-13 NOTE — ED PROVIDER NOTE - PHYSICAL EXAMINATION
CONSTITUTIONAL: WA / WN   HEAD: NCAT  EYES: PERRL; EOMI;   ENT: Normal pharynx; mucous membranes pink/moist, no erythema.  NECK: Supple; no meningeal signs  CARD: Tachycardic nl S1/S2; no M/R/G.   RESP:Tachypneic & b/l wheezing  ABD: Soft, NT ND + g tubr  MSK/EXT: No gross deformities; full range of motion.  SKIN: Warm and dry;   NEURO: AAOx3, Motor 5/5 x 4 extremities  PSYCH: Memory Intact, Normal Affect

## 2018-10-13 NOTE — ED ADULT NURSE NOTE - PMH
Anxiety disorder, unspecified type    Chronic obstructive pulmonary disease, unspecified COPD type    High cholesterol    Hypertension, unspecified type    Other hydrocele    PEG (percutaneous endoscopic gastrostomy) status

## 2018-10-13 NOTE — ED PROVIDER NOTE - OBJECTIVE STATEMENT
63 year old male with a pmh of copd hld and a history of g tube placement from [facility]. 63 year old male with a pmh of copd hld and a history of g tube placement from [facility]. Patient biba for worsening sob cough productive of white sputum & fever.

## 2018-10-13 NOTE — ED ADULT NURSE NOTE - OBJECTIVE STATEMENT
Pt BIBA from NH for productive cough and worsening SOB that started today. Pt tachypneic and tachycardic, speaking 1-2 words at a time. Wheezing noted on auscultation, productive cough with white sputum

## 2018-10-14 LAB
ALBUMIN SERPL ELPH-MCNC: 3.7 G/DL — SIGNIFICANT CHANGE UP (ref 3.5–5.2)
ALP SERPL-CCNC: 82 U/L — SIGNIFICANT CHANGE UP (ref 30–115)
ALT FLD-CCNC: 14 U/L — SIGNIFICANT CHANGE UP (ref 0–41)
ANION GAP SERPL CALC-SCNC: 14 MMOL/L — SIGNIFICANT CHANGE UP (ref 7–14)
APPEARANCE UR: ABNORMAL
AST SERPL-CCNC: 16 U/L — SIGNIFICANT CHANGE UP (ref 0–41)
BASE EXCESS BLDA CALC-SCNC: 4.9 MMOL/L — HIGH (ref -2–2)
BASOPHILS # BLD AUTO: 0.01 K/UL — SIGNIFICANT CHANGE UP (ref 0–0.2)
BASOPHILS NFR BLD AUTO: 0.1 % — SIGNIFICANT CHANGE UP (ref 0–1)
BILIRUB SERPL-MCNC: <0.2 MG/DL — SIGNIFICANT CHANGE UP (ref 0.2–1.2)
BILIRUB UR-MCNC: NEGATIVE — SIGNIFICANT CHANGE UP
BUN SERPL-MCNC: 19 MG/DL — SIGNIFICANT CHANGE UP (ref 10–20)
CALCIUM SERPL-MCNC: 9.8 MG/DL — SIGNIFICANT CHANGE UP (ref 8.5–10.1)
CHLORIDE SERPL-SCNC: 98 MMOL/L — SIGNIFICANT CHANGE UP (ref 98–110)
CO2 SERPL-SCNC: 27 MMOL/L — SIGNIFICANT CHANGE UP (ref 17–32)
COLOR SPEC: YELLOW — SIGNIFICANT CHANGE UP
CREAT SERPL-MCNC: 0.8 MG/DL — SIGNIFICANT CHANGE UP (ref 0.7–1.5)
DIFF PNL FLD: NEGATIVE — SIGNIFICANT CHANGE UP
EOSINOPHIL # BLD AUTO: 0 K/UL — SIGNIFICANT CHANGE UP (ref 0–0.7)
EOSINOPHIL NFR BLD AUTO: 0 % — SIGNIFICANT CHANGE UP (ref 0–8)
EPI CELLS # UR: ABNORMAL /HPF
GLUCOSE SERPL-MCNC: 102 MG/DL — HIGH (ref 70–99)
GLUCOSE UR QL: NEGATIVE MG/DL — SIGNIFICANT CHANGE UP
HCO3 BLDA-SCNC: 29 MMOL/L — HIGH (ref 23–27)
HCT VFR BLD CALC: 26.9 % — LOW (ref 42–52)
HGB BLD-MCNC: 8.5 G/DL — LOW (ref 14–18)
IMM GRANULOCYTES NFR BLD AUTO: 0.4 % — HIGH (ref 0.1–0.3)
KETONES UR-MCNC: NEGATIVE — SIGNIFICANT CHANGE UP
LACTATE SERPL-SCNC: 1.2 MMOL/L — SIGNIFICANT CHANGE UP (ref 0.5–2.2)
LEUKOCYTE ESTERASE UR-ACNC: NEGATIVE — SIGNIFICANT CHANGE UP
LYMPHOCYTES # BLD AUTO: 0.5 K/UL — LOW (ref 1.2–3.4)
LYMPHOCYTES # BLD AUTO: 4.6 % — LOW (ref 20.5–51.1)
MCHC RBC-ENTMCNC: 27.8 PG — SIGNIFICANT CHANGE UP (ref 27–31)
MCHC RBC-ENTMCNC: 31.6 G/DL — LOW (ref 32–37)
MCV RBC AUTO: 87.9 FL — SIGNIFICANT CHANGE UP (ref 80–94)
MONOCYTES # BLD AUTO: 0.12 K/UL — SIGNIFICANT CHANGE UP (ref 0.1–0.6)
MONOCYTES NFR BLD AUTO: 1.1 % — LOW (ref 1.7–9.3)
NEUTROPHILS # BLD AUTO: 10.16 K/UL — HIGH (ref 1.4–6.5)
NEUTROPHILS NFR BLD AUTO: 93.8 % — HIGH (ref 42.2–75.2)
NITRITE UR-MCNC: NEGATIVE — SIGNIFICANT CHANGE UP
PCO2 BLDA: 42 MMHG — SIGNIFICANT CHANGE UP (ref 38–42)
PH BLDA: 7.46 — HIGH (ref 7.38–7.42)
PH UR: 7 — SIGNIFICANT CHANGE UP (ref 5–8)
PLATELET # BLD AUTO: 259 K/UL — SIGNIFICANT CHANGE UP (ref 130–400)
PO2 BLDA: 77 MMHG — LOW (ref 78–95)
POTASSIUM SERPL-MCNC: 4.4 MMOL/L — SIGNIFICANT CHANGE UP (ref 3.5–5)
POTASSIUM SERPL-SCNC: 4.4 MMOL/L — SIGNIFICANT CHANGE UP (ref 3.5–5)
PROT SERPL-MCNC: 6.9 G/DL — SIGNIFICANT CHANGE UP (ref 6–8)
PROT UR-MCNC: NEGATIVE MG/DL — SIGNIFICANT CHANGE UP
RBC # BLD: 3.06 M/UL — LOW (ref 4.7–6.1)
RBC # FLD: 13.8 % — SIGNIFICANT CHANGE UP (ref 11.5–14.5)
SAO2 % BLDA: 95 % — SIGNIFICANT CHANGE UP (ref 94–98)
SODIUM SERPL-SCNC: 139 MMOL/L — SIGNIFICANT CHANGE UP (ref 135–146)
SP GR SPEC: 1.01 — SIGNIFICANT CHANGE UP (ref 1.01–1.03)
UROBILINOGEN FLD QL: 0.2 MG/DL — SIGNIFICANT CHANGE UP (ref 0.2–0.2)
WBC # BLD: 10.83 K/UL — HIGH (ref 4.8–10.8)
WBC # FLD AUTO: 10.83 K/UL — HIGH (ref 4.8–10.8)

## 2018-10-14 RX ORDER — SIMVASTATIN 20 MG/1
10 TABLET, FILM COATED ORAL AT BEDTIME
Qty: 0 | Refills: 0 | Status: DISCONTINUED | OUTPATIENT
Start: 2018-10-14 | End: 2018-11-13

## 2018-10-14 RX ORDER — SENNA PLUS 8.6 MG/1
2 TABLET ORAL AT BEDTIME
Qty: 0 | Refills: 0 | Status: DISCONTINUED | OUTPATIENT
Start: 2018-10-14 | End: 2018-11-13

## 2018-10-14 RX ORDER — ACETAMINOPHEN 500 MG
650 TABLET ORAL EVERY 6 HOURS
Qty: 0 | Refills: 0 | Status: DISCONTINUED | OUTPATIENT
Start: 2018-10-14 | End: 2018-11-13

## 2018-10-14 RX ORDER — ENOXAPARIN SODIUM 100 MG/ML
40 INJECTION SUBCUTANEOUS AT BEDTIME
Qty: 0 | Refills: 0 | Status: DISCONTINUED | OUTPATIENT
Start: 2018-10-14 | End: 2018-11-13

## 2018-10-14 RX ORDER — BUDESONIDE AND FORMOTEROL FUMARATE DIHYDRATE 160; 4.5 UG/1; UG/1
2 AEROSOL RESPIRATORY (INHALATION)
Qty: 0 | Refills: 0 | Status: COMPLETED | OUTPATIENT
Start: 2018-10-14 | End: 2019-09-12

## 2018-10-14 RX ORDER — AMLODIPINE BESYLATE 2.5 MG/1
10 TABLET ORAL DAILY
Qty: 0 | Refills: 0 | Status: DISCONTINUED | OUTPATIENT
Start: 2018-10-14 | End: 2018-10-24

## 2018-10-14 RX ORDER — LORATADINE 10 MG/1
10 TABLET ORAL DAILY
Qty: 0 | Refills: 0 | Status: DISCONTINUED | OUTPATIENT
Start: 2018-10-14 | End: 2018-11-13

## 2018-10-14 RX ORDER — DOCUSATE SODIUM 100 MG
100 CAPSULE ORAL
Qty: 0 | Refills: 0 | Status: DISCONTINUED | OUTPATIENT
Start: 2018-10-14 | End: 2018-11-13

## 2018-10-14 RX ORDER — PANTOPRAZOLE SODIUM 20 MG/1
40 TABLET, DELAYED RELEASE ORAL
Qty: 0 | Refills: 0 | Status: DISCONTINUED | OUTPATIENT
Start: 2018-10-14 | End: 2018-10-18

## 2018-10-14 RX ORDER — IPRATROPIUM/ALBUTEROL SULFATE 18-103MCG
3 AEROSOL WITH ADAPTER (GRAM) INHALATION EVERY 6 HOURS
Qty: 0 | Refills: 0 | Status: DISCONTINUED | OUTPATIENT
Start: 2018-10-14 | End: 2018-10-15

## 2018-10-14 RX ORDER — SODIUM CHLORIDE 9 MG/ML
1000 INJECTION INTRAMUSCULAR; INTRAVENOUS; SUBCUTANEOUS
Qty: 0 | Refills: 0 | Status: COMPLETED | OUTPATIENT
Start: 2018-10-14 | End: 2018-10-14

## 2018-10-14 RX ORDER — ALPRAZOLAM 0.25 MG
1 TABLET ORAL
Qty: 0 | Refills: 0 | Status: DISCONTINUED | OUTPATIENT
Start: 2018-10-14 | End: 2018-10-15

## 2018-10-14 RX ADMIN — Medication 3 MILLILITER(S): at 19:38

## 2018-10-14 RX ADMIN — CEFEPIME 100 MILLIGRAM(S): 1 INJECTION, POWDER, FOR SOLUTION INTRAMUSCULAR; INTRAVENOUS at 06:26

## 2018-10-14 RX ADMIN — Medication 3 MILLILITER(S): at 12:13

## 2018-10-14 RX ADMIN — Medication 650 MILLIGRAM(S): at 23:28

## 2018-10-14 RX ADMIN — Medication 1 MILLIGRAM(S): at 08:09

## 2018-10-14 RX ADMIN — ENOXAPARIN SODIUM 40 MILLIGRAM(S): 100 INJECTION SUBCUTANEOUS at 21:56

## 2018-10-14 RX ADMIN — CEFEPIME 100 MILLIGRAM(S): 1 INJECTION, POWDER, FOR SOLUTION INTRAMUSCULAR; INTRAVENOUS at 21:56

## 2018-10-14 RX ADMIN — Medication 650 MILLIGRAM(S): at 12:13

## 2018-10-14 RX ADMIN — CEFEPIME 100 MILLIGRAM(S): 1 INJECTION, POWDER, FOR SOLUTION INTRAMUSCULAR; INTRAVENOUS at 13:33

## 2018-10-14 RX ADMIN — SENNA PLUS 2 TABLET(S): 8.6 TABLET ORAL at 21:56

## 2018-10-14 RX ADMIN — Medication 650 MILLIGRAM(S): at 17:22

## 2018-10-14 RX ADMIN — Medication 650 MILLIGRAM(S): at 23:00

## 2018-10-14 RX ADMIN — AMLODIPINE BESYLATE 10 MILLIGRAM(S): 2.5 TABLET ORAL at 12:13

## 2018-10-14 RX ADMIN — LORATADINE 10 MILLIGRAM(S): 10 TABLET ORAL at 12:13

## 2018-10-14 RX ADMIN — Medication 1 MILLIGRAM(S): at 19:38

## 2018-10-14 RX ADMIN — Medication 650 MILLIGRAM(S): at 13:33

## 2018-10-14 RX ADMIN — Medication 3 MILLILITER(S): at 14:22

## 2018-10-14 RX ADMIN — Medication 40 MILLIGRAM(S): at 17:23

## 2018-10-14 RX ADMIN — Medication 100 MILLIGRAM(S): at 17:22

## 2018-10-14 RX ADMIN — SIMVASTATIN 10 MILLIGRAM(S): 20 TABLET, FILM COATED ORAL at 21:56

## 2018-10-14 RX ADMIN — SODIUM CHLORIDE 75 MILLILITER(S): 9 INJECTION INTRAMUSCULAR; INTRAVENOUS; SUBCUTANEOUS at 11:18

## 2018-10-14 NOTE — H&P ADULT - ASSESSMENT
64 yo M with COPD, chronic respiratory failure on BIPAP, HTN, DLD, vocal cord paralysis and dysphagia s/p PEG 5/ 18 in the setting of resistant esophageal candidiasis and inability to swallow. Today bought in from [facility] ,for cough productive ,sob and difficulty breathing of 1 day.    #Acute hypercapnic rep failure /  COPD ex likely 2/2 viral vs bacterial inflection;  iv abx  iv steriods   BIPAP and PRN ox  repeat ABGs  cw nebs    #HTN/DLD;  cw home meds    #anxiety ;cw home meds  #DVT ppx;with lovenox  #resume PEG diet  FULL CODE  cw MICU monitoring

## 2018-10-14 NOTE — H&P ADULT - ATTENDING COMMENTS
64 yo M with COPD, chronic respiratory failure on BIPAP, HTN, DLD, vocal cord paralysis and dysphagia s/p PEG 5/ 18 in the setting of resistant esophageal candidiasis and inability to swallow. Today bought in from [facility] ,for cough productive ,sob and difficulty breathing    pt seen and examined in er cc8    chart reviewed, events noted    pt now stating "I want to go home now"    agree with initial plan above    pulm Dr Dumont    steroids, bronchodilators    DC when cleared by pulm

## 2018-10-14 NOTE — H&P ADULT - HISTORY OF PRESENT ILLNESS
62 yo M with COPD, chronic respiratory failure on BIPAP, HTN, DLD, vocal cord paralysis and dysphagia s/p PEG 5/ 18 in the setting of resistant esophageal candidiasis and inability to swallow. Today bought in from [facility] ,for cough productive ,sob and difficulty breathing of 1 day.  he has a recent admission in 6/18 for COPD ex too.    In ER he  was tachyonic, temp of 103 ,given  respiratory  treatment *3 and was placed on BIPAP  and admitted for COPD excerabtion    also given IV antibiotics

## 2018-10-14 NOTE — CHART NOTE - NSCHARTNOTEFT_GEN_A_CORE
64 yo M with COPD, chronic respiratory failure on BIPAP, HTN, DLD, vocal cord paralysis and dysphagia s/p PEG 5/ 18 in the setting of resistant esophageal candidiasis and inability to swallow. Today bought in from [employer] ,for cough productive ,sob and difficulty breathing of 1 day. Admitted for COPD exacerbation and originally admitted to ICU for monitoring of acute hypercapneic respiratory failure. This morning the patient is much improved after steroids, abx, duonebs, and bipap use. He no longer requires monitoring by the critical care team and can be further managed on the floor.  Downgrade approved by Dr. Cheung. Placed consult for patient's outpatient pulmonologist, Dr. Teodora Dumont.       Michael Goldberg, MD  PGY-3

## 2018-10-14 NOTE — H&P ADULT - NSHPLABSRESULTS_GEN_ALL_CORE
9.7    22.86 )-----------( 377      ( 13 Oct 2018 20:40 )             31.0     10-13    141  |  96<L>  |  22<H>  ----------------------------<  177<H>  5.0   |  29  |  0.9    Ca    10.2<H>      13 Oct 2018 20:40    TPro  7.6  /  Alb  4.3  /  TBili  <0.2  /  DBili  x   /  AST  17  /  ALT  15  /  AlkPhos  99  10-13      CXR ; no opacity

## 2018-10-14 NOTE — H&P ADULT - NSHPPHYSICALEXAM_GEN_ALL_CORE
Vital Signs Last 24 Hrs  T(C): 37.2 (14 Oct 2018 05:30), Max: 39.4 (13 Oct 2018 20:26)  T(F): 98.9 (14 Oct 2018 05:30), Max: 103 (13 Oct 2018 20:26)  HR: 85 (14 Oct 2018 06:06) (85 - 153)  BP: 128/82 (14 Oct 2018 05:30) (126/62 - 153/71)  BP(mean): --  RR: 19 (14 Oct 2018 06:06) (19 - 36)  SpO2: 97% (14 Oct 2018 06:06) (94% - 100%)      PHYSICAL EXAM:      Constitutional: no acute distress  Eyes:no pallor or icterus     ENMT: within normal range    Neck: no JVD    Respiratory: VB +o    Cardiovascular:S1+S+0    Gastrointestinal: soft ,non tender, PEG in place ,BS +    Extremities; no LE edema     Neurological: AAO*3

## 2018-10-15 LAB
ALBUMIN SERPL ELPH-MCNC: 4.1 G/DL — SIGNIFICANT CHANGE UP (ref 3.5–5.2)
ALP SERPL-CCNC: 78 U/L — SIGNIFICANT CHANGE UP (ref 30–115)
ALT FLD-CCNC: 15 U/L — SIGNIFICANT CHANGE UP (ref 0–41)
ANION GAP SERPL CALC-SCNC: 16 MMOL/L — HIGH (ref 7–14)
AST SERPL-CCNC: 15 U/L — SIGNIFICANT CHANGE UP (ref 0–41)
BILIRUB SERPL-MCNC: <0.2 MG/DL — SIGNIFICANT CHANGE UP (ref 0.2–1.2)
BUN SERPL-MCNC: 26 MG/DL — HIGH (ref 10–20)
CALCIUM SERPL-MCNC: 9.5 MG/DL — SIGNIFICANT CHANGE UP (ref 8.5–10.1)
CHLORIDE SERPL-SCNC: 100 MMOL/L — SIGNIFICANT CHANGE UP (ref 98–110)
CK MB CFR SERPL CALC: 6.7 NG/ML — HIGH (ref 0.6–6.3)
CO2 SERPL-SCNC: 26 MMOL/L — SIGNIFICANT CHANGE UP (ref 17–32)
CREAT SERPL-MCNC: 0.8 MG/DL — SIGNIFICANT CHANGE UP (ref 0.7–1.5)
CULTURE RESULTS: NO GROWTH — SIGNIFICANT CHANGE UP
GLUCOSE SERPL-MCNC: 85 MG/DL — SIGNIFICANT CHANGE UP (ref 70–99)
HCT VFR BLD CALC: 28.8 % — LOW (ref 42–52)
HGB BLD-MCNC: 8.9 G/DL — LOW (ref 14–18)
LACTATE SERPL-SCNC: 3.3 MMOL/L — HIGH (ref 0.5–2.2)
MAGNESIUM SERPL-MCNC: 2 MG/DL — SIGNIFICANT CHANGE UP (ref 1.8–2.4)
MCHC RBC-ENTMCNC: 27.6 PG — SIGNIFICANT CHANGE UP (ref 27–31)
MCHC RBC-ENTMCNC: 30.9 G/DL — LOW (ref 32–37)
MCV RBC AUTO: 89.4 FL — SIGNIFICANT CHANGE UP (ref 80–94)
NRBC # BLD: 0 /100 WBCS — SIGNIFICANT CHANGE UP (ref 0–0)
PLATELET # BLD AUTO: 266 K/UL — SIGNIFICANT CHANGE UP (ref 130–400)
POTASSIUM SERPL-MCNC: 4.2 MMOL/L — SIGNIFICANT CHANGE UP (ref 3.5–5)
POTASSIUM SERPL-SCNC: 4.2 MMOL/L — SIGNIFICANT CHANGE UP (ref 3.5–5)
PROT SERPL-MCNC: 7.6 G/DL — SIGNIFICANT CHANGE UP (ref 6–8)
RBC # BLD: 3.22 M/UL — LOW (ref 4.7–6.1)
RBC # FLD: 13.9 % — SIGNIFICANT CHANGE UP (ref 11.5–14.5)
SODIUM SERPL-SCNC: 142 MMOL/L — SIGNIFICANT CHANGE UP (ref 135–146)
SPECIMEN SOURCE: SIGNIFICANT CHANGE UP
TROPONIN T SERPL-MCNC: <0.01 NG/ML — SIGNIFICANT CHANGE UP
WBC # BLD: 13.83 K/UL — HIGH (ref 4.8–10.8)
WBC # FLD AUTO: 13.83 K/UL — HIGH (ref 4.8–10.8)

## 2018-10-15 RX ORDER — ALBUTEROL 90 UG/1
2.5 AEROSOL, METERED ORAL EVERY 4 HOURS
Qty: 0 | Refills: 0 | Status: DISCONTINUED | OUTPATIENT
Start: 2018-10-15 | End: 2018-10-17

## 2018-10-15 RX ORDER — ALPRAZOLAM 0.25 MG
0.25 TABLET ORAL
Qty: 0 | Refills: 0 | Status: DISCONTINUED | OUTPATIENT
Start: 2018-10-15 | End: 2018-10-15

## 2018-10-15 RX ORDER — ALPRAZOLAM 0.25 MG
1 TABLET ORAL THREE TIMES A DAY
Qty: 0 | Refills: 0 | Status: DISCONTINUED | OUTPATIENT
Start: 2018-10-15 | End: 2018-10-16

## 2018-10-15 RX ADMIN — Medication 650 MILLIGRAM(S): at 17:14

## 2018-10-15 RX ADMIN — CEFEPIME 100 MILLIGRAM(S): 1 INJECTION, POWDER, FOR SOLUTION INTRAMUSCULAR; INTRAVENOUS at 06:55

## 2018-10-15 RX ADMIN — Medication 650 MILLIGRAM(S): at 07:24

## 2018-10-15 RX ADMIN — Medication 100 MILLIGRAM(S): at 06:55

## 2018-10-15 RX ADMIN — AMLODIPINE BESYLATE 10 MILLIGRAM(S): 2.5 TABLET ORAL at 06:55

## 2018-10-15 RX ADMIN — CEFEPIME 100 MILLIGRAM(S): 1 INJECTION, POWDER, FOR SOLUTION INTRAMUSCULAR; INTRAVENOUS at 23:03

## 2018-10-15 RX ADMIN — Medication 650 MILLIGRAM(S): at 06:55

## 2018-10-15 RX ADMIN — CEFEPIME 100 MILLIGRAM(S): 1 INJECTION, POWDER, FOR SOLUTION INTRAMUSCULAR; INTRAVENOUS at 13:17

## 2018-10-15 RX ADMIN — LORATADINE 10 MILLIGRAM(S): 10 TABLET ORAL at 11:00

## 2018-10-15 RX ADMIN — Medication 650 MILLIGRAM(S): at 11:00

## 2018-10-15 RX ADMIN — Medication 3 MILLILITER(S): at 08:03

## 2018-10-15 RX ADMIN — Medication 3 MILLILITER(S): at 02:11

## 2018-10-15 RX ADMIN — SIMVASTATIN 10 MILLIGRAM(S): 20 TABLET, FILM COATED ORAL at 22:29

## 2018-10-15 RX ADMIN — Medication 650 MILLIGRAM(S): at 23:03

## 2018-10-15 RX ADMIN — Medication 3 MILLILITER(S): at 13:17

## 2018-10-15 RX ADMIN — Medication 1 MILLIGRAM(S): at 17:14

## 2018-10-15 RX ADMIN — Medication 0.25 MILLIGRAM(S): at 10:59

## 2018-10-15 RX ADMIN — Medication 40 MILLIGRAM(S): at 17:14

## 2018-10-15 RX ADMIN — ENOXAPARIN SODIUM 40 MILLIGRAM(S): 100 INJECTION SUBCUTANEOUS at 22:29

## 2018-10-15 RX ADMIN — SENNA PLUS 2 TABLET(S): 8.6 TABLET ORAL at 22:29

## 2018-10-15 RX ADMIN — Medication 100 MILLIGRAM(S): at 17:14

## 2018-10-15 RX ADMIN — Medication 40 MILLIGRAM(S): at 06:55

## 2018-10-15 NOTE — PROGRESS NOTE ADULT - SUBJECTIVE AND OBJECTIVE BOX
SUBJECTIVE:    Patient is a 63y old Male who presents with a chief complaint of sob, cough and temp 103 (15 Oct 2018 12:46)    Currently admitted to medicine with the primary diagnosis of Sepsis     Today is hospital day 2d. On assessment, patient is anxious. Upset about his tv not working and about his room overall. Explained to patient that he is doing well on oxygen therapy, saturating anywhere between 96-97% on Nasal Cannula. BiPAP is at bedside and should be used PRN and at night. He received a nebulizer treatment while I was in room. Explained to patient he is otherwise doing well and will be monitored for an additional 24 hours. Xanax dose adjusted. DAVONTE Rizvi saw bottle brought by ro and confirmed that he was receiving 1mg TID     PAST MEDICAL & SURGICAL HISTORY  PEG (percutaneous endoscopic gastrostomy) status  Other hydrocele  Anxiety disorder, unspecified type  Hypertension, unspecified type  High cholesterol  Chronic obstructive pulmonary disease, unspecified COPD type  PEG (percutaneous endoscopic gastrostomy) status    SOCIAL HISTORY:  Negative for smoking/alcohol/drug use.     ALLERGIES:  No Known Allergies    MEDICATIONS:  STANDING MEDICATIONS  acetaminophen   Tablet .. 650 milliGRAM(s) Oral every 6 hours  amLODIPine   Tablet 10 milliGRAM(s) Oral daily  buDESOnide 160 MICROgram(s)/formoterol 4.5 MICROgram(s) Inhaler 2 Puff(s) Inhalation two times a day  cefepime   IVPB      cefepime   IVPB 2000 milliGRAM(s) IV Intermittent every 8 hours  docusate sodium Liquid 100 milliGRAM(s) Oral two times a day  enoxaparin Injectable 40 milliGRAM(s) SubCutaneous at bedtime  levoFLOXacin IVPB      levoFLOXacin IVPB 750 milliGRAM(s) IV Intermittent every 24 hours  loratadine 10 milliGRAM(s) Oral daily  pantoprazole    Tablet 40 milliGRAM(s) Oral before breakfast  senna 2 Tablet(s) Oral at bedtime  simvastatin 10 milliGRAM(s) Oral at bedtime    PRN MEDICATIONS  ALBUTerol    0.083% 2.5 milliGRAM(s) Nebulizer every 4 hours PRN  ALPRAZolam 1 milliGRAM(s) Oral three times a day PRN  guaiFENesin    Syrup 200 milliGRAM(s) Oral every 6 hours PRN    VITALS:   T(F): 98.5  HR: 89  BP: 153/66  RR: 20  SpO2: 99%    LABS:                        8.9    13.83 )-----------( 266      ( 15 Oct 2018 08:53 )             28.8     10-15    142  |  100  |  26<H>  ----------------------------<  85  4.2   |  26  |  0.8    Ca    9.5      15 Oct 2018 08:53  Mg     2.0     10-15    TPro  7.6  /  Alb  4.1  /  TBili  <0.2  /  DBili  x   /  AST  15  /  ALT  15  /  AlkPhos  78  10-15    PT/INR - ( 13 Oct 2018 20:40 )   PT: 12.20 sec;   INR: 1.06 ratio         PTT - ( 13 Oct 2018 20:40 )  PTT:29.0 sec  Urinalysis Basic - ( 14 Oct 2018 01:00 )    Color: Yellow / Appearance: Cloudy / S.015 / pH: x  Gluc: x / Ketone: Negative  / Bili: Negative / Urobili: 0.2 mg/dL   Blood: x / Protein: Negative mg/dL / Nitrite: Negative   Leuk Esterase: Negative / RBC: x / WBC x   Sq Epi: x / Non Sq Epi: Occasional /HPF / Bacteria: x      ABG - ( 14 Oct 2018 06:48 )  pH, Arterial: 7.46  pH, Blood: x     /  pCO2: 42    /  pO2: 77    / HCO3: 29    / Base Excess: 4.9   /  SaO2: 95          Troponin T, Serum: <0.01 ng/mL (10-15-18 @ 08:53)  Lactate, Blood: 3.3 mmol/L <H> (10-15-18 @ 08:53)      Culture - Blood (collected 13 Oct 2018 20:40)  Source: .Blood Blood-Peripheral  Preliminary Report (15 Oct 2018 07:00):    No growth to date.    Culture - Blood (collected 13 Oct 2018 20:40)  Source: .Blood Blood-Peripheral  Preliminary Report (15 Oct 2018 07:00):    No growth to date.      CARDIAC MARKERS ( 15 Oct 2018 08:53 )  x     / <0.01 ng/mL / x     / x     / 6.7 ng/mL      RADIOLOGY:    < from: Xray Chest 1 View- PORTABLE-Routine (10.15.18 @ 06:45) >  Impression:      No radiographic evidence of acute cardiopulmonary disease.    < end of copied text >      PHYSICAL EXAM:  GEN: No acute distress  LUNGS: Clear to auscultation bilaterally   HEART: S1/S2 present. RRR.   ABD: Soft, non-tender, non-distended. Bowel sounds present  EXT: NC/NC/NE/2+PP/LOONEY  NEURO: AAOX3

## 2018-10-15 NOTE — PROGRESS NOTE ADULT - ASSESSMENT
Patient is a 62 yo M with COPD, chronic respiratory failure on BIPAP, HTN, DLD, vocal cord paralysis and dysphagia s/p PEG 5/ 18 in the setting of resistant esophageal candidiasis and inability to swallow. Brought in from [facility] ,for cough productive ,sob and difficulty breathing of 1 day. Received IV antibiotics with steroids and BiPAP with improvement in respiratory status. Patient also suffers from anxiety which contributes to breathing status.     #Acute hypercapnic rep failure  - COPD ex likely 2/2 viral URI   - Pulm evaluation appreciated; On Symbicort for now and PO Prednisone from tomorrow AM  - BIPAP at night and PRN   - Albuterol PRN     #HTN/DLD;  - Cont Amlodipine 10mg daily  - Cont with Simvastatin 10mg QHS     #) Anxiety  - continue with  home meds    #DVT ppx; Lovenox subQ   #Diet: PEG feedings   #Activity: OOBTC   #Code: Full Code   #Dispo: Likely d/c in 24-48 hours to NH

## 2018-10-15 NOTE — CONSULT NOTE ADULT - ASSESSMENT
Acute respiratory failure/CRF  COPD exac  Multiple pulmonary nodule  Anxiety  Dysphagia s/p peg tube      02 via nc  albuterol prn  symbicort 160/4.5 2 puff bid (in lieu of brovana and pulmicort)  cont current abx for now, follow up pan cultures  albuterol prn  may change iv steroids to po prednisone 60 mg with taper by 20 mg every 3 days  cont daliresp, mucinex  cont niv at night and prn  pt to follow up with primary pulmonologist Dr Almonte once stable for discharge  dvt/gi px  oob - chair   overall prognosis guarded

## 2018-10-16 RX ORDER — ALPRAZOLAM 0.25 MG
1 TABLET ORAL EVERY 8 HOURS
Qty: 0 | Refills: 0 | Status: DISCONTINUED | OUTPATIENT
Start: 2018-10-16 | End: 2018-10-17

## 2018-10-16 RX ORDER — BENZOCAINE AND MENTHOL 5; 1 G/100ML; G/100ML
1 LIQUID ORAL THREE TIMES A DAY
Qty: 0 | Refills: 0 | Status: DISCONTINUED | OUTPATIENT
Start: 2018-10-16 | End: 2018-10-25

## 2018-10-16 RX ADMIN — Medication 650 MILLIGRAM(S): at 17:40

## 2018-10-16 RX ADMIN — ALBUTEROL 2.5 MILLIGRAM(S): 90 AEROSOL, METERED ORAL at 16:49

## 2018-10-16 RX ADMIN — Medication 650 MILLIGRAM(S): at 19:07

## 2018-10-16 RX ADMIN — Medication 650 MILLIGRAM(S): at 05:46

## 2018-10-16 RX ADMIN — Medication 650 MILLIGRAM(S): at 12:28

## 2018-10-16 RX ADMIN — SENNA PLUS 2 TABLET(S): 8.6 TABLET ORAL at 21:44

## 2018-10-16 RX ADMIN — Medication 100 MILLIGRAM(S): at 17:40

## 2018-10-16 RX ADMIN — Medication 60 MILLIGRAM(S): at 05:46

## 2018-10-16 RX ADMIN — Medication 1 MILLIGRAM(S): at 00:38

## 2018-10-16 RX ADMIN — Medication 100 MILLIGRAM(S): at 05:47

## 2018-10-16 RX ADMIN — AMLODIPINE BESYLATE 10 MILLIGRAM(S): 2.5 TABLET ORAL at 05:46

## 2018-10-16 RX ADMIN — ENOXAPARIN SODIUM 40 MILLIGRAM(S): 100 INJECTION SUBCUTANEOUS at 21:45

## 2018-10-16 RX ADMIN — SIMVASTATIN 10 MILLIGRAM(S): 20 TABLET, FILM COATED ORAL at 21:44

## 2018-10-16 RX ADMIN — Medication 650 MILLIGRAM(S): at 13:00

## 2018-10-16 RX ADMIN — Medication 1 MILLIGRAM(S): at 21:44

## 2018-10-16 RX ADMIN — ALBUTEROL 2.5 MILLIGRAM(S): 90 AEROSOL, METERED ORAL at 00:08

## 2018-10-16 RX ADMIN — LORATADINE 10 MILLIGRAM(S): 10 TABLET ORAL at 12:28

## 2018-10-16 RX ADMIN — ALBUTEROL 2.5 MILLIGRAM(S): 90 AEROSOL, METERED ORAL at 04:18

## 2018-10-16 RX ADMIN — ALBUTEROL 2.5 MILLIGRAM(S): 90 AEROSOL, METERED ORAL at 12:18

## 2018-10-16 RX ADMIN — CEFEPIME 100 MILLIGRAM(S): 1 INJECTION, POWDER, FOR SOLUTION INTRAMUSCULAR; INTRAVENOUS at 05:45

## 2018-10-16 RX ADMIN — ALBUTEROL 2.5 MILLIGRAM(S): 90 AEROSOL, METERED ORAL at 08:24

## 2018-10-16 NOTE — PROGRESS NOTE ADULT - ASSESSMENT
Acute respiratory failure/CRF  COPD exac improving  Multiple pulmonary nodule  Anxiety  Dysphagia s/p peg tube      02 via nc  albuterol prn  symbicort 160/4.5 2 puff bid (in lieu of brovana and pulmicort)  agree with oral abx-complete 7 day course  albuterol prn  prednisone 60 mg with taper by 20 mg every 3 days  cont daliresp, mucinex  cont niv at night and prn  pt to follow up with primary pulmonologist Dr Almonte once stable for discharge  dvt/gi px  oob - chair , ambulate as tolerated  overall prognosis guarded

## 2018-10-16 NOTE — PROGRESS NOTE ADULT - SUBJECTIVE AND OBJECTIVE BOX
Patient was seen and examined. Spoke with RN. Chart reviewed.  No events overnight.  on cpap which he took off to speak to me  No complaints - wants to go home      Vital Signs Last 24 Hrs  T(F): 96.8 (16 Oct 2018 05:12), Max: 97.2 (15 Oct 2018 21:25)  HR: 76 (16 Oct 2018 05:12) (72 - 111)  BP: 109/55 (16 Oct 2018 05:12) (109/55 - 129/66)  SpO2: 98% (16 Oct 2018 00:15) (98% - 98%)  MEDICATIONS  (STANDING):  acetaminophen   Tablet .. 650 milliGRAM(s) Oral every 6 hours  amLODIPine   Tablet 10 milliGRAM(s) Oral daily  buDESOnide 160 MICROgram(s)/formoterol 4.5 MICROgram(s) Inhaler 2 Puff(s) Inhalation two times a day  cefepime   IVPB      cefepime   IVPB 2000 milliGRAM(s) IV Intermittent every 8 hours  docusate sodium Liquid 100 milliGRAM(s) Oral two times a day  enoxaparin Injectable 40 milliGRAM(s) SubCutaneous at bedtime  levoFLOXacin IVPB      levoFLOXacin IVPB 750 milliGRAM(s) IV Intermittent every 24 hours  loratadine 10 milliGRAM(s) Oral daily  pantoprazole    Tablet 40 milliGRAM(s) Oral before breakfast  predniSONE   Tablet 60 milliGRAM(s) Oral daily  senna 2 Tablet(s) Oral at bedtime  simvastatin 10 milliGRAM(s) Oral at bedtime    MEDICATIONS  (PRN):  ALBUTerol    0.083% 2.5 milliGRAM(s) Nebulizer every 4 hours PRN Shortness of Breath and/or Wheezing  ALPRAZolam 1 milliGRAM(s) Oral three times a day PRN Anxiety  guaiFENesin    Syrup 200 milliGRAM(s) Oral every 6 hours PRN Cough    Labs:                        8.9    13.83 )-----------( 266      ( 15 Oct 2018 08:53 )             28.8                         8.5    10.83 )-----------( 259      ( 14 Oct 2018 11:06 )             26.9     15 Oct 2018 08:53    142    |  100    |  26     ----------------------------<  85     4.2     |  26     |  0.8    14 Oct 2018 11:06    139    |  98     |  19     ----------------------------<  102    4.4     |  27     |  0.8      Ca    9.5        15 Oct 2018 08:53  Ca    9.8        14 Oct 2018 11:06  Mg     2.0       15 Oct 2018 08:53    TPro  7.6    /  Alb  4.1    /  TBili  <0.2   /  DBili  x      /  AST  15     /  ALT  15     /  AlkPhos  78     15 Oct 2018 08:53  TPro  6.9    /  Alb  3.7    /  TBili  <0.2   /  DBili  x      /  AST  16     /  ALT  14     /  AlkPhos  82     14 Oct 2018 11:06          Culture - Urine (collected 14 Oct 2018 01:00)  Source: .Urine Clean Catch (Midstream)  Final Report (15 Oct 2018 21:11):    No growth    Culture - Blood (collected 13 Oct 2018 20:40)  Source: .Blood Blood-Peripheral  Preliminary Report (15 Oct 2018 07:00):    No growth to date.    Culture - Blood (collected 13 Oct 2018 20:40)  Source: .Blood Blood-Peripheral  Preliminary Report (15 Oct 2018 07:00):    No growth to date.      General: comfortable, NAD  Neurology: A&Ox3, nonfocal  Head:  Normocephalic, atraumatic  ENT:  Mucosa moist, no ulcerations  Neck:  Supple, no JVD,   Skin: no breakdowns (as per RN)  Resp: CTA B/L decreased sounds  CV: RRR, S1S2,   GI: Soft, NT, bowel sounds - feeding tube  MS: No edema, + peripheral pulses, FROM all 4 extremity      A/P: see plan below - modified plan  from resident yesterday    DVT prophylaxis  Decubitus prevention- all measures as per RN protocol  Please call or text me with any questions or updates

## 2018-10-16 NOTE — PROGRESS NOTE ADULT - SUBJECTIVE AND OBJECTIVE BOX
CHIEF COMPLAINT:    Patient is a 63y old Male who presents with a chief complaint of sob, cough and temp 103 (15 Oct 2018 18:24)    Currently admitted to medicine with the primary diagnosis of Sepsis     Today is hospital day 3d. This morning he is resting comfortably in bed and reports no new issues or overnight events.     PAST MEDICAL & SURGICAL HISTORY  PEG (percutaneous endoscopic gastrostomy) status  Other hydrocele  Anxiety disorder, unspecified type  Hypertension, unspecified type  High cholesterol  Chronic obstructive pulmonary disease, unspecified COPD type  PEG (percutaneous endoscopic gastrostomy) status    SOCIAL HISTORY:  Negative for smoking/alcohol/drug use.     ALLERGIES:  No Known Allergies    MEDICATIONS:  STANDING MEDICATIONS  acetaminophen   Tablet .. 650 milliGRAM(s) Oral every 6 hours  amLODIPine   Tablet 10 milliGRAM(s) Oral daily  buDESOnide 160 MICROgram(s)/formoterol 4.5 MICROgram(s) Inhaler 2 Puff(s) Inhalation two times a day  cefepime   IVPB      cefepime   IVPB 2000 milliGRAM(s) IV Intermittent every 8 hours  docusate sodium Liquid 100 milliGRAM(s) Oral two times a day  enoxaparin Injectable 40 milliGRAM(s) SubCutaneous at bedtime  levoFLOXacin IVPB      levoFLOXacin IVPB 750 milliGRAM(s) IV Intermittent every 24 hours  loratadine 10 milliGRAM(s) Oral daily  pantoprazole    Tablet 40 milliGRAM(s) Oral before breakfast  predniSONE   Tablet 60 milliGRAM(s) Oral daily  senna 2 Tablet(s) Oral at bedtime  simvastatin 10 milliGRAM(s) Oral at bedtime    PRN MEDICATIONS  ALBUTerol    0.083% 2.5 milliGRAM(s) Nebulizer every 4 hours PRN  ALPRAZolam 1 milliGRAM(s) Oral three times a day PRN  guaiFENesin    Syrup 200 milliGRAM(s) Oral every 6 hours PRN    VITALS:   T(F): 96.8  HR: 76  BP: 109/55  RR: 20  SpO2: 98%    LABS:                        8.9    13.83 )-----------( 266      ( 15 Oct 2018 08:53 )             28.8     10-15    142  |  100  |  26<H>  ----------------------------<  85  4.2   |  26  |  0.8    Ca    9.5      15 Oct 2018 08:53  Mg     2.0     10-15    TPro  7.6  /  Alb  4.1  /  TBili  <0.2  /  DBili  x   /  AST  15  /  ALT  15  /  AlkPhos  78  10-15          Troponin T, Serum: <0.01 ng/mL (10-15-18 @ 08:53)  Lactate, Blood: 3.3 mmol/L <H> (10-15-18 @ 08:53)      Culture - Urine (collected 14 Oct 2018 01:00)  Source: .Urine Clean Catch (Midstream)  Final Report (15 Oct 2018 21:11):    No growth    Culture - Blood (collected 13 Oct 2018 20:40)  Source: .Blood Blood-Peripheral  Preliminary Report (15 Oct 2018 07:00):    No growth to date.    Culture - Blood (collected 13 Oct 2018 20:40)  Source: .Blood Blood-Peripheral  Preliminary Report (15 Oct 2018 07:00):    No growth to date.      CARDIAC MARKERS ( 15 Oct 2018 08:53 )  x     / <0.01 ng/mL / x     / x     / 6.7 ng/mL        RADIOLOGY:    PHYSICAL EXAM:  GEN: No acute distress  PULM: Clear to auscultation bilaterally   CARD: S1/S2 present. RRR.   GI: Soft, non-tender, non-distended. Bowel sounds present  MSK: NC/NC/NE/2+PP/LOONEY  NEURO: AAOX3 CHIEF COMPLAINT:    Patient is a 63y old Male who presents with a chief complaint of sob, cough and temp 103 (15 Oct 2018 18:24)    Currently admitted to medicine with the primary diagnosis of Sepsis     Today is hospital day 3d. This morning he is resting comfortably in bed and reports cough. No overnight events. Pt feels better today. He denied fever, chills, N/V/D/C, palpitations. Pt is hoarse and there is some difficulty understanding him. He asked for some cough expectorant and was very anxious.    PAST MEDICAL & SURGICAL HISTORY  PEG (percutaneous endoscopic gastrostomy) status  Other hydrocele  Anxiety disorder, unspecified type  Hypertension, unspecified type  High cholesterol  Chronic obstructive pulmonary disease, unspecified COPD type  PEG (percutaneous endoscopic gastrostomy) status    SOCIAL HISTORY:  Negative for smoking/alcohol/drug use.     ALLERGIES:  No Known Allergies    MEDICATIONS:  STANDING MEDICATIONS  acetaminophen   Tablet .. 650 milliGRAM(s) Oral every 6 hours  amLODIPine   Tablet 10 milliGRAM(s) Oral daily  buDESOnide 160 MICROgram(s)/formoterol 4.5 MICROgram(s) Inhaler 2 Puff(s) Inhalation two times a day  cefepime   IVPB      cefepime   IVPB 2000 milliGRAM(s) IV Intermittent every 8 hours  docusate sodium Liquid 100 milliGRAM(s) Oral two times a day  enoxaparin Injectable 40 milliGRAM(s) SubCutaneous at bedtime  levoFLOXacin IVPB      levoFLOXacin IVPB 750 milliGRAM(s) IV Intermittent every 24 hours  loratadine 10 milliGRAM(s) Oral daily  pantoprazole    Tablet 40 milliGRAM(s) Oral before breakfast  predniSONE   Tablet 60 milliGRAM(s) Oral daily  senna 2 Tablet(s) Oral at bedtime  simvastatin 10 milliGRAM(s) Oral at bedtime    PRN MEDICATIONS  ALBUTerol    0.083% 2.5 milliGRAM(s) Nebulizer every 4 hours PRN  ALPRAZolam 1 milliGRAM(s) Oral three times a day PRN  guaiFENesin    Syrup 200 milliGRAM(s) Oral every 6 hours PRN    VITALS:   T(F): 96.8  HR: 76  BP: 109/55  RR: 20  SpO2: 98%    LABS:                        8.9    13.83 )-----------( 266      ( 15 Oct 2018 08:53 )             28.8     10-15    142  |  100  |  26<H>  ----------------------------<  85  4.2   |  26  |  0.8    Ca    9.5      15 Oct 2018 08:53  Mg     2.0     10-15    TPro  7.6  /  Alb  4.1  /  TBili  <0.2  /  DBili  x   /  AST  15  /  ALT  15  /  AlkPhos  78  10-15          Troponin T, Serum: <0.01 ng/mL (10-15-18 @ 08:53)  Lactate, Blood: 3.3 mmol/L <H> (10-15-18 @ 08:53)      Culture - Urine (collected 14 Oct 2018 01:00)  Source: .Urine Clean Catch (Midstream)  Final Report (15 Oct 2018 21:11):    No growth    Culture - Blood (collected 13 Oct 2018 20:40)  Source: .Blood Blood-Peripheral  Preliminary Report (15 Oct 2018 07:00):    No growth to date.    Culture - Blood (collected 13 Oct 2018 20:40)  Source: .Blood Blood-Peripheral  Preliminary Report (15 Oct 2018 07:00):    No growth to date.      CARDIAC MARKERS ( 15 Oct 2018 08:53 )  x     / <0.01 ng/mL / x     / x     / 6.7 ng/mL        RADIOLOGY:    PHYSICAL EXAM:  GEN: No acute distress, very anxious, hoarse and difficult to understand  PULM: Clear to auscultation bilaterally, no wheezes  CARD: S1/S2 present. RRR.   GI: Soft, non-tender, non-distended. Bowel sounds present  MSK: NC/NC/NE/2+PP  NEURO: AAOX3

## 2018-10-16 NOTE — CONSULT NOTE ADULT - ASSESSMENT
IMPRESSION: Rehab of gait dysfunction      PRECAUTIONS: [  ] Cardiac  [  ] Respiratory  [  ] Seizures [  ] Contact Isolation  [  ] Droplet Isolation  [  ] Other    Weight Bearing Status:     RECOMMENDATION:    Out of Bed to Chair     DVT/Decubiti Prophylaxis    REHAB PLAN:     [ x  ] Bedside P/T 3-5 times a week   [   ]   Bedside O/T  2-3 times a week             [   ] No Rehab Therapy Indicated                   [   ]  Speech Therapy   Conditioning/ROM                                    ADL  Bed Mobility                                               Conditioning/ROM  Transfers                                                     Bed Mobility  Sitting /Standing Balance                         Transfers                                        Gait Training                                               Sitting/Standing Balance  Stair Training [   ]Applicable                    Home equipment Eval                                                                        Splinting  [   ] Only      GOALS:   ADL   [   ]   Independent                    Transfers  [x   ] Independent                          Ambulation  [ x  ] Independent     [  x  ] With device                            [   ]  CG                                                         [   ]  CG                                                                  [   ] CG                            [    ] Min A                                                   [   ] Min A                                                              [   ] Min  A          DISCHARGE PLAN:   [   ]  Good candidate for Intensive Rehabilitation/Hospital based                                             Will tolerate 3hrs Intensive Rehab Daily                                       [  x  ]  Short Term Rehab in Skilled Nursing Facility                                       [    ]  Home with Outpatient or  services                                         [    ]  Possible Candidate for Intensive Hospital based Rehab

## 2018-10-16 NOTE — DIETITIAN INITIAL EVALUATION ADULT. - ORAL INTAKE PTA
good/Pt. on enteral nutrition since May, reports receiving Jevity 1.5 with good tolerance. No other supplementation. Jeivty 1.5- 1500ml total volume with 100ml pre/post flush

## 2018-10-16 NOTE — DIETITIAN INITIAL EVALUATION ADULT. - DIET TYPE
NPO with tube feedings/good tolerance to enteral feeding- 1680kcal, 53g protein, 1134ml free H2O (100% estimated energy needs, 93% estimated protein needs)

## 2018-10-16 NOTE — DIETITIAN INITIAL EVALUATION ADULT. - OTHER INFO
Initial assessment for enteral nutrition. Pt. arrived on unit and screened 10/16. P/w: SOB, cough. Acute hypercapnic rep failure: - COPD ex likely 2/2 viral URI. On BiPAP.

## 2018-10-16 NOTE — DIETITIAN INITIAL EVALUATION ADULT. - PHYSICAL APPEARANCE
BMI 19.7, alert&oriented, hoarse voice, skin: pressure ulcer stage I to buttocks, sacrum/well nourished

## 2018-10-16 NOTE — PROGRESS NOTE ADULT - SUBJECTIVE AND OBJECTIVE BOX
ALETA SUAZO  63y, Male  Allergy: No Known Allergies      LAST 24-Hr EVENTS:  pt seen examined  sob improved  no fevers    VITALS:  T(F): 98.7 (10-16-18 @ 20:21), Max: 98.7 (10-16-18 @ 20:21)  HR: 98 (10-16-18 @ 20:21)  BP: 129/56 (10-16-18 @ 20:21) (109/55 - 129/56)  RR: 22 (10-16-18 @ 20:21)  SpO2: 98% (10-16-18 @ 00:15)    PHYSICAL EXAM:    GENERAL: NAD  NECK: Supple, No JVD  CHEST/LUNG: distant breath sounds  HEART: Regular rate and rhythm; No murmurs.  ABDOMEN: Soft, Nontender, Nondistended; Bowel sounds present  EXTREMITIES:  No clubbing, edema absent        TESTS & MEASUREMENTS:    IN: 1600 mL / OUT: 650 mL / NET: 950 mL    IN: 440 mL / OUT: 0 mL / NET: 440 mL                            8.9    13.83 )-----------( 266      ( 15 Oct 2018 08:53 )             28.8       10-15    142  |  100  |  26<H>  ----------------------------<  85  4.2   |  26  |  0.8    Ca    9.5      15 Oct 2018 08:53  Mg     2.0     10-15    TPro  7.6  /  Alb  4.1  /  TBili  <0.2  /  DBili  x   /  AST  15  /  ALT  15  /  AlkPhos  78  10-15    LIVER FUNCTIONS - ( 15 Oct 2018 08:53 )  Alb: 4.1 g/dL / Pro: 7.6 g/dL / ALK PHOS: 78 U/L / ALT: 15 U/L / AST: 15 U/L / GGT: x           CARDIAC MARKERS ( 15 Oct 2018 08:53 )  x     / <0.01 ng/mL / x     / x     / 6.7 ng/mL        Culture - Urine (collected 10-14-18 @ 01:00)  Source: .Urine Clean Catch (Midstream)  Final Report (10-15-18 @ 21:11):    No growth    Culture - Blood (collected 10-13-18 @ 20:40)  Source: .Blood Blood-Peripheral  Preliminary Report (10-15-18 @ 07:00):    No growth to date.    Culture - Blood (collected 10-13-18 @ 20:40)  Source: .Blood Blood-Peripheral  Preliminary Report (10-15-18 @ 07:00):    No growth to date.            MEDICATIONS:  MEDICATIONS  (STANDING):  acetaminophen   Tablet .. 650 milliGRAM(s) Oral every 6 hours  ALPRAZolam 1 milliGRAM(s) Oral every 8 hours  amLODIPine   Tablet 10 milliGRAM(s) Oral daily  benzocaine 15 mG/menthol 3.6 mG Lozenge 1 Lozenge Oral three times a day  buDESOnide 160 MICROgram(s)/formoterol 4.5 MICROgram(s) Inhaler 2 Puff(s) Inhalation two times a day  docusate sodium Liquid 100 milliGRAM(s) Oral two times a day  enoxaparin Injectable 40 milliGRAM(s) SubCutaneous at bedtime  levoFLOXacin  Tablet 750 milliGRAM(s) Oral every 24 hours  loratadine 10 milliGRAM(s) Oral daily  pantoprazole    Tablet 40 milliGRAM(s) Oral before breakfast  predniSONE   Tablet 60 milliGRAM(s) Oral daily  senna 2 Tablet(s) Oral at bedtime  simvastatin 10 milliGRAM(s) Oral at bedtime    MEDICATIONS  (PRN):  ALBUTerol    0.083% 2.5 milliGRAM(s) Nebulizer every 4 hours PRN Shortness of Breath and/or Wheezing  guaiFENesin    Syrup 200 milliGRAM(s) Oral every 6 hours PRN Cough

## 2018-10-16 NOTE — PROGRESS NOTE ADULT - ASSESSMENT
Patient is a 64 yo M with COPD, chronic respiratory failure on BIPAP, HTN, DLD, vocal cord paralysis and dysphagia s/p PEG 5/ 18 in the setting of resistant esophageal candidiasis and inability to swallow. Brought in from [facility] ,for cough productive ,sob and difficulty breathing of 1 day. Received IV antibiotics with steroids and BiPAP with improvement in respiratory status. Patient also suffers from anxiety which contributes to breathing status.     #Acute hypercapnic rep failure    On O2 by NC ? O2 Sat;  Home O2 use?  Auscultation: Wheezes ?  - COPD ex likely 2/2 viral URI   - Pulm evaluation appreciated; On Symbicort for now and PO Prednisone from tomorrow AM - already changed and pt is stable  - BIPAP at night and PRN   - Albuterol PRN   - Lactate trending up at 3.3  - CXR:  No radiographic evidence of acute cardiopulmonary disease. Left upper lobe nodule, grossly unchanged. Emphysema.  Avascular necrosis in the humeral heads.  follow up with PULM as out pt  check cultures that were taken on admission      #HTN/DLD;  - Cont Amlodipine 10mg daily  - Cont with Simvastatin 10mg QHS     #) Anxiety  - continue with  home meds    #DVT ppx; Lovenox subQ   #Diet: PEG feedings   #Activity: OOBTC   #Code: Full Code   #Dispo: Likely d/c in 24-48 hours to NH

## 2018-10-17 RX ORDER — ALBUTEROL 90 UG/1
2.5 AEROSOL, METERED ORAL EVERY 4 HOURS
Qty: 0 | Refills: 0 | Status: DISCONTINUED | OUTPATIENT
Start: 2018-10-17 | End: 2018-11-13

## 2018-10-17 RX ORDER — ALPRAZOLAM 0.25 MG
1 TABLET ORAL EVERY 6 HOURS
Qty: 0 | Refills: 0 | Status: DISCONTINUED | OUTPATIENT
Start: 2018-10-17 | End: 2018-10-24

## 2018-10-17 RX ADMIN — ALBUTEROL 2.5 MILLIGRAM(S): 90 AEROSOL, METERED ORAL at 19:35

## 2018-10-17 RX ADMIN — Medication 650 MILLIGRAM(S): at 06:26

## 2018-10-17 RX ADMIN — SIMVASTATIN 10 MILLIGRAM(S): 20 TABLET, FILM COATED ORAL at 21:51

## 2018-10-17 RX ADMIN — SENNA PLUS 2 TABLET(S): 8.6 TABLET ORAL at 21:51

## 2018-10-17 RX ADMIN — Medication 1 MILLIGRAM(S): at 06:26

## 2018-10-17 RX ADMIN — Medication 1 MILLIGRAM(S): at 23:07

## 2018-10-17 RX ADMIN — ALBUTEROL 2.5 MILLIGRAM(S): 90 AEROSOL, METERED ORAL at 13:21

## 2018-10-17 RX ADMIN — Medication 100 MILLIGRAM(S): at 06:27

## 2018-10-17 RX ADMIN — Medication 650 MILLIGRAM(S): at 23:06

## 2018-10-17 RX ADMIN — Medication 1 MILLIGRAM(S): at 17:12

## 2018-10-17 RX ADMIN — ALBUTEROL 2.5 MILLIGRAM(S): 90 AEROSOL, METERED ORAL at 01:18

## 2018-10-17 RX ADMIN — PANTOPRAZOLE SODIUM 40 MILLIGRAM(S): 20 TABLET, DELAYED RELEASE ORAL at 06:27

## 2018-10-17 RX ADMIN — ENOXAPARIN SODIUM 40 MILLIGRAM(S): 100 INJECTION SUBCUTANEOUS at 21:53

## 2018-10-17 RX ADMIN — ALBUTEROL 2.5 MILLIGRAM(S): 90 AEROSOL, METERED ORAL at 08:15

## 2018-10-17 RX ADMIN — Medication 60 MILLIGRAM(S): at 06:27

## 2018-10-17 RX ADMIN — Medication 650 MILLIGRAM(S): at 12:49

## 2018-10-17 RX ADMIN — BENZOCAINE AND MENTHOL 1 LOZENGE: 5; 1 LIQUID ORAL at 21:52

## 2018-10-17 RX ADMIN — Medication 100 MILLIGRAM(S): at 17:13

## 2018-10-17 RX ADMIN — Medication 650 MILLIGRAM(S): at 17:13

## 2018-10-17 RX ADMIN — AMLODIPINE BESYLATE 10 MILLIGRAM(S): 2.5 TABLET ORAL at 06:26

## 2018-10-17 RX ADMIN — Medication 1 MILLIGRAM(S): at 14:08

## 2018-10-17 RX ADMIN — ALBUTEROL 2.5 MILLIGRAM(S): 90 AEROSOL, METERED ORAL at 22:49

## 2018-10-17 RX ADMIN — LORATADINE 10 MILLIGRAM(S): 10 TABLET ORAL at 12:46

## 2018-10-17 NOTE — PROGRESS NOTE ADULT - ASSESSMENT
acute/chronic hypoxic/hypercapnic respiratory failure, improved  copd exacerbation  dysphagia  lung nodules  anxiety disorder      continue supplemental oxygen per pulse oximetry  bipap hs and prn  prednisone taper  bronchodilators  antibiotic  gi/dvt prophylaxis  enteric feeds/aspiration precautions  anxiolytic therapy  agree with discharge planning to snf

## 2018-10-17 NOTE — PROGRESS NOTE ADULT - SUBJECTIVE AND OBJECTIVE BOX
ALETA SUAZO  63y, Male  Allergy: No Known Allergies      LAST 24-Hr EVENTS:  feels better since admission  VITALS:  T(F): 96.6 (10-17-18 @ 05:32), Max: 98.7 (10-16-18 @ 20:21)  HR: 61 (10-17-18 @ 05:32)  BP: 107/53 (10-17-18 @ 05:32) (107/53 - 129/56)  RR: 20 (10-17-18 @ 05:32)  SpO2: --    PHYSICAL EXAM:    GENERAL: NAD, well-developed  HEAD:  Atraumatic, Normocephalic  NECK: Supple, No JVD  CHEST/LUNG: Clear to auscultation bilaterally; No wheeze  HEART: Regular rate and rhythm; No murmurs, rubs, or gallops  ABDOMEN: Soft, Nontender, Nondistended; Bowel sounds present  EXTREMITIES:  2+ Peripheral Pulses, No clubbing, cyanosis, or edema        TESTS & MEASUREMENTS:    IN: 440 mL / OUT: 0 mL / NET: 440 mL                        Culture - Urine (collected 10-14-18 @ 01:00)  Source: .Urine Clean Catch (Midstream)  Final Report (10-15-18 @ 21:11):    No growth    Culture - Blood (collected 10-13-18 @ 20:40)  Source: .Blood Blood-Peripheral  Preliminary Report (10-15-18 @ 07:00):    No growth to date.    Culture - Blood (collected 10-13-18 @ 20:40)  Source: .Blood Blood-Peripheral  Preliminary Report (10-15-18 @ 07:00):    No growth to date.          ABG & VENT SETTINGS: (when applicable)    n/a    RADIOLOGY & ADDITIONAL TESTS:    MEDICATIONS:  MEDICATIONS  (STANDING):  acetaminophen   Tablet .. 650 milliGRAM(s) Oral every 6 hours  ALPRAZolam 1 milliGRAM(s) Oral every 8 hours  amLODIPine   Tablet 10 milliGRAM(s) Oral daily  benzocaine 15 mG/menthol 3.6 mG Lozenge 1 Lozenge Oral three times a day  buDESOnide 160 MICROgram(s)/formoterol 4.5 MICROgram(s) Inhaler 2 Puff(s) Inhalation two times a day  docusate sodium Liquid 100 milliGRAM(s) Oral two times a day  enoxaparin Injectable 40 milliGRAM(s) SubCutaneous at bedtime  levoFLOXacin  Tablet 750 milliGRAM(s) Oral every 24 hours  loratadine 10 milliGRAM(s) Oral daily  pantoprazole    Tablet 40 milliGRAM(s) Oral before breakfast  predniSONE   Tablet 60 milliGRAM(s) Oral daily  senna 2 Tablet(s) Oral at bedtime  simvastatin 10 milliGRAM(s) Oral at bedtime    MEDICATIONS  (PRN):  ALBUTerol    0.083% 2.5 milliGRAM(s) Nebulizer every 4 hours PRN Shortness of Breath and/or Wheezing  guaiFENesin    Syrup 200 milliGRAM(s) Oral every 6 hours PRN Cough

## 2018-10-17 NOTE — PROGRESS NOTE ADULT - ASSESSMENT
rupal is a 62 yo M with COPD, chronic respiratory failure on BIPAP, HTN, DLD, vocal cord paralysis and dysphagia s/p PEG 5/ 18 in the setting of resistant esophageal candidiasis and inability to swallow. Brought in from [facility] ,for cough productive ,sob and difficulty breathing of 1 day. Received IV antibiotics with steroids and BiPAP with improvement in respiratory status. Patient also suffers from anxiety which contributes to breathing status.     Acute hypercapnic and hypoxic resp failure 2/2 COPD Exacerbation: improving    On 3L O2 by NC, decrease to 2L for a goal of 88-92% O2 sat  O2 Sat: 100% On  Home O2 (2L)  Auscultation: No wheezes   COPD ex likely 2/2 viral URI   PO Prednisone 60 mg QD for 3 days, day 1  BIPAP at night and PRN   Albuterol PRN   Lactate trending up at 3.3, repeat pending  CXR:  No radiographic evidence of acute cardiopulmonary disease. Left upper lobe nodule, grossly unchanged. Emphysema.  Physiatry Consult appreciated: Bedside PT 3-5x/wk, STR in SNF    HTN/DLD: Stable    Cont Amlodipine 10mg daily  Cont with Simvastatin 10mg QHS     Anxiety: Stable    Continue with  home meds    DVT ppx;     Lovenox subQ   Diet:     PEG feedings     Activity:    OOBTC

## 2018-10-17 NOTE — PROGRESS NOTE ADULT - SUBJECTIVE AND OBJECTIVE BOX
CHIEF COMPLAINT:    Patient is a 63y old Male who presents with a chief complaint of sob, cough and temp 103 (16 Oct 2018 21:25)    Currently admitted to medicine with the primary diagnosis of Sepsis     Today is hospital day 4d. This morning he is resting comfortably in bed and reports no new issues or overnight events.     PAST MEDICAL & SURGICAL HISTORY  PEG (percutaneous endoscopic gastrostomy) status  Other hydrocele  Anxiety disorder, unspecified type  Hypertension, unspecified type  High cholesterol  Chronic obstructive pulmonary disease, unspecified COPD type  PEG (percutaneous endoscopic gastrostomy) status    SOCIAL HISTORY:  Negative for smoking/alcohol/drug use.     ALLERGIES:  No Known Allergies    MEDICATIONS:  STANDING MEDICATIONS  acetaminophen   Tablet .. 650 milliGRAM(s) Oral every 6 hours  ALPRAZolam 1 milliGRAM(s) Oral every 8 hours  amLODIPine   Tablet 10 milliGRAM(s) Oral daily  benzocaine 15 mG/menthol 3.6 mG Lozenge 1 Lozenge Oral three times a day  buDESOnide 160 MICROgram(s)/formoterol 4.5 MICROgram(s) Inhaler 2 Puff(s) Inhalation two times a day  docusate sodium Liquid 100 milliGRAM(s) Oral two times a day  enoxaparin Injectable 40 milliGRAM(s) SubCutaneous at bedtime  levoFLOXacin  Tablet 750 milliGRAM(s) Oral every 24 hours  loratadine 10 milliGRAM(s) Oral daily  pantoprazole    Tablet 40 milliGRAM(s) Oral before breakfast  predniSONE   Tablet 60 milliGRAM(s) Oral daily  senna 2 Tablet(s) Oral at bedtime  simvastatin 10 milliGRAM(s) Oral at bedtime    PRN MEDICATIONS  ALBUTerol    0.083% 2.5 milliGRAM(s) Nebulizer every 4 hours PRN  guaiFENesin    Syrup 200 milliGRAM(s) Oral every 6 hours PRN    VITALS:   T(F): 96.6  HR: 61  BP: 107/53  RR: 20  SpO2: --    LABS:                        8.9    13.83 )-----------( 266      ( 15 Oct 2018 08:53 )             28.8     10-15    142  |  100  |  26<H>  ----------------------------<  85  4.2   |  26  |  0.8    Ca    9.5      15 Oct 2018 08:53  Mg     2.0     10-15    TPro  7.6  /  Alb  4.1  /  TBili  <0.2  /  DBili  x   /  AST  15  /  ALT  15  /  AlkPhos  78  10-15              CARDIAC MARKERS ( 15 Oct 2018 08:53 )  x     / <0.01 ng/mL / x     / x     / 6.7 ng/mL        RADIOLOGY:    PHYSICAL EXAM:  GEN: No acute distress  PULM: Clear to auscultation bilaterally   CARD: S1/S2 present. RRR.   GI: Soft, non-tender, non-distended. Bowel sounds present  MSK: NC/NC/NE/2+PP/LOONEY  NEURO: AAOX3 CHIEF COMPLAINT:    Patient is a 63y old Male who presents with a chief complaint of sob, cough and temp 103 (16 Oct 2018 21:25)    Currently admitted to medicine with the primary diagnosis of Sepsis     Today is hospital day 4d. This morning he is resting comfortably in bed and reports no new issues or overnight events. C/o cough, SOB. Pt is very anxious, denied CP, N/V/D/C, palpitations.    PAST MEDICAL & SURGICAL HISTORY  PEG (percutaneous endoscopic gastrostomy) status  Other hydrocele  Anxiety disorder, unspecified type  Hypertension, unspecified type  High cholesterol  Chronic obstructive pulmonary disease, unspecified COPD type  PEG (percutaneous endoscopic gastrostomy) status    SOCIAL HISTORY:  Negative for smoking/alcohol/drug use.     ALLERGIES:  No Known Allergies    MEDICATIONS:  STANDING MEDICATIONS  acetaminophen   Tablet .. 650 milliGRAM(s) Oral every 6 hours  ALPRAZolam 1 milliGRAM(s) Oral every 8 hours  amLODIPine   Tablet 10 milliGRAM(s) Oral daily  benzocaine 15 mG/menthol 3.6 mG Lozenge 1 Lozenge Oral three times a day  buDESOnide 160 MICROgram(s)/formoterol 4.5 MICROgram(s) Inhaler 2 Puff(s) Inhalation two times a day  docusate sodium Liquid 100 milliGRAM(s) Oral two times a day  enoxaparin Injectable 40 milliGRAM(s) SubCutaneous at bedtime  levoFLOXacin  Tablet 750 milliGRAM(s) Oral every 24 hours  loratadine 10 milliGRAM(s) Oral daily  pantoprazole    Tablet 40 milliGRAM(s) Oral before breakfast  predniSONE   Tablet 60 milliGRAM(s) Oral daily  senna 2 Tablet(s) Oral at bedtime  simvastatin 10 milliGRAM(s) Oral at bedtime    PRN MEDICATIONS  ALBUTerol    0.083% 2.5 milliGRAM(s) Nebulizer every 4 hours PRN  guaiFENesin    Syrup 200 milliGRAM(s) Oral every 6 hours PRN    VITALS:   T(F): 96.6  HR: 61  BP: 107/53  RR: 20  SpO2: --    LABS:                        8.9    13.83 )-----------( 266      ( 15 Oct 2018 08:53 )             28.8     10-15    142  |  100  |  26<H>  ----------------------------<  85  4.2   |  26  |  0.8    Ca    9.5      15 Oct 2018 08:53  Mg     2.0     10-15    TPro  7.6  /  Alb  4.1  /  TBili  <0.2  /  DBili  x   /  AST  15  /  ALT  15  /  AlkPhos  78  10-15              CARDIAC MARKERS ( 15 Oct 2018 08:53 )  x     / <0.01 ng/mL / x     / x     / 6.7 ng/mL        RADIOLOGY:    PHYSICAL EXAM:  GEN: No acute distress, very anxious, hoarse and difficult to understand  PULM: Clear to auscultation bilaterally, no wheezes  CARD: S1/S2 present. RRR.   GI: Soft, non-tender, non-distended. Bowel sounds present  MSK: NC/NC/NE/2+PP  NEURO: AAOX3

## 2018-10-17 NOTE — PROGRESS NOTE ADULT - SUBJECTIVE AND OBJECTIVE BOX
Patient was seen and examined. Spoke with RN. Chart reviewed.  c/o sob, cough,     No events overnight.  Vital Signs Last 24 Hrs  T(F): 96.1 (17 Oct 2018 12:45), Max: 98.7 (16 Oct 2018 20:21)  HR: 97 (17 Oct 2018 12:45) (61 - 98)  BP: 106/62 (17 Oct 2018 12:45) (106/62 - 129/56)  SpO2: --  MEDICATIONS  (STANDING):  acetaminophen   Tablet .. 650 milliGRAM(s) Oral every 6 hours  ALPRAZolam 1 milliGRAM(s) Oral every 6 hours  amLODIPine   Tablet 10 milliGRAM(s) Oral daily  benzocaine 15 mG/menthol 3.6 mG Lozenge 1 Lozenge Oral three times a day  buDESOnide 160 MICROgram(s)/formoterol 4.5 MICROgram(s) Inhaler 2 Puff(s) Inhalation two times a day  docusate sodium Liquid 100 milliGRAM(s) Oral two times a day  enoxaparin Injectable 40 milliGRAM(s) SubCutaneous at bedtime  levoFLOXacin  Tablet 750 milliGRAM(s) Oral every 24 hours  loratadine 10 milliGRAM(s) Oral daily  pantoprazole    Tablet 40 milliGRAM(s) Oral before breakfast  predniSONE   Tablet 60 milliGRAM(s) Oral daily  senna 2 Tablet(s) Oral at bedtime  simvastatin 10 milliGRAM(s) Oral at bedtime    MEDICATIONS  (PRN):  ALBUTerol    0.083% 2.5 milliGRAM(s) Nebulizer every 4 hours PRN Shortness of Breath and/or Wheezing  guaiFENesin    Syrup 200 milliGRAM(s) Oral every 6 hours PRN Cough    Labs:                  Radiology:    General: comfortable, NAD  Neurology: A&Ox3, nonfocal  Head:  Normocephalic, atraumatic  ENT:  Mucosa moist, no ulcerations  Neck:  Supple, no JVD,   Resp: CTA B/L  CV: RRR, S1S2,   GI: Soft, NT, bowel sounds peg +  MS: No edema, + peripheral pulses, FROM all 4 extremity

## 2018-10-17 NOTE — PROGRESS NOTE ADULT - ASSESSMENT
Patient is a 62 yo M with COPD, chronic respiratory failure on BIPAP, HTN, DLD, vocal cord paralysis and dysphagia s/p PEG 5/ 18 in the setting of resistant esophageal candidiasis and inability to swallow. Brought in from [facility] ,for cough productive ,sob and difficulty breathing of 1 day. Received IV antibiotics with steroids and BiPAP with improvement in respiratory status. Patient also suffers from anxiety which contributes to breathing status.     Acute hypercapnic and hypoxic resp failure 2/2 COPD Exacerbation: improving    On 3L O2 by NC, decrease to 2L for a goal of 88-92% O2 sat  O2 Sat: 100% On  Home O2 (2L)  Auscultation: No wheezes   COPD ex likely 2/2 viral URI   PO Prednisone 60 mg QD for 3 days, day 1  BIPAP at night and PRN   Albuterol PRN   Lactate trending up at 3.3, repeat pending  CXR:  No radiographic evidence of acute cardiopulmonary disease. Left upper lobe nodule, grossly unchanged. Emphysema.  Physiatry Consult appreciated: Bedside PT 3-5x/wk, STR in SNF    HTN/DLD: Stable    Cont Amlodipine 10mg daily  Cont with Simvastatin 10mg QHS     Anxiety: Stable    Continue with  home meds    DVT ppx;     Lovenox subQ     Diet:     PEG feedings     Activity:    OOBTC     Code:     Full Code     Dispo:     Likely d/c in 24-48 hours to NH Patient is a 62 yo M with COPD, chronic respiratory failure on BIPAP, HTN, CRF, DLD, vocal cord paralysis and dysphagia s/p PEG 5/18 in the setting of resistant esophageal candidiasis and inability to swallow. Brought in from [facility] ,for cough productive ,sob and difficulty breathing of 1 day. Received IV antibiotics with steroids and BiPAP with improvement in respiratory status. Patient also suffers from anxiety which contributes to breathing status.     Acute hypercapnic and hypoxic resp failure 2/2 COPD Exacerbation likely 2/2 URI: improving    On 5L O2 by NC, decrease to 2.5L for a goal of 88-92% O2 sat  O2 Sat: 98%   On  Home O2 (2L)  Auscultation: No wheezes   PO Prednisone 60 mg QD for 3 days, day 2  BIPAP at night and PRN   Albuterol PRN   CXR:  No radiographic evidence of acute cardiopulmonary disease. Left upper lobe nodule, grossly unchanged. Emphysema.  Physiatry Consult appreciated: Bedside PT 3-5x/wk, STR in SNF  PT Consult: Pending  Pulm f/u appreciated: continue supplemental oxygen per pulse oximetry, bipap hs and prn, prednisone taper. ok w/d/c plan  Nephro consult: increase xanax, levaquin course, prednisone taper, f/u pulm, cont norvasc      HTN/DLD: Stable    Cont Amlodipine 10mg daily  Cont with Simvastatin 10mg QHS     Anxiety: Stable    Continue with  home meds    DVT ppx;     Lovenox subQ     Diet:     PEG feedings     Activity:    OOBTC     Code:     Full Code     Dispo:     Likely d/c in 24-48 hours to NH

## 2018-10-17 NOTE — CONSULT NOTE ADULT - ASSESSMENT
CRF / ARF  anxiety  copd  dysphagia / s/p PEG  vocal cord injury with hoarseness  HTN    plan:    increase xanax  levaquin course  prednisone taper  f/u pulm  cont norvasc  cpap / o2 / nebs  pt / rehab  PEG feeds  dc planning - snf vs home with svc

## 2018-10-18 LAB
ANION GAP SERPL CALC-SCNC: 14 MMOL/L — SIGNIFICANT CHANGE UP (ref 7–14)
BASOPHILS # BLD AUTO: 0 K/UL — SIGNIFICANT CHANGE UP (ref 0–0.2)
BASOPHILS NFR BLD AUTO: 0 % — SIGNIFICANT CHANGE UP (ref 0–1)
BUN SERPL-MCNC: 25 MG/DL — HIGH (ref 10–20)
CALCIUM SERPL-MCNC: 9.5 MG/DL — SIGNIFICANT CHANGE UP (ref 8.5–10.1)
CHLORIDE SERPL-SCNC: 106 MMOL/L — SIGNIFICANT CHANGE UP (ref 98–110)
CO2 SERPL-SCNC: 27 MMOL/L — SIGNIFICANT CHANGE UP (ref 17–32)
CREAT SERPL-MCNC: 0.8 MG/DL — SIGNIFICANT CHANGE UP (ref 0.7–1.5)
EOSINOPHIL # BLD AUTO: 0 K/UL — SIGNIFICANT CHANGE UP (ref 0–0.7)
EOSINOPHIL NFR BLD AUTO: 0 % — SIGNIFICANT CHANGE UP (ref 0–8)
GLUCOSE SERPL-MCNC: 152 MG/DL — HIGH (ref 70–99)
HCT VFR BLD CALC: 29.7 % — LOW (ref 42–52)
HGB BLD-MCNC: 9.3 G/DL — LOW (ref 14–18)
IMM GRANULOCYTES NFR BLD AUTO: 0.4 % — HIGH (ref 0.1–0.3)
LYMPHOCYTES # BLD AUTO: 0.53 K/UL — LOW (ref 1.2–3.4)
LYMPHOCYTES # BLD AUTO: 7.5 % — LOW (ref 20.5–51.1)
MCHC RBC-ENTMCNC: 28.1 PG — SIGNIFICANT CHANGE UP (ref 27–31)
MCHC RBC-ENTMCNC: 31.3 G/DL — LOW (ref 32–37)
MCV RBC AUTO: 89.7 FL — SIGNIFICANT CHANGE UP (ref 80–94)
MONOCYTES # BLD AUTO: 0.12 K/UL — SIGNIFICANT CHANGE UP (ref 0.1–0.6)
MONOCYTES NFR BLD AUTO: 1.7 % — SIGNIFICANT CHANGE UP (ref 1.7–9.3)
NEUTROPHILS # BLD AUTO: 6.4 K/UL — SIGNIFICANT CHANGE UP (ref 1.4–6.5)
NEUTROPHILS NFR BLD AUTO: 90.4 % — HIGH (ref 42.2–75.2)
NRBC # BLD: 0 /100 WBCS — SIGNIFICANT CHANGE UP (ref 0–0)
PLATELET # BLD AUTO: 304 K/UL — SIGNIFICANT CHANGE UP (ref 130–400)
POTASSIUM SERPL-MCNC: 4.3 MMOL/L — SIGNIFICANT CHANGE UP (ref 3.5–5)
POTASSIUM SERPL-SCNC: 4.3 MMOL/L — SIGNIFICANT CHANGE UP (ref 3.5–5)
RBC # BLD: 3.31 M/UL — LOW (ref 4.7–6.1)
RBC # FLD: 14.1 % — SIGNIFICANT CHANGE UP (ref 11.5–14.5)
SODIUM SERPL-SCNC: 147 MMOL/L — HIGH (ref 135–146)
WBC # BLD: 7.08 K/UL — SIGNIFICANT CHANGE UP (ref 4.8–10.8)
WBC # FLD AUTO: 7.08 K/UL — SIGNIFICANT CHANGE UP (ref 4.8–10.8)

## 2018-10-18 RX ORDER — PANTOPRAZOLE SODIUM 20 MG/1
40 TABLET, DELAYED RELEASE ORAL DAILY
Qty: 0 | Refills: 0 | Status: DISCONTINUED | OUTPATIENT
Start: 2018-10-18 | End: 2018-11-13

## 2018-10-18 RX ADMIN — PANTOPRAZOLE SODIUM 40 MILLIGRAM(S): 20 TABLET, DELAYED RELEASE ORAL at 11:16

## 2018-10-18 RX ADMIN — BENZOCAINE AND MENTHOL 1 LOZENGE: 5; 1 LIQUID ORAL at 13:00

## 2018-10-18 RX ADMIN — BENZOCAINE AND MENTHOL 1 LOZENGE: 5; 1 LIQUID ORAL at 21:10

## 2018-10-18 RX ADMIN — Medication 650 MILLIGRAM(S): at 05:13

## 2018-10-18 RX ADMIN — ALBUTEROL 2.5 MILLIGRAM(S): 90 AEROSOL, METERED ORAL at 15:38

## 2018-10-18 RX ADMIN — Medication 60 MILLIGRAM(S): at 05:12

## 2018-10-18 RX ADMIN — Medication 1 MILLIGRAM(S): at 17:42

## 2018-10-18 RX ADMIN — Medication 1 MILLIGRAM(S): at 05:12

## 2018-10-18 RX ADMIN — BENZOCAINE AND MENTHOL 1 LOZENGE: 5; 1 LIQUID ORAL at 06:57

## 2018-10-18 RX ADMIN — ALBUTEROL 2.5 MILLIGRAM(S): 90 AEROSOL, METERED ORAL at 00:08

## 2018-10-18 RX ADMIN — Medication 100 MILLIGRAM(S): at 05:14

## 2018-10-18 RX ADMIN — LORATADINE 10 MILLIGRAM(S): 10 TABLET ORAL at 11:16

## 2018-10-18 RX ADMIN — SIMVASTATIN 10 MILLIGRAM(S): 20 TABLET, FILM COATED ORAL at 21:10

## 2018-10-18 RX ADMIN — Medication 100 MILLIGRAM(S): at 17:39

## 2018-10-18 RX ADMIN — ALBUTEROL 2.5 MILLIGRAM(S): 90 AEROSOL, METERED ORAL at 19:53

## 2018-10-18 RX ADMIN — Medication 1 MILLIGRAM(S): at 11:16

## 2018-10-18 RX ADMIN — Medication 650 MILLIGRAM(S): at 11:16

## 2018-10-18 RX ADMIN — ALBUTEROL 2.5 MILLIGRAM(S): 90 AEROSOL, METERED ORAL at 04:48

## 2018-10-18 RX ADMIN — AMLODIPINE BESYLATE 10 MILLIGRAM(S): 2.5 TABLET ORAL at 05:12

## 2018-10-18 RX ADMIN — ENOXAPARIN SODIUM 40 MILLIGRAM(S): 100 INJECTION SUBCUTANEOUS at 21:10

## 2018-10-18 RX ADMIN — SENNA PLUS 2 TABLET(S): 8.6 TABLET ORAL at 21:10

## 2018-10-18 RX ADMIN — Medication 650 MILLIGRAM(S): at 17:42

## 2018-10-18 NOTE — PROGRESS NOTE ADULT - SUBJECTIVE AND OBJECTIVE BOX
NEPHROLOGY FOLLOW UP NOTE    d/w resident  no overnight events  similiar complaints  no fever / cp / usual sob and cough / no fever    PAST MEDICAL & SURGICAL HISTORY:  PEG (percutaneous endoscopic gastrostomy) status  Other hydrocele  Anxiety disorder, unspecified type  Hypertension, unspecified type  High cholesterol  Chronic obstructive pulmonary disease, unspecified COPD type  PEG (percutaneous endoscopic gastrostomy) status    Allergies:  No Known Allergies    Home Medications Reviewed    SOCIAL HISTORY:  Denies ETOH,Smoking,   FAMILY HISTORY:  No pertinent family history in first degree relatives        REVIEW OF SYSTEMS:  All other review of systems is negative unless indicated above.    advance care planning and advance care directives reviewed and d/w pt in detail    PHYSICAL EXAM:  NAD  frail  poor dentition  O2 via NC  tremors  decreased bs bl  distant hs  soft  peg  no cvat  no edema    Hospital Medications:   MEDICATIONS  (STANDING):  acetaminophen   Tablet .. 650 milliGRAM(s) Oral every 6 hours  ALBUTerol    0.083% 2.5 milliGRAM(s) Nebulizer every 4 hours  ALPRAZolam 1 milliGRAM(s) Oral every 6 hours  amLODIPine   Tablet 10 milliGRAM(s) Oral daily  benzocaine 15 mG/menthol 3.6 mG Lozenge 1 Lozenge Oral three times a day  buDESOnide 160 MICROgram(s)/formoterol 4.5 MICROgram(s) Inhaler 2 Puff(s) Inhalation two times a day  docusate sodium Liquid 100 milliGRAM(s) Oral two times a day  enoxaparin Injectable 40 milliGRAM(s) SubCutaneous at bedtime  levoFLOXacin  Tablet 750 milliGRAM(s) Oral every 24 hours  loratadine 10 milliGRAM(s) Oral daily  pantoprazole   Suspension 40 milliGRAM(s) Oral daily  predniSONE   Tablet 40 milliGRAM(s) Oral daily  senna 2 Tablet(s) Oral at bedtime  simvastatin 10 milliGRAM(s) Oral at bedtime        VITALS:  T(F): 98.4 (10-18-18 @ 12:43), Max: 98.4 (10-18-18 @ 12:43)  HR: 83 (10-18-18 @ 12:43)  BP: 120/62 (10-18-18 @ 12:43)  RR: 20 (10-18-18 @ 12:43)  SpO2: --  Wt(kg): --    10-17 @ 07:01  -  10-18 @ 07:00  --------------------------------------------------------  IN: 1000 mL / OUT: 600 mL / NET: 400 mL          LABS:  10-18    147<H>  |  106  |  25<H>  ----------------------------<  152<H>  4.3   |  27  |  0.8    Ca    9.5      18 Oct 2018 10:42                            9.3    7.08  )-----------( 304      ( 18 Oct 2018 10:42 )             29.7       Urine Studies:  Urinalysis Basic - ( 14 Oct 2018 01:00 )    Color: Yellow / Appearance: Cloudy / S.015 / pH:   Gluc:  / Ketone: Negative  / Bili: Negative / Urobili: 0.2 mg/dL   Blood:  / Protein: Negative mg/dL / Nitrite: Negative   Leuk Esterase: Negative / RBC:  / WBC    Sq Epi:  / Non Sq Epi: Occasional /HPF / Bacteria:           RADIOLOGY & ADDITIONAL STUDIES:

## 2018-10-18 NOTE — PROGRESS NOTE ADULT - SUBJECTIVE AND OBJECTIVE BOX
ALETA SUAZO  63y, Male  Allergy: No Known Allergies      LAST 24-Hr EVENTS:  pt seen examined  sob at baseline    VITALS:  T(F): 98.7 (10-18-18 @ 19:50), Max: 98.7 (10-18-18 @ 19:50)  HR: 113 (10-18-18 @ 19:50)  BP: 141/62 (10-18-18 @ 19:50) (120/62 - 143/-)  RR: 20 (10-18-18 @ 19:50)  SpO2: --    PHYSICAL EXAM:    GENERAL: NAD  NECK: Supple, No JVD  CHEST/LUNG:  distant breath sounds  HEART: Regular rate and rhythm  ABDOMEN: Soft, Nontender, Nondistended  EXTREMITIES:  No clubbing, edema absent        TESTS & MEASUREMENTS:    IN: 1000 mL / OUT: 600 mL / NET: 400 mL                            9.3    7.08  )-----------( 304      ( 18 Oct 2018 10:42 )             29.7       10-18    147<H>  |  106  |  25<H>  ----------------------------<  152<H>  4.3   |  27  |  0.8    Ca    9.5      18 Oct 2018 10:42              Culture - Urine (collected 10-14-18 @ 01:00)  Source: .Urine Clean Catch (Midstream)  Final Report (10-15-18 @ 21:11):    No growth    Culture - Blood (collected 10-13-18 @ 20:40)  Source: .Blood Blood-Peripheral  Preliminary Report (10-15-18 @ 07:00):    No growth to date.    Culture - Blood (collected 10-13-18 @ 20:40)  Source: .Blood Blood-Peripheral  Preliminary Report (10-15-18 @ 07:00):    No growth to date.        MEDICATIONS:  MEDICATIONS  (STANDING):  acetaminophen   Tablet .. 650 milliGRAM(s) Oral every 6 hours  ALBUTerol    0.083% 2.5 milliGRAM(s) Nebulizer every 4 hours  ALPRAZolam 1 milliGRAM(s) Oral every 6 hours  amLODIPine   Tablet 10 milliGRAM(s) Oral daily  benzocaine 15 mG/menthol 3.6 mG Lozenge 1 Lozenge Oral three times a day  buDESOnide 160 MICROgram(s)/formoterol 4.5 MICROgram(s) Inhaler 2 Puff(s) Inhalation two times a day  docusate sodium Liquid 100 milliGRAM(s) Oral two times a day  enoxaparin Injectable 40 milliGRAM(s) SubCutaneous at bedtime  levoFLOXacin  Tablet 750 milliGRAM(s) Oral every 24 hours  loratadine 10 milliGRAM(s) Oral daily  pantoprazole   Suspension 40 milliGRAM(s) Oral daily  predniSONE   Tablet 40 milliGRAM(s) Oral daily  senna 2 Tablet(s) Oral at bedtime  simvastatin 10 milliGRAM(s) Oral at bedtime    MEDICATIONS  (PRN):  guaiFENesin    Syrup 200 milliGRAM(s) Oral every 6 hours PRN Cough

## 2018-10-18 NOTE — PROGRESS NOTE ADULT - ASSESSMENT
Patient is a 64 yo M with COPD, chronic respiratory failure on BIPAP, HTN, CRF, DLD, vocal cord paralysis and dysphagia s/p PEG 5/18 in the setting of resistant esophageal candidiasis and inability to swallow. Brought in from [facility] ,for cough productive ,sob and difficulty breathing of 1 day. Received IV antibiotics with steroids and BiPAP with improvement in respiratory status. Patient also suffers from anxiety which contributes to breathing status.     Acute hypercapnic and hypoxic resp failure 2/2 COPD Exacerbation likely 2/2 URI: improving    On 5L O2 by NC, decrease to 2.5L for a goal of 88-92% O2 sat  O2 Sat: 98%   On  Home O2 (2L)  Auscultation: No wheezes   PO Prednisone 60 mg QD for 3 days, day 2  BIPAP at night and PRN   Albuterol PRN   CXR:  No radiographic evidence of acute cardiopulmonary disease. Left upper lobe nodule, grossly unchanged. Emphysema.  Physiatry Consult appreciated: Bedside PT 3-5x/wk, STR in SNF  PT Consult: Pending  Pulm f/u appreciated: continue supplemental oxygen per pulse oximetry, bipap hs and prn, prednisone taper. ok w/d/c plan  Nephro consult: increase xanax, levaquin course, prednisone taper, f/u pulm, cont norvasc      HTN/DLD: Stable    Cont Amlodipine 10mg daily  Cont with Simvastatin 10mg QHS     Anxiety: Stable    Continue with  home meds    DVT ppx;     Lovenox subQ     Diet:     PEG feedings     Activity:    OOBTC     Code:     Full Code     Dispo:     Likely d/c in 24-48 hours to NH Patient is a 62 yo M with COPD, chronic respiratory failure on BIPAP, HTN, CRF, DLD, vocal cord paralysis and dysphagia s/p PEG 5/18 in the setting of resistant esophageal candidiasis and inability to swallow. Brought in from [facility] ,for cough productive ,sob and difficulty breathing of 1 day. Received IV antibiotics with steroids and BiPAP with improvement in respiratory status. Patient also suffers from anxiety which contributes to breathing status.     Acute hypercapnic and hypoxic resp failure 2/2 COPD Exacerbation likely 2/2 URI: improving    On 2L O2 by NC, decrease to 1.5L for a goal of 88-92% O2 sat  O2 Sat: 95%   On  Home O2 (2L)  Auscultation: No wheezes   PO Prednisone 40 mg QD for 3 days, day 1  BIPAP at night and PRN   Albuterol PRN   CXR:  No radiographic evidence of acute cardiopulmonary disease. Left upper lobe nodule, grossly unchanged. Emphysema.  Physiatry Consult appreciated: Bedside PT 3-5x/wk, STR in SNF  PT Consult: Pending  Nephro f/u: cont xanax RTC, levaquin course, prednisone taper, f/u pulm    HTN/DLD: Stable    Cont Amlodipine 10mg daily  Cont with Simvastatin 10mg QHS     Anxiety: Stable    Continue with  home meds    DVT ppx;     Lovenox subQ     Diet:     PEG feedings     Activity:    OOBTC     Code:     Full Code     Dispo:     Likely d/c in 24-48 hours to NH

## 2018-10-18 NOTE — PROGRESS NOTE ADULT - ASSESSMENT
· Assessment		  Patient is a 64 yo M with COPD, chronic respiratory failure on BIPAP, HTN, CRF, DLD, vocal cord paralysis and dysphagia s/p PEG 5/18 in the setting of resistant esophageal candidiasis and inability to swallow. Brought in from [facility] ,for cough productive ,sob and difficulty breathing of 1 day. Received IV antibiotics with steroids and BiPAP with improvement in respiratory status. Patient also suffers from anxiety which contributes to breathing status.     Acute hypercapnic and hypoxic resp failure 2/2 COPD Exacerbation likely 2/2 URI: improving    On 5L O2 by NC, decrease to 2.5L for a goal of 88-92% O2 sat  O2 Sat: 98%   On  Home O2 (2L)  Auscultation: No wheezes   PO Prednisone 60 mg QD for 3 days, day 2  BIPAP at night and PRN   Albuterol PRN   CXR:  No radiographic evidence of acute cardiopulmonary disease. Left upper lobe nodule, grossly unchanged. Emphysema.  Physiatry Consult appreciated: Bedside PT 3-5x/wk, STR in SNF  PT Consult: Pending  Pulm f/u appreciated: continue supplemental oxygen per pulse oximetry, bipap hs and prn, prednisone taper. ok w/d/c plan  Nephro consult: increase xanax, levaquin course, prednisone taper, f/u pulm, cont norvasc  HTN/DLD: Stable    Cont Amlodipine 10mg daily  Cont with Simvastatin 10mg QHS     Anxiety: Stable    Continue with  home meds    DVT ppx;     Lovenox subQ     Diet:     PEG feedings

## 2018-10-18 NOTE — PROGRESS NOTE ADULT - ASSESSMENT
Acute respiratory failure/CRF  COPD exac improving  Multiple pulmonary nodule  Anxiety  Dysphagia s/p peg tube      02 via nc  albuterol prn  symbicort 160/4.5 2 puff bid (in lieu of brovana and pulmicort)  oral abx-complete 7 day course  albuterol prn  prednisone 60 mg with taper by 20 mg every 3 days  cont daliresp, mucinex  cont niv at night and prn  pt to follow up with primary pulmonologist Dr Almonte once stable for discharge  dvt/gi px  oob - chair , ambulate as tolerated  overall prognosis guarded  stable from pulmonary standpoint

## 2018-10-18 NOTE — PROGRESS NOTE ADULT - SUBJECTIVE AND OBJECTIVE BOX
Patient was seen and examined. Spoke with RN. Chart reviewed.  awake comf, no distress,  awaiting rehab/pt    No events overnight.  Vital Signs Last 24 Hrs  T(F): 98.4 (18 Oct 2018 12:43), Max: 98.4 (18 Oct 2018 12:43)  HR: 83 (18 Oct 2018 12:43) (83 - 92)  BP: 120/62 (18 Oct 2018 12:43) (116/66 - 143/-)  SpO2: --  MEDICATIONS  (STANDING):  acetaminophen   Tablet .. 650 milliGRAM(s) Oral every 6 hours  ALBUTerol    0.083% 2.5 milliGRAM(s) Nebulizer every 4 hours  ALPRAZolam 1 milliGRAM(s) Oral every 6 hours  amLODIPine   Tablet 10 milliGRAM(s) Oral daily  benzocaine 15 mG/menthol 3.6 mG Lozenge 1 Lozenge Oral three times a day  buDESOnide 160 MICROgram(s)/formoterol 4.5 MICROgram(s) Inhaler 2 Puff(s) Inhalation two times a day  docusate sodium Liquid 100 milliGRAM(s) Oral two times a day  enoxaparin Injectable 40 milliGRAM(s) SubCutaneous at bedtime  levoFLOXacin  Tablet 750 milliGRAM(s) Oral every 24 hours  loratadine 10 milliGRAM(s) Oral daily  pantoprazole   Suspension 40 milliGRAM(s) Oral daily  predniSONE   Tablet 40 milliGRAM(s) Oral daily  senna 2 Tablet(s) Oral at bedtime  simvastatin 10 milliGRAM(s) Oral at bedtime    MEDICATIONS  (PRN):  guaiFENesin    Syrup 200 milliGRAM(s) Oral every 6 hours PRN Cough    Labs:                        9.3    7.08  )-----------( 304      ( 18 Oct 2018 10:42 )             29.7     18 Oct 2018 10:42    147    |  106    |  25     ----------------------------<  152    4.3     |  27     |  0.8      Ca    9.5        18 Oct 2018 10:42              Radiology:    General: comfortable, NAD  Neurology: A&Ox3, nonfocal  Head:  Normocephalic, atraumatic  ENT:  Mucosa moist, no ulcerations  Neck:  Supple, no JVD,   Skin: no breakdowns (as per RN)  Resp: CTA B/L  CV: RRR, S1S2,   GI: Soft, NT, bowel sounds peg  MS: No edema, + peripheral pulses, FROM all 4 extremity

## 2018-10-18 NOTE — PROGRESS NOTE ADULT - ASSESSMENT
CRF / ARF  anxiety  copd  dysphagia / s/p PEG  vocal cord injury with hoarseness  HTN    plan:    cont xanax RTC  levaquin course  prednisone taper  f/u pulm  cont norvasc  cpap / o2 / nebs  pt / rehab  PEG feeds  full code  dc planning - snf vs home with svc

## 2018-10-18 NOTE — PROGRESS NOTE ADULT - SUBJECTIVE AND OBJECTIVE BOX
CHIEF COMPLAINT:    Patient is a 63y old Male who presents with a chief complaint of sob, cough and temp 103 (17 Oct 2018 16:06)    Currently admitted to medicine with the primary diagnosis of Sepsis     Today is hospital day 5d. This morning he is resting comfortably in bed and reports no new issues or overnight events.     PAST MEDICAL & SURGICAL HISTORY  PEG (percutaneous endoscopic gastrostomy) status  Other hydrocele  Anxiety disorder, unspecified type  Hypertension, unspecified type  High cholesterol  Chronic obstructive pulmonary disease, unspecified COPD type  PEG (percutaneous endoscopic gastrostomy) status    SOCIAL HISTORY:  Negative for smoking/alcohol/drug use.     ALLERGIES:  No Known Allergies    MEDICATIONS:  STANDING MEDICATIONS  acetaminophen   Tablet .. 650 milliGRAM(s) Oral every 6 hours  ALBUTerol    0.083% 2.5 milliGRAM(s) Nebulizer every 4 hours  ALPRAZolam 1 milliGRAM(s) Oral every 6 hours  amLODIPine   Tablet 10 milliGRAM(s) Oral daily  benzocaine 15 mG/menthol 3.6 mG Lozenge 1 Lozenge Oral three times a day  buDESOnide 160 MICROgram(s)/formoterol 4.5 MICROgram(s) Inhaler 2 Puff(s) Inhalation two times a day  docusate sodium Liquid 100 milliGRAM(s) Oral two times a day  enoxaparin Injectable 40 milliGRAM(s) SubCutaneous at bedtime  levoFLOXacin  Tablet 750 milliGRAM(s) Oral every 24 hours  loratadine 10 milliGRAM(s) Oral daily  pantoprazole    Tablet 40 milliGRAM(s) Oral before breakfast  senna 2 Tablet(s) Oral at bedtime  simvastatin 10 milliGRAM(s) Oral at bedtime    PRN MEDICATIONS  guaiFENesin    Syrup 200 milliGRAM(s) Oral every 6 hours PRN    VITALS:   T(F): 97.6  HR: 86  BP: 143/-  RR: 18  SpO2: --    LABS:                          RADIOLOGY:    PHYSICAL EXAM:  GEN: No acute distress  PULM: Clear to auscultation bilaterally   CARD: S1/S2 present. RRR.   GI: Soft, non-tender, non-distended. Bowel sounds present  MSK: NC/NC/NE/2+PP/LOONEY  NEURO: AAOX3 CHIEF COMPLAINT:    Patient is a 63y old Male who presents with a chief complaint of sob, cough and temp 103 (17 Oct 2018 16:06)    Currently admitted to medicine with the primary diagnosis of Sepsis     Today is hospital day 5d. This morning he is resting comfortably in bed and reports no new issues or overnight events. Pt feels better, denied fever, chest pain, n/v/d/c, palpitations. Still has some sob.    PAST MEDICAL & SURGICAL HISTORY  PEG (percutaneous endoscopic gastrostomy) status  Other hydrocele  Anxiety disorder, unspecified type  Hypertension, unspecified type  High cholesterol  Chronic obstructive pulmonary disease, unspecified COPD type  PEG (percutaneous endoscopic gastrostomy) status    SOCIAL HISTORY:  Negative for smoking/alcohol/drug use.     ALLERGIES:  No Known Allergies    MEDICATIONS:  STANDING MEDICATIONS  acetaminophen   Tablet .. 650 milliGRAM(s) Oral every 6 hours  ALBUTerol    0.083% 2.5 milliGRAM(s) Nebulizer every 4 hours  ALPRAZolam 1 milliGRAM(s) Oral every 6 hours  amLODIPine   Tablet 10 milliGRAM(s) Oral daily  benzocaine 15 mG/menthol 3.6 mG Lozenge 1 Lozenge Oral three times a day  buDESOnide 160 MICROgram(s)/formoterol 4.5 MICROgram(s) Inhaler 2 Puff(s) Inhalation two times a day  docusate sodium Liquid 100 milliGRAM(s) Oral two times a day  enoxaparin Injectable 40 milliGRAM(s) SubCutaneous at bedtime  levoFLOXacin  Tablet 750 milliGRAM(s) Oral every 24 hours  loratadine 10 milliGRAM(s) Oral daily  pantoprazole    Tablet 40 milliGRAM(s) Oral before breakfast  senna 2 Tablet(s) Oral at bedtime  simvastatin 10 milliGRAM(s) Oral at bedtime    PRN MEDICATIONS  guaiFENesin    Syrup 200 milliGRAM(s) Oral every 6 hours PRN    VITALS:   T(F): 97.6  HR: 86  BP: 143/-  RR: 18  SpO2: --    LABS:                          RADIOLOGY:    PHYSICAL EXAM:  GEN: No acute distress, on 2l O2, O2 sat 95%  PULM: Decreased auscultation bilaterally   CARD: S1/S2 present. RRR.   GI: Soft, non-tender, non-distended. Bowel sounds present. PEG tube is functional  MSK: NC/NC/NE/2+PP  NEURO: AAOX3

## 2018-10-19 ENCOUNTER — TRANSCRIPTION ENCOUNTER (OUTPATIENT)
Age: 63
End: 2018-10-19

## 2018-10-19 LAB
ANION GAP SERPL CALC-SCNC: 12 MMOL/L — SIGNIFICANT CHANGE UP (ref 7–14)
BASOPHILS # BLD AUTO: 0.01 K/UL — SIGNIFICANT CHANGE UP (ref 0–0.2)
BASOPHILS NFR BLD AUTO: 0.1 % — SIGNIFICANT CHANGE UP (ref 0–1)
BUN SERPL-MCNC: 25 MG/DL — HIGH (ref 10–20)
CALCIUM SERPL-MCNC: 8.6 MG/DL — SIGNIFICANT CHANGE UP (ref 8.5–10.1)
CHLORIDE SERPL-SCNC: 109 MMOL/L — SIGNIFICANT CHANGE UP (ref 98–110)
CO2 SERPL-SCNC: 29 MMOL/L — SIGNIFICANT CHANGE UP (ref 17–32)
CREAT SERPL-MCNC: 0.8 MG/DL — SIGNIFICANT CHANGE UP (ref 0.7–1.5)
CULTURE RESULTS: SIGNIFICANT CHANGE UP
CULTURE RESULTS: SIGNIFICANT CHANGE UP
EOSINOPHIL # BLD AUTO: 0.14 K/UL — SIGNIFICANT CHANGE UP (ref 0–0.7)
EOSINOPHIL NFR BLD AUTO: 1 % — SIGNIFICANT CHANGE UP (ref 0–8)
GLUCOSE SERPL-MCNC: 197 MG/DL — HIGH (ref 70–99)
HCT VFR BLD CALC: 28.2 % — LOW (ref 42–52)
HGB BLD-MCNC: 8.7 G/DL — LOW (ref 14–18)
IMM GRANULOCYTES NFR BLD AUTO: 0.4 % — HIGH (ref 0.1–0.3)
LYMPHOCYTES # BLD AUTO: 1.6 K/UL — SIGNIFICANT CHANGE UP (ref 1.2–3.4)
LYMPHOCYTES # BLD AUTO: 11.8 % — LOW (ref 20.5–51.1)
MCHC RBC-ENTMCNC: 28 PG — SIGNIFICANT CHANGE UP (ref 27–31)
MCHC RBC-ENTMCNC: 30.9 G/DL — LOW (ref 32–37)
MCV RBC AUTO: 90.7 FL — SIGNIFICANT CHANGE UP (ref 80–94)
MONOCYTES # BLD AUTO: 0.72 K/UL — HIGH (ref 0.1–0.6)
MONOCYTES NFR BLD AUTO: 5.3 % — SIGNIFICANT CHANGE UP (ref 1.7–9.3)
NEUTROPHILS # BLD AUTO: 11.05 K/UL — HIGH (ref 1.4–6.5)
NEUTROPHILS NFR BLD AUTO: 81.4 % — HIGH (ref 42.2–75.2)
NRBC # BLD: 0 /100 WBCS — SIGNIFICANT CHANGE UP (ref 0–0)
PLATELET # BLD AUTO: 298 K/UL — SIGNIFICANT CHANGE UP (ref 130–400)
POTASSIUM SERPL-MCNC: 3.7 MMOL/L — SIGNIFICANT CHANGE UP (ref 3.5–5)
POTASSIUM SERPL-SCNC: 3.7 MMOL/L — SIGNIFICANT CHANGE UP (ref 3.5–5)
RBC # BLD: 3.11 M/UL — LOW (ref 4.7–6.1)
RBC # FLD: 14.2 % — SIGNIFICANT CHANGE UP (ref 11.5–14.5)
SODIUM SERPL-SCNC: 150 MMOL/L — HIGH (ref 135–146)
SPECIMEN SOURCE: SIGNIFICANT CHANGE UP
SPECIMEN SOURCE: SIGNIFICANT CHANGE UP
WBC # BLD: 13.57 K/UL — HIGH (ref 4.8–10.8)
WBC # FLD AUTO: 13.57 K/UL — HIGH (ref 4.8–10.8)

## 2018-10-19 RX ORDER — ARFORMOTEROL TARTRATE 15 UG/2ML
2 SOLUTION RESPIRATORY (INHALATION)
Qty: 0 | Refills: 0 | COMMUNITY

## 2018-10-19 RX ORDER — BUDESONIDE AND FORMOTEROL FUMARATE DIHYDRATE 160; 4.5 UG/1; UG/1
2 AEROSOL RESPIRATORY (INHALATION)
Qty: 0 | Refills: 0 | COMMUNITY
Start: 2018-10-19

## 2018-10-19 RX ORDER — BUDESONIDE AND FORMOTEROL FUMARATE DIHYDRATE 160; 4.5 UG/1; UG/1
0 AEROSOL RESPIRATORY (INHALATION)
Qty: 0 | Refills: 0 | COMMUNITY
Start: 2018-10-19

## 2018-10-19 RX ADMIN — Medication 650 MILLIGRAM(S): at 00:17

## 2018-10-19 RX ADMIN — Medication 1 MILLIGRAM(S): at 05:31

## 2018-10-19 RX ADMIN — Medication 200 MILLIGRAM(S): at 05:31

## 2018-10-19 RX ADMIN — Medication 650 MILLIGRAM(S): at 21:34

## 2018-10-19 RX ADMIN — AMLODIPINE BESYLATE 10 MILLIGRAM(S): 2.5 TABLET ORAL at 05:33

## 2018-10-19 RX ADMIN — Medication 650 MILLIGRAM(S): at 14:08

## 2018-10-19 RX ADMIN — SENNA PLUS 2 TABLET(S): 8.6 TABLET ORAL at 21:33

## 2018-10-19 RX ADMIN — ALBUTEROL 2.5 MILLIGRAM(S): 90 AEROSOL, METERED ORAL at 19:41

## 2018-10-19 RX ADMIN — ALBUTEROL 2.5 MILLIGRAM(S): 90 AEROSOL, METERED ORAL at 00:31

## 2018-10-19 RX ADMIN — LORATADINE 10 MILLIGRAM(S): 10 TABLET ORAL at 12:39

## 2018-10-19 RX ADMIN — Medication 100 MILLIGRAM(S): at 18:24

## 2018-10-19 RX ADMIN — ALBUTEROL 2.5 MILLIGRAM(S): 90 AEROSOL, METERED ORAL at 08:02

## 2018-10-19 RX ADMIN — ALBUTEROL 2.5 MILLIGRAM(S): 90 AEROSOL, METERED ORAL at 13:48

## 2018-10-19 RX ADMIN — ALBUTEROL 2.5 MILLIGRAM(S): 90 AEROSOL, METERED ORAL at 16:21

## 2018-10-19 RX ADMIN — Medication 1 MILLIGRAM(S): at 18:24

## 2018-10-19 RX ADMIN — Medication 1 MILLIGRAM(S): at 00:17

## 2018-10-19 RX ADMIN — Medication 100 MILLIGRAM(S): at 05:32

## 2018-10-19 RX ADMIN — ENOXAPARIN SODIUM 40 MILLIGRAM(S): 100 INJECTION SUBCUTANEOUS at 21:33

## 2018-10-19 RX ADMIN — Medication 650 MILLIGRAM(S): at 05:32

## 2018-10-19 RX ADMIN — SIMVASTATIN 10 MILLIGRAM(S): 20 TABLET, FILM COATED ORAL at 21:33

## 2018-10-19 RX ADMIN — Medication 1 MILLIGRAM(S): at 12:39

## 2018-10-19 RX ADMIN — Medication 650 MILLIGRAM(S): at 18:24

## 2018-10-19 RX ADMIN — ALBUTEROL 2.5 MILLIGRAM(S): 90 AEROSOL, METERED ORAL at 03:50

## 2018-10-19 RX ADMIN — Medication 40 MILLIGRAM(S): at 05:33

## 2018-10-19 RX ADMIN — Medication 1 MILLIGRAM(S): at 21:35

## 2018-10-19 RX ADMIN — PANTOPRAZOLE SODIUM 40 MILLIGRAM(S): 20 TABLET, DELAYED RELEASE ORAL at 12:39

## 2018-10-19 RX ADMIN — Medication 650 MILLIGRAM(S): at 12:41

## 2018-10-19 NOTE — DISCHARGE NOTE ADULT - CARE PROVIDERS DIRECT ADDRESSES
[email or web address]

## 2018-10-19 NOTE — DISCHARGE NOTE ADULT - PROVIDER TOKENS
TOKEN:'[id number]',FREE:[LAST:[Almonte],FIRST:[Neri],PHONE:[phone]],FAX:[(   [phone]    -],ADDRESS:[[address] [place] [address]]],TOKEN:'[id number]'

## 2018-10-19 NOTE — PROGRESS NOTE ADULT - SUBJECTIVE AND OBJECTIVE BOX
Patient was seen and examined. Spoke with RN. Chart reviewed.  No events overnight.  Vital Signs Last 24 Hrs  T(F): 96.5 (19 Oct 2018 04:28), Max: 98.7 (18 Oct 2018 19:50)  HR: 66 (19 Oct 2018 04:28) (66 - 113)  BP: 112/55 (19 Oct 2018 04:28) (112/55 - 141/62)  SpO2: 98% (19 Oct 2018 09:37) (98% - 98%)  MEDICATIONS  (STANDING):  acetaminophen   Tablet .. 650 milliGRAM(s) Oral every 6 hours  ALBUTerol    0.083% 2.5 milliGRAM(s) Nebulizer every 4 hours  ALPRAZolam 1 milliGRAM(s) Oral every 6 hours  amLODIPine   Tablet 10 milliGRAM(s) Oral daily  benzocaine 15 mG/menthol 3.6 mG Lozenge 1 Lozenge Oral three times a day  buDESOnide 160 MICROgram(s)/formoterol 4.5 MICROgram(s) Inhaler 2 Puff(s) Inhalation two times a day  docusate sodium Liquid 100 milliGRAM(s) Oral two times a day  enoxaparin Injectable 40 milliGRAM(s) SubCutaneous at bedtime  levoFLOXacin  Tablet 750 milliGRAM(s) Oral every 24 hours  loratadine 10 milliGRAM(s) Oral daily  pantoprazole   Suspension 40 milliGRAM(s) Oral daily  predniSONE   Tablet 40 milliGRAM(s) Oral daily  senna 2 Tablet(s) Oral at bedtime  simvastatin 10 milliGRAM(s) Oral at bedtime    MEDICATIONS  (PRN):  guaiFENesin    Syrup 200 milliGRAM(s) Oral every 6 hours PRN Cough    Labs:                        8.7    13.57 )-----------( 298      ( 19 Oct 2018 07:02 )             28.2                         9.3    7.08  )-----------( 304      ( 18 Oct 2018 10:42 )             29.7     19 Oct 2018 07:02    150    |  109    |  25     ----------------------------<  197    3.7     |  29     |  0.8    18 Oct 2018 10:42    147    |  106    |  25     ----------------------------<  152    4.3     |  27     |  0.8      Ca    8.6        19 Oct 2018 07:02  Ca    9.5        18 Oct 2018 10:42            General: lethargic  Head:  Normocephalic, atraumatic  ENT:  Mucosa moist, no ulcerations  Neck:  Supple, no JVD,   Resp: CTA B/L  CV: RRR, S1S2,   GI: Soft, NT, bowel sounds, PEG  MS: No edema, + peripheral pulses, FROM all 4 extremity      A/P:  62 yo M with COPD, chronic respiratory failure on BIPAP, HTN, CRF, DLD, vocal cord paralysis and dysphagia s/p PEG 5/18 in the setting of resistant esophageal candidiasis and inability to swallow. Brought in from [facility] ,for cough productive ,sob and difficulty breathing; Received IV antibiotics with steroids and BiPAP with improvement in respiratory status.     Acute hypercapnic and hypoxic resp failure 2/2 COPD Exacerbation likely 2/2 URI: improving    On  Home O2 (2L)    BIPAP at night and PRN   Albuterol PRN     Physiatry Consult appreciated: Bedside PT 3-5x/wk, STR in SNF    Pulm f/u appreciated: continue supplemental oxygen per pulse oximetry, bipap hs and prn, prednisone taper. ok w/d/c plan  DVT prophylaxis  Decubitus prevention- all measures as per RN protocol  Please call or text me with any questions or updates

## 2018-10-19 NOTE — PROGRESS NOTE ADULT - SUBJECTIVE AND OBJECTIVE BOX
CHIEF COMPLAINT:    Patient is a 63y old Male who presents with a chief complaint of sob, cough and temp 103 (19 Oct 2018 14:28)    Currently admitted to medicine with the primary diagnosis of Sepsis     Today is hospital day 6d. This morning he is resting comfortably in bed and reports no new issues or overnight events. Pt c/o cough, no sob, n/v/d/c, palpitations, fever, chills.    PAST MEDICAL & SURGICAL HISTORY  PEG (percutaneous endoscopic gastrostomy) status  Other hydrocele  Anxiety disorder, unspecified type  Hypertension, unspecified type  High cholesterol  Chronic obstructive pulmonary disease, unspecified COPD type  PEG (percutaneous endoscopic gastrostomy) status    SOCIAL HISTORY:  Negative for smoking/alcohol/drug use.     ALLERGIES:  No Known Allergies    MEDICATIONS:  STANDING MEDICATIONS  acetaminophen   Tablet .. 650 milliGRAM(s) Oral every 6 hours  ALBUTerol    0.083% 2.5 milliGRAM(s) Nebulizer every 4 hours  ALPRAZolam 1 milliGRAM(s) Oral every 6 hours  amLODIPine   Tablet 10 milliGRAM(s) Oral daily  benzocaine 15 mG/menthol 3.6 mG Lozenge 1 Lozenge Oral three times a day  buDESOnide 160 MICROgram(s)/formoterol 4.5 MICROgram(s) Inhaler 2 Puff(s) Inhalation two times a day  docusate sodium Liquid 100 milliGRAM(s) Oral two times a day  enoxaparin Injectable 40 milliGRAM(s) SubCutaneous at bedtime  levoFLOXacin  Tablet 750 milliGRAM(s) Oral every 24 hours  loratadine 10 milliGRAM(s) Oral daily  pantoprazole   Suspension 40 milliGRAM(s) Oral daily  predniSONE   Tablet 40 milliGRAM(s) Oral daily  senna 2 Tablet(s) Oral at bedtime  simvastatin 10 milliGRAM(s) Oral at bedtime    PRN MEDICATIONS  guaiFENesin    Syrup 200 milliGRAM(s) Oral every 6 hours PRN    VITALS:   T(F): 97.8  HR: 72  BP: 107/52  RR: 18  SpO2: 98%    LABS:                        8.7    13.57 )-----------( 298      ( 19 Oct 2018 07:02 )             28.2     10-19    150<H>  |  109  |  25<H>  ----------------------------<  197<H>  3.7   |  29  |  0.8    Ca    8.6      19 Oct 2018 07:02                      RADIOLOGY:    PHYSICAL EXAM:  GEN: No acute distress  PULM: Clear to auscultation bilaterally, no wheezes  CARD: S1/S2 present. RRR.   GI: Soft, non-tender, non-distended. Bowel sounds present  MSK: NC/NC/NE/2+PP  NEURO: AAOX3

## 2018-10-19 NOTE — DISCHARGE NOTE ADULT - HOSPITAL COURSE
Patient is a 64 yo M with COPD, chronic respiratory failure on BIPAP, HTN, CRF, DLD, vocal cord paralysis and dysphagia s/p PEG 5/18 in the setting of resistant esophageal candidiasis and inability to swallow. Brought in from [facility] for productive cough ,sob and difficulty breathing for 1 day. Received IV antibiotics with steroids and BiPAP with improvement in respiratory status. Patient also suffers from anxiety which contributes to breathing status. He was evaluated and treated for the following conditions:    Acute hypercapnic and hypoxic respiratory failure 2/2 COPD Exacerbation likely 2/2 URI: improved    On 1.5L O2 sat was 92%  On  Home O2 (2L)  Auscultation: No wheezes   PO Prednisone 40 mg QD for 3 days, day 2  BIPAP at night and PRN   Albuterol PRN   CXR:  No radiographic evidence of acute cardiopulmonary disease. Left upper lobe nodule, grossly unchanged. Emphysema.  Physiatry Consult appreciated: Bedside PT 3-5x/wk, STR in SNF  PT Consult: Bedside PT 3-5x/wk  Nephro f/u: cont xanax RTC, f/u pulm    HTN/DLD : Stable    Cont Amlodipine 10mg daily  Cont with Simvastatin 10mg QHS     Anxiety: Stable    Continue with  Xanax    Pt is being discharged in stable condition for the following recommendations: Patient is a 62 yo M with COPD, chronic respiratory failure on BIPAP, HTN, CRF, DLD, vocal cord paralysis and dysphagia s/p PEG 5/18 in the setting of resistant esophageal candidiasis and inability to swallow. Brought in from [facility] for productive cough ,sob and difficulty breathing for 1 day. Received IV antibiotics with steroids and BiPAP with improvement in respiratory status. Patient also suffers from anxiety which contributes to breathing status. He was evaluated and treated for the following conditions:    Acute hypercapnic and hypoxic respiratory failure 2/2 COPD Exacerbation likely 2/2 URI: improved    On 1.5L O2 sat was 92%  On  Home O2 (2L)  PO Prednisone 10 mg QD  BIPAP at night and PRN   Albuterol PRN   CXR:  No radiographic evidence of acute cardiopulmonary disease. Left upper lobe nodule, grossly unchanged. Emphysema.  Physiatry Consult appreciated: Bedside PT 3-5x/wk  PT Consult: Bedside PT 3-5x/wk  aspiration precautions.   continue symbicort 160/4.5 2 puffs BID.   continue daliresp    HTN/DLD : Stable    Cont Amlodipine 10mg daily  Cont with Simvastatin 10mg QHS     Anxiety: Stable    Continue with  Xanax    Dysphagia   Patient is know to SLP. He was evaluated and continues to have severe dysphagia. Consider ENT Eval as outpatient

## 2018-10-19 NOTE — PROGRESS NOTE ADULT - ASSESSMENT
acute respiratory failure, improved  chronic hypoxic and hypercapnic respiratory failure  copd exacerbation, improved  multiple pulmonary nodules  anxiety disorder      continue low flow oxygen  bipap/avaps hs  bronchodilators  maintenance medications  prednisone taper  can discontinue antibiotic  gi/dvt prophylaxis  anxiolytic therapy  out of bed/physical therapy  discharge planning

## 2018-10-19 NOTE — DISCHARGE NOTE ADULT - MEDICATION SUMMARY - MEDICATIONS TO TAKE
I will START or STAY ON the medications listed below when I get home from the hospital:    predniSONE 10 mg oral tablet  -- 1 tab(s) by gastrostomy tube once a day  -- Indication: For COPD exacerbation    acetaminophen 325 mg oral tablet  -- 2 tab(s) by mouth every 6 hours, As needed, For Temp greater than 38 C (100.4 F)  -- Indication: For fever / pain     KlonoPIN 0.5 mg oral tablet  -- 0.5 tab(s) by mouth every 12 hours MDD:0.5mg daily  -- Indication: For anxiety    scopolamine 1.5 mg transdermal film, extended release  -- 1 patch by transdermal patch every 72 hours   -- Indication: For antiemesis    loratadine 10 mg oral tablet  -- 1 tab(s) by mouth once a day peg tube  -- Indication: For antihistamine    simvastatin 10 mg oral tablet  -- 1 tab(s) by mouth once a day (at bedtime)  -- Indication: For dld    albuterol 2.5 mg/3 mL (0.083%) inhalation solution  -- 1 inhaler(s) inhaled every 6 hours, As Needed   -- Indication: For COPD exacerbation    budesonide-formoterol 160 mcg-4.5 mcg/inh inhalation aerosol  -- 2 puff(s) inhaled every 6 hours  -- Indication: For COPD exacerbation    tiotropium 18 mcg inhalation capsule  -- 1 cap(s) inhaled once a day  -- Indication: For COPD exacerbation    Norvasc 10 mg oral tablet  -- 1 tab(s) by mouth once a day  -- Indication: For htn    Balmex Adult Care 11.3% topical cream  -- Apply on skin to affected area once a day  sacrum   -- Indication: For Skin    nystatin 100,000 units/g topical cream  -- Apply on skin to affected area 2 times a day left abdomen  -- Indication: For Skin    vitamin A & D topical cream  -- Apply on skin to affected area 2 times a day legs  -- Indication: For Skin    Mucinex Childrens 100 mg/5 mL oral liquid  -- 5 milliliter(s) by mouth every 6 hours   -- Medication should be taken with plenty of water.    -- Indication: For expectorant    docusate sodium 10 mg/mL oral liquid  -- 10 milliliter(s) by mouth 2 times a day  -- Indication: For COnstipation    senna oral tablet  -- 2 tab(s) by mouth once a day (at bedtime)  -- Indication: For COnstipation    sodium chloride 0.65% nasal spray  --  into nose   -- Indication: For nasal spray    omeprazole 20 mg oral delayed release capsule  -- 1 cap(s) by mouth once a day via peg  -- Indication: For gerd    Flovent  mcg/inh inhalation aerosol  -- 1 puff(s) inhaled once a day   -- Indication: For COPD exacerbation    roflumilast 500 mcg oral tablet  -- 1 tab(s) by mouth once a day  -- Indication: For COPD exacerbation

## 2018-10-19 NOTE — DISCHARGE NOTE ADULT - PATIENT PORTAL LINK FT
You can access the [facility] Patient Portal, offered by [facility], by registering with the following website: [email or web address]

## 2018-10-19 NOTE — DISCHARGE NOTE ADULT - OTHER SIGNIFICANT FINDINGS
< from: Xray Chest 1 View- PORTABLE-Routine (10.15.18 @ 06:45) >  Impression:      No radiographic evidence of acute cardiopulmonary disease.    < end of copied text >

## 2018-10-19 NOTE — PROGRESS NOTE ADULT - SUBJECTIVE AND OBJECTIVE BOX
NEPHROLOGY FOLLOW UP NOTE    d/w resident  no overnight events  no fever / cp / usual sob and cough / no fever  for dc to snf    PAST MEDICAL & SURGICAL HISTORY:  PEG (percutaneous endoscopic gastrostomy) status  Other hydrocele  Anxiety disorder, unspecified type  Hypertension, unspecified type  High cholesterol  Chronic obstructive pulmonary disease, unspecified COPD type  PEG (percutaneous endoscopic gastrostomy) status    Allergies:  No Known Allergies    Home Medications Reviewed    SOCIAL HISTORY:  Denies ETOH,Smoking,   FAMILY HISTORY:  No pertinent family history in first degree relatives        REVIEW OF SYSTEMS:  All other review of systems is negative unless indicated above.      PHYSICAL EXAM:  NAD  frail  poor dentition  O2 via NC  tremors  decreased bs bl  distant hs  soft  peg  no cvat  no edema    Hospital Medications:   MEDICATIONS  (STANDING):  acetaminophen   Tablet .. 650 milliGRAM(s) Oral every 6 hours  ALBUTerol    0.083% 2.5 milliGRAM(s) Nebulizer every 4 hours  ALPRAZolam 1 milliGRAM(s) Oral every 6 hours  amLODIPine   Tablet 10 milliGRAM(s) Oral daily  benzocaine 15 mG/menthol 3.6 mG Lozenge 1 Lozenge Oral three times a day  buDESOnide 160 MICROgram(s)/formoterol 4.5 MICROgram(s) Inhaler 2 Puff(s) Inhalation two times a day  docusate sodium Liquid 100 milliGRAM(s) Oral two times a day  enoxaparin Injectable 40 milliGRAM(s) SubCutaneous at bedtime  levoFLOXacin  Tablet 750 milliGRAM(s) Oral every 24 hours  loratadine 10 milliGRAM(s) Oral daily  pantoprazole   Suspension 40 milliGRAM(s) Oral daily  predniSONE   Tablet 40 milliGRAM(s) Oral daily  senna 2 Tablet(s) Oral at bedtime  simvastatin 10 milliGRAM(s) Oral at bedtime        VITALS:  T(F): 97.8 (10-19-18 @ 12:00), Max: 98.7 (10-18-18 @ 19:50)  HR: 72 (10-19-18 @ 12:00)  BP: 107/52 (10-19-18 @ 12:00)  RR: 18 (10-19-18 @ 12:00)  SpO2: 98% (10-19-18 @ 09:37)  Wt(kg): --    10-17 @ 07:01  -  10-18 @ 07:00  --------------------------------------------------------  IN: 1000 mL / OUT: 600 mL / NET: 400 mL    10-19 @ 07:01  -  10-19 @ 14:29  --------------------------------------------------------  IN: 660 mL / OUT: 200 mL / NET: 460 mL          LABS:  10-19    150<H>  |  109  |  25<H>  ----------------------------<  197<H>  3.7   |  29  |  0.8    Ca    8.6      19 Oct 2018 07:02                            8.7    13.57 )-----------( 298      ( 19 Oct 2018 07:02 )             28.2       Urine Studies:  Urinalysis Basic - ( 14 Oct 2018 01:00 )    Color: Yellow / Appearance: Cloudy / S.015 / pH:   Gluc:  / Ketone: Negative  / Bili: Negative / Urobili: 0.2 mg/dL   Blood:  / Protein: Negative mg/dL / Nitrite: Negative   Leuk Esterase: Negative / RBC:  / WBC    Sq Epi:  / Non Sq Epi: Occasional /HPF / Bacteria:           RADIOLOGY & ADDITIONAL STUDIES:

## 2018-10-19 NOTE — PROGRESS NOTE ADULT - ASSESSMENT
Patient is a 62 yo M with COPD, chronic respiratory failure on BIPAP, HTN, CRF, DLD, vocal cord paralysis and dysphagia s/p PEG 5/18 in the setting of resistant esophageal candidiasis and inability to swallow. Brought in from [facility] ,for cough productive ,sob and difficulty breathing of 1 day. Received IV antibiotics with steroids and BiPAP with improvement in respiratory status. Patient also suffers from anxiety which contributes to breathing status.     Acute hypercapnic and hypoxic resp failure 2/2 COPD Exacerbation likely 2/2 URI: improved    Levaquine d/c'd  On 2L O2 by NC, decrease to 1.5L for a goal of 88-92% O2 sat  O2 Sat: 95%   On  Home O2 (2L)  Auscultation: No wheezes   PO Prednisone 40 mg QD for 3 days, day 2  BIPAP at night and PRN   Albuterol PRN   CXR:  No radiographic evidence of acute cardiopulmonary disease. Left upper lobe nodule, grossly unchanged. Emphysema.  Physiatry Consult appreciated: Bedside PT 3-5x/wk, STR in SNF  PT Consult: 3-5x/wk  Nephro f/u appreciated: CRF / ARF, prednisone taper, f/u pulm, cont norvasc  Pulm f/u appreciated: continue low flow oxygen, bipap/avaps hs, maintenance medications, prednisone taper, can discontinue antibiotic      HTN/DLD: Stable    Cont Amlodipine 10mg daily  Cont with Simvastatin 10mg QHS     Anxiety: Stable    Continue with  home meds    DVT ppx;     Lovenox subQ     Diet:     PEG feedings     Activity:    OOBTC     Code:     Full Code     Dispo:     Pending

## 2018-10-19 NOTE — DISCHARGE NOTE ADULT - PLAN OF CARE
Risk management, symptom management. Please continue medications as above. Continue supplemental oxygen as needed. You may follow up with Dr. Almonte within 1 week of discharge. In the event of severe shortness of breath, please present to you nearest emergency facility. For vocal cord paralysis, you may follow up with an ENT professional. Follow up with Dr. Joy within 1 month.

## 2018-10-19 NOTE — PROGRESS NOTE ADULT - ASSESSMENT
CRF / ARF  anxiety  copd  dysphagia / s/p PEG  vocal cord injury with hoarseness  HTN    plan:    cont xanax   complete levaquin course  prednisone taper  f/u pulm  cont norvasc  cpap / o2 / nebs  pt / rehab  PEG feeds  dc planning

## 2018-10-19 NOTE — DISCHARGE NOTE ADULT - INSTRUCTIONS
NPO, with Tube feeds. Jevity 1.2 james NPO, with Tube feeds. Jevity 1.2 jmaes (450 mL via PEG tube every 8 hours).

## 2018-10-19 NOTE — PROGRESS NOTE ADULT - SUBJECTIVE AND OBJECTIVE BOX
ALETA SUAZO  63y, Male  Allergy: No Known Allergies      LAST 24-Hr EVENTS:  feels at baseline, wants to get out of bed, physical therapy  VITALS:  T(F): 96.5 (10-19-18 @ 04:28), Max: 98.7 (10-18-18 @ 19:50)  HR: 66 (10-19-18 @ 04:28)  BP: 112/55 (10-19-18 @ 04:28) (112/55 - 141/62)  RR: 18 (10-19-18 @ 04:28)  SpO2: 98% (10-19-18 @ 09:37)on low flow oxygen    PHYSICAL EXAM:    GENERAL: NAD, well-developed  HEAD:  Atraumatic, Normocephalic  NECK: Supple, No JVD  CHEST/LUNG: Clear to auscultation bilaterally; No wheeze  HEART: Regular rate and rhythm; No murmurs, rubs, or gallops  ABDOMEN: Soft, Nontender, Nondistended; Bowel sounds present  EXTREMITIES:  2+ Peripheral Pulses, No clubbing, cyanosis, or edema        TESTS & MEASUREMENTS:    IN: 1000 mL / OUT: 600 mL / NET: 400 mL                            8.7    13.57 )-----------( 298      ( 19 Oct 2018 07:02 )             28.2       10-19    150<H>  |  109  |  25<H>  ----------------------------<  197<H>  3.7   |  29  |  0.8    Ca    8.6      19 Oct 2018 07:02              Culture - Urine (collected 10-14-18 @ 01:00)  Source: .Urine Clean Catch (Midstream)  Final Report (10-15-18 @ 21:11):    No growth    Culture - Blood (collected 10-13-18 @ 20:40)  Source: .Blood Blood-Peripheral  Final Report (10-19-18 @ 07:00):    No growth at 5 days.    Culture - Blood (collected 10-13-18 @ 20:40)  Source: .Blood Blood-Peripheral  Final Report (10-19-18 @ 07:00):    No growth at 5 days.          ABG & VENT SETTINGS: (when applicable)  n/a      RADIOLOGY & ADDITIONAL TESTS:    MEDICATIONS:  MEDICATIONS  (STANDING):  acetaminophen   Tablet .. 650 milliGRAM(s) Oral every 6 hours  ALBUTerol    0.083% 2.5 milliGRAM(s) Nebulizer every 4 hours  ALPRAZolam 1 milliGRAM(s) Oral every 6 hours  amLODIPine   Tablet 10 milliGRAM(s) Oral daily  benzocaine 15 mG/menthol 3.6 mG Lozenge 1 Lozenge Oral three times a day  buDESOnide 160 MICROgram(s)/formoterol 4.5 MICROgram(s) Inhaler 2 Puff(s) Inhalation two times a day  docusate sodium Liquid 100 milliGRAM(s) Oral two times a day  enoxaparin Injectable 40 milliGRAM(s) SubCutaneous at bedtime  levoFLOXacin  Tablet 750 milliGRAM(s) Oral every 24 hours  loratadine 10 milliGRAM(s) Oral daily  pantoprazole   Suspension 40 milliGRAM(s) Oral daily  predniSONE   Tablet 40 milliGRAM(s) Oral daily  senna 2 Tablet(s) Oral at bedtime  simvastatin 10 milliGRAM(s) Oral at bedtime    MEDICATIONS  (PRN):  guaiFENesin    Syrup 200 milliGRAM(s) Oral every 6 hours PRN Cough

## 2018-10-20 LAB
ANION GAP SERPL CALC-SCNC: 11 MMOL/L — SIGNIFICANT CHANGE UP (ref 7–14)
BASOPHILS # BLD AUTO: 0.01 K/UL — SIGNIFICANT CHANGE UP (ref 0–0.2)
BASOPHILS NFR BLD AUTO: 0.1 % — SIGNIFICANT CHANGE UP (ref 0–1)
BUN SERPL-MCNC: 22 MG/DL — HIGH (ref 10–20)
CALCIUM SERPL-MCNC: 8.8 MG/DL — SIGNIFICANT CHANGE UP (ref 8.5–10.1)
CHLORIDE SERPL-SCNC: 107 MMOL/L — SIGNIFICANT CHANGE UP (ref 98–110)
CO2 SERPL-SCNC: 30 MMOL/L — SIGNIFICANT CHANGE UP (ref 17–32)
CREAT SERPL-MCNC: 0.7 MG/DL — SIGNIFICANT CHANGE UP (ref 0.7–1.5)
EOSINOPHIL # BLD AUTO: 0.08 K/UL — SIGNIFICANT CHANGE UP (ref 0–0.7)
EOSINOPHIL NFR BLD AUTO: 0.5 % — SIGNIFICANT CHANGE UP (ref 0–8)
GLUCOSE SERPL-MCNC: 131 MG/DL — HIGH (ref 70–99)
HCT VFR BLD CALC: 28.4 % — LOW (ref 42–52)
HGB BLD-MCNC: 8.8 G/DL — LOW (ref 14–18)
IMM GRANULOCYTES NFR BLD AUTO: 0.4 % — HIGH (ref 0.1–0.3)
LYMPHOCYTES # BLD AUTO: 0.93 K/UL — LOW (ref 1.2–3.4)
LYMPHOCYTES # BLD AUTO: 5.6 % — LOW (ref 20.5–51.1)
MCHC RBC-ENTMCNC: 27.8 PG — SIGNIFICANT CHANGE UP (ref 27–31)
MCHC RBC-ENTMCNC: 31 G/DL — LOW (ref 32–37)
MCV RBC AUTO: 89.9 FL — SIGNIFICANT CHANGE UP (ref 80–94)
MONOCYTES # BLD AUTO: 0.44 K/UL — SIGNIFICANT CHANGE UP (ref 0.1–0.6)
MONOCYTES NFR BLD AUTO: 2.7 % — SIGNIFICANT CHANGE UP (ref 1.7–9.3)
NEUTROPHILS # BLD AUTO: 14.95 K/UL — HIGH (ref 1.4–6.5)
NEUTROPHILS NFR BLD AUTO: 90.7 % — HIGH (ref 42.2–75.2)
NRBC # BLD: 0 /100 WBCS — SIGNIFICANT CHANGE UP (ref 0–0)
PLATELET # BLD AUTO: 307 K/UL — SIGNIFICANT CHANGE UP (ref 130–400)
POTASSIUM SERPL-MCNC: 3.9 MMOL/L — SIGNIFICANT CHANGE UP (ref 3.5–5)
POTASSIUM SERPL-SCNC: 3.9 MMOL/L — SIGNIFICANT CHANGE UP (ref 3.5–5)
RBC # BLD: 3.16 M/UL — LOW (ref 4.7–6.1)
RBC # FLD: 14.3 % — SIGNIFICANT CHANGE UP (ref 11.5–14.5)
SODIUM SERPL-SCNC: 148 MMOL/L — HIGH (ref 135–146)
WBC # BLD: 16.48 K/UL — HIGH (ref 4.8–10.8)
WBC # FLD AUTO: 16.48 K/UL — HIGH (ref 4.8–10.8)

## 2018-10-20 RX ADMIN — ALBUTEROL 2.5 MILLIGRAM(S): 90 AEROSOL, METERED ORAL at 07:38

## 2018-10-20 RX ADMIN — ALBUTEROL 2.5 MILLIGRAM(S): 90 AEROSOL, METERED ORAL at 16:44

## 2018-10-20 RX ADMIN — Medication 100 MILLIGRAM(S): at 05:41

## 2018-10-20 RX ADMIN — Medication 650 MILLIGRAM(S): at 12:14

## 2018-10-20 RX ADMIN — Medication 1 MILLIGRAM(S): at 17:57

## 2018-10-20 RX ADMIN — Medication 650 MILLIGRAM(S): at 23:30

## 2018-10-20 RX ADMIN — Medication 650 MILLIGRAM(S): at 17:39

## 2018-10-20 RX ADMIN — Medication 1 MILLIGRAM(S): at 12:15

## 2018-10-20 RX ADMIN — LORATADINE 10 MILLIGRAM(S): 10 TABLET ORAL at 17:56

## 2018-10-20 RX ADMIN — Medication 650 MILLIGRAM(S): at 05:40

## 2018-10-20 RX ADMIN — ALBUTEROL 2.5 MILLIGRAM(S): 90 AEROSOL, METERED ORAL at 00:04

## 2018-10-20 RX ADMIN — Medication 650 MILLIGRAM(S): at 23:00

## 2018-10-20 RX ADMIN — AMLODIPINE BESYLATE 10 MILLIGRAM(S): 2.5 TABLET ORAL at 05:40

## 2018-10-20 RX ADMIN — ALBUTEROL 2.5 MILLIGRAM(S): 90 AEROSOL, METERED ORAL at 11:38

## 2018-10-20 RX ADMIN — Medication 100 MILLIGRAM(S): at 17:56

## 2018-10-20 RX ADMIN — Medication 650 MILLIGRAM(S): at 17:56

## 2018-10-20 RX ADMIN — ALBUTEROL 2.5 MILLIGRAM(S): 90 AEROSOL, METERED ORAL at 21:28

## 2018-10-20 RX ADMIN — SENNA PLUS 2 TABLET(S): 8.6 TABLET ORAL at 21:53

## 2018-10-20 RX ADMIN — ALBUTEROL 2.5 MILLIGRAM(S): 90 AEROSOL, METERED ORAL at 04:03

## 2018-10-20 RX ADMIN — Medication 650 MILLIGRAM(S): at 18:22

## 2018-10-20 RX ADMIN — ENOXAPARIN SODIUM 40 MILLIGRAM(S): 100 INJECTION SUBCUTANEOUS at 21:53

## 2018-10-20 RX ADMIN — Medication 1 MILLIGRAM(S): at 23:00

## 2018-10-20 RX ADMIN — SIMVASTATIN 10 MILLIGRAM(S): 20 TABLET, FILM COATED ORAL at 21:53

## 2018-10-20 RX ADMIN — PANTOPRAZOLE SODIUM 40 MILLIGRAM(S): 20 TABLET, DELAYED RELEASE ORAL at 12:15

## 2018-10-20 RX ADMIN — Medication 40 MILLIGRAM(S): at 05:40

## 2018-10-20 NOTE — PROGRESS NOTE ADULT - SUBJECTIVE AND OBJECTIVE BOX
ALETA SUAZO  63y, Male  Allergy: No Known Allergies      LAST 24-Hr EVENTS:  patient appears comfortable  VITALS:  T(F): 98.1 (10-20-18 @ 14:10), Max: 98.4 (10-19-18 @ 21:00)  HR: 80 (10-20-18 @ 14:10)  BP: 95/51 (10-20-18 @ 14:10) (95/51 - 121/56)  RR: 20 (10-20-18 @ 14:10)  SpO2: 97% (10-19-18 @ 20:49)on low flow oxygen    PHYSICAL EXAM:    GENERAL: NAD, well-developed  HEAD:  Atraumatic, Normocephalic  NECK: Supple, No JVD  CHEST/LUNG: Clear to auscultation bilaterally; No wheeze; decreased breath sounds bilaterally  HEART: Regular rate and rhythm; No murmurs, rubs, or gallops  ABDOMEN: Soft, Nontender, Nondistended; Bowel sounds present  EXTREMITIES:  2+ Peripheral Pulses, No clubbing, cyanosis, or edema        TESTS & MEASUREMENTS:    IN: 1560 mL / OUT: 750 mL / NET: 810 mL    IN: 660 mL / OUT: 400 mL / NET: 260 mL                            8.8    16.48 )-----------( 307      ( 20 Oct 2018 08:37 )             28.4       10-20    148<H>  |  107  |  22<H>  ----------------------------<  131<H>  3.9   |  30  |  0.7    Ca    8.8      20 Oct 2018 08:37              Culture - Urine (collected 10-14-18 @ 01:00)  Source: .Urine Clean Catch (Midstream)  Final Report (10-15-18 @ 21:11):    No growth    Culture - Blood (collected 10-13-18 @ 20:40)  Source: .Blood Blood-Peripheral  Final Report (10-19-18 @ 07:00):    No growth at 5 days.    Culture - Blood (collected 10-13-18 @ 20:40)  Source: .Blood Blood-Peripheral  Final Report (10-19-18 @ 07:00):    No growth at 5 days.          ABG & VENT SETTINGS: (when applicable)  n/a      RADIOLOGY & ADDITIONAL TESTS:    MEDICATIONS:  MEDICATIONS  (STANDING):  acetaminophen   Tablet .. 650 milliGRAM(s) Oral every 6 hours  ALBUTerol    0.083% 2.5 milliGRAM(s) Nebulizer every 4 hours  ALPRAZolam 1 milliGRAM(s) Oral every 6 hours  amLODIPine   Tablet 10 milliGRAM(s) Oral daily  benzocaine 15 mG/menthol 3.6 mG Lozenge 1 Lozenge Oral three times a day  buDESOnide 160 MICROgram(s)/formoterol 4.5 MICROgram(s) Inhaler 2 Puff(s) Inhalation two times a day  docusate sodium Liquid 100 milliGRAM(s) Oral two times a day  enoxaparin Injectable 40 milliGRAM(s) SubCutaneous at bedtime  loratadine 10 milliGRAM(s) Oral daily  pantoprazole   Suspension 40 milliGRAM(s) Oral daily  predniSONE   Tablet 40 milliGRAM(s) Oral daily  senna 2 Tablet(s) Oral at bedtime  simvastatin 10 milliGRAM(s) Oral at bedtime    MEDICATIONS  (PRN):  guaiFENesin    Syrup 200 milliGRAM(s) Oral every 6 hours PRN Cough

## 2018-10-20 NOTE — PROGRESS NOTE ADULT - ASSESSMENT
Patient is a 62 yo M with COPD, chronic respiratory failure on BIPAP, HTN, CRF, DLD, vocal cord paralysis and dysphagia s/p PEG 5/18 in the setting of resistant esophageal candidiasis and inability to swallow. Brought in from [facility] ,for cough productive ,sob and difficulty breathing of 1 day. Received IV antibiotics with steroids and BiPAP with improvement in respiratory status. Patient also suffers from anxiety which contributes to breathing status.     Acute hypercapnic and hypoxic resp failure 2/2 COPD Exacerbation likely 2/2 URI: improved    Levaquine d/c'd  On 2L O2 by NC, decrease to 1.5L for a goal of 88-92% O2 sat  O2 Sat: 95%   On  Home O2 (2L)  Auscultation: No wheezes   PO Prednisone 40 mg QD for 3 days, day 3. Please decrease to 20 mg tomorrow  BIPAP at night and PRN   Albuterol PRN   CXR:  No radiographic evidence of acute cardiopulmonary disease. Left upper lobe nodule, grossly unchanged. Emphysema.  Physiatry Consult appreciated: Bedside PT 3-5x/wk, STR in SNF  PT Consult: 3-5x/wk  Nephro f/u appreciated: CRF / ARF, prednisone taper, f/u pulm, cont norvasc  Pulm f/u appreciated: continue low flow oxygen, bipap/avaps hs, maintenance medications, prednisone taper, can discontinue antibiotic      HTN/DLD: Stable    Cont Amlodipine 10mg daily  Cont with Simvastatin 10mg QHS     Anxiety: Stable    Continue with  home meds    DVT ppx;     Lovenox subQ     Diet:     PEG feedings     Activity:    OOBTC     Code:     Full Code     Dispo:     Pending

## 2018-10-20 NOTE — PROGRESS NOTE ADULT - SUBJECTIVE AND OBJECTIVE BOX
CHIEF COMPLAINT:    Patient is a 63y old Male who presents with a chief complaint of sob, cough and temp 103 (19 Oct 2018 17:27)    Currently admitted to medicine with the primary diagnosis of Sepsis     Today is hospital day 7d. This morning he is resting comfortably in bed and reports no new issues or overnight events.     PAST MEDICAL & SURGICAL HISTORY  PEG (percutaneous endoscopic gastrostomy) status  Other hydrocele  Anxiety disorder, unspecified type  Hypertension, unspecified type  High cholesterol  Chronic obstructive pulmonary disease, unspecified COPD type  PEG (percutaneous endoscopic gastrostomy) status    SOCIAL HISTORY:  Negative for smoking/alcohol/drug use.     ALLERGIES:  No Known Allergies    MEDICATIONS:  STANDING MEDICATIONS  acetaminophen   Tablet .. 650 milliGRAM(s) Oral every 6 hours  ALBUTerol    0.083% 2.5 milliGRAM(s) Nebulizer every 4 hours  ALPRAZolam 1 milliGRAM(s) Oral every 6 hours  amLODIPine   Tablet 10 milliGRAM(s) Oral daily  benzocaine 15 mG/menthol 3.6 mG Lozenge 1 Lozenge Oral three times a day  buDESOnide 160 MICROgram(s)/formoterol 4.5 MICROgram(s) Inhaler 2 Puff(s) Inhalation two times a day  docusate sodium Liquid 100 milliGRAM(s) Oral two times a day  enoxaparin Injectable 40 milliGRAM(s) SubCutaneous at bedtime  loratadine 10 milliGRAM(s) Oral daily  pantoprazole   Suspension 40 milliGRAM(s) Oral daily  predniSONE   Tablet 40 milliGRAM(s) Oral daily  senna 2 Tablet(s) Oral at bedtime  simvastatin 10 milliGRAM(s) Oral at bedtime    PRN MEDICATIONS  guaiFENesin    Syrup 200 milliGRAM(s) Oral every 6 hours PRN    VITALS:   T(F): 96  HR: 62  BP: 102/51  RR: 18  SpO2: 97%    LABS:                        8.7    13.57 )-----------( 298      ( 19 Oct 2018 07:02 )             28.2     10-19    150<H>  |  109  |  25<H>  ----------------------------<  197<H>  3.7   |  29  |  0.8    Ca    8.6      19 Oct 2018 07:02                      RADIOLOGY:    PHYSICAL EXAM:  GEN: No acute distress  PULM: Clear to auscultation bilaterally   CARD: S1/S2 present. RRR.   GI: Soft, non-tender, non-distended. Bowel sounds present  MSK: NC/NC/NE/2+PP/LOONEY  NEURO: AAOX3

## 2018-10-20 NOTE — PROGRESS NOTE ADULT - SUBJECTIVE AND OBJECTIVE BOX
CHIEF COMPLAINT:    Patient is a 63y old Male who presents with a chief complaint of sob, cough and temp 103 (19 Oct 2018 17:27)    Currently admitted to medicine with the primary diagnosis of Sepsis     Today is hospital day 7d. This morning he is resting comfortably in bed and reports no new issues or overnight events.     PAST MEDICAL & SURGICAL HISTORY  PEG (percutaneous endoscopic gastrostomy) status  Other hydrocele  Anxiety disorder, unspecified type  Hypertension, unspecified type  High cholesterol  Chronic obstructive pulmonary disease, unspecified COPD type  PEG (percutaneous endoscopic gastrostomy) status    SOCIAL HISTORY:  Negative for smoking/alcohol/drug use.     ALLERGIES:  No Known Allergies    MEDICATIONS:  STANDING MEDICATIONS  acetaminophen   Tablet .. 650 milliGRAM(s) Oral every 6 hours  ALBUTerol    0.083% 2.5 milliGRAM(s) Nebulizer every 4 hours  ALPRAZolam 1 milliGRAM(s) Oral every 6 hours  amLODIPine   Tablet 10 milliGRAM(s) Oral daily  benzocaine 15 mG/menthol 3.6 mG Lozenge 1 Lozenge Oral three times a day  buDESOnide 160 MICROgram(s)/formoterol 4.5 MICROgram(s) Inhaler 2 Puff(s) Inhalation two times a day  docusate sodium Liquid 100 milliGRAM(s) Oral two times a day  enoxaparin Injectable 40 milliGRAM(s) SubCutaneous at bedtime  loratadine 10 milliGRAM(s) Oral daily  pantoprazole   Suspension 40 milliGRAM(s) Oral daily  predniSONE   Tablet 40 milliGRAM(s) Oral daily  senna 2 Tablet(s) Oral at bedtime  simvastatin 10 milliGRAM(s) Oral at bedtime    PRN MEDICATIONS  guaiFENesin    Syrup 200 milliGRAM(s) Oral every 6 hours PRN    VITALS:   T(F): 96  HR: 62  BP: 102/51  RR: 18  SpO2: 97%    LABS:                        8.7    13.57 )-----------( 298      ( 19 Oct 2018 07:02 )             28.2     10-19    150<H>  |  109  |  25<H>  ----------------------------<  197<H>  3.7   |  29  |  0.8    Ca    8.6      19 Oct 2018 07:02                      RADIOLOGY:    PHYSICAL EXAM:  GEN: No acute distress  PULM: Clear to auscultation bilaterally   CARD: S1/S2 present. RRR.   GI: Soft, non-tender, non-distended. Bowel sounds present  MSK: NC/NC/NE/2+PP/LOONEY  NEURO: AAOX3 CHIEF COMPLAINT:    Patient is a 63y old Male who presents with a chief complaint of sob, cough and temp 103 (19 Oct 2018 17:27)    Currently admitted to medicine with the primary diagnosis of Sepsis     Today is hospital day 7d. This morning he is resting comfortably in bed and reports no new issues or overnight events. Pt denies fever, chills, sob, cp, abd pain. Still has mild cough. He remains anxious and is always using oxygen even when hen does not need it. This am, he was saturating at 96% on 1L of O2.    PAST MEDICAL & SURGICAL HISTORY  PEG (percutaneous endoscopic gastrostomy) status  Other hydrocele  Anxiety disorder, unspecified type  Hypertension, unspecified type  High cholesterol  Chronic obstructive pulmonary disease, unspecified COPD type  PEG (percutaneous endoscopic gastrostomy) status    SOCIAL HISTORY:  Negative for smoking/alcohol/drug use.     ALLERGIES:  No Known Allergies    MEDICATIONS:  STANDING MEDICATIONS  acetaminophen   Tablet .. 650 milliGRAM(s) Oral every 6 hours  ALBUTerol    0.083% 2.5 milliGRAM(s) Nebulizer every 4 hours  ALPRAZolam 1 milliGRAM(s) Oral every 6 hours  amLODIPine   Tablet 10 milliGRAM(s) Oral daily  benzocaine 15 mG/menthol 3.6 mG Lozenge 1 Lozenge Oral three times a day  buDESOnide 160 MICROgram(s)/formoterol 4.5 MICROgram(s) Inhaler 2 Puff(s) Inhalation two times a day  docusate sodium Liquid 100 milliGRAM(s) Oral two times a day  enoxaparin Injectable 40 milliGRAM(s) SubCutaneous at bedtime  loratadine 10 milliGRAM(s) Oral daily  pantoprazole   Suspension 40 milliGRAM(s) Oral daily  predniSONE   Tablet 40 milliGRAM(s) Oral daily  senna 2 Tablet(s) Oral at bedtime  simvastatin 10 milliGRAM(s) Oral at bedtime    PRN MEDICATIONS  guaiFENesin    Syrup 200 milliGRAM(s) Oral every 6 hours PRN    VITALS:   T(F): 96  HR: 62  BP: 102/51  RR: 18  SpO2: 97%    LABS:                        8.7    13.57 )-----------( 298      ( 19 Oct 2018 07:02 )             28.2     10-19    150<H>  |  109  |  25<H>  ----------------------------<  197<H>  3.7   |  29  |  0.8    Ca    8.6      19 Oct 2018 07:02                      RADIOLOGY:    PHYSICAL EXAM:  GEN: No acute distress, O2 sat on RA 94%  PULM: Clear to auscultation bilaterally, no wheezes   CARD: S1/S2 present. RRR.   GI: Soft, non-tender, non-distended. Bowel sounds present, PEG tube functional  MSK: NC/NC/NE/2+PP  NEURO: AAOX3

## 2018-10-20 NOTE — PROGRESS NOTE ADULT - ASSESSMENT
s/p acute hypoxic respiratory failure  chronic hypoxic and hypercapnic respiratory failure  copd exacerbation, improved  multiple pulmonary nodules  anxiety disorder        oxygen per pulse oximetry  noninvasive ventilator support with sleep and prn  bronchodilators  prednisone  monitor off antibiotic  gi/dvt prophylaxis  enteric feeds/aspiration precautions  antianxiety medications  pulmonary status stable for discharge planning

## 2018-10-20 NOTE — PROGRESS NOTE ADULT - ASSESSMENT
Patient is a 62 yo M with COPD, chronic respiratory failure on BIPAP, HTN, CRF, DLD, vocal cord paralysis and dysphagia s/p PEG 5/18 in the setting of resistant esophageal candidiasis and inability to swallow. Brought in from [facility] ,for cough productive ,sob and difficulty breathing of 1 day. Received IV antibiotics with steroids and BiPAP with improvement in respiratory status. Patient also suffers from anxiety which contributes to breathing status.     Acute hypercapnic and hypoxic resp failure 2/2 COPD Exacerbation likely 2/2 URI: improved    Levaquine d/c'd  On 2L O2 by NC, decrease to 1.5L for a goal of 88-92% O2 sat  O2 Sat: 95%   On  Home O2 (2L)  Auscultation: No wheezes   PO Prednisone 40 mg QD for 3 days, day 3. Please decrease to 20 mg tomorrow  BIPAP at night and PRN   Albuterol PRN   CXR:  No radiographic evidence of acute cardiopulmonary disease. Left upper lobe nodule, grossly unchanged. Emphysema.  Physiatry Consult appreciated: Bedside PT 3-5x/wk, STR in SNF  PT Consult: 3-5x/wk  Nephro f/u appreciated: CRF / ARF, prednisone taper, f/u pulm, cont norvasc  Pulm f/u appreciated: continue low flow oxygen, bipap/avaps hs, maintenance medications, prednisone taper, can discontinue antibiotic      HTN/DLD: Stable    Cont Amlodipine 10mg daily  Cont with Simvastatin 10mg QHS     Anxiety: Stable    Continue with  home meds    DVT ppx;     Lovenox subQ     Diet:     PEG feedings     Activity:    OOBTC     Code:     Full Code     Dispo:     Pending Patient is a 64 yo M with COPD, chronic respiratory failure on BIPAP, HTN, CRF, DLD, vocal cord paralysis and dysphagia s/p PEG 5/18 in the setting of resistant esophageal candidiasis and inability to swallow. Brought in from [facility] ,for cough productive ,sob and difficulty breathing of 1 day. Received IV antibiotics with steroids and BiPAP with improvement in respiratory status. Patient also suffers from anxiety which contributes to breathing status.     Acute hypercapnic and hypoxic resp failure 2/2 COPD Exacerbation likely 2/2 URI: improved    Completed course of abxs  On RA, O2 sat was 94%   On  Home O2 (2L)  Auscultation: No wheezes   PO Prednisone 40 mg QD for 3 days, day 3. Please decrease to 30 mg on 10/21  BIPAP at night and PRN   Albuterol PRN   CXR:  No radiographic evidence of acute cardiopulmonary disease. Left upper lobe nodule, grossly unchanged. Emphysema.        HTN/DLD: Stable    Cont Amlodipine 10mg daily  Cont with Simvastatin 10mg QHS     Anxiety: Bettere    Continue with Xanax    DVT ppx;     Lovenox subQ     Diet:     PEG feedings     Activity:    OOBTC     Code:     Full Code     Dispo:     Awaiting placement Patient is a 62 yo M with COPD, chronic respiratory failure on BIPAP, HTN, CRF, DLD, vocal cord paralysis and dysphagia s/p PEG 5/18 in the setting of resistant esophageal candidiasis and inability to swallow. Brought in from [facility] ,for cough productive ,sob and difficulty breathing of 1 day. Received IV antibiotics with steroids and BiPAP with improvement in respiratory status. Patient also suffers from anxiety which contributes to breathing status.     Acute hypercapnic and hypoxic resp failure 2/2 COPD Exacerbation likely 2/2 URI: improved    Completed course of abxs  On RA, O2 sat was 94%   On  Home O2 (2L)  Auscultation: No wheezes   PO Prednisone 40 mg QD for 3 days, day 3. Please decrease to 30 mg on 10/21  BIPAP at night and PRN   Albuterol PRN   CXR:  No radiographic evidence of acute cardiopulmonary disease. Left upper lobe nodule, grossly unchanged. Emphysema.  Pulm f/u appreciated: oxygen per pulse oximetry, prednisone taper, monitor off antibiotics, antianxiety medications, pulmonary status stable for discharge planning      HTN/DLD: Stable    Cont Amlodipine 10mg daily  Cont with Simvastatin 10mg QHS     Anxiety: Bettere    Continue with Xanax    DVT ppx;     Lovenox subQ     Diet:     PEG feedings     Activity:    OOBTC     Code:     Full Code     Dispo:     Awaiting placement

## 2018-10-21 LAB
ANION GAP SERPL CALC-SCNC: 10 MMOL/L — SIGNIFICANT CHANGE UP (ref 7–14)
BASOPHILS # BLD AUTO: 0.01 K/UL — SIGNIFICANT CHANGE UP (ref 0–0.2)
BASOPHILS NFR BLD AUTO: 0.1 % — SIGNIFICANT CHANGE UP (ref 0–1)
BUN SERPL-MCNC: 18 MG/DL — SIGNIFICANT CHANGE UP (ref 10–20)
CALCIUM SERPL-MCNC: 8.9 MG/DL — SIGNIFICANT CHANGE UP (ref 8.5–10.1)
CHLORIDE SERPL-SCNC: 109 MMOL/L — SIGNIFICANT CHANGE UP (ref 98–110)
CO2 SERPL-SCNC: 29 MMOL/L — SIGNIFICANT CHANGE UP (ref 17–32)
CREAT SERPL-MCNC: 0.6 MG/DL — LOW (ref 0.7–1.5)
EOSINOPHIL # BLD AUTO: 0.3 K/UL — SIGNIFICANT CHANGE UP (ref 0–0.7)
EOSINOPHIL NFR BLD AUTO: 2.7 % — SIGNIFICANT CHANGE UP (ref 0–8)
GLUCOSE SERPL-MCNC: 90 MG/DL — SIGNIFICANT CHANGE UP (ref 70–99)
HCT VFR BLD CALC: 29.3 % — LOW (ref 42–52)
HGB BLD-MCNC: 9.1 G/DL — LOW (ref 14–18)
IMM GRANULOCYTES NFR BLD AUTO: 0.4 % — HIGH (ref 0.1–0.3)
LYMPHOCYTES # BLD AUTO: 1.86 K/UL — SIGNIFICANT CHANGE UP (ref 1.2–3.4)
LYMPHOCYTES # BLD AUTO: 16.6 % — LOW (ref 20.5–51.1)
MCHC RBC-ENTMCNC: 27.9 PG — SIGNIFICANT CHANGE UP (ref 27–31)
MCHC RBC-ENTMCNC: 31.1 G/DL — LOW (ref 32–37)
MCV RBC AUTO: 89.9 FL — SIGNIFICANT CHANGE UP (ref 80–94)
MONOCYTES # BLD AUTO: 1.05 K/UL — HIGH (ref 0.1–0.6)
MONOCYTES NFR BLD AUTO: 9.3 % — SIGNIFICANT CHANGE UP (ref 1.7–9.3)
NEUTROPHILS # BLD AUTO: 7.97 K/UL — HIGH (ref 1.4–6.5)
NEUTROPHILS NFR BLD AUTO: 70.9 % — SIGNIFICANT CHANGE UP (ref 42.2–75.2)
NRBC # BLD: 0 /100 WBCS — SIGNIFICANT CHANGE UP (ref 0–0)
PLATELET # BLD AUTO: 322 K/UL — SIGNIFICANT CHANGE UP (ref 130–400)
POTASSIUM SERPL-MCNC: 3.7 MMOL/L — SIGNIFICANT CHANGE UP (ref 3.5–5)
POTASSIUM SERPL-SCNC: 3.7 MMOL/L — SIGNIFICANT CHANGE UP (ref 3.5–5)
RBC # BLD: 3.26 M/UL — LOW (ref 4.7–6.1)
RBC # FLD: 14.6 % — HIGH (ref 11.5–14.5)
SODIUM SERPL-SCNC: 148 MMOL/L — HIGH (ref 135–146)
WBC # BLD: 11.23 K/UL — HIGH (ref 4.8–10.8)
WBC # FLD AUTO: 11.23 K/UL — HIGH (ref 4.8–10.8)

## 2018-10-21 RX ADMIN — SIMVASTATIN 10 MILLIGRAM(S): 20 TABLET, FILM COATED ORAL at 22:56

## 2018-10-21 RX ADMIN — Medication 1 MILLIGRAM(S): at 06:14

## 2018-10-21 RX ADMIN — Medication 650 MILLIGRAM(S): at 12:38

## 2018-10-21 RX ADMIN — SENNA PLUS 2 TABLET(S): 8.6 TABLET ORAL at 22:56

## 2018-10-21 RX ADMIN — AMLODIPINE BESYLATE 10 MILLIGRAM(S): 2.5 TABLET ORAL at 06:17

## 2018-10-21 RX ADMIN — Medication 1 MILLIGRAM(S): at 23:01

## 2018-10-21 RX ADMIN — ALBUTEROL 2.5 MILLIGRAM(S): 90 AEROSOL, METERED ORAL at 04:27

## 2018-10-21 RX ADMIN — Medication 1 MILLIGRAM(S): at 17:59

## 2018-10-21 RX ADMIN — ENOXAPARIN SODIUM 40 MILLIGRAM(S): 100 INJECTION SUBCUTANEOUS at 22:56

## 2018-10-21 RX ADMIN — ALBUTEROL 2.5 MILLIGRAM(S): 90 AEROSOL, METERED ORAL at 08:08

## 2018-10-21 RX ADMIN — ALBUTEROL 2.5 MILLIGRAM(S): 90 AEROSOL, METERED ORAL at 12:31

## 2018-10-21 RX ADMIN — Medication 650 MILLIGRAM(S): at 06:45

## 2018-10-21 RX ADMIN — Medication 100 MILLIGRAM(S): at 17:58

## 2018-10-21 RX ADMIN — Medication 1 MILLIGRAM(S): at 12:38

## 2018-10-21 RX ADMIN — Medication 650 MILLIGRAM(S): at 18:54

## 2018-10-21 RX ADMIN — Medication 100 MILLIGRAM(S): at 06:18

## 2018-10-21 RX ADMIN — Medication 650 MILLIGRAM(S): at 23:00

## 2018-10-21 RX ADMIN — Medication 650 MILLIGRAM(S): at 12:39

## 2018-10-21 RX ADMIN — ALBUTEROL 2.5 MILLIGRAM(S): 90 AEROSOL, METERED ORAL at 16:14

## 2018-10-21 RX ADMIN — Medication 650 MILLIGRAM(S): at 06:14

## 2018-10-21 RX ADMIN — ALBUTEROL 2.5 MILLIGRAM(S): 90 AEROSOL, METERED ORAL at 21:10

## 2018-10-21 RX ADMIN — LORATADINE 10 MILLIGRAM(S): 10 TABLET ORAL at 12:39

## 2018-10-21 RX ADMIN — PANTOPRAZOLE SODIUM 40 MILLIGRAM(S): 20 TABLET, DELAYED RELEASE ORAL at 12:35

## 2018-10-21 RX ADMIN — ALBUTEROL 2.5 MILLIGRAM(S): 90 AEROSOL, METERED ORAL at 00:24

## 2018-10-21 RX ADMIN — Medication 20 MILLIGRAM(S): at 06:18

## 2018-10-21 RX ADMIN — Medication 650 MILLIGRAM(S): at 17:58

## 2018-10-21 NOTE — PROGRESS NOTE ADULT - ASSESSMENT
acute/chronic hypoxic, hypercapnic respiratory failure, improved  copd exacerbation, improved  multiple pulmonary nodules  anxiety disorder      oxygen as needed, continue to monitor SpO2 prn  bronchodilators  prednisone taper  seems stable off antibiotics  enteric feeds  gi/dvt prophylaxis  anxiolytic therapy  discharge planning back to snf

## 2018-10-21 NOTE — PROGRESS NOTE ADULT - SUBJECTIVE AND OBJECTIVE BOX
ALETA SUAZO  63y, Male  Allergy: No Known Allergies      LAST 24-Hr EVENTS:  more anxious off ooxygen  VITALS:  T(F): 97.7 (10-21-18 @ 13:19), Max: 97.7 (10-21-18 @ 13:19)  HR: 68 (10-21-18 @ 13:19)  BP: 117/57 (10-21-18 @ 13:19) (103/51 - 117/57)  RR: 18 (10-21-18 @ 13:19)  SpO2: 98% (10-21-18 @ 06:12)    PHYSICAL EXAM:    GENERAL: NAD, well-developed  HEAD:  Atraumatic, Normocephalic  NECK: Supple, No JVD  CHEST/LUNG: Clear to auscultation bilaterally; No wheeze  HEART: Regular rate and rhythm; No murmurs, rubs, or gallops  ABDOMEN: Soft, Nontender, Nondistended; Bowel sounds present  EXTREMITIES:  2+ Peripheral Pulses, No clubbing, cyanosis, or edema        TESTS & MEASUREMENTS:    IN: 1560 mL / OUT: 750 mL / NET: 810 mL    IN: 660 mL / OUT: 800 mL / NET: -140 mL                            9.1    11.23 )-----------( 322      ( 21 Oct 2018 06:44 )             29.3       10-21    148<H>  |  109  |  18  ----------------------------<  90  3.7   |  29  |  0.6<L>    Ca    8.9      21 Oct 2018 06:44                  ABG & VENT SETTINGS: (when applicable)  n/a      RADIOLOGY & ADDITIONAL TESTS:    MEDICATIONS:  MEDICATIONS  (STANDING):  acetaminophen   Tablet .. 650 milliGRAM(s) Oral every 6 hours  ALBUTerol    0.083% 2.5 milliGRAM(s) Nebulizer every 4 hours  ALPRAZolam 1 milliGRAM(s) Oral every 6 hours  amLODIPine   Tablet 10 milliGRAM(s) Oral daily  benzocaine 15 mG/menthol 3.6 mG Lozenge 1 Lozenge Oral three times a day  buDESOnide 160 MICROgram(s)/formoterol 4.5 MICROgram(s) Inhaler 2 Puff(s) Inhalation two times a day  docusate sodium Liquid 100 milliGRAM(s) Oral two times a day  enoxaparin Injectable 40 milliGRAM(s) SubCutaneous at bedtime  loratadine 10 milliGRAM(s) Oral daily  pantoprazole   Suspension 40 milliGRAM(s) Oral daily  predniSONE   Tablet 20 milliGRAM(s) Oral daily  senna 2 Tablet(s) Oral at bedtime  simvastatin 10 milliGRAM(s) Oral at bedtime    MEDICATIONS  (PRN):  guaiFENesin    Syrup 200 milliGRAM(s) Oral every 6 hours PRN Cough

## 2018-10-21 NOTE — PROGRESS NOTE ADULT - SUBJECTIVE AND OBJECTIVE BOX
Patient was seen and examined. Spoke with RN. Chart reviewed.  No events overnight.  Vital Signs Last 24 Hrs  T(F): 96.4 (21 Oct 2018 05:05), Max: 98.1 (20 Oct 2018 14:10)  HR: 96 (21 Oct 2018 05:05) (80 - 96)  BP: 109/586 (21 Oct 2018 05:05) (95/51 - 109/586)  SpO2: 98% (21 Oct 2018 06:12) (96% - 98%)  MEDICATIONS  (STANDING):  acetaminophen   Tablet .. 650 milliGRAM(s) Oral every 6 hours  ALBUTerol    0.083% 2.5 milliGRAM(s) Nebulizer every 4 hours  ALPRAZolam 1 milliGRAM(s) Oral every 6 hours  amLODIPine   Tablet 10 milliGRAM(s) Oral daily  benzocaine 15 mG/menthol 3.6 mG Lozenge 1 Lozenge Oral three times a day  buDESOnide 160 MICROgram(s)/formoterol 4.5 MICROgram(s) Inhaler 2 Puff(s) Inhalation two times a day  docusate sodium Liquid 100 milliGRAM(s) Oral two times a day  enoxaparin Injectable 40 milliGRAM(s) SubCutaneous at bedtime  loratadine 10 milliGRAM(s) Oral daily  pantoprazole   Suspension 40 milliGRAM(s) Oral daily  predniSONE   Tablet 20 milliGRAM(s) Oral daily  senna 2 Tablet(s) Oral at bedtime  simvastatin 10 milliGRAM(s) Oral at bedtime    MEDICATIONS  (PRN):  guaiFENesin    Syrup 200 milliGRAM(s) Oral every 6 hours PRN Cough    Labs:                        9.1    11.23 )-----------( 322      ( 21 Oct 2018 06:44 )             29.3                         8.8    16.48 )-----------( 307      ( 20 Oct 2018 08:37 )             28.4     20 Oct 2018 08:37    148    |  107    |  22     ----------------------------<  131    3.9     |  30     |  0.7      Ca    8.8        20 Oct 2018 08:37            General: comfortable, NAD  Neurology: nonfocal  Head:  Normocephalic, atraumatic  ENT:  Mucosa moist, no ulcerations  Neck:  Supple, no JVD,   Skin: no breakdowns (as per RN)  Resp: CTA B/L  CV: RRR, S1S2,   GI: Soft, NT, bowel sounds  MS: No edema, + peripheral pulses,       A/P:  62 yo M with COPD, chronic respiratory failure on BIPAP, HTN, CRF, DLD, vocal cord paralysis and dysphagia s/p PEG 5/18 in the setting of resistant esophageal candidiasis and inability to swallow. Brought in from [facility] ,for cough productive ,sob and difficulty breathing; Received IV antibiotics with steroids and BiPAP with improvement in respiratory status.     Acute hypercapnic and hypoxic resp failure 2/2 COPD Exacerbation likely 2/2 URI: improving    On  Home O2 (2L)    BIPAP at night and PRN   Albuterol PRN     Physiatry Consult appreciated: Bedside PT 3-5x/wk, STR in SNF    Pulm f/u appreciated: continue supplemental oxygen per pulse oximetry, bipap hs and prn, prednisone taper.    DC planning    DVT prophylaxis  Decubitus prevention- all measures as per RN protocol  Please call or text me with any questions or updates

## 2018-10-22 LAB
ANION GAP SERPL CALC-SCNC: 9 MMOL/L — SIGNIFICANT CHANGE UP (ref 7–14)
BASOPHILS # BLD AUTO: 0.01 K/UL — SIGNIFICANT CHANGE UP (ref 0–0.2)
BASOPHILS NFR BLD AUTO: 0.1 % — SIGNIFICANT CHANGE UP (ref 0–1)
BUN SERPL-MCNC: 18 MG/DL — SIGNIFICANT CHANGE UP (ref 10–20)
CALCIUM SERPL-MCNC: 9.1 MG/DL — SIGNIFICANT CHANGE UP (ref 8.5–10.1)
CHLORIDE SERPL-SCNC: 102 MMOL/L — SIGNIFICANT CHANGE UP (ref 98–110)
CO2 SERPL-SCNC: 30 MMOL/L — SIGNIFICANT CHANGE UP (ref 17–32)
CREAT SERPL-MCNC: 0.7 MG/DL — SIGNIFICANT CHANGE UP (ref 0.7–1.5)
EOSINOPHIL # BLD AUTO: 0.19 K/UL — SIGNIFICANT CHANGE UP (ref 0–0.7)
EOSINOPHIL NFR BLD AUTO: 1.4 % — SIGNIFICANT CHANGE UP (ref 0–8)
GLUCOSE SERPL-MCNC: 144 MG/DL — HIGH (ref 70–99)
HCT VFR BLD CALC: 31.8 % — LOW (ref 42–52)
HGB BLD-MCNC: 9.8 G/DL — LOW (ref 14–18)
IMM GRANULOCYTES NFR BLD AUTO: 0.6 % — HIGH (ref 0.1–0.3)
LYMPHOCYTES # BLD AUTO: 0.73 K/UL — LOW (ref 1.2–3.4)
LYMPHOCYTES # BLD AUTO: 5.3 % — LOW (ref 20.5–51.1)
MCHC RBC-ENTMCNC: 27.8 PG — SIGNIFICANT CHANGE UP (ref 27–31)
MCHC RBC-ENTMCNC: 30.8 G/DL — LOW (ref 32–37)
MCV RBC AUTO: 90.1 FL — SIGNIFICANT CHANGE UP (ref 80–94)
MONOCYTES # BLD AUTO: 0.57 K/UL — SIGNIFICANT CHANGE UP (ref 0.1–0.6)
MONOCYTES NFR BLD AUTO: 4.1 % — SIGNIFICANT CHANGE UP (ref 1.7–9.3)
NEUTROPHILS # BLD AUTO: 12.29 K/UL — HIGH (ref 1.4–6.5)
NEUTROPHILS NFR BLD AUTO: 88.5 % — HIGH (ref 42.2–75.2)
NRBC # BLD: 0 /100 WBCS — SIGNIFICANT CHANGE UP (ref 0–0)
PLATELET # BLD AUTO: 332 K/UL — SIGNIFICANT CHANGE UP (ref 130–400)
POTASSIUM SERPL-MCNC: 4.2 MMOL/L — SIGNIFICANT CHANGE UP (ref 3.5–5)
POTASSIUM SERPL-SCNC: 4.2 MMOL/L — SIGNIFICANT CHANGE UP (ref 3.5–5)
RBC # BLD: 3.53 M/UL — LOW (ref 4.7–6.1)
RBC # FLD: 14.7 % — HIGH (ref 11.5–14.5)
SODIUM SERPL-SCNC: 141 MMOL/L — SIGNIFICANT CHANGE UP (ref 135–146)
WBC # BLD: 13.87 K/UL — HIGH (ref 4.8–10.8)
WBC # FLD AUTO: 13.87 K/UL — HIGH (ref 4.8–10.8)

## 2018-10-22 RX ADMIN — Medication 650 MILLIGRAM(S): at 06:11

## 2018-10-22 RX ADMIN — PANTOPRAZOLE SODIUM 40 MILLIGRAM(S): 20 TABLET, DELAYED RELEASE ORAL at 11:18

## 2018-10-22 RX ADMIN — SENNA PLUS 2 TABLET(S): 8.6 TABLET ORAL at 22:06

## 2018-10-22 RX ADMIN — ENOXAPARIN SODIUM 40 MILLIGRAM(S): 100 INJECTION SUBCUTANEOUS at 22:06

## 2018-10-22 RX ADMIN — Medication 20 MILLIGRAM(S): at 06:08

## 2018-10-22 RX ADMIN — SIMVASTATIN 10 MILLIGRAM(S): 20 TABLET, FILM COATED ORAL at 22:06

## 2018-10-22 RX ADMIN — Medication 650 MILLIGRAM(S): at 11:19

## 2018-10-22 RX ADMIN — Medication 1 MILLIGRAM(S): at 17:06

## 2018-10-22 RX ADMIN — Medication 100 MILLIGRAM(S): at 17:06

## 2018-10-22 RX ADMIN — ALBUTEROL 2.5 MILLIGRAM(S): 90 AEROSOL, METERED ORAL at 20:03

## 2018-10-22 RX ADMIN — ALBUTEROL 2.5 MILLIGRAM(S): 90 AEROSOL, METERED ORAL at 04:31

## 2018-10-22 RX ADMIN — Medication 1 MILLIGRAM(S): at 23:07

## 2018-10-22 RX ADMIN — ALBUTEROL 2.5 MILLIGRAM(S): 90 AEROSOL, METERED ORAL at 00:30

## 2018-10-22 RX ADMIN — Medication 1 MILLIGRAM(S): at 06:11

## 2018-10-22 RX ADMIN — LORATADINE 10 MILLIGRAM(S): 10 TABLET ORAL at 11:19

## 2018-10-22 RX ADMIN — Medication 650 MILLIGRAM(S): at 23:09

## 2018-10-22 RX ADMIN — Medication 650 MILLIGRAM(S): at 23:08

## 2018-10-22 RX ADMIN — Medication 1 MILLIGRAM(S): at 11:19

## 2018-10-22 RX ADMIN — ALBUTEROL 2.5 MILLIGRAM(S): 90 AEROSOL, METERED ORAL at 10:44

## 2018-10-22 RX ADMIN — BENZOCAINE AND MENTHOL 1 LOZENGE: 5; 1 LIQUID ORAL at 13:15

## 2018-10-22 RX ADMIN — Medication 650 MILLIGRAM(S): at 17:06

## 2018-10-22 RX ADMIN — Medication 100 MILLIGRAM(S): at 06:08

## 2018-10-22 NOTE — PROGRESS NOTE ADULT - SUBJECTIVE AND OBJECTIVE BOX
OVERNIGHT EVENTS: None    Subjective:   pt s/e at bedside. No active complaints, resting comfortably    HISTORY OF PRESENTING ILLNESS:   Reason for Admission: sob, cough and temp 103	  History of Present Illness: 	  62 yo M with COPD, chronic respiratory failure on BIPAP, HTN, DLD, vocal cord paralysis and dysphagia s/p PEG 5/ 18 in the setting of resistant esophageal candidiasis and inability to swallow. Today bought in from [facility] ,for cough productive ,sob and difficulty breathing of 1 day. He has a recent admission in 6/18 for COPD ex too.    In ER he  was tachyonic, temp of 103 ,given  respiratory  treatment *3 and was placed on BIPAP and admitted for COPD excerabtion also given IV antibiotics     Past Medical History:  Anxiety disorder, unspecified type    Chronic obstructive pulmonary disease, unspecified COPD type    High cholesterol    Hypertension, unspecified type    Other hydrocele    PEG (percutaneous endoscopic gastrostomy) status.    Past Surgical History:  PEG (percutaneous endoscopic gastrostomy) status.    MEDICATIONS:  STANDING MEDICATIONS  acetaminophen   Tablet .. 650 milliGRAM(s) Oral every 6 hours  ALBUTerol    0.083% 2.5 milliGRAM(s) Nebulizer every 4 hours  ALPRAZolam 1 milliGRAM(s) Oral every 6 hours  amLODIPine   Tablet 10 milliGRAM(s) Oral daily  benzocaine 15 mG/menthol 3.6 mG Lozenge 1 Lozenge Oral three times a day  buDESOnide 160 MICROgram(s)/formoterol 4.5 MICROgram(s) Inhaler 2 Puff(s) Inhalation two times a day  docusate sodium Liquid 100 milliGRAM(s) Oral two times a day  enoxaparin Injectable 40 milliGRAM(s) SubCutaneous at bedtime  loratadine 10 milliGRAM(s) Oral daily  pantoprazole   Suspension 40 milliGRAM(s) Oral daily  predniSONE   Tablet 20 milliGRAM(s) Oral daily  senna 2 Tablet(s) Oral at bedtime  simvastatin 10 milliGRAM(s) Oral at bedtime    PRN MEDICATIONS  guaiFENesin    Syrup 200 milliGRAM(s) Oral every 6 hours PRN    VITALS:   T(F): 98.1  HR: 73  BP: 110/56  RR: 20  SpO2: 95%    LABS:                        9.8    13.87 )-----------( 332      ( 22 Oct 2018 09:15 )             31.8     10-22    141  |  102  |  18  ----------------------------<  144<H>  4.2   |  30  |  0.7    Ca    9.1      22 Oct 2018 09:15        RADIOLOGY:  < from: Xray Chest 1 View- PORTABLE-Routine (10.15.18 @ 06:45) >  Impression:      No radiographic evidence of acute cardiopulmonary disease.        PHYSICAL EXAM:  GENERAL: No acute distress, well-developed  HEAD:  Atraumatic, Normocephalic  EYES: EOMI, PERRLA, conjunctiva and sclera clear  NECK: Supple, no lymphadenopathy, no JVD  CHEST/LUNG: CTAB; No wheezes, rales, or rhonchi  HEART: Regular rate and rhythm; No murmurs, rubs, or gallops  ABDOMEN: Soft, non-tender, non-distended; normal bowel sounds, no organomegaly  EXTREMITIES:  2+ peripheral pulses b/l, No clubbing, cyanosis, or edema  NEUROLOGY: A&O x 3, no focal deficits  SKIN: No rashes or lesions OVERNIGHT EVENTS: None    Subjective:   pt s/e at bedside. No active complaints, pt c/o he has been sleeping too much and his throat is bothering him, feels very dry. He is NPO for now, offered him lozenges to provide some relief.     HISTORY OF PRESENTING ILLNESS:   Reason for Admission: sob, cough and temp 103	  History of Present Illness: 	  64 yo M with COPD, chronic respiratory failure on BIPAP, HTN, DLD, vocal cord paralysis and dysphagia s/p PEG 5/ 18 in the setting of resistant esophageal candidiasis and inability to swallow. Today bought in from [employer] ,for cough productive ,sob and difficulty breathing of 1 day. He has a recent admission in 6/18 for COPD ex too.    In ER he  was tachyonic, temp of 103 ,given  respiratory  treatment *3 and was placed on BIPAP and admitted for COPD excerabtion also given IV antibiotics     Past Medical History:  Anxiety disorder, unspecified type    Chronic obstructive pulmonary disease, unspecified COPD type    High cholesterol    Hypertension, unspecified type    Other hydrocele    PEG (percutaneous endoscopic gastrostomy) status.    Past Surgical History:  PEG (percutaneous endoscopic gastrostomy) status.    MEDICATIONS:  STANDING MEDICATIONS  acetaminophen   Tablet .. 650 milliGRAM(s) Oral every 6 hours  ALBUTerol    0.083% 2.5 milliGRAM(s) Nebulizer every 4 hours  ALPRAZolam 1 milliGRAM(s) Oral every 6 hours  amLODIPine   Tablet 10 milliGRAM(s) Oral daily  benzocaine 15 mG/menthol 3.6 mG Lozenge 1 Lozenge Oral three times a day  buDESOnide 160 MICROgram(s)/formoterol 4.5 MICROgram(s) Inhaler 2 Puff(s) Inhalation two times a day  docusate sodium Liquid 100 milliGRAM(s) Oral two times a day  enoxaparin Injectable 40 milliGRAM(s) SubCutaneous at bedtime  loratadine 10 milliGRAM(s) Oral daily  pantoprazole   Suspension 40 milliGRAM(s) Oral daily  predniSONE   Tablet 20 milliGRAM(s) Oral daily  senna 2 Tablet(s) Oral at bedtime  simvastatin 10 milliGRAM(s) Oral at bedtime    PRN MEDICATIONS  guaiFENesin    Syrup 200 milliGRAM(s) Oral every 6 hours PRN    VITALS:   T(F): 98.1  HR: 73  BP: 110/56  RR: 20  SpO2: 95%    LABS:                        9.8    13.87 )-----------( 332      ( 22 Oct 2018 09:15 )             31.8     10-22    141  |  102  |  18  ----------------------------<  144<H>  4.2   |  30  |  0.7    Ca    9.1      22 Oct 2018 09:15        RADIOLOGY:  < from: Xray Chest 1 View- PORTABLE-Routine (10.15.18 @ 06:45) >  Impression:      No radiographic evidence of acute cardiopulmonary disease.        PHYSICAL EXAM:  GENERAL: No acute distress, well-developed  HEAD:  Atraumatic, Normocephalic  EYES: EOMI, PERRLA, conjunctiva and sclera clear  NECK: Supple, no lymphadenopathy, no JVD  CHEST/LUNG: CTAB; No wheezes, rales, or rhonchi  HEART: Regular rate and rhythm; No murmurs, rubs, or gallops  ABDOMEN: Soft, non-tender, non-distended; normal bowel sounds, no organomegaly  EXTREMITIES:  2+ peripheral pulses b/l, No clubbing, cyanosis, or edema  NEUROLOGY: A&O x 3, no focal deficits  SKIN: No rashes or lesions

## 2018-10-22 NOTE — PROGRESS NOTE ADULT - SUBJECTIVE AND OBJECTIVE BOX
ALETA SUAZO  63y, Male  Allergy: No Known Allergies      LAST 24-Hr EVENTS:  pt sen examined  around 830 am  laying comfortable in bed    VITALS:  T(F): 97.9 (10-22-18 @ 21:00), Max: 98.3 (10-22-18 @ 17:00)  HR: 67 (10-22-18 @ 21:00)  BP: 101/54 (10-22-18 @ 21:00) (101/54 - 110/57)  RR: 20 (10-22-18 @ 21:00)  SpO2: 95% (10-22-18 @ 22:53)    PHYSICAL EXAM:    GENERAL: NAD  NECK: Supple, No JVD  CHEST/LUNG: CTA b/l- no wheeze  HEART: Regular rate and rhythm  ABDOMEN: Soft, Nontender, Nondistended   EXTREMITIES:  No clubbing, edema absent        TESTS & MEASUREMENTS:    IN: 660 mL / OUT: 800 mL / NET: -140 mL    IN: 450 mL / OUT: 500 mL / NET: -50 mL    IN: 1100 mL / OUT: 200 mL / NET: 900 mL                            9.8    13.87 )-----------( 332      ( 22 Oct 2018 09:15 )             31.8       10-22    141  |  102  |  18  ----------------------------<  144<H>  4.2   |  30  |  0.7    Ca    9.1      22 Oct 2018 09:15              MEDICATIONS:  MEDICATIONS  (STANDING):  acetaminophen   Tablet .. 650 milliGRAM(s) Oral every 6 hours  ALBUTerol    0.083% 2.5 milliGRAM(s) Nebulizer every 4 hours  ALPRAZolam 1 milliGRAM(s) Oral every 6 hours  amLODIPine   Tablet 10 milliGRAM(s) Oral daily  benzocaine 15 mG/menthol 3.6 mG Lozenge 1 Lozenge Oral three times a day  buDESOnide 160 MICROgram(s)/formoterol 4.5 MICROgram(s) Inhaler 2 Puff(s) Inhalation two times a day  docusate sodium Liquid 100 milliGRAM(s) Oral two times a day  enoxaparin Injectable 40 milliGRAM(s) SubCutaneous at bedtime  loratadine 10 milliGRAM(s) Oral daily  pantoprazole   Suspension 40 milliGRAM(s) Oral daily  predniSONE   Tablet 20 milliGRAM(s) Oral daily  senna 2 Tablet(s) Oral at bedtime  simvastatin 10 milliGRAM(s) Oral at bedtime    MEDICATIONS  (PRN):  guaiFENesin    Syrup 200 milliGRAM(s) Oral every 6 hours PRN Cough

## 2018-10-22 NOTE — PROGRESS NOTE ADULT - SUBJECTIVE AND OBJECTIVE BOX
Patient was seen and examined. Spoke with RN. Chart reviewed.  No events overnight.  Vital Signs Last 24 Hrs  T(F): 96.1 (22 Oct 2018 05:19), Max: 97.7 (21 Oct 2018 13:19)  HR: 61 (22 Oct 2018 05:19) (61 - 72)  BP: 103/52 (22 Oct 2018 05:19) (103/52 - 117/57)  SpO2: 93% (22 Oct 2018 06:53) (92% - 99%)  MEDICATIONS  (STANDING):  acetaminophen   Tablet .. 650 milliGRAM(s) Oral every 6 hours  ALBUTerol    0.083% 2.5 milliGRAM(s) Nebulizer every 4 hours  ALPRAZolam 1 milliGRAM(s) Oral every 6 hours  amLODIPine   Tablet 10 milliGRAM(s) Oral daily  benzocaine 15 mG/menthol 3.6 mG Lozenge 1 Lozenge Oral three times a day  buDESOnide 160 MICROgram(s)/formoterol 4.5 MICROgram(s) Inhaler 2 Puff(s) Inhalation two times a day  docusate sodium Liquid 100 milliGRAM(s) Oral two times a day  enoxaparin Injectable 40 milliGRAM(s) SubCutaneous at bedtime  loratadine 10 milliGRAM(s) Oral daily  pantoprazole   Suspension 40 milliGRAM(s) Oral daily  predniSONE   Tablet 20 milliGRAM(s) Oral daily  senna 2 Tablet(s) Oral at bedtime  simvastatin 10 milliGRAM(s) Oral at bedtime    MEDICATIONS  (PRN):  guaiFENesin    Syrup 200 milliGRAM(s) Oral every 6 hours PRN Cough    Labs:                        9.1    11.23 )-----------( 322      ( 21 Oct 2018 06:44 )             29.3                         8.8    16.48 )-----------( 307      ( 20 Oct 2018 08:37 )             28.4     21 Oct 2018 06:44    148    |  109    |  18     ----------------------------<  90     3.7     |  29     |  0.6    20 Oct 2018 08:37    148    |  107    |  22     ----------------------------<  131    3.9     |  30     |  0.7      Ca    8.9        21 Oct 2018 06:44  Ca    8.8        20 Oct 2018 08:37            General: comfortable, NAD  Neurology: nonfocal  Head:  Normocephalic, atraumatic  ENT:  Mucosa moist, no ulcerations  Neck:  Supple, no JVD,   Skin: no breakdowns (as per RN)  Resp: CTA B/L  CV: RRR, S1S2,   GI: Soft, NT, bowel sounds  MS: No edema, + peripheral pulses,       A/P:  62 yo M with COPD, chronic respiratory failure on BIPAP, HTN, CRF, DLD, vocal cord paralysis and dysphagia s/p PEG 5/18 in the setting of resistant esophageal candidiasis and inability to swallow. Brought in from [facility] ,for cough productive ,sob and difficulty breathing; Received IV antibiotics with steroids and BiPAP with improvement in respiratory status.     Acute hypercapnic and hypoxic resp failure 2/2 COPD Exacerbation likely 2/2 URI: improving    On  Home O2 (2L)    BIPAP at night and PRN   Albuterol PRN     Physiatry Consult appreciated: Bedside PT 3-5x/wk, STR in SNF    Pulm f/u appreciated: continue supplemental oxygen per pulse oximetry, bipap hs and prn, prednisone taper.    DC planning  DVT prophylaxis  Decubitus prevention- all measures as per RN protocol  Please call or text me with any questions or updates

## 2018-10-22 NOTE — PROGRESS NOTE ADULT - ASSESSMENT
62 yo M with COPD, chronic respiratory failure on BIPAP, HTN, DLD, vocal cord paralysis and dysphagia s/p PEG 5/ 18 in the setting of resistant esophageal candidiasis and inability to swallow. Today bought in from [facility] for cough productive ,sob and difficulty breathing of 1 day.    #Acute hypercapnic rep failure- improved (COPD ex likely 2/2 viral vs bacterial inflection)  - multiple pulm nodules  - c/w O2 as needed- on home O2, 2LNC  - albuterol prn  - prednisone taper  - iv abx  - BIPAP prn    #HTN/DLD;  - cw home meds    #anxiety ;cw home meds    DVT/GI ppx   #resume PEG diet  FULL CODE  Dispo: SNF tomorrow

## 2018-10-22 NOTE — PROGRESS NOTE ADULT - ASSESSMENT
Acute respiratory failure/CRF-hypoxic/hyper capnic  COPD exac improving  Multiple pulmonary nodule  Anxiety  Dysphagia s/p peg tube      02 via nc  albuterol prn  symbicort 160/4.5 2 puff bid (in lieu of brovana and pulmicort)  albuterol prn  prednisone taper  cont daliresp, mucinex  cont niv at night and prn  pt to follow up with primary pulmonologist Dr Almonte   dvt/gi px  oob - chair , ambulate as tolerated  overall prognosis guarded  stable from pulmonary standpoint  d/c planning

## 2018-10-23 LAB
ANION GAP SERPL CALC-SCNC: 7 MMOL/L — SIGNIFICANT CHANGE UP (ref 7–14)
BASOPHILS # BLD AUTO: 0.02 K/UL — SIGNIFICANT CHANGE UP (ref 0–0.2)
BASOPHILS NFR BLD AUTO: 0.1 % — SIGNIFICANT CHANGE UP (ref 0–1)
BUN SERPL-MCNC: 18 MG/DL — SIGNIFICANT CHANGE UP (ref 10–20)
CALCIUM SERPL-MCNC: 8.9 MG/DL — SIGNIFICANT CHANGE UP (ref 8.5–10.1)
CHLORIDE SERPL-SCNC: 102 MMOL/L — SIGNIFICANT CHANGE UP (ref 98–110)
CO2 SERPL-SCNC: 30 MMOL/L — SIGNIFICANT CHANGE UP (ref 17–32)
CREAT SERPL-MCNC: 0.8 MG/DL — SIGNIFICANT CHANGE UP (ref 0.7–1.5)
EOSINOPHIL # BLD AUTO: 0.27 K/UL — SIGNIFICANT CHANGE UP (ref 0–0.7)
EOSINOPHIL NFR BLD AUTO: 1.9 % — SIGNIFICANT CHANGE UP (ref 0–8)
GLUCOSE SERPL-MCNC: 242 MG/DL — HIGH (ref 70–99)
HCT VFR BLD CALC: 32.3 % — LOW (ref 42–52)
HGB BLD-MCNC: 10.2 G/DL — LOW (ref 14–18)
IMM GRANULOCYTES NFR BLD AUTO: 0.6 % — HIGH (ref 0.1–0.3)
LYMPHOCYTES # BLD AUTO: 0.95 K/UL — LOW (ref 1.2–3.4)
LYMPHOCYTES # BLD AUTO: 6.7 % — LOW (ref 20.5–51.1)
MCHC RBC-ENTMCNC: 28.3 PG — SIGNIFICANT CHANGE UP (ref 27–31)
MCHC RBC-ENTMCNC: 31.6 G/DL — LOW (ref 32–37)
MCV RBC AUTO: 89.7 FL — SIGNIFICANT CHANGE UP (ref 80–94)
MONOCYTES # BLD AUTO: 0.22 K/UL — SIGNIFICANT CHANGE UP (ref 0.1–0.6)
MONOCYTES NFR BLD AUTO: 1.6 % — LOW (ref 1.7–9.3)
NEUTROPHILS # BLD AUTO: 12.58 K/UL — HIGH (ref 1.4–6.5)
NEUTROPHILS NFR BLD AUTO: 89.1 % — HIGH (ref 42.2–75.2)
NRBC # BLD: 0 /100 WBCS — SIGNIFICANT CHANGE UP (ref 0–0)
PLATELET # BLD AUTO: 309 K/UL — SIGNIFICANT CHANGE UP (ref 130–400)
POTASSIUM SERPL-MCNC: 4.6 MMOL/L — SIGNIFICANT CHANGE UP (ref 3.5–5)
POTASSIUM SERPL-SCNC: 4.6 MMOL/L — SIGNIFICANT CHANGE UP (ref 3.5–5)
RBC # BLD: 3.6 M/UL — LOW (ref 4.7–6.1)
RBC # FLD: 14.8 % — HIGH (ref 11.5–14.5)
SODIUM SERPL-SCNC: 139 MMOL/L — SIGNIFICANT CHANGE UP (ref 135–146)
WBC # BLD: 14.12 K/UL — HIGH (ref 4.8–10.8)
WBC # FLD AUTO: 14.12 K/UL — HIGH (ref 4.8–10.8)

## 2018-10-23 RX ORDER — SCOPALAMINE 1 MG/3D
1 PATCH, EXTENDED RELEASE TRANSDERMAL ONCE
Qty: 0 | Refills: 0 | Status: COMPLETED | OUTPATIENT
Start: 2018-10-23 | End: 2018-10-23

## 2018-10-23 RX ORDER — BUDESONIDE AND FORMOTEROL FUMARATE DIHYDRATE 160; 4.5 UG/1; UG/1
2 AEROSOL RESPIRATORY (INHALATION)
Qty: 0 | Refills: 0 | Status: DISCONTINUED | OUTPATIENT
Start: 2018-10-23 | End: 2018-11-13

## 2018-10-23 RX ADMIN — SENNA PLUS 2 TABLET(S): 8.6 TABLET ORAL at 22:14

## 2018-10-23 RX ADMIN — Medication 1 MILLIGRAM(S): at 23:21

## 2018-10-23 RX ADMIN — SIMVASTATIN 10 MILLIGRAM(S): 20 TABLET, FILM COATED ORAL at 22:14

## 2018-10-23 RX ADMIN — Medication 650 MILLIGRAM(S): at 23:21

## 2018-10-23 RX ADMIN — ALBUTEROL 2.5 MILLIGRAM(S): 90 AEROSOL, METERED ORAL at 08:06

## 2018-10-23 RX ADMIN — SCOPALAMINE 1 PATCH: 1 PATCH, EXTENDED RELEASE TRANSDERMAL at 17:25

## 2018-10-23 RX ADMIN — ALBUTEROL 2.5 MILLIGRAM(S): 90 AEROSOL, METERED ORAL at 05:04

## 2018-10-23 RX ADMIN — Medication 100 MILLIGRAM(S): at 06:32

## 2018-10-23 RX ADMIN — Medication 1 MILLIGRAM(S): at 06:35

## 2018-10-23 RX ADMIN — ALBUTEROL 2.5 MILLIGRAM(S): 90 AEROSOL, METERED ORAL at 00:06

## 2018-10-23 RX ADMIN — PANTOPRAZOLE SODIUM 40 MILLIGRAM(S): 20 TABLET, DELAYED RELEASE ORAL at 12:34

## 2018-10-23 RX ADMIN — Medication 650 MILLIGRAM(S): at 06:35

## 2018-10-23 RX ADMIN — AMLODIPINE BESYLATE 10 MILLIGRAM(S): 2.5 TABLET ORAL at 06:33

## 2018-10-23 RX ADMIN — Medication 650 MILLIGRAM(S): at 12:34

## 2018-10-23 RX ADMIN — Medication 650 MILLIGRAM(S): at 18:48

## 2018-10-23 RX ADMIN — Medication 1 MILLIGRAM(S): at 18:48

## 2018-10-23 RX ADMIN — LORATADINE 10 MILLIGRAM(S): 10 TABLET ORAL at 12:33

## 2018-10-23 RX ADMIN — ALBUTEROL 2.5 MILLIGRAM(S): 90 AEROSOL, METERED ORAL at 16:07

## 2018-10-23 RX ADMIN — ALBUTEROL 2.5 MILLIGRAM(S): 90 AEROSOL, METERED ORAL at 20:14

## 2018-10-23 RX ADMIN — Medication 1 MILLIGRAM(S): at 12:34

## 2018-10-23 RX ADMIN — SCOPALAMINE 1 PATCH: 1 PATCH, EXTENDED RELEASE TRANSDERMAL at 18:13

## 2018-10-23 RX ADMIN — Medication 20 MILLIGRAM(S): at 06:33

## 2018-10-23 RX ADMIN — ENOXAPARIN SODIUM 40 MILLIGRAM(S): 100 INJECTION SUBCUTANEOUS at 22:13

## 2018-10-23 RX ADMIN — ALBUTEROL 2.5 MILLIGRAM(S): 90 AEROSOL, METERED ORAL at 11:31

## 2018-10-23 NOTE — PROGRESS NOTE ADULT - SUBJECTIVE AND OBJECTIVE BOX
ALETA SUAZO  63y, Male  Allergy: No Known Allergies      LAST 24-Hr EVENTS:  pt seen examined  feeling overall better    VITALS:  T(F): 98.2 (10-23-18 @ 12:59), Max: 98.3 (10-22-18 @ 17:00)  HR: 79 (10-23-18 @ 12:59)  BP: 105/56 (10-23-18 @ 12:59) (96/54 - 109/53)  RR: 20 (10-23-18 @ 12:59)  SpO2: 93% (10-23-18 @ 06:55)    PHYSICAL EXAM:    GENERAL: NAD  NECK: Supple, No JVD  CHEST/LUNG: CTA b/l  HEART: Regular rate and rhythm  ABDOMEN: Soft, Nontender, Nondistended- peg tube in place  EXTREMITIES:  No clubbing, edema absent        TESTS & MEASUREMENTS:    IN: 450 mL / OUT: 500 mL / NET: -50 mL    IN: 1550 mL / OUT: 400 mL / NET: 1150 mL    IN: 450 mL / OUT: 400 mL / NET: 50 mL                            10.2   14.12 )-----------( 309      ( 23 Oct 2018 09:18 )             32.3       10-23    139  |  102  |  18  ----------------------------<  242<H>  4.6   |  30  |  0.8    Ca    8.9      23 Oct 2018 09:18            MEDICATIONS:  MEDICATIONS  (STANDING):  acetaminophen   Tablet .. 650 milliGRAM(s) Oral every 6 hours  ALBUTerol    0.083% 2.5 milliGRAM(s) Nebulizer every 4 hours  ALPRAZolam 1 milliGRAM(s) Oral every 6 hours  amLODIPine   Tablet 10 milliGRAM(s) Oral daily  benzocaine 15 mG/menthol 3.6 mG Lozenge 1 Lozenge Oral three times a day  buDESOnide 160 MICROgram(s)/formoterol 4.5 MICROgram(s) Inhaler 2 Puff(s) Inhalation two times a day  docusate sodium Liquid 100 milliGRAM(s) Oral two times a day  enoxaparin Injectable 40 milliGRAM(s) SubCutaneous at bedtime  loratadine 10 milliGRAM(s) Oral daily  pantoprazole   Suspension 40 milliGRAM(s) Oral daily  scopolamine   Patch 1 Patch Transdermal once  senna 2 Tablet(s) Oral at bedtime  simvastatin 10 milliGRAM(s) Oral at bedtime    MEDICATIONS  (PRN):  guaiFENesin    Syrup 200 milliGRAM(s) Oral every 6 hours PRN Cough

## 2018-10-23 NOTE — PROGRESS NOTE ADULT - SUBJECTIVE AND OBJECTIVE BOX
Patient was seen and examined. Spoke with RN. Chart reviewed.  No events overnight.  Vital Signs Last 24 Hrs  T(F): 96.8 (23 Oct 2018 05:04), Max: 98.3 (22 Oct 2018 17:00)  HR: 58 (23 Oct 2018 05:04) (58 - 73)  BP: 101/52 (23 Oct 2018 05:04) (96/54 - 110/57)  SpO2: 93% (23 Oct 2018 06:55) (93% - 95%)  MEDICATIONS  (STANDING):  acetaminophen   Tablet .. 650 milliGRAM(s) Oral every 6 hours  ALBUTerol    0.083% 2.5 milliGRAM(s) Nebulizer every 4 hours  ALPRAZolam 1 milliGRAM(s) Oral every 6 hours  amLODIPine   Tablet 10 milliGRAM(s) Oral daily  benzocaine 15 mG/menthol 3.6 mG Lozenge 1 Lozenge Oral three times a day  buDESOnide 160 MICROgram(s)/formoterol 4.5 MICROgram(s) Inhaler 2 Puff(s) Inhalation two times a day  docusate sodium Liquid 100 milliGRAM(s) Oral two times a day  enoxaparin Injectable 40 milliGRAM(s) SubCutaneous at bedtime  loratadine 10 milliGRAM(s) Oral daily  pantoprazole   Suspension 40 milliGRAM(s) Oral daily  senna 2 Tablet(s) Oral at bedtime  simvastatin 10 milliGRAM(s) Oral at bedtime    MEDICATIONS  (PRN):  guaiFENesin    Syrup 200 milliGRAM(s) Oral every 6 hours PRN Cough    Labs:                        9.8    13.87 )-----------( 332      ( 22 Oct 2018 09:15 )             31.8     22 Oct 2018 09:15    141    |  102    |  18     ----------------------------<  144    4.2     |  30     |  0.7      Ca    9.1        22 Oct 2018 09:15            General: comfortable, NAD  Neurology: nonfocal  Head:  Normocephalic, atraumatic  ENT:  Mucosa moist, no ulcerations  Neck:  Supple, no JVD,   Skin: no breakdowns (as per RN)  Resp: CTA B/L  CV: RRR, S1S2,   GI: Soft, NT, bowel sounds, PEG  MS: No edema, + peripheral pulses, FROM all 4 extremity      A/P:  64 yo M with COPD, chronic respiratory failure on BIPAP, HTN, CRF, DLD, vocal cord paralysis and dysphagia s/p PEG 5/18 in the setting of resistant esophageal candidiasis and inability to swallow. Brought in from [facility] ,for cough productive ,sob and difficulty breathing; Received IV antibiotics with steroids and BiPAP with improvement in respiratory status.     Acute hypercapnic and hypoxic resp failure 2/2 COPD Exacerbation likely 2/2 URI: improving    On  Home O2 (2L)    BIPAP at night and PRN   Albuterol PRN     Physiatry Consult appreciated: Bedside PT 3-5x/wk, STR in SNF    Pulm f/u appreciated: continue supplemental oxygen per pulse oximetry, bipap hs and prn, prednisone taper.    DC planning    DVT prophylaxis  Decubitus prevention- all measures as per RN protocol  Please call or text me with any questions or updates

## 2018-10-23 NOTE — PROGRESS NOTE ADULT - ASSESSMENT
SUBJECTIVE:    Patient is a 63y old Male who presents with a chief complaint of sob, cough and temp 103 (23 Oct 2018 07:33)  Currently admitted to medicine with the primary diagnosis of Sepsis   Today is hospital day 10d. This morning he is resting comfortably in bed and reports no new issues or overnight events. Patient notes no difficulty breathing, was on BiPAP at night. Now on room air appears comfortable. Notes chronic cough with clear secreations.     PAST MEDICAL & SURGICAL HISTORY  PEG (percutaneous endoscopic gastrostomy) status  Other hydrocele  Anxiety disorder, unspecified type  Hypertension, unspecified type  High cholesterol  Chronic obstructive pulmonary disease, unspecified COPD type  PEG (percutaneous endoscopic gastrostomy) status    SOCIAL HISTORY:  Negative for smoking/alcohol/drug use.     ALLERGIES:  No Known Allergies    MEDICATIONS:  STANDING MEDICATIONS  acetaminophen   Tablet .. 650 milliGRAM(s) Oral every 6 hours  ALBUTerol    0.083% 2.5 milliGRAM(s) Nebulizer every 4 hours  ALPRAZolam 1 milliGRAM(s) Oral every 6 hours  amLODIPine   Tablet 10 milliGRAM(s) Oral daily  benzocaine 15 mG/menthol 3.6 mG Lozenge 1 Lozenge Oral three times a day  buDESOnide 160 MICROgram(s)/formoterol 4.5 MICROgram(s) Inhaler 2 Puff(s) Inhalation two times a day  docusate sodium Liquid 100 milliGRAM(s) Oral two times a day  enoxaparin Injectable 40 milliGRAM(s) SubCutaneous at bedtime  loratadine 10 milliGRAM(s) Oral daily  pantoprazole   Suspension 40 milliGRAM(s) Oral daily  scopolamine   Patch 1 Patch Transdermal once  senna 2 Tablet(s) Oral at bedtime  simvastatin 10 milliGRAM(s) Oral at bedtime    PRN MEDICATIONS  guaiFENesin    Syrup 200 milliGRAM(s) Oral every 6 hours PRN    VITALS:   T(F): 98.5  HR: 77  BP: 100/55  RR: 20  SpO2: 93%    LABS:                        10.2   14.12 )-----------( 309      ( 23 Oct 2018 09:18 )             32.3     10-23    139  |  102  |  18  ----------------------------<  242<H>  4.6   |  30  |  0.8    Ca    8.9      23 Oct 2018 09:18      RADIOLOGY:    PHYSICAL EXAM:  GEN: No acute distress, resting comfortably in weight   Pulmonary: No increased WOB, clear to auscultation bilaterally, decreased breath sounds L>R.  CV: Regular rate and rhythm, no murmurs, rubs, or gallops.   GI: BS+. Soft, non-tender, PEG+  MSK: Full ROM bilaterally, DP 2+ bilaterally, no edema legs.   Neuro: AAOx3      64 yo M with COPD, chronic respiratory failure on BIPAP, HTN, DLD, vocal cord paralysis and dysphagia s/p PEG 5/ 18 in the setting of resistant esophageal candidiasis and inability to swallow, bought in from [employer] for cough productive ,sob and difficulty breathing of 1 day.    #Acute hypercapnic rep failure 2/2 COPD exacerbation resolved.  - multiple pulm nodules  - c/w O2 as needed- on home O2, 2LNC,   -c/w BiPAP at night and PRN IPAP 12, EPAP 6  - albuterol prn  -s/p prednisone taper, now off steroids  -symbicort 160/4.5 2 puffs BID.   -c/w guaifenescin. (daliresp non-formulary_  -s/p 7 days levofloxacin 4 days cefepime  -f/u with Dr. Almonte as outpatient.   -STR in SNF  ambulate as tolerated .    #HTN/DLD;  - cw home meds    #anxiety : cw home meds    DVT/GI ppx   #resume PEG diet, feeds adjusted to TID    FULL CODE  Dispo: SNF

## 2018-10-23 NOTE — PROGRESS NOTE ADULT - ASSESSMENT
Acute respiratory failure/CRF-hypoxic/hypercapnic  COPD exac improving  Multiple pulmonary nodule  Anxiety  Dysphagia s/p peg tube      02 via nc  albuterol prn  symbicort 160/4.5 2 puff bid (in lieu of brovana and pulmicort)  albuterol prn  prednisone taper  cont daliresp, mucinex  cont niv at night and prn  dvt/gi px  oob - chair , ambulate as tolerated  overall prognosis guarded  stable from pulmonary standpoint  pt to follow up with primary pulmonologist Dr Almonte

## 2018-10-23 NOTE — CHART NOTE - NSCHARTNOTEFT_GEN_A_CORE
Registered dietitian (limited note)    Pt. continues on enteral nutrition with Jevity 1.2 350ml q6h via PEG with good tolerance. No acute GI distress noted. Last BM 10/20. On bowel regimen. Labs/meds reviewed. No RD intervention at this time. Will continue to monitor pt.

## 2018-10-24 LAB
ANION GAP SERPL CALC-SCNC: 10 MMOL/L — SIGNIFICANT CHANGE UP (ref 7–14)
BASOPHILS # BLD AUTO: 0.03 K/UL — SIGNIFICANT CHANGE UP (ref 0–0.2)
BASOPHILS NFR BLD AUTO: 0.3 % — SIGNIFICANT CHANGE UP (ref 0–1)
BUN SERPL-MCNC: 17 MG/DL — SIGNIFICANT CHANGE UP (ref 10–20)
CALCIUM SERPL-MCNC: 8.4 MG/DL — LOW (ref 8.5–10.1)
CHLORIDE SERPL-SCNC: 102 MMOL/L — SIGNIFICANT CHANGE UP (ref 98–110)
CO2 SERPL-SCNC: 28 MMOL/L — SIGNIFICANT CHANGE UP (ref 17–32)
CREAT SERPL-MCNC: 0.7 MG/DL — SIGNIFICANT CHANGE UP (ref 0.7–1.5)
EOSINOPHIL # BLD AUTO: 0.41 K/UL — SIGNIFICANT CHANGE UP (ref 0–0.7)
EOSINOPHIL NFR BLD AUTO: 4.2 % — SIGNIFICANT CHANGE UP (ref 0–8)
GLUCOSE SERPL-MCNC: 130 MG/DL — HIGH (ref 70–99)
HCT VFR BLD CALC: 27.8 % — LOW (ref 42–52)
HGB BLD-MCNC: 8.8 G/DL — LOW (ref 14–18)
IMM GRANULOCYTES NFR BLD AUTO: 0.4 % — HIGH (ref 0.1–0.3)
LYMPHOCYTES # BLD AUTO: 1.41 K/UL — SIGNIFICANT CHANGE UP (ref 1.2–3.4)
LYMPHOCYTES # BLD AUTO: 14.6 % — LOW (ref 20.5–51.1)
MCHC RBC-ENTMCNC: 28.2 PG — SIGNIFICANT CHANGE UP (ref 27–31)
MCHC RBC-ENTMCNC: 31.7 G/DL — LOW (ref 32–37)
MCV RBC AUTO: 89.1 FL — SIGNIFICANT CHANGE UP (ref 80–94)
MONOCYTES # BLD AUTO: 1.13 K/UL — HIGH (ref 0.1–0.6)
MONOCYTES NFR BLD AUTO: 11.7 % — HIGH (ref 1.7–9.3)
NEUTROPHILS # BLD AUTO: 6.64 K/UL — HIGH (ref 1.4–6.5)
NEUTROPHILS NFR BLD AUTO: 68.8 % — SIGNIFICANT CHANGE UP (ref 42.2–75.2)
NRBC # BLD: 0 /100 WBCS — SIGNIFICANT CHANGE UP (ref 0–0)
PLATELET # BLD AUTO: 277 K/UL — SIGNIFICANT CHANGE UP (ref 130–400)
POTASSIUM SERPL-MCNC: 4.1 MMOL/L — SIGNIFICANT CHANGE UP (ref 3.5–5)
POTASSIUM SERPL-SCNC: 4.1 MMOL/L — SIGNIFICANT CHANGE UP (ref 3.5–5)
RBC # BLD: 3.12 M/UL — LOW (ref 4.7–6.1)
RBC # FLD: 14.7 % — HIGH (ref 11.5–14.5)
SODIUM SERPL-SCNC: 140 MMOL/L — SIGNIFICANT CHANGE UP (ref 135–146)
WBC # BLD: 9.66 K/UL — SIGNIFICANT CHANGE UP (ref 4.8–10.8)
WBC # FLD AUTO: 9.66 K/UL — SIGNIFICANT CHANGE UP (ref 4.8–10.8)

## 2018-10-24 RX ORDER — ALPRAZOLAM 0.25 MG
1 TABLET ORAL EVERY 6 HOURS
Qty: 0 | Refills: 0 | Status: DISCONTINUED | OUTPATIENT
Start: 2018-10-24 | End: 2018-10-26

## 2018-10-24 RX ORDER — TIOTROPIUM BROMIDE 18 UG/1
1 CAPSULE ORAL; RESPIRATORY (INHALATION) DAILY
Qty: 0 | Refills: 0 | Status: DISCONTINUED | OUTPATIENT
Start: 2018-10-24 | End: 2018-11-13

## 2018-10-24 RX ADMIN — Medication 650 MILLIGRAM(S): at 11:21

## 2018-10-24 RX ADMIN — Medication 650 MILLIGRAM(S): at 18:57

## 2018-10-24 RX ADMIN — PANTOPRAZOLE SODIUM 40 MILLIGRAM(S): 20 TABLET, DELAYED RELEASE ORAL at 11:22

## 2018-10-24 RX ADMIN — Medication 1 MILLIGRAM(S): at 11:21

## 2018-10-24 RX ADMIN — LORATADINE 10 MILLIGRAM(S): 10 TABLET ORAL at 11:21

## 2018-10-24 RX ADMIN — SENNA PLUS 2 TABLET(S): 8.6 TABLET ORAL at 21:15

## 2018-10-24 RX ADMIN — Medication 1 MILLIGRAM(S): at 05:46

## 2018-10-24 RX ADMIN — Medication 100 MILLIGRAM(S): at 05:47

## 2018-10-24 RX ADMIN — SCOPALAMINE 1 PATCH: 1 PATCH, EXTENDED RELEASE TRANSDERMAL at 21:20

## 2018-10-24 RX ADMIN — ALBUTEROL 2.5 MILLIGRAM(S): 90 AEROSOL, METERED ORAL at 12:29

## 2018-10-24 RX ADMIN — Medication 1200 MILLIGRAM(S): at 17:24

## 2018-10-24 RX ADMIN — ALBUTEROL 2.5 MILLIGRAM(S): 90 AEROSOL, METERED ORAL at 06:08

## 2018-10-24 RX ADMIN — Medication 1 MILLIGRAM(S): at 17:23

## 2018-10-24 RX ADMIN — SIMVASTATIN 10 MILLIGRAM(S): 20 TABLET, FILM COATED ORAL at 21:15

## 2018-10-24 RX ADMIN — Medication 1 MILLIGRAM(S): at 23:38

## 2018-10-24 RX ADMIN — ALBUTEROL 2.5 MILLIGRAM(S): 90 AEROSOL, METERED ORAL at 08:15

## 2018-10-24 RX ADMIN — ALBUTEROL 2.5 MILLIGRAM(S): 90 AEROSOL, METERED ORAL at 19:43

## 2018-10-24 RX ADMIN — Medication 650 MILLIGRAM(S): at 23:39

## 2018-10-24 RX ADMIN — Medication 650 MILLIGRAM(S): at 17:23

## 2018-10-24 RX ADMIN — Medication 650 MILLIGRAM(S): at 13:07

## 2018-10-24 RX ADMIN — Medication 650 MILLIGRAM(S): at 05:46

## 2018-10-24 RX ADMIN — Medication 650 MILLIGRAM(S): at 05:47

## 2018-10-24 RX ADMIN — Medication 100 MILLIGRAM(S): at 17:23

## 2018-10-24 RX ADMIN — ALBUTEROL 2.5 MILLIGRAM(S): 90 AEROSOL, METERED ORAL at 14:37

## 2018-10-24 RX ADMIN — ALBUTEROL 2.5 MILLIGRAM(S): 90 AEROSOL, METERED ORAL at 23:56

## 2018-10-24 NOTE — PROGRESS NOTE ADULT - ASSESSMENT
CRF / ARF  anxiety  copd  dysphagia / s/p PEG  vocal cord injury with hoarseness  HTN    plan:    cont norvasc  cont xanax   abx course complete  prednisone taper to off  f/u pulm  cpap / o2 / nebs  pt / rehab  PEG feeds  PPI  LMWH  dc planning

## 2018-10-24 NOTE — PROGRESS NOTE ADULT - ASSESSMENT
SUBJECTIVE:    Patient is a 63y old Male who presents with a chief complaint of sob, cough and temp 103 (24 Oct 2018 14:48)  Currently admitted to medicine with the primary diagnosis of Sepsis  Today is hospital day 11d. This morning he is resting comfortably in bed and reports no new issues or overnight events. Later this afternoon pt complaining of mucous plug giving his a choking sensation. Pt saturating 97% on RA. Called respiratory for deep suctioning/to place patient on bipap. Patient removed BiPap as he did not want to wear it. Spoke with respiratory reg getting patient chest PT vest, but likely this will not be available for adults.     PAST MEDICAL & SURGICAL HISTORY  PEG (percutaneous endoscopic gastrostomy) status  Other hydrocele  Anxiety disorder, unspecified type  Hypertension, unspecified type  High cholesterol  Chronic obstructive pulmonary disease, unspecified COPD type  PEG (percutaneous endoscopic gastrostomy) status    SOCIAL HISTORY:  Negative for smoking/alcohol/drug use.     ALLERGIES:  No Known Allergies    MEDICATIONS:  STANDING MEDICATIONS  acetaminophen   Tablet .. 650 milliGRAM(s) Oral every 6 hours  ALBUTerol    0.083% 2.5 milliGRAM(s) Nebulizer every 4 hours  ALPRAZolam 1 milliGRAM(s) Oral every 6 hours  benzocaine 15 mG/menthol 3.6 mG Lozenge 1 Lozenge Oral three times a day  buDESOnide 160 MICROgram(s)/formoterol 4.5 MICROgram(s) Inhaler 2 Puff(s) Inhalation two times a day  docusate sodium Liquid 100 milliGRAM(s) Oral two times a day  enoxaparin Injectable 40 milliGRAM(s) SubCutaneous at bedtime  guaiFENesin ER 1200 milliGRAM(s) Oral every 12 hours  loratadine 10 milliGRAM(s) Oral daily  pantoprazole   Suspension 40 milliGRAM(s) Oral daily  senna 2 Tablet(s) Oral at bedtime  simvastatin 10 milliGRAM(s) Oral at bedtime    PRN MEDICATIONS    VITALS:   T(F): 98  HR: 98  BP: 118/88  RR: 20  SpO2: 97%    LABS:                        8.8    9.66  )-----------( 277      ( 24 Oct 2018 09:16 )             27.8     10-24    140  |  102  |  17  ----------------------------<  130<H>  4.1   |  28  |  0.7    Ca    8.4<L>      24 Oct 2018 09:16                    RADIOLOGY:    PHYSICAL EXAM:  GEN: Coughing, with productive mucous.  Pulmonary: No increased WOB, clear to auscultation bilaterally, no wheezes, rales, or rhonchi. decreased breath sounds L>R  CV: Regular rate and rhythm  GI: BS+, Soft, non-tender  MSK: Full ROM bilaterally, DP 2+ bilaterally, no edema  Neuro: AAOx3  Skin: no rashes or lesions    62 yo M with COPD, chronic respiratory failure on BIPAP, HTN, DLD, vocal cord paralysis and dysphagia s/p PEG 5/ 18 in the setting of resistant esophageal candidiasis and inability to swallow, bought in from [facility] [employer] for cough productive ,sob and difficulty breathing of 1 day.    #Acute hypercapnic rep failure 2/2 COPD exacerbation resolved.  - multiple pulm nodules  - c/w O2 as needed- on home O2, 2LNC,   -c/w BiPAP at night and PRN IPAP 12, EPAP 6  - albuterol prn  -s/p prednisone taper, now off steroids  -per pulm, add spiriva, 10 mg daily prednisone, anxiolytic therapy aspiration precautions.   -symbicort 160/4.5 2 puffs BID.   -c/w guaifenescin. (daliresp non-formulary_  -s/p 7 days levofloxacin 4 days cefepime  -f/u with Dr. Almonte as outpatient.   -STR in SNF  ambulate as tolerated .    #HTN/DLD;  - cw home meds    #anxiety : cw home meds    DVT/GI ppx   #resume PEG diet, feeds adjusted to TID    FULL CODE  Dispo: SNF

## 2018-10-24 NOTE — PROGRESS NOTE ADULT - ASSESSMENT
acute hypoxic/hypercapnic respiratory failure, improved  chronic hypoxic and hypercapnic respiratory failure  copd exacerbation, improved  anxiety disorder  multiple pulmonary nodules      low flow oxygen as needed  bipap hs and prn  bronchodilators  consider adding spiriva  continue symbicort  currently off systemic steroids, would resume 10mg daily prednisone, i believe he has been on it a long time  anxiolytic therapy  cardiac/blood pressure medications  gi/dvt prophylaxis  enteric feeds/aspiration precautions  out of bed/physical therapy  discharge planning

## 2018-10-24 NOTE — PROGRESS NOTE ADULT - SUBJECTIVE AND OBJECTIVE BOX
NEPHROLOGY FOLLOW UP NOTE    no new events  usual sob and cough  no fever / cp   tolerating PEG feeds    PAST MEDICAL & SURGICAL HISTORY:  PEG (percutaneous endoscopic gastrostomy) status  Other hydrocele  Anxiety disorder, unspecified type  Hypertension, unspecified type  High cholesterol  Chronic obstructive pulmonary disease, unspecified COPD type  PEG (percutaneous endoscopic gastrostomy) status    Allergies:  No Known Allergies    Home Medications Reviewed    SOCIAL HISTORY:  Denies ETOH,Smoking,   FAMILY HISTORY:  No pertinent family history in first degree relatives        REVIEW OF SYSTEMS:  All other review of systems is negative unless indicated above.      PHYSICAL EXAM:  NAD  frail  poor dentition  O2 via NC  tremors  decreased bs bl  distant hs  soft  peg  no cvat  no edema    Hospital Medications:   MEDICATIONS  (STANDING):  acetaminophen   Tablet .. 650 milliGRAM(s) Oral every 6 hours  ALBUTerol    0.083% 2.5 milliGRAM(s) Nebulizer every 4 hours  ALPRAZolam 1 milliGRAM(s) Oral every 6 hours  amLODIPine   Tablet 10 milliGRAM(s) Oral daily  benzocaine 15 mG/menthol 3.6 mG Lozenge 1 Lozenge Oral three times a day  buDESOnide 160 MICROgram(s)/formoterol 4.5 MICROgram(s) Inhaler 2 Puff(s) Inhalation two times a day  docusate sodium Liquid 100 milliGRAM(s) Oral two times a day  enoxaparin Injectable 40 milliGRAM(s) SubCutaneous at bedtime  loratadine 10 milliGRAM(s) Oral daily  pantoprazole   Suspension 40 milliGRAM(s) Oral daily  senna 2 Tablet(s) Oral at bedtime  simvastatin 10 milliGRAM(s) Oral at bedtime        VITALS:  T(F): 98.2 (10-24-18 @ 12:19), Max: 98.6 (10-24-18 @ 08:12)  HR: 65 (10-24-18 @ 12:19)  BP: 93/52 (10-24-18 @ 12:19)  RR: 20 (10-24-18 @ 12:19)  SpO2: 97% (10-24-18 @ 00:21)  Wt(kg): --    10-22 @ 07:01  -  10-23 @ 07:00  --------------------------------------------------------  IN: 1550 mL / OUT: 400 mL / NET: 1150 mL    10-23 @ 07:01  -  10-24 @ 07:00  --------------------------------------------------------  IN: 2100 mL / OUT: 1000 mL / NET: 1100 mL    10-24 @ 07:01  -  10-24 @ 14:49  --------------------------------------------------------  IN: 550 mL / OUT: 0 mL / NET: 550 mL          LABS:  10-24    140  |  102  |  17  ----------------------------<  130<H>  4.1   |  28  |  0.7    Ca    8.4<L>      24 Oct 2018 09:16                            8.8    9.66  )-----------( 277      ( 24 Oct 2018 09:16 )             27.8       Urine Studies:        RADIOLOGY & ADDITIONAL STUDIES:

## 2018-10-24 NOTE — PROGRESS NOTE ADULT - SUBJECTIVE AND OBJECTIVE BOX
ALETA SUAZO  63y, Male  Allergy: No Known Allergies      LAST 24-Hr EVENTS:  feels at baseline  VITALS:  T(F): 98.6 (10-24-18 @ 08:12), Max: 98.6 (10-24-18 @ 08:12)  HR: 69 (10-24-18 @ 08:12)  BP: 91/47 (10-24-18 @ 08:12) (91/47 - 105/56)  RR: 20 (10-24-18 @ 08:12)  SpO2: 97% (10-24-18 @ 00:21)    PHYSICAL EXAM:    GENERAL: NAD, well-developed  HEAD:  Atraumatic, Normocephalic  NECK: Supple, No JVD  CHEST/LUNG: Clear to auscultation bilaterally; No wheeze, decreased breath sounds bilaterally  HEART: Regular rate and rhythm; No murmurs, rubs, or gallops  ABDOMEN: Soft, Nontender, Nondistended; Bowel sounds present  EXTREMITIES:  2+ Peripheral Pulses, No clubbing, cyanosis, or edema        TESTS & MEASUREMENTS:    IN: 1550 mL / OUT: 400 mL / NET: 1150 mL    IN: 2100 mL / OUT: 1000 mL / NET: 1100 mL    IN: 100 mL / OUT: 0 mL / NET: 100 mL                            8.8    9.66  )-----------( 277      ( 24 Oct 2018 09:16 )             27.8       10-24    140  |  102  |  17  ----------------------------<  130<H>  4.1   |  28  |  0.7    Ca    8.4<L>      24 Oct 2018 09:16                  ABG & VENT SETTINGS: (when applicable)  n/a      RADIOLOGY & ADDITIONAL TESTS:    MEDICATIONS:  MEDICATIONS  (STANDING):  acetaminophen   Tablet .. 650 milliGRAM(s) Oral every 6 hours  ALBUTerol    0.083% 2.5 milliGRAM(s) Nebulizer every 4 hours  ALPRAZolam 1 milliGRAM(s) Oral every 6 hours  amLODIPine   Tablet 10 milliGRAM(s) Oral daily  benzocaine 15 mG/menthol 3.6 mG Lozenge 1 Lozenge Oral three times a day  buDESOnide 160 MICROgram(s)/formoterol 4.5 MICROgram(s) Inhaler 2 Puff(s) Inhalation two times a day  docusate sodium Liquid 100 milliGRAM(s) Oral two times a day  enoxaparin Injectable 40 milliGRAM(s) SubCutaneous at bedtime  loratadine 10 milliGRAM(s) Oral daily  pantoprazole   Suspension 40 milliGRAM(s) Oral daily  senna 2 Tablet(s) Oral at bedtime  simvastatin 10 milliGRAM(s) Oral at bedtime    MEDICATIONS  (PRN):  guaiFENesin    Syrup 200 milliGRAM(s) Oral every 6 hours PRN Cough

## 2018-10-24 NOTE — PROGRESS NOTE ADULT - SUBJECTIVE AND OBJECTIVE BOX
Patient was seen and examined. Spoke with RN. Chart reviewed.  No events overnight.  Vital Signs Last 24 Hrs  T(F): 97.5 (24 Oct 2018 04:00), Max: 98.5 (23 Oct 2018 16:05)  HR: 60 (24 Oct 2018 04:00) (60 - 85)  BP: 102/55 (24 Oct 2018 04:00) (91/53 - 109/53)  SpO2: 97% (24 Oct 2018 00:21) (97% - 100%)  MEDICATIONS  (STANDING):  acetaminophen   Tablet .. 650 milliGRAM(s) Oral every 6 hours  ALBUTerol    0.083% 2.5 milliGRAM(s) Nebulizer every 4 hours  ALPRAZolam 1 milliGRAM(s) Oral every 6 hours  amLODIPine   Tablet 10 milliGRAM(s) Oral daily  benzocaine 15 mG/menthol 3.6 mG Lozenge 1 Lozenge Oral three times a day  buDESOnide 160 MICROgram(s)/formoterol 4.5 MICROgram(s) Inhaler 2 Puff(s) Inhalation two times a day  docusate sodium Liquid 100 milliGRAM(s) Oral two times a day  enoxaparin Injectable 40 milliGRAM(s) SubCutaneous at bedtime  loratadine 10 milliGRAM(s) Oral daily  pantoprazole   Suspension 40 milliGRAM(s) Oral daily  senna 2 Tablet(s) Oral at bedtime  simvastatin 10 milliGRAM(s) Oral at bedtime    MEDICATIONS  (PRN):  guaiFENesin    Syrup 200 milliGRAM(s) Oral every 6 hours PRN Cough    Labs:                        10.2   14.12 )-----------( 309      ( 23 Oct 2018 09:18 )             32.3                         9.8    13.87 )-----------( 332      ( 22 Oct 2018 09:15 )             31.8     23 Oct 2018 09:18    139    |  102    |  18     ----------------------------<  242    4.6     |  30     |  0.8    22 Oct 2018 09:15    141    |  102    |  18     ----------------------------<  144    4.2     |  30     |  0.7      Ca    8.9        23 Oct 2018 09:18  Ca    9.1        22 Oct 2018 09:15            General: comfortable, NAD  Neurology: nonfocal  Head:  Normocephalic, atraumatic  ENT:  Mucosa moist, no ulcerations  Neck:  Supple, no JVD,   Skin: no breakdowns (as per RN)  Resp: CTA B/L  CV: RRR, S1S2,   GI: Soft, NT, bowel sounds, peg  MS: No edema, + peripheral pulses, FROM all 4 extremity      A/P:  64 yo M with COPD, chronic respiratory failure on BIPAP, HTN, CRF, DLD, vocal cord paralysis and dysphagia s/p PEG 5/18 in the setting of resistant esophageal candidiasis and inability to swallow. Brought in from [facility] ,for cough productive ,sob and difficulty breathing; Received IV antibiotics with steroids and BiPAP with improvement in respiratory status.     Acute hypercapnic and hypoxic resp failure 2/2 COPD Exacerbation likely 2/2 URI: improving    On  Home O2 (2L)    BIPAP at night and PRN   Albuterol PRN     Physiatry Consult appreciated: Bedside PT 3-5x/wk, STR in SNF    PEG feeding    Pulm f/u appreciated: continue supplemental oxygen per pulse oximetry, bipap hs and prn, prednisone taper.    DC planning as per pulm    DVT prophylaxis  Decubitus prevention- all measures as per RN protocol  Please call or text me with any questions or updates

## 2018-10-25 LAB
ANION GAP SERPL CALC-SCNC: 8 MMOL/L — SIGNIFICANT CHANGE UP (ref 7–14)
BASOPHILS # BLD AUTO: 0.03 K/UL — SIGNIFICANT CHANGE UP (ref 0–0.2)
BASOPHILS NFR BLD AUTO: 0.2 % — SIGNIFICANT CHANGE UP (ref 0–1)
BUN SERPL-MCNC: 14 MG/DL — SIGNIFICANT CHANGE UP (ref 10–20)
CALCIUM SERPL-MCNC: 8.9 MG/DL — SIGNIFICANT CHANGE UP (ref 8.5–10.1)
CHLORIDE SERPL-SCNC: 100 MMOL/L — SIGNIFICANT CHANGE UP (ref 98–110)
CO2 SERPL-SCNC: 29 MMOL/L — SIGNIFICANT CHANGE UP (ref 17–32)
CREAT SERPL-MCNC: 0.7 MG/DL — SIGNIFICANT CHANGE UP (ref 0.7–1.5)
EOSINOPHIL # BLD AUTO: 0.3 K/UL — SIGNIFICANT CHANGE UP (ref 0–0.7)
EOSINOPHIL NFR BLD AUTO: 2.1 % — SIGNIFICANT CHANGE UP (ref 0–8)
GLUCOSE SERPL-MCNC: 97 MG/DL — SIGNIFICANT CHANGE UP (ref 70–99)
HCT VFR BLD CALC: 30.5 % — LOW (ref 42–52)
HGB BLD-MCNC: 9.5 G/DL — LOW (ref 14–18)
IMM GRANULOCYTES NFR BLD AUTO: 0.5 % — HIGH (ref 0.1–0.3)
LYMPHOCYTES # BLD AUTO: 1.03 K/UL — LOW (ref 1.2–3.4)
LYMPHOCYTES # BLD AUTO: 7.2 % — LOW (ref 20.5–51.1)
MCHC RBC-ENTMCNC: 27.8 PG — SIGNIFICANT CHANGE UP (ref 27–31)
MCHC RBC-ENTMCNC: 31.1 G/DL — LOW (ref 32–37)
MCV RBC AUTO: 89.2 FL — SIGNIFICANT CHANGE UP (ref 80–94)
MONOCYTES # BLD AUTO: 0.6 K/UL — SIGNIFICANT CHANGE UP (ref 0.1–0.6)
MONOCYTES NFR BLD AUTO: 4.2 % — SIGNIFICANT CHANGE UP (ref 1.7–9.3)
NEUTROPHILS # BLD AUTO: 12.26 K/UL — HIGH (ref 1.4–6.5)
NEUTROPHILS NFR BLD AUTO: 85.8 % — HIGH (ref 42.2–75.2)
NRBC # BLD: 0 /100 WBCS — SIGNIFICANT CHANGE UP (ref 0–0)
PLATELET # BLD AUTO: 285 K/UL — SIGNIFICANT CHANGE UP (ref 130–400)
POTASSIUM SERPL-MCNC: 4.7 MMOL/L — SIGNIFICANT CHANGE UP (ref 3.5–5)
POTASSIUM SERPL-SCNC: 4.7 MMOL/L — SIGNIFICANT CHANGE UP (ref 3.5–5)
RBC # BLD: 3.42 M/UL — LOW (ref 4.7–6.1)
RBC # FLD: 15.1 % — HIGH (ref 11.5–14.5)
SODIUM SERPL-SCNC: 137 MMOL/L — SIGNIFICANT CHANGE UP (ref 135–146)
WBC # BLD: 14.29 K/UL — HIGH (ref 4.8–10.8)
WBC # FLD AUTO: 14.29 K/UL — HIGH (ref 4.8–10.8)

## 2018-10-25 RX ADMIN — ALBUTEROL 2.5 MILLIGRAM(S): 90 AEROSOL, METERED ORAL at 21:03

## 2018-10-25 RX ADMIN — Medication 1 MILLIGRAM(S): at 05:27

## 2018-10-25 RX ADMIN — Medication 1 MILLIGRAM(S): at 23:55

## 2018-10-25 RX ADMIN — Medication 650 MILLIGRAM(S): at 05:27

## 2018-10-25 RX ADMIN — SCOPALAMINE 1 PATCH: 1 PATCH, EXTENDED RELEASE TRANSDERMAL at 14:10

## 2018-10-25 RX ADMIN — Medication 1 MILLIGRAM(S): at 12:26

## 2018-10-25 RX ADMIN — Medication 650 MILLIGRAM(S): at 06:52

## 2018-10-25 RX ADMIN — Medication 650 MILLIGRAM(S): at 12:18

## 2018-10-25 RX ADMIN — SCOPALAMINE 1 PATCH: 1 PATCH, EXTENDED RELEASE TRANSDERMAL at 20:45

## 2018-10-25 RX ADMIN — ALBUTEROL 2.5 MILLIGRAM(S): 90 AEROSOL, METERED ORAL at 04:20

## 2018-10-25 RX ADMIN — ALBUTEROL 2.5 MILLIGRAM(S): 90 AEROSOL, METERED ORAL at 07:45

## 2018-10-25 RX ADMIN — PANTOPRAZOLE SODIUM 40 MILLIGRAM(S): 20 TABLET, DELAYED RELEASE ORAL at 12:26

## 2018-10-25 RX ADMIN — Medication 650 MILLIGRAM(S): at 23:55

## 2018-10-25 RX ADMIN — LORATADINE 10 MILLIGRAM(S): 10 TABLET ORAL at 12:20

## 2018-10-25 RX ADMIN — SENNA PLUS 2 TABLET(S): 8.6 TABLET ORAL at 21:31

## 2018-10-25 RX ADMIN — Medication 1200 MILLIGRAM(S): at 05:27

## 2018-10-25 RX ADMIN — TIOTROPIUM BROMIDE 1 CAPSULE(S): 18 CAPSULE ORAL; RESPIRATORY (INHALATION) at 07:44

## 2018-10-25 RX ADMIN — Medication 100 MILLIGRAM(S): at 05:27

## 2018-10-25 RX ADMIN — Medication 10 MILLIGRAM(S): at 05:27

## 2018-10-25 RX ADMIN — SIMVASTATIN 10 MILLIGRAM(S): 20 TABLET, FILM COATED ORAL at 21:31

## 2018-10-25 RX ADMIN — Medication 650 MILLIGRAM(S): at 14:05

## 2018-10-25 RX ADMIN — Medication 650 MILLIGRAM(S): at 00:40

## 2018-10-25 RX ADMIN — ALBUTEROL 2.5 MILLIGRAM(S): 90 AEROSOL, METERED ORAL at 16:59

## 2018-10-25 RX ADMIN — ALBUTEROL 2.5 MILLIGRAM(S): 90 AEROSOL, METERED ORAL at 12:51

## 2018-10-25 NOTE — SWALLOW BEDSIDE ASSESSMENT ADULT - SLP PERTINENT HISTORY OF CURRENT PROBLEM
pt admitted with SOB cough and temperature. pt being treated for COPD exacerbation. pt known to SLP from previous admission with h/o dysphagia, PEG, COPD, and L vocal cord paralysis. PEG was originially placed at [facility] 2/2 poor po intake. pt then have acute voice changes. during june admission here pt had VFSS which revealed severe pharyngeal dysphagia and reocmmendations for NPO

## 2018-10-25 NOTE — PROGRESS NOTE ADULT - ASSESSMENT
Acute respiratory failure/CRF-hypoxic/hypercapnic-improved  COPD exac improved  Multiple pulmonary nodule  Anxiety  Dysphagia s/p peg tube      02 via nc  albuterol prn  cont symbicort 160/4.5 2 puff bid and spiriva  albuterol prn  prednisone 10 mg daily  resume daliresp please  cont niv at night and prn  dvt/gi px  oob - chair , ambulate as tolerated  overall prognosis guarded  stable from pulmonary standpoint  pt to follow up with primary pulmonologist Dr Almonte

## 2018-10-25 NOTE — PROGRESS NOTE ADULT - SUBJECTIVE AND OBJECTIVE BOX
THIS IS A DRAFT    Patient seen and examined earlier today.    Case discussed with primary team    Complete documentation to follow.    For any question, please contact me:  Dr. Teodora Dumont  Office: [phone]

## 2018-10-25 NOTE — PROGRESS NOTE ADULT - ASSESSMENT
SUBJECTIVE:    Patient is a 63y old Male who presents with a chief complaint of sob, cough and temp 103 (25 Oct 2018 16:13)  Currently admitted to medicine with the primary diagnosis of Sepsis   Today is hospital day 12d. This morning he is resting comfortably in bed and reports no new issues or overnight events. Patient is exasperated at not being able to eat. Otherwise, notes no SOB. No fevers, no chills.     PAST MEDICAL & SURGICAL HISTORY  PEG (percutaneous endoscopic gastrostomy) status  Other hydrocele  Anxiety disorder, unspecified type  Hypertension, unspecified type  High cholesterol  Chronic obstructive pulmonary disease, unspecified COPD type  PEG (percutaneous endoscopic gastrostomy) status    SOCIAL HISTORY:  Negative for smoking/alcohol/drug use.     ALLERGIES:  No Known Allergies    MEDICATIONS:  STANDING MEDICATIONS  acetaminophen   Tablet .. 650 milliGRAM(s) Oral every 6 hours  ALBUTerol    0.083% 2.5 milliGRAM(s) Nebulizer every 4 hours  ALPRAZolam 1 milliGRAM(s) Oral every 6 hours  buDESOnide 160 MICROgram(s)/formoterol 4.5 MICROgram(s) Inhaler 2 Puff(s) Inhalation two times a day  docusate sodium Liquid 100 milliGRAM(s) Oral two times a day  enoxaparin Injectable 40 milliGRAM(s) SubCutaneous at bedtime  guaiFENesin ER 1200 milliGRAM(s) Oral every 12 hours  loratadine 10 milliGRAM(s) Oral daily  pantoprazole   Suspension 40 milliGRAM(s) Oral daily  predniSONE   Tablet 10 milliGRAM(s) Oral daily  senna 2 Tablet(s) Oral at bedtime  simvastatin 10 milliGRAM(s) Oral at bedtime  tiotropium 18 MICROgram(s) Capsule 1 Capsule(s) Inhalation daily    PRN MEDICATIONS    VITALS:   T(F): 97.8  HR: 71  BP: 108/59  RR: 20  SpO2: --    LABS:                        9.5    14.29 )-----------( 285      ( 25 Oct 2018 08:10 )             30.5     10-25    137  |  100  |  14  ----------------------------<  97  4.7   |  29  |  0.7    Ca    8.9      25 Oct 2018 08:10        RADIOLOGY:    PHYSICAL EXAM:  GEN: NAD, no increased secretions, pt not coughing.   Pulmonary: No increased WOB, clear to auscultation bilaterally, decreased lung sounds on left.   CV: Regular rate and rhythm  GI: Soft, non-tender  MSK: Full ROM bilaterally, DP 2+ bilaterally  Neuro: AAOx3  Skin: no rashes or lesions     62 yo M with COPD, chronic respiratory failure on BIPAP, HTN, DLD, vocal cord paralysis and dysphagia s/p PEG 5/ 18 in the setting of resistant esophageal candidiasis and inability to swallow, bought in from [facility] for cough productive ,sob and difficulty breathing of 1 day.    #Acute hypercapnic rep failure 2/2 COPD exacerbation resolved.  - multiple pulm nodules  - c/w O2 as needed- on home O2, 2LNC,   -c/w BiPAP at night and PRN IPAP 12, EPAP 6  - albuterol prn  -s/p prednisone taper,   -per pulm, add spiriva, 10 mg daily prednisone, anxiolytic therapy aspiration precautions.   -symbicort 160/4.5 2 puffs BID.   -c/w guaifenescin. (daliresp non-formulary)  -s/p 7 days levofloxacin 4 days cefepime  -f/u with Dr. Almonte as outpatient.   -STR in SNF  ambulate as tolerated .    # Dysphagia  -patietn is know to SLP. They saw him today. pt continues to have severe dysphagia. Consider ENT Eval as outpatient  #HTN/DLD;  - cw home meds    #anxiety : cw home meds    DVT/GI ppx   #resume PEG diet, feeds adjusted to TID    FULL CODE  Dispo: [facility]

## 2018-10-25 NOTE — PROGRESS NOTE ADULT - ASSESSMENT
64 yo M with COPD, chronic respiratory failure on BIPAP, HTN, DLD, vocal cord paralysis and dysphagia s/p PEG 5/ 18 in the setting of resistant esophageal candidiasis and inability to swallow, bought in from [facility] for cough productive ,sob and difficulty breathing of 1 day.    #Acute hypercapnic rep failure 2/2 COPD exacerbation resolved.  - multiple pulm nodules  - c/w O2 as needed- on home O2, 2LNC,   -c/w BiPAP at night and PRN IPAP 12, EPAP 6  - albuterol prn  -s/p prednisone taper, now off steroids  -per pulm, add spiriva, 10 mg daily prednisone, anxiolytic therapy aspiration precautions.   -symbicort 160/4.5 2 puffs BID.   -c/w guaifenescin. (daliresp non-formulary_  -s/p 7 days levofloxacin 4 days cefepime  -f/u with Dr. Almonte as outpatient.   -STR in SNF  ambulate as tolerated .    #HTN/DLD;  - cw home meds    #anxiety : cw home meds    DVT/GI ppx   #resume PEG diet, feeds adjusted to TID

## 2018-10-25 NOTE — PROGRESS NOTE ADULT - SUBJECTIVE AND OBJECTIVE BOX
ALETA SUAZO  63y, Male  Allergy: No Known Allergies      LAST 24-Hr EVENTS:  pt seen examined  sob at baseline  no new complaints    VITALS:  T(F): 99.1 (10-25-18 @ 20:31), Max: 99.1 (10-25-18 @ 20:31)  HR: 80 (10-25-18 @ 20:31)  BP: 116/51 (10-25-18 @ 20:31) (93/55 - 124/59)  RR: 18 (10-25-18 @ 20:31)  SpO2: --    PHYSICAL EXAM:    GENERAL: NAD  NECK: Supple, No JVD  CHEST/LUNG: CTA b/l  HEART: Regular rate and rhythm  ABDOMEN: Soft, Nontender, Nondistended peg tube  EXTREMITIES:  No clubbing, edema absent        TESTS & MEASUREMENTS:    IN: 2100 mL / OUT: 1000 mL / NET: 1100 mL    IN: 2100 mL / OUT: 0 mL / NET: 2100 mL    IN: 0 mL / OUT: 600 mL / NET: -600 mL                            9.5    14.29 )-----------( 285      ( 25 Oct 2018 08:10 )             30.5       10-25    137  |  100  |  14  ----------------------------<  97  4.7   |  29  |  0.7    Ca    8.9      25 Oct 2018 08:10            MEDICATIONS:  MEDICATIONS  (STANDING):  acetaminophen   Tablet .. 650 milliGRAM(s) Oral every 6 hours  ALBUTerol    0.083% 2.5 milliGRAM(s) Nebulizer every 4 hours  ALPRAZolam 1 milliGRAM(s) Oral every 6 hours  buDESOnide 160 MICROgram(s)/formoterol 4.5 MICROgram(s) Inhaler 2 Puff(s) Inhalation two times a day  docusate sodium Liquid 100 milliGRAM(s) Oral two times a day  enoxaparin Injectable 40 milliGRAM(s) SubCutaneous at bedtime  guaiFENesin ER 1200 milliGRAM(s) Oral every 12 hours  loratadine 10 milliGRAM(s) Oral daily  pantoprazole   Suspension 40 milliGRAM(s) Oral daily  predniSONE   Tablet 10 milliGRAM(s) Oral daily  senna 2 Tablet(s) Oral at bedtime  simvastatin 10 milliGRAM(s) Oral at bedtime  tiotropium 18 MICROgram(s) Capsule 1 Capsule(s) Inhalation daily    MEDICATIONS  (PRN):

## 2018-10-25 NOTE — PROGRESS NOTE ADULT - SUBJECTIVE AND OBJECTIVE BOX
Patient was seen and examined. Spoke with RN. Chart reviewed.    No events overnight.  Vital Signs Last 24 Hrs  T(F): 98.7 (25 Oct 2018 12:11), Max: 98.7 (25 Oct 2018 12:11)  HR: 67 (25 Oct 2018 12:11) (67 - 79)  BP: 106/51 (25 Oct 2018 12:11) (93/55 - 124/59)  SpO2: --  MEDICATIONS  (STANDING):  acetaminophen   Tablet .. 650 milliGRAM(s) Oral every 6 hours  ALBUTerol    0.083% 2.5 milliGRAM(s) Nebulizer every 4 hours  ALPRAZolam 1 milliGRAM(s) Oral every 6 hours  buDESOnide 160 MICROgram(s)/formoterol 4.5 MICROgram(s) Inhaler 2 Puff(s) Inhalation two times a day  docusate sodium Liquid 100 milliGRAM(s) Oral two times a day  enoxaparin Injectable 40 milliGRAM(s) SubCutaneous at bedtime  guaiFENesin ER 1200 milliGRAM(s) Oral every 12 hours  loratadine 10 milliGRAM(s) Oral daily  pantoprazole   Suspension 40 milliGRAM(s) Oral daily  predniSONE   Tablet 10 milliGRAM(s) Oral daily  senna 2 Tablet(s) Oral at bedtime  simvastatin 10 milliGRAM(s) Oral at bedtime  tiotropium 18 MICROgram(s) Capsule 1 Capsule(s) Inhalation daily    MEDICATIONS  (PRN):    Labs:                        9.5    14.29 )-----------( 285      ( 25 Oct 2018 08:10 )             30.5                         8.8    9.66  )-----------( 277      ( 24 Oct 2018 09:16 )             27.8     25 Oct 2018 08:10    137    |  100    |  14     ----------------------------<  97     4.7     |  29     |  0.7    24 Oct 2018 09:16    140    |  102    |  17     ----------------------------<  130    4.1     |  28     |  0.7      Ca    8.9        25 Oct 2018 08:10  Ca    8.4        24 Oct 2018 09:16              Radiology:    General: comfortable, NAD  Neurology: A&Ox3, nonfocal  Head:  Normocephalic, atraumatic  ENT:  Mucosa moist, no ulcerations  Neck:  Supple, no JVD, )  Resp: CTA B/L  CV: RRR, S1S2,   GI: Soft, NT, bowel sounds peg  MS: No edema, + peripheral pulses, FROM all 4 extremity

## 2018-10-26 LAB
ANION GAP SERPL CALC-SCNC: 7 MMOL/L — SIGNIFICANT CHANGE UP (ref 7–14)
BASOPHILS # BLD AUTO: 0.04 K/UL — SIGNIFICANT CHANGE UP (ref 0–0.2)
BASOPHILS NFR BLD AUTO: 0.3 % — SIGNIFICANT CHANGE UP (ref 0–1)
BUN SERPL-MCNC: 15 MG/DL — SIGNIFICANT CHANGE UP (ref 10–20)
CALCIUM SERPL-MCNC: 8.9 MG/DL — SIGNIFICANT CHANGE UP (ref 8.5–10.1)
CHLORIDE SERPL-SCNC: 98 MMOL/L — SIGNIFICANT CHANGE UP (ref 98–110)
CO2 SERPL-SCNC: 29 MMOL/L — SIGNIFICANT CHANGE UP (ref 17–32)
CREAT SERPL-MCNC: 0.8 MG/DL — SIGNIFICANT CHANGE UP (ref 0.7–1.5)
EOSINOPHIL # BLD AUTO: 0.26 K/UL — SIGNIFICANT CHANGE UP (ref 0–0.7)
EOSINOPHIL NFR BLD AUTO: 2.2 % — SIGNIFICANT CHANGE UP (ref 0–8)
GLUCOSE BLDC GLUCOMTR-MCNC: 107 MG/DL — HIGH (ref 70–99)
GLUCOSE BLDC GLUCOMTR-MCNC: 222 MG/DL — HIGH (ref 70–99)
GLUCOSE SERPL-MCNC: 93 MG/DL — SIGNIFICANT CHANGE UP (ref 70–99)
HCT VFR BLD CALC: 31.1 % — LOW (ref 42–52)
HGB BLD-MCNC: 9.8 G/DL — LOW (ref 14–18)
IMM GRANULOCYTES NFR BLD AUTO: 0.4 % — HIGH (ref 0.1–0.3)
LYMPHOCYTES # BLD AUTO: 0.7 K/UL — LOW (ref 1.2–3.4)
LYMPHOCYTES # BLD AUTO: 6 % — LOW (ref 20.5–51.1)
MCHC RBC-ENTMCNC: 28 PG — SIGNIFICANT CHANGE UP (ref 27–31)
MCHC RBC-ENTMCNC: 31.5 G/DL — LOW (ref 32–37)
MCV RBC AUTO: 88.9 FL — SIGNIFICANT CHANGE UP (ref 80–94)
MONOCYTES # BLD AUTO: 0.6 K/UL — SIGNIFICANT CHANGE UP (ref 0.1–0.6)
MONOCYTES NFR BLD AUTO: 5.1 % — SIGNIFICANT CHANGE UP (ref 1.7–9.3)
NEUTROPHILS # BLD AUTO: 10.06 K/UL — HIGH (ref 1.4–6.5)
NEUTROPHILS NFR BLD AUTO: 86 % — HIGH (ref 42.2–75.2)
NRBC # BLD: 0 /100 WBCS — SIGNIFICANT CHANGE UP (ref 0–0)
PLATELET # BLD AUTO: 289 K/UL — SIGNIFICANT CHANGE UP (ref 130–400)
POTASSIUM SERPL-MCNC: 5.1 MMOL/L — HIGH (ref 3.5–5)
POTASSIUM SERPL-SCNC: 5.1 MMOL/L — HIGH (ref 3.5–5)
RBC # BLD: 3.5 M/UL — LOW (ref 4.7–6.1)
RBC # FLD: 15.1 % — HIGH (ref 11.5–14.5)
SODIUM SERPL-SCNC: 134 MMOL/L — LOW (ref 135–146)
WBC # BLD: 11.71 K/UL — HIGH (ref 4.8–10.8)
WBC # FLD AUTO: 11.71 K/UL — HIGH (ref 4.8–10.8)

## 2018-10-26 RX ORDER — ALPRAZOLAM 0.25 MG
0 TABLET ORAL
Qty: 0 | Refills: 0 | COMMUNITY

## 2018-10-26 RX ORDER — TIOTROPIUM BROMIDE 18 UG/1
1 CAPSULE ORAL; RESPIRATORY (INHALATION)
Qty: 0 | Refills: 0 | COMMUNITY
Start: 2018-10-26

## 2018-10-26 RX ORDER — CLONAZEPAM 1 MG
0.25 TABLET ORAL EVERY 12 HOURS
Qty: 0 | Refills: 0 | Status: DISCONTINUED | OUTPATIENT
Start: 2018-10-26 | End: 2018-11-01

## 2018-10-26 RX ADMIN — LORATADINE 10 MILLIGRAM(S): 10 TABLET ORAL at 12:15

## 2018-10-26 RX ADMIN — Medication 1200 MILLIGRAM(S): at 05:06

## 2018-10-26 RX ADMIN — Medication 650 MILLIGRAM(S): at 12:14

## 2018-10-26 RX ADMIN — ALBUTEROL 2.5 MILLIGRAM(S): 90 AEROSOL, METERED ORAL at 04:25

## 2018-10-26 RX ADMIN — PANTOPRAZOLE SODIUM 40 MILLIGRAM(S): 20 TABLET, DELAYED RELEASE ORAL at 12:16

## 2018-10-26 RX ADMIN — Medication 650 MILLIGRAM(S): at 00:20

## 2018-10-26 RX ADMIN — SCOPALAMINE 1 PATCH: 1 PATCH, EXTENDED RELEASE TRANSDERMAL at 17:31

## 2018-10-26 RX ADMIN — Medication 1 MILLIGRAM(S): at 05:06

## 2018-10-26 RX ADMIN — ALBUTEROL 2.5 MILLIGRAM(S): 90 AEROSOL, METERED ORAL at 20:38

## 2018-10-26 RX ADMIN — SIMVASTATIN 10 MILLIGRAM(S): 20 TABLET, FILM COATED ORAL at 21:16

## 2018-10-26 RX ADMIN — ALBUTEROL 2.5 MILLIGRAM(S): 90 AEROSOL, METERED ORAL at 09:25

## 2018-10-26 RX ADMIN — Medication 650 MILLIGRAM(S): at 23:44

## 2018-10-26 RX ADMIN — ALBUTEROL 2.5 MILLIGRAM(S): 90 AEROSOL, METERED ORAL at 15:50

## 2018-10-26 RX ADMIN — SENNA PLUS 2 TABLET(S): 8.6 TABLET ORAL at 21:16

## 2018-10-26 RX ADMIN — Medication 0.25 MILLIGRAM(S): at 17:09

## 2018-10-26 RX ADMIN — ALBUTEROL 2.5 MILLIGRAM(S): 90 AEROSOL, METERED ORAL at 00:14

## 2018-10-26 RX ADMIN — Medication 650 MILLIGRAM(S): at 00:30

## 2018-10-26 RX ADMIN — Medication 650 MILLIGRAM(S): at 17:09

## 2018-10-26 RX ADMIN — Medication 1 MILLIGRAM(S): at 12:15

## 2018-10-26 RX ADMIN — Medication 10 MILLIGRAM(S): at 05:06

## 2018-10-26 RX ADMIN — Medication 650 MILLIGRAM(S): at 17:02

## 2018-10-26 RX ADMIN — Medication 100 MILLIGRAM(S): at 05:06

## 2018-10-26 RX ADMIN — Medication 100 MILLIGRAM(S): at 17:13

## 2018-10-26 RX ADMIN — TIOTROPIUM BROMIDE 1 CAPSULE(S): 18 CAPSULE ORAL; RESPIRATORY (INHALATION) at 09:25

## 2018-10-26 RX ADMIN — ENOXAPARIN SODIUM 40 MILLIGRAM(S): 100 INJECTION SUBCUTANEOUS at 21:17

## 2018-10-26 RX ADMIN — SCOPALAMINE 1 PATCH: 1 PATCH, EXTENDED RELEASE TRANSDERMAL at 08:09

## 2018-10-26 RX ADMIN — ALBUTEROL 2.5 MILLIGRAM(S): 90 AEROSOL, METERED ORAL at 23:59

## 2018-10-26 RX ADMIN — Medication 650 MILLIGRAM(S): at 05:06

## 2018-10-26 RX ADMIN — ALBUTEROL 2.5 MILLIGRAM(S): 90 AEROSOL, METERED ORAL at 12:26

## 2018-10-26 NOTE — PROGRESS NOTE ADULT - SUBJECTIVE AND OBJECTIVE BOX
ALETA SUAZO  63y, Male  Allergy: No Known Allergies      LAST 24-Hr EVENTS:  no complaints, feels at baseline  VITALS:  T(F): 98 (10-26-18 @ 09:34), Max: 99.1 (10-25-18 @ 20:31)  HR: 80 (10-26-18 @ 09:34)  BP: 103/65 (10-26-18 @ 09:34) (94/52 - 121/57)  RR: 18 (10-26-18 @ 09:34)  SpO2: --    PHYSICAL EXAM:    GENERAL: NAD, well-developed  HEAD:  Atraumatic, Normocephalic  NECK: Supple, No JVD  CHEST/LUNG: Clear to auscultation bilaterally; No wheeze  HEART: Regular rate and rhythm; No murmurs, rubs, or gallops  ABDOMEN: Soft, Nontender, Nondistended; Bowel sounds present  EXTREMITIES:  2+ Peripheral Pulses, No clubbing, cyanosis, or edema        TESTS & MEASUREMENTS:    IN: 2100 mL / OUT: 0 mL / NET: 2100 mL    IN: 450 mL / OUT: 600 mL / NET: -150 mL                            9.8    11.71 )-----------( 289      ( 26 Oct 2018 08:54 )             31.1       10-26    134<L>  |  98  |  15  ----------------------------<  93  5.1<H>   |  29  |  0.8    Ca    8.9      26 Oct 2018 08:54                  ABG & VENT SETTINGS: (when applicable)  n/a      RADIOLOGY & ADDITIONAL TESTS:    MEDICATIONS:  MEDICATIONS  (STANDING):  acetaminophen   Tablet .. 650 milliGRAM(s) Oral every 6 hours  ALBUTerol    0.083% 2.5 milliGRAM(s) Nebulizer every 4 hours  ALPRAZolam 1 milliGRAM(s) Oral every 6 hours  buDESOnide 160 MICROgram(s)/formoterol 4.5 MICROgram(s) Inhaler 2 Puff(s) Inhalation two times a day  docusate sodium Liquid 100 milliGRAM(s) Oral two times a day  enoxaparin Injectable 40 milliGRAM(s) SubCutaneous at bedtime  guaiFENesin ER 1200 milliGRAM(s) Oral every 12 hours  loratadine 10 milliGRAM(s) Oral daily  pantoprazole   Suspension 40 milliGRAM(s) Oral daily  predniSONE   Tablet 10 milliGRAM(s) Oral daily  senna 2 Tablet(s) Oral at bedtime  simvastatin 10 milliGRAM(s) Oral at bedtime  tiotropium 18 MICROgram(s) Capsule 1 Capsule(s) Inhalation daily    MEDICATIONS  (PRN):

## 2018-10-26 NOTE — PROGRESS NOTE ADULT - ASSESSMENT
SUBJECTIVE:    Patient is a 63y old Male who presents with a chief complaint of sob, cough and temp 103 (26 Oct 2018 10:49)    Currently admitted to medicine with the primary diagnosis of COPD exacerbation     Today is hospital day 13d. This morning he is resting comfortably in bed and reports no new issues or overnight events.     PAST MEDICAL & SURGICAL HISTORY  PEG (percutaneous endoscopic gastrostomy) status  Other hydrocele  Anxiety disorder, unspecified type  Hypertension, unspecified type  High cholesterol  Chronic obstructive pulmonary disease, unspecified COPD type  PEG (percutaneous endoscopic gastrostomy) status    SOCIAL HISTORY:  Negative for smoking/alcohol/drug use.     ALLERGIES:  No Known Allergies    MEDICATIONS:  STANDING MEDICATIONS  acetaminophen   Tablet .. 650 milliGRAM(s) Oral every 6 hours  ALBUTerol    0.083% 2.5 milliGRAM(s) Nebulizer every 4 hours  ALPRAZolam 1 milliGRAM(s) Oral every 6 hours  buDESOnide 160 MICROgram(s)/formoterol 4.5 MICROgram(s) Inhaler 2 Puff(s) Inhalation two times a day  docusate sodium Liquid 100 milliGRAM(s) Oral two times a day  enoxaparin Injectable 40 milliGRAM(s) SubCutaneous at bedtime  guaiFENesin ER 1200 milliGRAM(s) Oral every 12 hours  loratadine 10 milliGRAM(s) Oral daily  pantoprazole   Suspension 40 milliGRAM(s) Oral daily  predniSONE   Tablet 10 milliGRAM(s) Oral daily  senna 2 Tablet(s) Oral at bedtime  simvastatin 10 milliGRAM(s) Oral at bedtime  tiotropium 18 MICROgram(s) Capsule 1 Capsule(s) Inhalation daily    PRN MEDICATIONS    VITALS:   T(F): 98.2  HR: 89  BP: 107/63  RR: 18  SpO2: --    LABS:                        9.8    11.71 )-----------( 289      ( 26 Oct 2018 08:54 )             31.1     10-26    134<L>  |  98  |  15  ----------------------------<  93  5.1<H>   |  29  |  0.8    Ca    8.9      26 Oct 2018 08:54                    RADIOLOGY:    PHYSICAL EXAM:  GEN: NAD  Pulmonary: No increased WOB, clear to auscultation bilaterally, decreased lung sounds bilaterally.   CV: Regular rate and rhythm  GI: Soft, non-tender  MSK: Full ROM bilaterally, DP 2+ bilaterally, no edema legs.   Neuro: AAOx3  Skin: no rashes or lesions    62 yo M with COPD, chronic respiratory failure secondary to COPD was admitted with high CO2 levels of 58. He was placed on BiPap and still retained high CO2 levels, proving BIPAP has failed. this patient will require a NIV on discharge for home use at bedtime, naptime, and periods of shortness of breath. The NC is veing ordered to decrease the work of breathing and decrease the amount of CO2. It will increase the oxygenation, and decrease hospital admissions. Whithout this NIV, patient will clinically decline and will cause readmissions. ,     #Acute hypercapnic rep failure 2/2 COPD exacerbation now resolved.  - multiple pulm nodules  - c/w O2 as needed- on home O2, 2LNC,   -c/w BiPAP at night and PRN IPAP 12, EPAP 6  - albuterol prn  -s/p prednisone taper,   -per pulm, add spiriva, 10 mg daily prednisone, anxiolytic therapy aspiration precautions.   -symbicort 160/4.5 2 puffs BID.   -c/w guaifenescin. (daliresp non-formulary)  -s/p 7 days levofloxacin 4 days cefepime  -f/u with Dr. Almonte as outpatient.   -STR in SNF  ambulate as tolerated .    # Dysphagia  -patietn is know to SLP. They saw him today. pt continues to have severe dysphagia. Consider ENT Eval as outpatient  #HTN/DLD;  - cw home meds    #anxiety : cw home meds    DVT/GI ppx   #resume PEG diet, feeds adjusted to TID    FULL CODE  Dispo: SNF

## 2018-10-26 NOTE — PROGRESS NOTE ADULT - ASSESSMENT
chronic hypoxic/hypercapnic respiratory failure  copd  anxiety disorder  multiple pulmonary nodules      oxygen as needed, monitor SpO2  bipap with sleep, not needing during the day at this time  bronchodilators  steroids  enteric feeds/aspiration precautions  gi/dvt prophylaxis  physical therapy  anxiolytic therapy  discharge planning

## 2018-10-27 RX ADMIN — Medication 0.25 MILLIGRAM(S): at 17:45

## 2018-10-27 RX ADMIN — ALBUTEROL 2.5 MILLIGRAM(S): 90 AEROSOL, METERED ORAL at 04:17

## 2018-10-27 RX ADMIN — ALBUTEROL 2.5 MILLIGRAM(S): 90 AEROSOL, METERED ORAL at 08:57

## 2018-10-27 RX ADMIN — Medication 10 MILLIGRAM(S): at 06:27

## 2018-10-27 RX ADMIN — Medication 0.25 MILLIGRAM(S): at 06:27

## 2018-10-27 RX ADMIN — PANTOPRAZOLE SODIUM 40 MILLIGRAM(S): 20 TABLET, DELAYED RELEASE ORAL at 11:14

## 2018-10-27 RX ADMIN — Medication 650 MILLIGRAM(S): at 06:26

## 2018-10-27 RX ADMIN — Medication 650 MILLIGRAM(S): at 00:00

## 2018-10-27 RX ADMIN — ENOXAPARIN SODIUM 40 MILLIGRAM(S): 100 INJECTION SUBCUTANEOUS at 21:13

## 2018-10-27 RX ADMIN — Medication 650 MILLIGRAM(S): at 17:45

## 2018-10-27 RX ADMIN — BUDESONIDE AND FORMOTEROL FUMARATE DIHYDRATE 2 PUFF(S): 160; 4.5 AEROSOL RESPIRATORY (INHALATION) at 11:14

## 2018-10-27 RX ADMIN — SIMVASTATIN 10 MILLIGRAM(S): 20 TABLET, FILM COATED ORAL at 21:14

## 2018-10-27 RX ADMIN — TIOTROPIUM BROMIDE 1 CAPSULE(S): 18 CAPSULE ORAL; RESPIRATORY (INHALATION) at 08:56

## 2018-10-27 RX ADMIN — Medication 650 MILLIGRAM(S): at 23:05

## 2018-10-27 RX ADMIN — Medication 100 MILLIGRAM(S): at 17:46

## 2018-10-27 RX ADMIN — ALBUTEROL 2.5 MILLIGRAM(S): 90 AEROSOL, METERED ORAL at 15:20

## 2018-10-27 RX ADMIN — SENNA PLUS 2 TABLET(S): 8.6 TABLET ORAL at 21:13

## 2018-10-27 RX ADMIN — Medication 100 MILLIGRAM(S): at 07:39

## 2018-10-27 RX ADMIN — Medication 650 MILLIGRAM(S): at 07:00

## 2018-10-27 RX ADMIN — Medication 650 MILLIGRAM(S): at 11:13

## 2018-10-27 RX ADMIN — LORATADINE 10 MILLIGRAM(S): 10 TABLET ORAL at 11:13

## 2018-10-27 NOTE — PROGRESS NOTE ADULT - SUBJECTIVE AND OBJECTIVE BOX
Patient was seen and examined. Spoke with RN. Chart reviewed.    No events overnight.  Vital Signs Last 24 Hrs  T(F): 98.2 (27 Oct 2018 12:15), Max: 98.3 (26 Oct 2018 17:03)  HR: 68 (27 Oct 2018 12:15) (68 - 94)  BP: 117/55 (27 Oct 2018 12:15) (92/51 - 118/56)  SpO2: --  MEDICATIONS  (STANDING):  acetaminophen   Tablet .. 650 milliGRAM(s) Oral every 6 hours  ALBUTerol    0.083% 2.5 milliGRAM(s) Nebulizer every 4 hours  buDESOnide 160 MICROgram(s)/formoterol 4.5 MICROgram(s) Inhaler 2 Puff(s) Inhalation two times a day  clonazePAM Tablet 0.25 milliGRAM(s) Oral every 12 hours  docusate sodium Liquid 100 milliGRAM(s) Oral two times a day  enoxaparin Injectable 40 milliGRAM(s) SubCutaneous at bedtime  loratadine 10 milliGRAM(s) Oral daily  pantoprazole   Suspension 40 milliGRAM(s) Oral daily  predniSONE   Tablet 10 milliGRAM(s) Oral daily  senna 2 Tablet(s) Oral at bedtime  simvastatin 10 milliGRAM(s) Oral at bedtime  tiotropium 18 MICROgram(s) Capsule 1 Capsule(s) Inhalation daily    MEDICATIONS  (PRN):    Labs:                        9.8    11.71 )-----------( 289      ( 26 Oct 2018 08:54 )             31.1     26 Oct 2018 08:54    134    |  98     |  15     ----------------------------<  93     5.1     |  29     |  0.8      Ca    8.9        26 Oct 2018 08:54              Radiology:    General: comfortable, NAD  Neurology: A&Ox3, nonfocal  Head:  Normocephalic, atraumatic  ENT:  Mucosa moist, no ulcerations  Neck:  Supple, no JVD,   Resp: CTA B/L  CV: RRR, S1S2,   GI: Soft, NT, bowel sounds peg  MS: No edema, + peripheral pulses, FROM all 4 extremity

## 2018-10-27 NOTE — PROGRESS NOTE ADULT - ASSESSMENT
62 yo M with COPD, chronic respiratory failure secondary to COPD was admitted with high CO2 levels of 58. He was placed on BiPap and still retained high CO2 levels, proving BIPAP has failed. this patient will require a NIV on discharge for home use at bedtime, naptime, and periods of shortness of breath. The NC is veing ordered to decrease the work of breathing and decrease the amount of CO2. It will increase the oxygenation, and decrease hospital admissions. Whithout this NIV, patient will clinically decline and will cause readmissions. ,     #Acute hypercapnic rep failure 2/2 COPD exacerbation now resolved.  - multiple pulm nodules  - c/w O2 as needed- on home O2, 2LNC,   -c/w BiPAP at night and PRN IPAP 12, EPAP 6  - albuterol prn  -s/p prednisone taper,   -per pulm, add spiriva, 10 mg daily prednisone, anxiolytic therapy aspiration precautions.   -symbicort 160/4.5 2 puffs BID.   -c/w guaifenescin. (daliresp non-formulary)  -s/p 7 days levofloxacin 4 days cefepime  -f/u with Dr. Almonte as outpatient.   -STR in SNF  ambulate as tolerated .    # Dysphagia  -patietn is know to SLP. They saw him today. pt continues to have severe dysphagia. Consider ENT Eval as outpatient  #HTN/DLD;  - cw home meds    #anxiety : cw home meds    DVT/GI ppx   #resume PEG diet, feeds adjusted to TID

## 2018-10-27 NOTE — PROGRESS NOTE ADULT - SUBJECTIVE AND OBJECTIVE BOX
ALETA SUAZO  63y, Male  Allergy: No Known Allergies      LAST 24-Hr EVENTS:  pt seen examined earler this am  resting comfortable in bed    VITALS:  T(F): 99.4 (10-27-18 @ 16:00), Max: 99.4 (10-27-18 @ 16:00)  HR: 72 (10-27-18 @ 16:00)  BP: 127/60 (10-27-18 @ 16:00) (92/51 - 127/60)  RR: 20 (10-27-18 @ 16:00)  SpO2: --    PHYSICAL EXAM:    GENERAL: NAD  NECK: Supple, No JVD  CHEST/LUNG: CTA b/l  HEART: Regular rate and rhythm  ABDOMEN: Soft, Nontender, Nondistended peg tube  EXTREMITIES:  No clubbing, edema absent        TESTS & MEASUREMENTS:    IN: 450 mL / OUT: 600 mL / NET: -150 mL    IN: 0 mL / OUT: 350 mL / NET: -350 mL    IN: 650 mL / OUT: 200 mL / NET: 450 mL                            9.8    11.71 )-----------( 289      ( 26 Oct 2018 08:54 )             31.1       10-26    134<L>  |  98  |  15  ----------------------------<  93  5.1<H>   |  29  |  0.8    Ca    8.9      26 Oct 2018 08:54        MEDICATIONS:  MEDICATIONS  (STANDING):  acetaminophen   Tablet .. 650 milliGRAM(s) Oral every 6 hours  ALBUTerol    0.083% 2.5 milliGRAM(s) Nebulizer every 4 hours  buDESOnide 160 MICROgram(s)/formoterol 4.5 MICROgram(s) Inhaler 2 Puff(s) Inhalation two times a day  clonazePAM Tablet 0.25 milliGRAM(s) Oral every 12 hours  docusate sodium Liquid 100 milliGRAM(s) Oral two times a day  enoxaparin Injectable 40 milliGRAM(s) SubCutaneous at bedtime  loratadine 10 milliGRAM(s) Oral daily  pantoprazole   Suspension 40 milliGRAM(s) Oral daily  predniSONE   Tablet 10 milliGRAM(s) Oral daily  senna 2 Tablet(s) Oral at bedtime  simvastatin 10 milliGRAM(s) Oral at bedtime  tiotropium 18 MICROgram(s) Capsule 1 Capsule(s) Inhalation daily    MEDICATIONS  (PRN):

## 2018-10-27 NOTE — PROGRESS NOTE ADULT - ASSESSMENT
Acute respiratory failure/CRF-hypoxic/hypercapnic-improved  COPD exac improved  Multiple pulmonary nodule  Anxiety  Dysphagia s/p peg tube      02 via nc  albuterol prn  cont symbicort 160/4.5 2 puff bid and spiriva  albuterol prn  prednisone 10 mg daily  resume daliresp on discharge  cont niv at night and prn  dvt/gi px  oob - chair , ambulate as tolerated  overall prognosis guarded  stable from pulmonary standpoint  pt to follow up with primary pulmonologist Dr Almonte

## 2018-10-28 RX ORDER — SCOPALAMINE 1 MG/3D
1 PATCH, EXTENDED RELEASE TRANSDERMAL
Qty: 0 | Refills: 0 | Status: DISCONTINUED | OUTPATIENT
Start: 2018-10-28 | End: 2018-11-13

## 2018-10-28 RX ADMIN — SIMVASTATIN 10 MILLIGRAM(S): 20 TABLET, FILM COATED ORAL at 21:13

## 2018-10-28 RX ADMIN — Medication 100 MILLIGRAM(S): at 05:46

## 2018-10-28 RX ADMIN — Medication 100 MILLIGRAM(S): at 17:46

## 2018-10-28 RX ADMIN — ALBUTEROL 2.5 MILLIGRAM(S): 90 AEROSOL, METERED ORAL at 19:59

## 2018-10-28 RX ADMIN — Medication 100 MILLIGRAM(S): at 18:59

## 2018-10-28 RX ADMIN — ALBUTEROL 2.5 MILLIGRAM(S): 90 AEROSOL, METERED ORAL at 12:27

## 2018-10-28 RX ADMIN — Medication 650 MILLIGRAM(S): at 06:30

## 2018-10-28 RX ADMIN — ALBUTEROL 2.5 MILLIGRAM(S): 90 AEROSOL, METERED ORAL at 23:58

## 2018-10-28 RX ADMIN — LORATADINE 10 MILLIGRAM(S): 10 TABLET ORAL at 11:52

## 2018-10-28 RX ADMIN — ALBUTEROL 2.5 MILLIGRAM(S): 90 AEROSOL, METERED ORAL at 08:54

## 2018-10-28 RX ADMIN — Medication 0.25 MILLIGRAM(S): at 05:46

## 2018-10-28 RX ADMIN — Medication 650 MILLIGRAM(S): at 17:46

## 2018-10-28 RX ADMIN — Medication 0.25 MILLIGRAM(S): at 17:46

## 2018-10-28 RX ADMIN — SCOPALAMINE 1 PATCH: 1 PATCH, EXTENDED RELEASE TRANSDERMAL at 11:51

## 2018-10-28 RX ADMIN — SENNA PLUS 2 TABLET(S): 8.6 TABLET ORAL at 21:13

## 2018-10-28 RX ADMIN — Medication 650 MILLIGRAM(S): at 18:58

## 2018-10-28 RX ADMIN — Medication 650 MILLIGRAM(S): at 05:47

## 2018-10-28 RX ADMIN — Medication 10 MILLIGRAM(S): at 05:47

## 2018-10-28 RX ADMIN — Medication 650 MILLIGRAM(S): at 11:53

## 2018-10-28 RX ADMIN — Medication 100 MILLIGRAM(S): at 21:13

## 2018-10-28 RX ADMIN — PANTOPRAZOLE SODIUM 40 MILLIGRAM(S): 20 TABLET, DELAYED RELEASE ORAL at 12:45

## 2018-10-28 RX ADMIN — Medication 650 MILLIGRAM(S): at 12:44

## 2018-10-28 RX ADMIN — ENOXAPARIN SODIUM 40 MILLIGRAM(S): 100 INJECTION SUBCUTANEOUS at 21:15

## 2018-10-28 RX ADMIN — Medication 650 MILLIGRAM(S): at 23:35

## 2018-10-28 RX ADMIN — TIOTROPIUM BROMIDE 1 CAPSULE(S): 18 CAPSULE ORAL; RESPIRATORY (INHALATION) at 08:58

## 2018-10-28 RX ADMIN — ALBUTEROL 2.5 MILLIGRAM(S): 90 AEROSOL, METERED ORAL at 02:59

## 2018-10-28 NOTE — PROGRESS NOTE ADULT - ASSESSMENT
SUBJECTIVE:    Patient is a 63y old Male who presents with a chief complaint of sob, cough and temp 103 (27 Oct 2018 12:43)    Currently admitted to medicine with the primary diagnosis of COPD exacerbation     Today is hospital day 15d. This morning he is resting comfortably in bed and reports no new issues or overnight events.     PAST MEDICAL & SURGICAL HISTORY  PEG (percutaneous endoscopic gastrostomy) status  Other hydrocele  Anxiety disorder, unspecified type  Hypertension, unspecified type  High cholesterol  Chronic obstructive pulmonary disease, unspecified COPD type  PEG (percutaneous endoscopic gastrostomy) status    SOCIAL HISTORY:  Negative for smoking/alcohol/drug use.     ALLERGIES:  No Known Allergies    MEDICATIONS:  STANDING MEDICATIONS  acetaminophen   Tablet .. 650 milliGRAM(s) Oral every 6 hours  ALBUTerol    0.083% 2.5 milliGRAM(s) Nebulizer every 4 hours  buDESOnide 160 MICROgram(s)/formoterol 4.5 MICROgram(s) Inhaler 2 Puff(s) Inhalation two times a day  clonazePAM Tablet 0.25 milliGRAM(s) Oral every 12 hours  docusate sodium Liquid 100 milliGRAM(s) Oral two times a day  enoxaparin Injectable 40 milliGRAM(s) SubCutaneous at bedtime  loratadine 10 milliGRAM(s) Oral daily  pantoprazole   Suspension 40 milliGRAM(s) Oral daily  predniSONE   Tablet 10 milliGRAM(s) Oral daily  senna 2 Tablet(s) Oral at bedtime  simvastatin 10 milliGRAM(s) Oral at bedtime  tiotropium 18 MICROgram(s) Capsule 1 Capsule(s) Inhalation daily    PRN MEDICATIONS    VITALS:   T(F): 96.6  HR: 72  BP: 120/57  RR: 20  SpO2: 98%    LABS:                        9.8    11.71 )-----------( 289      ( 26 Oct 2018 08:54 )             31.1     10-26    134<L>  |  98  |  15  ----------------------------<  93  5.1<H>   |  29  |  0.8    Ca    8.9      26 Oct 2018 08:54                    RADIOLOGY:    PHYSICAL EXAM:  GEN: NAD  Pulmonary: No increased WOB, clear to auscultation bilaterally  CV: Regular rate and rhythm  GI: Soft, non-tender  MSK: Full ROM bilaterally, DP 2+ bilaterally  Neuro: AAOx3  Skin: no rashes or lesions      62 yo M with COPD, chronic respiratory failure secondary to COPD was admitted with high CO2 levels of 58. He was placed on BiPap and still retained high CO2 levels, proving BIPAP has failed. this patient will require a NIV on discharge for home use at bedtime, naptime, and periods of shortness of breath. The NC is veing ordered to decrease the work of breathing and decrease the amount of CO2. It will increase the oxygenation, and decrease hospital admissions. Whithout this NIV, patient will clinically decline and will cause readmissions. ,     #Acute hypercapnic rep failure 2/2 COPD exacerbation now resolved.  - multiple pulm nodules  - c/w O2 as needed- on home O2, 2LNC,   -c/w BiPAP at night and PRN IPAP 12, EPAP 6  - albuterol prn  -s/p prednisone taper,   -c/w spiriva, 10 mg daily prednisone, anxiolytic therapy,aspiration precautions.   -symbicort 160/4.5 2 puffs BID.   -daliresp non-formulary Resume upon discharge  -s/p 7 days levofloxacin 4 days cefepime  -f/u with Dr. Almonte as outpatient.   -ambulate as tolerated .    # Dysphagia  patient is know to SLP. He continues to have severe dysphagia. Consider ENT Eval as outpatient    #HTN/DLD;  - cw home meds    #anxiety : cw home meds    DVT/GI ppx   #resume PEG diet, feeds adjusted to TID    FULL CODE  Dispo: SNF SUBJECTIVE:    Patient is a 63y old Male who presents with a chief complaint of sob, cough and temp 103 (27 Oct 2018 12:43)  Currently admitted to medicine with the primary diagnosis of COPD exacerbation  Today is hospital day 15d. This morning he is resting comfortably in bed and reports no new issues or overnight events. Patient continues to note episodic shortness of breath and is coughing up phlegm.     PAST MEDICAL & SURGICAL HISTORY  PEG (percutaneous endoscopic gastrostomy) status  Other hydrocele  Anxiety disorder, unspecified type  Hypertension, unspecified type  High cholesterol  Chronic obstructive pulmonary disease, unspecified COPD type  PEG (percutaneous endoscopic gastrostomy) status    SOCIAL HISTORY:  Negative for smoking/alcohol/drug use.     ALLERGIES:  No Known Allergies    MEDICATIONS:  STANDING MEDICATIONS  acetaminophen   Tablet .. 650 milliGRAM(s) Oral every 6 hours  ALBUTerol    0.083% 2.5 milliGRAM(s) Nebulizer every 4 hours  buDESOnide 160 MICROgram(s)/formoterol 4.5 MICROgram(s) Inhaler 2 Puff(s) Inhalation two times a day  clonazePAM Tablet 0.25 milliGRAM(s) Oral every 12 hours  docusate sodium Liquid 100 milliGRAM(s) Oral two times a day  enoxaparin Injectable 40 milliGRAM(s) SubCutaneous at bedtime  loratadine 10 milliGRAM(s) Oral daily  pantoprazole   Suspension 40 milliGRAM(s) Oral daily  predniSONE   Tablet 10 milliGRAM(s) Oral daily  senna 2 Tablet(s) Oral at bedtime  simvastatin 10 milliGRAM(s) Oral at bedtime  tiotropium 18 MICROgram(s) Capsule 1 Capsule(s) Inhalation daily    PRN MEDICATIONS    VITALS:   T(F): 96.6  HR: 72  BP: 120/57  RR: 20  SpO2: 98%    LABS:                        9.8    11.71 )-----------( 289      ( 26 Oct 2018 08:54 )             31.1     10-26    134<L>  |  98  |  15  ----------------------------<  93  5.1<H>   |  29  |  0.8    Ca    8.9      26 Oct 2018 08:54                    RADIOLOGY:  < from: Xray Chest 1 View- PORTABLE-Urgent (10.28.18 @ 12:25) >  Impression:      Hyperexpanded lungs. Stable lung nodules. No focal consolidation, pleural   effusion, or pneumothorax.    < end of copied text >    PHYSICAL EXAM:  GEN: NAD  Pulmonary: No increased WOB, clear to auscultation bilaterally, decraesed breath sounds L>R  CV: Regular rate and rhythm  GI: Soft, non-tender  MSK: Full ROM bilaterally, DP 2+ bilaterally  Neuro: AAOx3  Skin: no rashes or lesions      62 yo M with COPD, chronic respiratory failure on BIPAP, HTN, DLD, vocal cord paralysis and dysphagia s/p PEG 5/ 18 in the setting of resistant esophageal candidiasis and inability to swallow, bought in from [employer] for cough productive ,sob and difficulty breathing of 1 day.    #Acute hypercapnic rep failure 2/2 COPD exacerbation now resolved.  - multiple pulm nodules  - c/w O2 as needed- on home O2, 2LNC,   -c/w BiPAP at night and PRN IPAP 12, EPAP 6  - albuterol prn  -s/p prednisone taper,   -c/w spiriva, 10 mg daily prednisone, anxiolytic therapy,aspiration precautions.   -symbicort 160/4.5 2 puffs BID.   -daliresp non-formulary Resume upon discharge  -s/p 7 days levofloxacin 4 days cefepime  -f/u with Dr. Almonte as outpatient.   -ambulate as tolerated .  -guaifenescin through PEG tube    # Dysphagia  patient is know to SLP. He continues to have severe dysphagia. Consider ENT Eval as outpatient    #HTN/DLD;  - cw home meds    #anxiety : cw home meds    DVT/GI ppx   #resume PEG diet, feeds adjusted to TID    FULL CODE  Dispo: SNF

## 2018-10-28 NOTE — PROGRESS NOTE ADULT - SUBJECTIVE AND OBJECTIVE BOX
Patient was seen and examined. Spoke with RN. Chart reviewed.  No events overnight.  Vital Signs Last 24 Hrs  T(F): 96.6 (28 Oct 2018 04:57), Max: 99.4 (27 Oct 2018 16:00)  HR: 72 (28 Oct 2018 04:57) (65 - 94)  BP: 120/57 (28 Oct 2018 04:57) (105/50 - 127/60)  SpO2: 98% (28 Oct 2018 06:42) (98% - 98%)  MEDICATIONS  (STANDING):  acetaminophen   Tablet .. 650 milliGRAM(s) Oral every 6 hours  ALBUTerol    0.083% 2.5 milliGRAM(s) Nebulizer every 4 hours  buDESOnide 160 MICROgram(s)/formoterol 4.5 MICROgram(s) Inhaler 2 Puff(s) Inhalation two times a day  clonazePAM Tablet 0.25 milliGRAM(s) Oral every 12 hours  docusate sodium Liquid 100 milliGRAM(s) Oral two times a day  enoxaparin Injectable 40 milliGRAM(s) SubCutaneous at bedtime  loratadine 10 milliGRAM(s) Oral daily  pantoprazole   Suspension 40 milliGRAM(s) Oral daily  predniSONE   Tablet 10 milliGRAM(s) Oral daily  senna 2 Tablet(s) Oral at bedtime  simvastatin 10 milliGRAM(s) Oral at bedtime  tiotropium 18 MICROgram(s) Capsule 1 Capsule(s) Inhalation daily    MEDICATIONS  (PRN):    Labs:                        9.8    11.71 )-----------( 289      ( 26 Oct 2018 08:54 )             31.1     26 Oct 2018 08:54    134    |  98     |  15     ----------------------------<  93     5.1     |  29     |  0.8      Ca    8.9        26 Oct 2018 08:54            General: comfortable, NAD  Neurology: A&Ox3, nonfocal  Head:  Normocephalic, atraumatic  ENT:  Mucosa moist, no ulcerations  Neck:  Supple, no JVD,   Skin: no breakdowns (as per RN)  Resp: CTA B/L  CV: RRR, S1S2,   GI: Soft, NT, bowel sounds, peg  MS: No edema, + peripheral pulses, FROM all 4 extremity      A/P:  64 yo M with COPD, chronic respiratory failure secondary to COPD was admitted with high CO2 levels of 58. He was placed on BiPap and still retained high CO2 levels, proving BIPAP has failed. this patient will require a NIV on discharge for home use at bedtime, naptime, and periods of shortness of breath.     #Acute hypercapnic rep failure 2/2 COPD exacerbation now resolved.  - multiple pulm nodules  - c/w O2 as needed- on home O2, 2LNC,   -c/w BiPAP at night and PRN IPAP 12, EPAP 6  - albuterol prn  -s/p prednisone taper,   -c/w spiriva, 10 mg daily prednisone, anxiolytic therapy,aspiration precautions.   -symbicort 160/4.5 2 puffs BID.   -daliresp non-formulary Resume upon discharge  -s/p abx  -f/u with Dr. Almonte as outpatient.   -ambulate as tolerated .    DC monday if stable    DVT prophylaxis  Decubitus prevention- all measures as per RN protocol  Please call or text me with any questions or updates

## 2018-10-28 NOTE — PROGRESS NOTE ADULT - ASSESSMENT
Acute respiratory failure/CRF-hypoxic/hypercapnic-improved  COPD exac improved  Multiple pulmonary nodule  Anxiety  Dysphagia s/p peg tube      02 via nc  albuterol prn  cont symbicort 160/4.5 2 puff bid and spiriva  albuterol prn  prednisone 10 mg daily  resume daliresp on discharge  cont niv at night and prn  dvt/gi px  oob - chair , ambulate as tolerated  overall prognosis guarded  stable from pulmonary standpoint  pt to follow up with pulmonologist Dr Almonte upon discharge

## 2018-10-28 NOTE — PROGRESS NOTE ADULT - SUBJECTIVE AND OBJECTIVE BOX
ALETA SUAZO  63y, Male  Allergy: No Known Allergies      LAST 24-Hr EVENTS:  laying in bed comfortable  no cough  feels better overall    VITALS:  T(F): 99.2 (10-28-18 @ 09:00), Max: 99.4 (10-27-18 @ 16:00)  HR: 80 (10-28-18 @ 09:00)  BP: 124/58 (10-28-18 @ 09:00) (105/50 - 127/60)  RR: 20 (10-28-18 @ 09:00)  SpO2: 98% (10-28-18 @ 06:42)    PHYSICAL EXAM:    GENERAL: NAD  NECK: Supple, No JVD  CHEST/LUNG: CTA b/l  HEART: Regular rate and rhythm  ABDOMEN: Soft, Nontender, Nondistended peg tube  EXTREMITIES:  edema absent        TESTS & MEASUREMENTS:    IN: 0 mL / OUT: 350 mL / NET: -350 mL    IN: 1110 mL / OUT: 600 mL / NET: 510 mL                        MEDICATIONS:  MEDICATIONS  (STANDING):  acetaminophen   Tablet .. 650 milliGRAM(s) Oral every 6 hours  ALBUTerol    0.083% 2.5 milliGRAM(s) Nebulizer every 4 hours  buDESOnide 160 MICROgram(s)/formoterol 4.5 MICROgram(s) Inhaler 2 Puff(s) Inhalation two times a day  clonazePAM Tablet 0.25 milliGRAM(s) Oral every 12 hours  docusate sodium Liquid 100 milliGRAM(s) Oral two times a day  enoxaparin Injectable 40 milliGRAM(s) SubCutaneous at bedtime  loratadine 10 milliGRAM(s) Oral daily  pantoprazole   Suspension 40 milliGRAM(s) Oral daily  predniSONE   Tablet 10 milliGRAM(s) Oral daily  scopolamine   Patch 1 Patch Transdermal every 72 hours  senna 2 Tablet(s) Oral at bedtime  simvastatin 10 milliGRAM(s) Oral at bedtime  tiotropium 18 MICROgram(s) Capsule 1 Capsule(s) Inhalation daily    MEDICATIONS  (PRN):

## 2018-10-29 RX ADMIN — Medication 100 MILLIGRAM(S): at 14:06

## 2018-10-29 RX ADMIN — Medication 650 MILLIGRAM(S): at 05:27

## 2018-10-29 RX ADMIN — TIOTROPIUM BROMIDE 1 CAPSULE(S): 18 CAPSULE ORAL; RESPIRATORY (INHALATION) at 08:40

## 2018-10-29 RX ADMIN — PANTOPRAZOLE SODIUM 40 MILLIGRAM(S): 20 TABLET, DELAYED RELEASE ORAL at 11:18

## 2018-10-29 RX ADMIN — Medication 100 MILLIGRAM(S): at 17:11

## 2018-10-29 RX ADMIN — SIMVASTATIN 10 MILLIGRAM(S): 20 TABLET, FILM COATED ORAL at 22:18

## 2018-10-29 RX ADMIN — Medication 100 MILLIGRAM(S): at 22:17

## 2018-10-29 RX ADMIN — Medication 0.25 MILLIGRAM(S): at 17:38

## 2018-10-29 RX ADMIN — Medication 0.25 MILLIGRAM(S): at 05:29

## 2018-10-29 RX ADMIN — Medication 100 MILLIGRAM(S): at 05:28

## 2018-10-29 RX ADMIN — Medication 650 MILLIGRAM(S): at 17:10

## 2018-10-29 RX ADMIN — ALBUTEROL 2.5 MILLIGRAM(S): 90 AEROSOL, METERED ORAL at 15:54

## 2018-10-29 RX ADMIN — ALBUTEROL 2.5 MILLIGRAM(S): 90 AEROSOL, METERED ORAL at 04:04

## 2018-10-29 RX ADMIN — Medication 10 MILLIGRAM(S): at 05:28

## 2018-10-29 RX ADMIN — ALBUTEROL 2.5 MILLIGRAM(S): 90 AEROSOL, METERED ORAL at 23:19

## 2018-10-29 RX ADMIN — Medication 100 MILLIGRAM(S): at 17:10

## 2018-10-29 RX ADMIN — Medication 100 MILLIGRAM(S): at 11:15

## 2018-10-29 RX ADMIN — Medication 650 MILLIGRAM(S): at 23:04

## 2018-10-29 RX ADMIN — LORATADINE 10 MILLIGRAM(S): 10 TABLET ORAL at 11:15

## 2018-10-29 RX ADMIN — ALBUTEROL 2.5 MILLIGRAM(S): 90 AEROSOL, METERED ORAL at 20:11

## 2018-10-29 RX ADMIN — Medication 650 MILLIGRAM(S): at 11:15

## 2018-10-29 RX ADMIN — ALBUTEROL 2.5 MILLIGRAM(S): 90 AEROSOL, METERED ORAL at 08:40

## 2018-10-29 RX ADMIN — SENNA PLUS 2 TABLET(S): 8.6 TABLET ORAL at 22:18

## 2018-10-29 RX ADMIN — ALBUTEROL 2.5 MILLIGRAM(S): 90 AEROSOL, METERED ORAL at 11:48

## 2018-10-29 NOTE — PROGRESS NOTE ADULT - ASSESSMENT
SUBJECTIVE:    Patient is a 63y old Male who presents with a chief complaint of sob, cough and temp 103 (29 Oct 2018 07:10)    Currently admitted to medicine with the primary diagnosis of COPD exacerbation     Today is hospital day 16d. This morning he is resting comfortably in bed and reports no new issues or overnight events.     PAST MEDICAL & SURGICAL HISTORY  PEG (percutaneous endoscopic gastrostomy) status  Other hydrocele  Anxiety disorder, unspecified type  Hypertension, unspecified type  High cholesterol  Chronic obstructive pulmonary disease, unspecified COPD type  PEG (percutaneous endoscopic gastrostomy) status    SOCIAL HISTORY:  Negative for smoking/alcohol/drug use.     ALLERGIES:  No Known Allergies    MEDICATIONS:  STANDING MEDICATIONS  acetaminophen   Tablet .. 650 milliGRAM(s) Oral every 6 hours  ALBUTerol    0.083% 2.5 milliGRAM(s) Nebulizer every 4 hours  buDESOnide 160 MICROgram(s)/formoterol 4.5 MICROgram(s) Inhaler 2 Puff(s) Inhalation two times a day  clonazePAM Tablet 0.25 milliGRAM(s) Oral every 12 hours  docusate sodium Liquid 100 milliGRAM(s) Oral two times a day  enoxaparin Injectable 40 milliGRAM(s) SubCutaneous at bedtime  guaiFENesin    Syrup 100 milliGRAM(s) Oral every 4 hours  loratadine 10 milliGRAM(s) Oral daily  pantoprazole   Suspension 40 milliGRAM(s) Oral daily  predniSONE   Tablet 10 milliGRAM(s) Oral daily  scopolamine   Patch 1 Patch Transdermal every 72 hours  senna 2 Tablet(s) Oral at bedtime  simvastatin 10 milliGRAM(s) Oral at bedtime  tiotropium 18 MICROgram(s) Capsule 1 Capsule(s) Inhalation daily    PRN MEDICATIONS    VITALS:   T(F): 97.7  HR: 66  BP: 121/65  RR: 19  SpO2: 98%    LABS:                        RADIOLOGY:  < from: Xray Chest 1 View- PORTABLE-Urgent (10.28.18 @ 12:25) >  Impression:      Hyperexpanded lungs. Stable lung nodules. No focal consolidation, pleural   effusion, or pneumothorax.    < end of copied text >    PHYSICAL EXAM:  GEN: No acute distress, resting in bed, cough+  LUNGS: Clear to auscultation bilaterally , L decreased lung sounds.   HEART: Regular rate and rhythm.   ABD: Soft, non-tender, non-distended PEG +  EXT: cachectic, no edema. full ROM bilaterally.   NEURO: AAOX3    62 yo M with COPD, chronic respiratory failure on BIPAP, HTN, DLD, vocal cord paralysis and dysphagia s/p PEG 5/ 18 in the setting of resistant esophageal candidiasis and inability to swallow, bought in from [employer] for cough productive ,sob and difficulty breathing of 1 day.    #Acute hypercapnic rep failure 2/2 COPD exacerbation now resolved.  - multiple pulm nodules  - c/w O2 as needed- on home O2, 2LNC,   -c/w BiPAP at night and PRN IPAP 12, EPAP 6  - albuterol prn  -s/p prednisone taper,   -c/w spiriva, 10 mg daily prednisone, anxiolytic therapy,aspiration precautions.   -symbicort 160/4.5 2 puffs BID.   -daliresp non-formulary Resume upon discharge  -s/p 7 days levofloxacin 4 days cefepime  -f/u with Dr. Almonte as outpatient.   -ambulate as tolerated .  -guaifenescin through PEG tube    # Dysphagia  patient is know to SLP. He continues to have severe dysphagia. Consider ENT Eval as outpatient    #HTN/DLD;  - cw home meds    #anxiety : cw home meds    DVT/GI ppx   #resume PEG diet, feeds adjusted to TID    FULL CODE  Dispo: Home w home care.

## 2018-10-29 NOTE — PROGRESS NOTE ADULT - ASSESSMENT
Acute respiratory failure/CRF-hypoxic/hypercapnic-improved  COPD with exac improved  Multiple pulmonary nodules  Anxiety  Dysphagia s/p peg tube      02 via nc  albuterol prn  cont symbicort 160/4.5 2 puff bid and spiriva  albuterol prn  prednisone 10 mg daily  resume daliresp on discharge  cont niv at night and prn  dvt/gi px  oob - chair , ambulate as tolerated  overall prognosis guarded  d/c planning in progress  pt to follow up with pulmonologist Dr Almonte upon discharge

## 2018-10-29 NOTE — PROGRESS NOTE ADULT - SUBJECTIVE AND OBJECTIVE BOX
Patient was seen and examined. Spoke with RN. Chart reviewed.  No events overnight.  Vital Signs Last 24 Hrs  T(F): 97.7 (29 Oct 2018 06:00), Max: 99.2 (28 Oct 2018 09:00)  HR: 66 (29 Oct 2018 06:00) (66 - 80)  BP: 121/65 (29 Oct 2018 06:00) (105/52 - 133/61)  SpO2: 98% (28 Oct 2018 16:40) (98% - 98%)  MEDICATIONS  (STANDING):  acetaminophen   Tablet .. 650 milliGRAM(s) Oral every 6 hours  ALBUTerol    0.083% 2.5 milliGRAM(s) Nebulizer every 4 hours  buDESOnide 160 MICROgram(s)/formoterol 4.5 MICROgram(s) Inhaler 2 Puff(s) Inhalation two times a day  clonazePAM Tablet 0.25 milliGRAM(s) Oral every 12 hours  docusate sodium Liquid 100 milliGRAM(s) Oral two times a day  enoxaparin Injectable 40 milliGRAM(s) SubCutaneous at bedtime  guaiFENesin    Syrup 100 milliGRAM(s) Oral every 4 hours  loratadine 10 milliGRAM(s) Oral daily  pantoprazole   Suspension 40 milliGRAM(s) Oral daily  predniSONE   Tablet 10 milliGRAM(s) Oral daily  scopolamine   Patch 1 Patch Transdermal every 72 hours  senna 2 Tablet(s) Oral at bedtime  simvastatin 10 milliGRAM(s) Oral at bedtime  tiotropium 18 MICROgram(s) Capsule 1 Capsule(s) Inhalation daily    MEDICATIONS  (PRN):      General: comfortable, NAD  Neurology: A&Ox3, nonfocal  Head:  Normocephalic, atraumatic  ENT:  Mucosa moist, no ulcerations  Neck:  Supple, no JVD,   Skin: no breakdowns (as per RN)  Resp: CTA B/L  CV: RRR, S1S2,   GI: Soft, NT, bowel sounds, PEG  MS: No edema, + peripheral pulses, FROM all 4 extremity      A/P:  64 yo M with COPD, chronic respiratory failure secondary to COPD was admitted with high CO2 levels of 58. He was placed on BiPap and still retained high CO2 levels, proving BIPAP has failed. this patient will require a NIV on discharge for home use at bedtime, naptime, and periods of shortness of breath.     #Acute hypercapnic rep failure 2/2 COPD exacerbation now resolved.  - multiple pulm nodules  - c/w O2 as needed- on home O2, 2LNC,   -c/w BiPAP at night and PRN IPAP 12, EPAP 6  - albuterol prn  -s/p prednisone taper,   -c/w spiriva, 10 mg daily prednisone, anxiolytic therapy,aspiration precautions.   -symbicort 160/4.5 2 puffs BID.   -daliresp non-formulary Resume upon discharge  -s/p abx  -f/u with Dr. Almonte as outpatient.   -ambulate as tolerated .    DC planning  DVT prophylaxis  Decubitus prevention- all measures as per RN protocol  Please call or text me with any questions or updates

## 2018-10-29 NOTE — PROGRESS NOTE ADULT - SUBJECTIVE AND OBJECTIVE BOX
ALETA SUAZO  63y, Male  Allergy: No Known Allergies      LAST 24-Hr EVENTS:  pt seen examined around 330 pm  laying comfortable in bed  no cough  feels overall better  wants to go home    VITALS:  T(F): 96.2 (10-29-18 @ 17:00), Max: 97.7 (10-29-18 @ 06:00)  HR: 74 (10-29-18 @ 17:00)  BP: 121/53 (10-29-18 @ 17:00) (88/57 - 121/65)  RR: 20 (10-29-18 @ 17:00)  SpO2: --    PHYSICAL EXAM:    GENERAL: NAD  NECK: Supple, No JVD  CHEST/LUNG: distant bs  HEART: Regular rate and rhythm  ABDOMEN: Soft, Nontender, Nondistended peg tube  EXTREMITIES:  No clubbing, edema absent        TESTS & MEASUREMENTS:    IN: 1110 mL / OUT: 600 mL / NET: 510 mL    IN: 460 mL / OUT: 0 mL / NET: 460 mL                      MEDICATIONS:  MEDICATIONS  (STANDING):  acetaminophen   Tablet .. 650 milliGRAM(s) Oral every 6 hours  ALBUTerol    0.083% 2.5 milliGRAM(s) Nebulizer every 4 hours  buDESOnide 160 MICROgram(s)/formoterol 4.5 MICROgram(s) Inhaler 2 Puff(s) Inhalation two times a day  clonazePAM Tablet 0.25 milliGRAM(s) Oral every 12 hours  docusate sodium Liquid 100 milliGRAM(s) Oral two times a day  enoxaparin Injectable 40 milliGRAM(s) SubCutaneous at bedtime  guaiFENesin    Syrup 100 milliGRAM(s) Oral every 4 hours  loratadine 10 milliGRAM(s) Oral daily  pantoprazole   Suspension 40 milliGRAM(s) Oral daily  predniSONE   Tablet 10 milliGRAM(s) Oral daily  scopolamine   Patch 1 Patch Transdermal every 72 hours  senna 2 Tablet(s) Oral at bedtime  simvastatin 10 milliGRAM(s) Oral at bedtime  tiotropium 18 MICROgram(s) Capsule 1 Capsule(s) Inhalation daily    MEDICATIONS  (PRN):

## 2018-10-30 RX ORDER — LORATADINE 10 MG/1
1 TABLET ORAL
Qty: 30 | Refills: 0
Start: 2018-10-30 | End: 2018-11-28

## 2018-10-30 RX ORDER — BUDESONIDE AND FORMOTEROL FUMARATE DIHYDRATE 160; 4.5 UG/1; UG/1
2 AEROSOL RESPIRATORY (INHALATION)
Qty: 1 | Refills: 0
Start: 2018-10-30 | End: 2018-11-28

## 2018-10-30 RX ORDER — FLUTICASONE PROPIONATE 220 MCG
0 AEROSOL WITH ADAPTER (GRAM) INHALATION
Qty: 0 | Refills: 0 | COMMUNITY

## 2018-10-30 RX ORDER — ZINC OXIDE 200 MG/G
0 OINTMENT TOPICAL
Qty: 0 | Refills: 0 | COMMUNITY

## 2018-10-30 RX ORDER — OMEPRAZOLE 10 MG/1
1 CAPSULE, DELAYED RELEASE ORAL
Qty: 30 | Refills: 0
Start: 2018-10-30 | End: 2018-11-28

## 2018-10-30 RX ORDER — LORATADINE 10 MG/1
1 TABLET ORAL
Qty: 0 | Refills: 0 | COMMUNITY

## 2018-10-30 RX ORDER — SCOPALAMINE 1 MG/3D
1 PATCH, EXTENDED RELEASE TRANSDERMAL
Qty: 10 | Refills: 0
Start: 2018-10-30 | End: 2018-11-28

## 2018-10-30 RX ORDER — SENNA PLUS 8.6 MG/1
2 TABLET ORAL
Qty: 60 | Refills: 0
Start: 2018-10-30 | End: 2018-11-28

## 2018-10-30 RX ORDER — SIMVASTATIN 20 MG/1
1 TABLET, FILM COATED ORAL
Qty: 0 | Refills: 0 | COMMUNITY

## 2018-10-30 RX ORDER — OMEPRAZOLE 10 MG/1
1 CAPSULE, DELAYED RELEASE ORAL
Qty: 0 | Refills: 0 | COMMUNITY

## 2018-10-30 RX ORDER — ACETAMINOPHEN 500 MG
2 TABLET ORAL
Qty: 240 | Refills: 0
Start: 2018-10-30 | End: 2018-11-28

## 2018-10-30 RX ORDER — FLUTICASONE PROPIONATE 220 MCG
1 AEROSOL WITH ADAPTER (GRAM) INHALATION
Qty: 1 | Refills: 0
Start: 2018-10-30 | End: 2018-11-28

## 2018-10-30 RX ORDER — NYSTATIN CREAM 100000 [USP'U]/G
1 CREAM TOPICAL
Qty: 0 | Refills: 0 | COMMUNITY

## 2018-10-30 RX ORDER — NYSTATIN CREAM 100000 [USP'U]/G
1 CREAM TOPICAL
Qty: 1 | Refills: 0
Start: 2018-10-30 | End: 2018-11-28

## 2018-10-30 RX ORDER — ALBUTEROL 90 UG/1
1 AEROSOL, METERED ORAL
Qty: 500 | Refills: 0
Start: 2018-10-30 | End: 2018-11-28

## 2018-10-30 RX ORDER — TIOTROPIUM BROMIDE 18 UG/1
1 CAPSULE ORAL; RESPIRATORY (INHALATION)
Qty: 1 | Refills: 0
Start: 2018-10-30 | End: 2018-11-28

## 2018-10-30 RX ORDER — AMLODIPINE BESYLATE 2.5 MG/1
1 TABLET ORAL
Qty: 30 | Refills: 0
Start: 2018-10-30 | End: 2018-11-28

## 2018-10-30 RX ORDER — ALBUTEROL 90 UG/1
0 AEROSOL, METERED ORAL
Qty: 0 | Refills: 0 | COMMUNITY

## 2018-10-30 RX ORDER — ZINC OXIDE 200 MG/G
1 OINTMENT TOPICAL
Qty: 1 | Refills: 0
Start: 2018-10-30 | End: 2018-11-28

## 2018-10-30 RX ORDER — ROFLUMILAST 500 UG/1
1 TABLET ORAL
Qty: 30 | Refills: 0
Start: 2018-10-30 | End: 2018-11-28

## 2018-10-30 RX ORDER — ROFLUMILAST 500 UG/1
1 TABLET ORAL
Qty: 0 | Refills: 0 | COMMUNITY

## 2018-10-30 RX ORDER — DOCUSATE SODIUM 100 MG
10 CAPSULE ORAL
Qty: 500 | Refills: 0
Start: 2018-10-30 | End: 2018-11-28

## 2018-10-30 RX ORDER — AMLODIPINE BESYLATE 2.5 MG/1
1 TABLET ORAL
Qty: 0 | Refills: 0 | COMMUNITY

## 2018-10-30 RX ORDER — CLONAZEPAM 1 MG
0.5 TABLET ORAL
Qty: 30 | Refills: 0
Start: 2018-10-30 | End: 2018-11-28

## 2018-10-30 RX ORDER — SIMVASTATIN 20 MG/1
1 TABLET, FILM COATED ORAL
Qty: 30 | Refills: 0
Start: 2018-10-30 | End: 2018-11-28

## 2018-10-30 RX ADMIN — SCOPALAMINE 1 PATCH: 1 PATCH, EXTENDED RELEASE TRANSDERMAL at 06:32

## 2018-10-30 RX ADMIN — Medication 650 MILLIGRAM(S): at 17:37

## 2018-10-30 RX ADMIN — ALBUTEROL 2.5 MILLIGRAM(S): 90 AEROSOL, METERED ORAL at 16:09

## 2018-10-30 RX ADMIN — SIMVASTATIN 10 MILLIGRAM(S): 20 TABLET, FILM COATED ORAL at 21:41

## 2018-10-30 RX ADMIN — SENNA PLUS 2 TABLET(S): 8.6 TABLET ORAL at 21:41

## 2018-10-30 RX ADMIN — TIOTROPIUM BROMIDE 1 CAPSULE(S): 18 CAPSULE ORAL; RESPIRATORY (INHALATION) at 08:44

## 2018-10-30 RX ADMIN — Medication 10 MILLIGRAM(S): at 06:31

## 2018-10-30 RX ADMIN — Medication 100 MILLIGRAM(S): at 17:38

## 2018-10-30 RX ADMIN — ALBUTEROL 2.5 MILLIGRAM(S): 90 AEROSOL, METERED ORAL at 20:02

## 2018-10-30 RX ADMIN — Medication 650 MILLIGRAM(S): at 17:54

## 2018-10-30 RX ADMIN — Medication 100 MILLIGRAM(S): at 13:26

## 2018-10-30 RX ADMIN — ENOXAPARIN SODIUM 40 MILLIGRAM(S): 100 INJECTION SUBCUTANEOUS at 21:41

## 2018-10-30 RX ADMIN — LORATADINE 10 MILLIGRAM(S): 10 TABLET ORAL at 12:27

## 2018-10-30 RX ADMIN — Medication 650 MILLIGRAM(S): at 23:15

## 2018-10-30 RX ADMIN — Medication 0.25 MILLIGRAM(S): at 06:30

## 2018-10-30 RX ADMIN — ALBUTEROL 2.5 MILLIGRAM(S): 90 AEROSOL, METERED ORAL at 03:48

## 2018-10-30 RX ADMIN — Medication 100 MILLIGRAM(S): at 21:38

## 2018-10-30 RX ADMIN — Medication 100 MILLIGRAM(S): at 06:32

## 2018-10-30 RX ADMIN — ALBUTEROL 2.5 MILLIGRAM(S): 90 AEROSOL, METERED ORAL at 13:46

## 2018-10-30 RX ADMIN — ALBUTEROL 2.5 MILLIGRAM(S): 90 AEROSOL, METERED ORAL at 08:44

## 2018-10-30 RX ADMIN — Medication 650 MILLIGRAM(S): at 12:28

## 2018-10-30 RX ADMIN — Medication 650 MILLIGRAM(S): at 06:30

## 2018-10-30 RX ADMIN — Medication 0.25 MILLIGRAM(S): at 17:37

## 2018-10-30 RX ADMIN — PANTOPRAZOLE SODIUM 40 MILLIGRAM(S): 20 TABLET, DELAYED RELEASE ORAL at 12:27

## 2018-10-30 RX ADMIN — Medication 100 MILLIGRAM(S): at 09:30

## 2018-10-30 RX ADMIN — Medication 100 MILLIGRAM(S): at 06:31

## 2018-10-30 NOTE — CHART NOTE - NSCHARTNOTEFT_GEN_A_CORE
Registered Dietitian Follow-Up     Patient Profile Reviewed                           Yes [x]   No []     Nutrition History Previously Obtained        Yes [x]  No []       Pertinent Subjective Information:      Pertinent Medical Interventions: Acute hypercapnic rep failure 2/2 COPD exacerbation now resolved. Multiple pulm nodules. S/p abx.      Diet order: Jevity 1.2 360ml q8h     Anthropometrics:  - Ht. 170.1cm   - Wt. no new weights documented   - %wt change  - BMI 19.7  - IBW 148lbs      Pertinent Lab Data: no new labs documented      Pertinent Meds: Protonix, Prednisone, Senna, Simvastatin      Physical Findings:  - Appearance: alert&oriented  - GI function: no symptoms noted, last BM 10/26- on bowel regimen   - Tubes: PEG  - Oral/Mouth cavity: NPO  - Skin: redness to sacrum (BS 17)      Nutrition Requirements (from RD note on 10/16)  Weight Used:     Estimated Energy Needs    Continue [x]  Adjust [] 1580-1712kcal   Adjusted Energy Recommendations:   kcal/day        Estimated Protein Needs    Continue [x]  Adjust [] 57-68g  Adjusted Protein Recommendations:   gm/day        Estimated Fluid Needs        Continue [x]  Adjust [] 1ml/kcal   Adjusted Fluid Recommendations:   mL/day     Nutrient Intake: Jevity 1.2 1080kcal total volume daily- 1296kcal, 59g protein (82% estimated energy needs, 100% estimated protein needs)         Nutrition Diagnostic #1  Problem: Less than optimal enteral nutrition infusion   Etiology: current TF regimen  Statement: pt. meeting ~82% estimated energy needs        Nutrition Intervention: enteral and parenteral nutrition    Rec: Increase Jevity 1.2 to 360ml q6h to provide 1728kcal, 79g protein (101% estimated energy needs, 116% estimated protein needs- 18% overall calories from protein- WNL)     Goal/Expected Outcome: In 3 days enteral nutrition to provide >85% estimated energy needs     Indicator/Monitoring: energy intake, body composition, NFPF Registered Dietitian Follow-Up     Patient Profile Reviewed                           Yes [x]   No []     Nutrition History Previously Obtained        Yes [x]  No []       Pertinent Subjective Information: Pt. sound asleep during visit. Spoke to RN to obtain information.      Pertinent Medical Interventions: Acute hypercapnic rep failure 2/2 COPD exacerbation now resolved. Multiple pulm nodules. S/p abx. Guarded prognosis.      Diet order: Jevity 1.2 360ml q8h     Anthropometrics:  - Ht. 170.1cm   - Wt. no new weights documented, 56.5kg bed scale weight not tared taken by RD today vs. 56.9kg on 10/15- relatively stable   - %wt change  - BMI 19.7  - IBW 148lbs      Pertinent Lab Data: no new labs documented      Pertinent Meds: Protonix, Prednisone, Senna, Simvastatin      Physical Findings:  - Appearance: alert&oriented  - GI function: no symptoms noted, last BM 10/26- on bowel regimen   - Tubes: PEG  - Oral/Mouth cavity: NPO  - Skin: redness to sacrum (BS 17)      Nutrition Requirements (from RD note on 10/16)  Weight Used:     Estimated Energy Needs    Continue [x]  Adjust [] 1580-1712kcal   Adjusted Energy Recommendations:   kcal/day        Estimated Protein Needs    Continue [x]  Adjust [] 57-68g  Adjusted Protein Recommendations:   gm/day        Estimated Fluid Needs        Continue [x]  Adjust [] 1ml/kcal   Adjusted Fluid Recommendations:   mL/day     Nutrient Intake: Jevity 1.2 1080kcal total volume daily- 1296kcal, 59g protein (82% estimated energy needs, 100% estimated protein needs)         Nutrition Diagnostic #1  Problem: Less than optimal enteral nutrition infusion   Etiology: current TF regimen  Statement: pt. meeting ~82% estimated energy needs        Nutrition Intervention: enteral and parenteral nutrition    Rec: Increase Jevity 1.2 to 360ml q6h to provide 1728kcal, 79g protein (101% estimated energy needs, 116% estimated protein needs- 18% overall calories from protein- WNL)     Goal/Expected Outcome: In 3 days enteral nutrition to provide >85% estimated energy needs, at least 1BM in 3 days     Indicator/Monitoring: energy intake, body composition, NFPF

## 2018-10-30 NOTE — PROGRESS NOTE ADULT - SUBJECTIVE AND OBJECTIVE BOX
Patient was seen and examined. Spoke with RN. Chart reviewed.  No events overnight.  Vital Signs Last 24 Hrs  T(F): 97.8 (30 Oct 2018 05:30), Max: 97.8 (30 Oct 2018 05:30)  HR: 60 (30 Oct 2018 05:30) (60 - 88)  BP: 110/49 (30 Oct 2018 05:30) (88/57 - 135/58)  SpO2: 96% (30 Oct 2018 06:54) (96% - 99%)  MEDICATIONS  (STANDING):  acetaminophen   Tablet .. 650 milliGRAM(s) Oral every 6 hours  ALBUTerol    0.083% 2.5 milliGRAM(s) Nebulizer every 4 hours  buDESOnide 160 MICROgram(s)/formoterol 4.5 MICROgram(s) Inhaler 2 Puff(s) Inhalation two times a day  clonazePAM Tablet 0.25 milliGRAM(s) Oral every 12 hours  docusate sodium Liquid 100 milliGRAM(s) Oral two times a day  enoxaparin Injectable 40 milliGRAM(s) SubCutaneous at bedtime  guaiFENesin    Syrup 100 milliGRAM(s) Oral every 4 hours  loratadine 10 milliGRAM(s) Oral daily  pantoprazole   Suspension 40 milliGRAM(s) Oral daily  predniSONE   Tablet 10 milliGRAM(s) Oral daily  scopolamine   Patch 1 Patch Transdermal every 72 hours  senna 2 Tablet(s) Oral at bedtime  simvastatin 10 milliGRAM(s) Oral at bedtime  tiotropium 18 MICROgram(s) Capsule 1 Capsule(s) Inhalation daily    MEDICATIONS  (PRN):      General: comfortable, NAD  Neurology: A&Ox3, nonfocal  Head:  Normocephalic, atraumatic  ENT:  Mucosa moist, no ulcerations  Neck:  Supple, no JVD,   Skin: no breakdowns (as per RN)  Resp: CTA B/L  CV: RRR, S1S2,   GI: Soft, NT, bowel sounds, peg  MS: No edema, + peripheral pulses, FROM all 4 extremity      A/P:  62 yo M with COPD, chronic respiratory failure secondary to COPD was admitted with high CO2 levels of 58. He was placed on BiPap and still retained high CO2 levels, proving BIPAP has failed. this patient will require a NIV on discharge for home use at bedtime, naptime, and periods of shortness of breath.     #Acute hypercapnic rep failure 2/2 COPD exacerbation now resolved.  - multiple pulm nodules  - c/w O2 as needed- on home O2, 2LNC,   -c/w BiPAP at night and PRN IPAP 12, EPAP 6  - albuterol prn  -s/p prednisone taper,   -c/w spiriva, 10 mg daily prednisone, anxiolytic therapy,aspiration precautions.   -symbicort 160/4.5 2 puffs BID.   -daliresp non-formulary Resume upon discharge  -s/p abx  -f/u with Dr. Almonte as outpatient.   -ambulate as tolerated .    DC planning  DVT prophylaxis  Decubitus prevention- all measures as per RN protocol  Please call or text me with any questions or updates

## 2018-10-30 NOTE — PROGRESS NOTE ADULT - SUBJECTIVE AND OBJECTIVE BOX
ALETA SUAZO  63y, Male  Allergy: No Known Allergies      LAST 24-Hr EVENTS:  pt seen examined  laying comfortable in bed    VITALS:  T(F): 97.8 (10-30-18 @ 05:30), Max: 97.8 (10-30-18 @ 05:30)  HR: 60 (10-30-18 @ 05:30)  BP: 110/49 (10-30-18 @ 05:30) (95/54 - 135/58)  RR: 20 (10-30-18 @ 05:30)  SpO2: 96% (10-30-18 @ 06:54)    PHYSICAL EXAM:    GENERAL: NAD  NECK: Supple, No JVD  CHEST/LUNG: CTA b/l  HEART: Regular rate and rhythm  ABDOMEN: Soft, Nontender, Nondistended- peg tube  EXTREMITIES:  No clubbing, edema absent        TESTS & MEASUREMENTS:    IN: 460 mL / OUT: 0 mL / NET: 460 mL    IN: 460 mL / OUT: 300 mL / NET: 160 mL    IN: 100 mL / OUT: 0 mL / NET: 100 mL            MEDICATIONS:  MEDICATIONS  (STANDING):  acetaminophen   Tablet .. 650 milliGRAM(s) Oral every 6 hours  ALBUTerol    0.083% 2.5 milliGRAM(s) Nebulizer every 4 hours  buDESOnide 160 MICROgram(s)/formoterol 4.5 MICROgram(s) Inhaler 2 Puff(s) Inhalation two times a day  clonazePAM Tablet 0.25 milliGRAM(s) Oral every 12 hours  docusate sodium Liquid 100 milliGRAM(s) Oral two times a day  enoxaparin Injectable 40 milliGRAM(s) SubCutaneous at bedtime  guaiFENesin    Syrup 100 milliGRAM(s) Oral every 4 hours  loratadine 10 milliGRAM(s) Oral daily  pantoprazole   Suspension 40 milliGRAM(s) Oral daily  predniSONE   Tablet 10 milliGRAM(s) Oral daily  scopolamine   Patch 1 Patch Transdermal every 72 hours  senna 2 Tablet(s) Oral at bedtime  simvastatin 10 milliGRAM(s) Oral at bedtime  tiotropium 18 MICROgram(s) Capsule 1 Capsule(s) Inhalation daily    MEDICATIONS  (PRN):

## 2018-10-30 NOTE — PROGRESS NOTE ADULT - ASSESSMENT
Acute respiratory failure/CRF-hypoxic/hypercapnic-improved  COPD with exac improved  Multiple pulmonary nodules  Anxiety  Dysphagia s/p peg tube      02 via nc  albuterol prn  cont current maintenance inhalers, upon discharge resume outpt inhaled nebulizers-  albuterol prn  prednisone 10 mg daily  resume daliresp on discharge  cont niv at night and prn  dvt/gi px  oob - chair , ambulate as tolerated  overall prognosis guarded  d/c planning in progress  pt to follow up with pulmonologist Dr Almonte upon discharge

## 2018-10-30 NOTE — PROGRESS NOTE ADULT - ASSESSMENT
SUBJECTIVE:    Patient is a 63y old Male who presents with a chief complaint of sob, cough and temp 103 (30 Oct 2018 09:11)  Currently admitted to medicine with the primary diagnosis of COPD exacerbation   Today is hospital day 17d. This morning he is resting comfortably in bed and reports no new issues or overnight events. Patient notes chronic cough.     PAST MEDICAL & SURGICAL HISTORY  PEG (percutaneous endoscopic gastrostomy) status  Other hydrocele  Anxiety disorder, unspecified type  Hypertension, unspecified type  High cholesterol  Chronic obstructive pulmonary disease, unspecified COPD type  PEG (percutaneous endoscopic gastrostomy) status    SOCIAL HISTORY:  Negative for smoking/alcohol/drug use.     ALLERGIES:  No Known Allergies    MEDICATIONS:  STANDING MEDICATIONS  acetaminophen   Tablet .. 650 milliGRAM(s) Oral every 6 hours  ALBUTerol    0.083% 2.5 milliGRAM(s) Nebulizer every 4 hours  buDESOnide 160 MICROgram(s)/formoterol 4.5 MICROgram(s) Inhaler 2 Puff(s) Inhalation two times a day  clonazePAM Tablet 0.25 milliGRAM(s) Oral every 12 hours  docusate sodium Liquid 100 milliGRAM(s) Oral two times a day  enoxaparin Injectable 40 milliGRAM(s) SubCutaneous at bedtime  guaiFENesin    Syrup 100 milliGRAM(s) Oral every 4 hours  loratadine 10 milliGRAM(s) Oral daily  pantoprazole   Suspension 40 milliGRAM(s) Oral daily  predniSONE   Tablet 10 milliGRAM(s) Oral daily  scopolamine   Patch 1 Patch Transdermal every 72 hours  senna 2 Tablet(s) Oral at bedtime  simvastatin 10 milliGRAM(s) Oral at bedtime  tiotropium 18 MICROgram(s) Capsule 1 Capsule(s) Inhalation daily    PRN MEDICATIONS    VITALS:   T(F): 97.8  HR: 60  BP: 110/49  RR: 20  SpO2: 96%    LABS:                        RADIOLOGY:    PHYSICAL EXAM:  GEN: NAD, no acute distress , cough+  Pulmonary: No increased WOB, clear to auscultation bilaterally, L lung sounds decreased.   CV: Regular rate and rhythm  GI: Soft, non-tender  MSK: Full ROM bilaterally, DP 2+ bilaterally  Neuro: AAOx3  Skin: no rashes or lesions    64 yo M with COPD, chronic respiratory failure on BIPAP, HTN, DLD, vocal cord paralysis and dysphagia s/p PEG 5/ 18 in the setting of resistant esophageal candidiasis and inability to swallow, bought in from [email or web address] for cough productive ,sob and difficulty breathing of 1 day.    #Acute hypercapnic rep failure 2/2 COPD exacerbation now resolved.  - multiple pulm nodules  - c/w O2 as needed- on home O2, 2LNC,   -c/w BiPAP at night and PRN IPAP 12, EPAP 6  - albuterol prn  -s/p prednisone taper,   -c/w spiriva, 10 mg daily prednisone, anxiolytic therapy,aspiration precautions.   -symbicort 160/4.5 2 puffs BID.   -daliresp non-formulary Resume upon discharge  -s/p 7 days levofloxacin 4 days cefepime  -f/u with Dr. Almonte as outpatient.   -ambulate as tolerated .  -guaifenescin through PEG tube    # Dysphagia  patient is know to SLP. He continues to have severe dysphagia. Consider ENT Eval as outpatient    #HTN/DLD;  - cw home meds    #anxiety : cw home meds    DVT/GI ppx   #resume PEG diet, feeds adjusted to TID    FULL CODE  Dispo: Home w home care.

## 2018-10-31 RX ORDER — ALPRAZOLAM 0.25 MG
0.25 TABLET ORAL ONCE
Qty: 0 | Refills: 0 | Status: DISCONTINUED | OUTPATIENT
Start: 2018-10-31 | End: 2018-10-31

## 2018-10-31 RX ADMIN — Medication 650 MILLIGRAM(S): at 05:43

## 2018-10-31 RX ADMIN — SCOPALAMINE 1 PATCH: 1 PATCH, EXTENDED RELEASE TRANSDERMAL at 19:46

## 2018-10-31 RX ADMIN — TIOTROPIUM BROMIDE 1 CAPSULE(S): 18 CAPSULE ORAL; RESPIRATORY (INHALATION) at 08:32

## 2018-10-31 RX ADMIN — Medication 0.25 MILLIGRAM(S): at 05:42

## 2018-10-31 RX ADMIN — LORATADINE 10 MILLIGRAM(S): 10 TABLET ORAL at 11:31

## 2018-10-31 RX ADMIN — SCOPALAMINE 1 PATCH: 1 PATCH, EXTENDED RELEASE TRANSDERMAL at 05:43

## 2018-10-31 RX ADMIN — Medication 100 MILLIGRAM(S): at 14:12

## 2018-10-31 RX ADMIN — SCOPALAMINE 1 PATCH: 1 PATCH, EXTENDED RELEASE TRANSDERMAL at 11:32

## 2018-10-31 RX ADMIN — ALBUTEROL 2.5 MILLIGRAM(S): 90 AEROSOL, METERED ORAL at 03:48

## 2018-10-31 RX ADMIN — Medication 650 MILLIGRAM(S): at 02:28

## 2018-10-31 RX ADMIN — Medication 0.25 MILLIGRAM(S): at 21:51

## 2018-10-31 RX ADMIN — PANTOPRAZOLE SODIUM 40 MILLIGRAM(S): 20 TABLET, DELAYED RELEASE ORAL at 11:31

## 2018-10-31 RX ADMIN — Medication 100 MILLIGRAM(S): at 21:02

## 2018-10-31 RX ADMIN — Medication 100 MILLIGRAM(S): at 05:42

## 2018-10-31 RX ADMIN — ALBUTEROL 2.5 MILLIGRAM(S): 90 AEROSOL, METERED ORAL at 19:22

## 2018-10-31 RX ADMIN — Medication 100 MILLIGRAM(S): at 17:20

## 2018-10-31 RX ADMIN — ALBUTEROL 2.5 MILLIGRAM(S): 90 AEROSOL, METERED ORAL at 11:11

## 2018-10-31 RX ADMIN — Medication 650 MILLIGRAM(S): at 17:13

## 2018-10-31 RX ADMIN — Medication 0.25 MILLIGRAM(S): at 17:18

## 2018-10-31 RX ADMIN — ALBUTEROL 2.5 MILLIGRAM(S): 90 AEROSOL, METERED ORAL at 23:43

## 2018-10-31 RX ADMIN — SIMVASTATIN 10 MILLIGRAM(S): 20 TABLET, FILM COATED ORAL at 21:01

## 2018-10-31 RX ADMIN — Medication 650 MILLIGRAM(S): at 11:33

## 2018-10-31 RX ADMIN — ALBUTEROL 2.5 MILLIGRAM(S): 90 AEROSOL, METERED ORAL at 08:32

## 2018-10-31 RX ADMIN — Medication 10 MILLIGRAM(S): at 05:43

## 2018-10-31 RX ADMIN — ENOXAPARIN SODIUM 40 MILLIGRAM(S): 100 INJECTION SUBCUTANEOUS at 21:02

## 2018-10-31 RX ADMIN — ALBUTEROL 2.5 MILLIGRAM(S): 90 AEROSOL, METERED ORAL at 00:22

## 2018-10-31 RX ADMIN — SENNA PLUS 2 TABLET(S): 8.6 TABLET ORAL at 21:01

## 2018-10-31 RX ADMIN — SCOPALAMINE 1 PATCH: 1 PATCH, EXTENDED RELEASE TRANSDERMAL at 11:38

## 2018-10-31 RX ADMIN — Medication 100 MILLIGRAM(S): at 17:19

## 2018-10-31 RX ADMIN — Medication 650 MILLIGRAM(S): at 05:42

## 2018-10-31 RX ADMIN — ALBUTEROL 2.5 MILLIGRAM(S): 90 AEROSOL, METERED ORAL at 16:13

## 2018-10-31 RX ADMIN — Medication 100 MILLIGRAM(S): at 05:43

## 2018-10-31 RX ADMIN — Medication 100 MILLIGRAM(S): at 11:32

## 2018-10-31 NOTE — PROGRESS NOTE ADULT - SUBJECTIVE AND OBJECTIVE BOX
Patient was seen and examined. Spoke with RN. Chart reviewed.  No events overnight.  Vital Signs Last 24 Hrs  T(F): 98 (31 Oct 2018 05:00), Max: 98.4 (30 Oct 2018 12:00)  HR: 64 (31 Oct 2018 05:00) (64 - 75)  BP: 99/50 (31 Oct 2018 05:00) (99/50 - 121/57)  SpO2: 97% (31 Oct 2018 06:58) (97% - 98%)  MEDICATIONS  (STANDING):  acetaminophen   Tablet .. 650 milliGRAM(s) Oral every 6 hours  ALBUTerol    0.083% 2.5 milliGRAM(s) Nebulizer every 4 hours  buDESOnide 160 MICROgram(s)/formoterol 4.5 MICROgram(s) Inhaler 2 Puff(s) Inhalation two times a day  clonazePAM Tablet 0.25 milliGRAM(s) Oral every 12 hours  docusate sodium Liquid 100 milliGRAM(s) Oral two times a day  enoxaparin Injectable 40 milliGRAM(s) SubCutaneous at bedtime  guaiFENesin    Syrup 100 milliGRAM(s) Oral every 4 hours  loratadine 10 milliGRAM(s) Oral daily  pantoprazole   Suspension 40 milliGRAM(s) Oral daily  predniSONE   Tablet 10 milliGRAM(s) Oral daily  scopolamine   Patch 1 Patch Transdermal every 72 hours  senna 2 Tablet(s) Oral at bedtime  simvastatin 10 milliGRAM(s) Oral at bedtime  tiotropium 18 MICROgram(s) Capsule 1 Capsule(s) Inhalation daily        General: comfortable, NAD  Neurology: A&Ox3, nonfocal  Head:  Normocephalic, atraumatic  ENT:  Mucosa moist, no ulcerations  Neck:  Supple, no JVD,   Skin: no breakdowns (as per RN)  Resp: CTA B/L  CV: RRR, S1S2,   GI: Soft, NT, bowel sounds, peg  MS: No edema, + peripheral pulses, FROM all 4 extremity      A/P:  64 yo M with COPD, chronic respiratory failure secondary to COPD was admitted with high CO2 levels of 58. He was placed on BiPap and still retained high CO2 levels, proving BIPAP has failed. this patient will require a NIV on discharge for home use at bedtime, naptime, and periods of shortness of breath.     #Acute hypercapnic rep failure 2/2 COPD exacerbation now resolved.  - multiple pulm nodules  - c/w O2 as needed- on home O2, 2LNC,   -c/w BiPAP at night and PRN IPAP 12, EPAP 6  - albuterol prn  -s/p prednisone taper,   -c/w spiriva, 10 mg daily prednisone, anxiolytic therapy,aspiration precautions.   -symbicort 160/4.5 2 puffs BID.   -daliresp non-formulary Resume upon discharge  -s/p abx  -f/u with Dr. Almonte as outpatient.   -ambulate as tolerated .    DC planning  DVT prophylaxis  Decubitus prevention- all measures as per RN protocol  Please call or text me with any questions or updates

## 2018-10-31 NOTE — PROGRESS NOTE ADULT - ASSESSMENT
s/p acute hypoxic/hypercapnic respiratory failure  chronic hypoxic/hypercapnic respiratory failure  copd  multiple pulmonary nodules  anxiety disorder      low flow oxygen  non invasive ventilation hs and prn  bronchodilators  steroids  anxiolytic therapy  enteric feeds/aspiration precautions  gi/dvt prophylaxis  discharge planning, he is looking to go home with services, i believe he has or had oxygen and bipap at home before

## 2018-10-31 NOTE — PROGRESS NOTE ADULT - SUBJECTIVE AND OBJECTIVE BOX
ALETA SUAZO  63y, Male  Allergy: No Known Allergies      LAST 24-Hr EVENTS:  complains of increased expectoration  VITALS:  T(F): 98.7 (10-31-18 @ 09:44), Max: 98.7 (10-31-18 @ 09:44)  HR: 77 (10-31-18 @ 09:44)  BP: 96/46 (10-31-18 @ 09:44) (96/46 - 121/57)  RR: 16 (10-31-18 @ 09:44)  SpO2: 98% (10-31-18 @ 09:44)    PHYSICAL EXAM:    GENERAL: NAD, well-developed  HEAD:  Atraumatic, Normocephalic  NECK: Supple, No JVD  CHEST/LUNG: Clear to auscultation bilaterally; No wheeze; decreased breath sounds bilaterally  HEART: Regular rate and rhythm; No murmurs, rubs, or gallops  ABDOMEN: Soft, Nontender, Nondistended; Bowel sounds present  EXTREMITIES:  2+ Peripheral Pulses, No clubbing, cyanosis, or edema        TESTS & MEASUREMENTS:    IN: 460 mL / OUT: 300 mL / NET: 160 mL    IN: 1680 mL / OUT: 400 mL / NET: 1280 mL                            ABG & VENT SETTINGS: (when applicable)    n/a    RADIOLOGY & ADDITIONAL TESTS:    MEDICATIONS:  MEDICATIONS  (STANDING):  acetaminophen   Tablet .. 650 milliGRAM(s) Oral every 6 hours  ALBUTerol    0.083% 2.5 milliGRAM(s) Nebulizer every 4 hours  buDESOnide 160 MICROgram(s)/formoterol 4.5 MICROgram(s) Inhaler 2 Puff(s) Inhalation two times a day  clonazePAM Tablet 0.25 milliGRAM(s) Oral every 12 hours  docusate sodium Liquid 100 milliGRAM(s) Oral two times a day  enoxaparin Injectable 40 milliGRAM(s) SubCutaneous at bedtime  guaiFENesin    Syrup 100 milliGRAM(s) Oral every 4 hours  loratadine 10 milliGRAM(s) Oral daily  pantoprazole   Suspension 40 milliGRAM(s) Oral daily  predniSONE   Tablet 10 milliGRAM(s) Oral daily  scopolamine   Patch 1 Patch Transdermal every 72 hours  senna 2 Tablet(s) Oral at bedtime  simvastatin 10 milliGRAM(s) Oral at bedtime  tiotropium 18 MICROgram(s) Capsule 1 Capsule(s) Inhalation daily    MEDICATIONS  (PRN):

## 2018-10-31 NOTE — PROGRESS NOTE ADULT - ASSESSMENT
SUBJECTIVE:    Patient is a 63y old Male who presents with a chief complaint of sob, cough and temp 103 (31 Oct 2018 07:29)  Currently admitted to medicine with the primary diagnosis of COPD exacerbation  Today is hospital day 18d. This morning he is resting comfortably in bed and reports no new issues or overnight events. Denies any complaints.     PAST MEDICAL & SURGICAL HISTORY  PEG (percutaneous endoscopic gastrostomy) status  Other hydrocele  Anxiety disorder, unspecified type  Hypertension, unspecified type  High cholesterol  Chronic obstructive pulmonary disease, unspecified COPD type  PEG (percutaneous endoscopic gastrostomy) status    SOCIAL HISTORY:  Negative for smoking/alcohol/drug use.     ALLERGIES:  No Known Allergies    MEDICATIONS:  STANDING MEDICATIONS  acetaminophen   Tablet .. 650 milliGRAM(s) Oral every 6 hours  ALBUTerol    0.083% 2.5 milliGRAM(s) Nebulizer every 4 hours  buDESOnide 160 MICROgram(s)/formoterol 4.5 MICROgram(s) Inhaler 2 Puff(s) Inhalation two times a day  clonazePAM Tablet 0.25 milliGRAM(s) Oral every 12 hours  docusate sodium Liquid 100 milliGRAM(s) Oral two times a day  enoxaparin Injectable 40 milliGRAM(s) SubCutaneous at bedtime  guaiFENesin    Syrup 100 milliGRAM(s) Oral every 4 hours  loratadine 10 milliGRAM(s) Oral daily  pantoprazole   Suspension 40 milliGRAM(s) Oral daily  predniSONE   Tablet 10 milliGRAM(s) Oral daily  scopolamine   Patch 1 Patch Transdermal every 72 hours  senna 2 Tablet(s) Oral at bedtime  simvastatin 10 milliGRAM(s) Oral at bedtime  tiotropium 18 MICROgram(s) Capsule 1 Capsule(s) Inhalation daily    PRN MEDICATIONS    VITALS:   T(F): 98.7  HR: 77  BP: 96/46  RR: 16  SpO2: 98%    LABS:                        RADIOLOGY:    PHYSICAL EXAM:  GEN: NAD  Pulmonary: No increased WOB, clear to auscultation bilaterally, L lung sounds decreased, mild diffuse wheeze bilaterally.   CV: Regular rate and rhythm  GI: BS+, Soft, non-tender  MSK: Full ROM bilaterally, DP 2+ bilaterally, no edema legs.   Neuro: AAOx3  Skin: no rashes or lesions    62 yo M with COPD, chronic respiratory failure on BIPAP, HTN, DLD, vocal cord paralysis and dysphagia s/p PEG 5/ 18 in the setting of resistant esophageal candidiasis and inability to swallow, bought in from [employer] for cough productive ,sob and difficulty breathing of 1 day.    #Acute hypercapnic rep failure 2/2 COPD exacerbation now resolved.  - multiple pulm nodules  - c/w O2 as needed- on home O2, 2LNC,   -c/w BiPAP at night and PRN IPAP 12, EPAP 6  - albuterol prn  -s/p prednisone taper,   -c/w spiriva, 10 mg daily prednisone, anxiolytic therapy,aspiration precautions.   -symbicort 160/4.5 2 puffs BID ( patient feels symbicort makes him choke)  -daliresp non-formulary Resume upon discharge, as well as op inhaled nebulizers  -s/p 7 days levofloxacin 4 days cefepime  -f/u with Dr. Almonte as outpatient.   -ambulate as tolerated .  -guaifenescin through PEG tube    # Dysphagia  patient is know to SLP. He continues to have severe dysphagia. Consider ENT Eval as outpatient    #HTN/DLD;  - cw home meds    #anxiety  -clonipin BID    DVT/GI ppx   #Jevity 1.2 360 mg q8h    FULL CODE  Dispo: Home w home care.

## 2018-11-01 RX ORDER — CLONAZEPAM 1 MG
0.25 TABLET ORAL ONCE
Qty: 0 | Refills: 0 | Status: DISCONTINUED | OUTPATIENT
Start: 2018-11-01 | End: 2018-11-01

## 2018-11-01 RX ORDER — CLONAZEPAM 1 MG
0.5 TABLET ORAL EVERY 12 HOURS
Qty: 0 | Refills: 0 | Status: DISCONTINUED | OUTPATIENT
Start: 2018-11-01 | End: 2018-11-08

## 2018-11-01 RX ADMIN — Medication 0.25 MILLIGRAM(S): at 08:19

## 2018-11-01 RX ADMIN — ALBUTEROL 2.5 MILLIGRAM(S): 90 AEROSOL, METERED ORAL at 08:14

## 2018-11-01 RX ADMIN — Medication 100 MILLIGRAM(S): at 05:32

## 2018-11-01 RX ADMIN — TIOTROPIUM BROMIDE 1 CAPSULE(S): 18 CAPSULE ORAL; RESPIRATORY (INHALATION) at 08:18

## 2018-11-01 RX ADMIN — ALBUTEROL 2.5 MILLIGRAM(S): 90 AEROSOL, METERED ORAL at 03:58

## 2018-11-01 RX ADMIN — Medication 650 MILLIGRAM(S): at 17:34

## 2018-11-01 RX ADMIN — Medication 0.5 MILLIGRAM(S): at 17:34

## 2018-11-01 RX ADMIN — ALBUTEROL 2.5 MILLIGRAM(S): 90 AEROSOL, METERED ORAL at 19:40

## 2018-11-01 RX ADMIN — ALBUTEROL 2.5 MILLIGRAM(S): 90 AEROSOL, METERED ORAL at 11:21

## 2018-11-01 RX ADMIN — Medication 10 MILLIGRAM(S): at 05:32

## 2018-11-01 RX ADMIN — SENNA PLUS 2 TABLET(S): 8.6 TABLET ORAL at 22:27

## 2018-11-01 RX ADMIN — PANTOPRAZOLE SODIUM 40 MILLIGRAM(S): 20 TABLET, DELAYED RELEASE ORAL at 12:30

## 2018-11-01 RX ADMIN — Medication 650 MILLIGRAM(S): at 23:09

## 2018-11-01 RX ADMIN — LORATADINE 10 MILLIGRAM(S): 10 TABLET ORAL at 12:30

## 2018-11-01 RX ADMIN — Medication 100 MILLIGRAM(S): at 17:34

## 2018-11-01 RX ADMIN — Medication 100 MILLIGRAM(S): at 12:36

## 2018-11-01 RX ADMIN — Medication 650 MILLIGRAM(S): at 12:30

## 2018-11-01 RX ADMIN — Medication 100 MILLIGRAM(S): at 22:28

## 2018-11-01 RX ADMIN — ENOXAPARIN SODIUM 40 MILLIGRAM(S): 100 INJECTION SUBCUTANEOUS at 22:27

## 2018-11-01 RX ADMIN — Medication 650 MILLIGRAM(S): at 05:32

## 2018-11-01 RX ADMIN — Medication 0.25 MILLIGRAM(S): at 05:34

## 2018-11-01 RX ADMIN — SIMVASTATIN 10 MILLIGRAM(S): 20 TABLET, FILM COATED ORAL at 22:27

## 2018-11-01 RX ADMIN — ALBUTEROL 2.5 MILLIGRAM(S): 90 AEROSOL, METERED ORAL at 15:55

## 2018-11-01 NOTE — PROGRESS NOTE ADULT - SUBJECTIVE AND OBJECTIVE BOX
ALETA SUAZO  63y, Male  Allergy: No Known Allergies      LAST 24-Hr EVENTS:  pt seen examined  laying comfortable in bed  wants to go home    VITALS:  T(F): 95.6 (11-01-18 @ 05:39), Max: 98.3 (10-31-18 @ 20:42)  HR: 73 (11-01-18 @ 05:39)  BP: 94/51 (11-01-18 @ 05:39) (94/51 - 128/63)  RR: 18 (11-01-18 @ 05:39)  SpO2: 96% (11-01-18 @ 05:39)    PHYSICAL EXAM:    GENERAL: NAD  NECK: Supple, No JVD  CHEST/LUNG: CTA b/l  HEART: Regular rate and rhythm  ABDOMEN: Soft, Nontender, Nondistended peg tube present  EXTREMITIES:  No clubbing, edema absent        TESTS & MEASUREMENTS:    IN: 1680 mL / OUT: 400 mL / NET: 1280 mL    IN: 920 mL / OUT: 600 mL / NET: 320 mL        MEDICATIONS:  MEDICATIONS  (STANDING):  acetaminophen   Tablet .. 650 milliGRAM(s) Oral every 6 hours  ALBUTerol    0.083% 2.5 milliGRAM(s) Nebulizer every 4 hours  buDESOnide 160 MICROgram(s)/formoterol 4.5 MICROgram(s) Inhaler 2 Puff(s) Inhalation two times a day  clonazePAM Tablet 0.5 milliGRAM(s) Oral every 12 hours  docusate sodium Liquid 100 milliGRAM(s) Oral two times a day  enoxaparin Injectable 40 milliGRAM(s) SubCutaneous at bedtime  guaiFENesin    Syrup 100 milliGRAM(s) Oral every 4 hours  loratadine 10 milliGRAM(s) Oral daily  pantoprazole   Suspension 40 milliGRAM(s) Oral daily  predniSONE   Tablet 10 milliGRAM(s) Oral daily  scopolamine   Patch 1 Patch Transdermal every 72 hours  senna 2 Tablet(s) Oral at bedtime  simvastatin 10 milliGRAM(s) Oral at bedtime  tiotropium 18 MICROgram(s) Capsule 1 Capsule(s) Inhalation daily    MEDICATIONS  (PRN):

## 2018-11-01 NOTE — PROGRESS NOTE ADULT - ASSESSMENT
Acute respiratory failure/CRF-hypoxic/hypercapnic-improved  COPD with exac improved  Multiple pulmonary nodules  Anxiety  Dysphagia s/p peg tube      02 via nc  albuterol prn  cont current maintenance inhalers, upon discharge resume outpt inhaled nebulizers  albuterol prn  prednisone 10 mg daily  resume daliresp on discharge  cont niv at night and prn  dvt/gi px  oob - chair , ambulate as tolerated  d/c planning in progress  pt to follow up with pulmonologist Dr Almonte upon discharge

## 2018-11-01 NOTE — PROGRESS NOTE ADULT - ASSESSMENT
SUBJECTIVE:    Patient is a 63y old Male who presents with a chief complaint of sob, cough and temp 103 (01 Nov 2018 07:43)  Currently admitted to medicine with the primary diagnosis of COPD exacerbation  Today is hospital day 19d. This morning he is resting comfortably in bed and reports no new issues or overnight events.     PAST MEDICAL & SURGICAL HISTORY  PEG (percutaneous endoscopic gastrostomy) status  Other hydrocele  Anxiety disorder, unspecified type  Hypertension, unspecified type  High cholesterol  Chronic obstructive pulmonary disease, unspecified COPD type  PEG (percutaneous endoscopic gastrostomy) status    SOCIAL HISTORY:  Negative for smoking/alcohol/drug use.     ALLERGIES:  No Known Allergies    MEDICATIONS:  STANDING MEDICATIONS  acetaminophen   Tablet .. 650 milliGRAM(s) Oral every 6 hours  ALBUTerol    0.083% 2.5 milliGRAM(s) Nebulizer every 4 hours  buDESOnide 160 MICROgram(s)/formoterol 4.5 MICROgram(s) Inhaler 2 Puff(s) Inhalation two times a day  clonazePAM Tablet 0.5 milliGRAM(s) Oral every 12 hours  docusate sodium Liquid 100 milliGRAM(s) Oral two times a day  enoxaparin Injectable 40 milliGRAM(s) SubCutaneous at bedtime  guaiFENesin    Syrup 100 milliGRAM(s) Oral every 4 hours  loratadine 10 milliGRAM(s) Oral daily  pantoprazole   Suspension 40 milliGRAM(s) Oral daily  predniSONE   Tablet 10 milliGRAM(s) Oral daily  scopolamine   Patch 1 Patch Transdermal every 72 hours  senna 2 Tablet(s) Oral at bedtime  simvastatin 10 milliGRAM(s) Oral at bedtime  tiotropium 18 MICROgram(s) Capsule 1 Capsule(s) Inhalation daily    PRN MEDICATIONS    VITALS:   T(F): 99.2  HR: 66  BP: 96/47  RR: 18  SpO2: 96%    LABS:                        RADIOLOGY:    PHYSICAL EXAM:  GEN: NAD, resting in bed.   Pulmonary: No increased WOB, clear to auscultation bilaterally, L lung sounds decreased.   CV: Regular rate and rhythm  GI: Soft, non-tender  MSK: Full ROM bilaterally, DP 2+ bilaterally  Neuro: AAOx3  Skin: no rashes or lesions      64 yo M with COPD, chronic respiratory failure on BIPAP, HTN, DLD, vocal cord paralysis and dysphagia s/p PEG 5/ 18 in the setting of resistant esophageal candidiasis and inability to swallow, bought in from [employer] for cough productive ,sob and difficulty breathing of 1 day.    #Acute hypercapnic rep failure 2/2 COPD exacerbation now resolved.  - multiple pulm nodules  - c/w O2 as needed- on home O2, 2LNC,   -c/w BiPAP at night and PRN IPAP 12, EPAP 6  - albuterol prn  -s/p prednisone taper,   -c/w spiriva, 10 mg daily prednisone, anxiolytic therapy,aspiration precautions.   -symbicort 160/4.5 2 puffs BID ( patient feels symbicort makes him choke)  -daliresp non-formulary Resume upon discharge, as well as op inhaled nebulizers  -s/p 7 days levofloxacin 4 days cefepime  -f/u with Dr. Almonte as outpatient.   -ambulate as tolerated .  -guaifenescin through PEG tube    # Dysphagia  patient is know to SLP. He continues to have severe dysphagia. Consider ENT Eval as outpatient    #HTN/DLD;  - cw home meds    #anxiety  -clonipin 0.5 BID    DVT/GI ppx   #Jevity 1.2 360 mg q8h    FULL CODE  Dispo: Home w home care.

## 2018-11-01 NOTE — PROGRESS NOTE ADULT - SUBJECTIVE AND OBJECTIVE BOX
Patient was seen and examined. Spoke with RN. Chart reviewed.  No events overnight.  Lying in bed comfortable  No new issues  awaiting set up for home      Vital Signs Last 24 Hrs  T(F): 95.6 (01 Nov 2018 05:39), Max: 98.7 (31 Oct 2018 09:44)  HR: 73 (01 Nov 2018 05:39) (73 - 94)  BP: 94/51 (01 Nov 2018 05:39) (94/51 - 128/63)  SpO2: 96% (01 Nov 2018 05:39) (95% - 98%)  MEDICATIONS  (STANDING):  acetaminophen   Tablet .. 650 milliGRAM(s) Oral every 6 hours  ALBUTerol    0.083% 2.5 milliGRAM(s) Nebulizer every 4 hours  buDESOnide 160 MICROgram(s)/formoterol 4.5 MICROgram(s) Inhaler 2 Puff(s) Inhalation two times a day  clonazePAM Tablet 0.5 milliGRAM(s) Oral every 12 hours  clonazePAM Tablet 0.25 milliGRAM(s) Oral once  docusate sodium Liquid 100 milliGRAM(s) Oral two times a day  enoxaparin Injectable 40 milliGRAM(s) SubCutaneous at bedtime  guaiFENesin    Syrup 100 milliGRAM(s) Oral every 4 hours  loratadine 10 milliGRAM(s) Oral daily  pantoprazole   Suspension 40 milliGRAM(s) Oral daily  predniSONE   Tablet 10 milliGRAM(s) Oral daily  scopolamine   Patch 1 Patch Transdermal every 72 hours  senna 2 Tablet(s) Oral at bedtime  simvastatin 10 milliGRAM(s) Oral at bedtime  tiotropium 18 MICROgram(s) Capsule 1 Capsule(s) Inhalation daily    MEDICATIONS  (PRN):    Labs:      No new labs           General: comfortable, NAD  Neurology: A&Ox3, nonfocal  Head:  Normocephalic, atraumatic  ENT:  Mucosa moist, no ulcerations  Neck:  Supple, no JVD,   Skin: no breakdowns (as per RN)  Resp: CTA B/L  CV: RRR, S1S2,   GI: PEG tube  MS: No edema,       A/P:  62 yo M with COPD, chronic respiratory failure on BIPAP, HTN, DLD, vocal cord paralysis and dysphagia s/p PEG 5/ 18 in the setting of resistant esophageal candidiasis and inability to swallow, bought in from [employer] for cough productive ,sob and difficulty breathing of 1 day.    #Acute hypercapnic rep failure 2/2 COPD exacerbation now resolved.  - multiple pulm nodules  - c/w O2 as needed- on home O2, 2LNC,   -c/w BiPAP at night and PRN IPAP 12, EPAP 6  - albuterol prn  -s/p prednisone taper,   -c/w spiriva, 10 mg daily prednisone, anxiolytic therapy,aspiration precautions.   -symbicort 160/4.5 2 puffs BID ( patient feels symbicort makes him choke)  -daliresp non-formulary Resume upon discharge, as well as op inhaled nebulizers  -s/p 7 days levofloxacin 4 days cefepime  -f/u with Dr. Almonte/Tim as outpatient.   -ambulate as tolerated .  -guaifenescin through PEG tube    # Dysphagia  patient is know to SLP. He continues to have severe dysphagia. Consider ENT Eval as outpatient    #HTN/DLD;  - cw home meds    #anxiety  -clonipin BID    DVT/GI ppx   #Jevity 1.2 360 mg q8h    FULL CODE  Dispo: Home w home care. Pt would like visiting nurse care and follow up at home            DVT prophylaxis  Decubitus prevention- all measures as per RN protocol  Please call or text me with any questions or updates

## 2018-11-02 RX ORDER — SODIUM CHLORIDE 0.65 %
1 AEROSOL, SPRAY (ML) NASAL
Qty: 0 | Refills: 0 | Status: DISCONTINUED | OUTPATIENT
Start: 2018-11-02 | End: 2018-11-13

## 2018-11-02 RX ADMIN — Medication 100 MILLIGRAM(S): at 17:36

## 2018-11-02 RX ADMIN — LORATADINE 10 MILLIGRAM(S): 10 TABLET ORAL at 11:49

## 2018-11-02 RX ADMIN — SCOPALAMINE 1 PATCH: 1 PATCH, EXTENDED RELEASE TRANSDERMAL at 06:28

## 2018-11-02 RX ADMIN — Medication 0.5 MILLIGRAM(S): at 17:35

## 2018-11-02 RX ADMIN — Medication 100 MILLIGRAM(S): at 21:59

## 2018-11-02 RX ADMIN — Medication 10 MILLIGRAM(S): at 06:28

## 2018-11-02 RX ADMIN — SIMVASTATIN 10 MILLIGRAM(S): 20 TABLET, FILM COATED ORAL at 21:57

## 2018-11-02 RX ADMIN — Medication 1 SPRAY(S): at 22:10

## 2018-11-02 RX ADMIN — Medication 650 MILLIGRAM(S): at 11:51

## 2018-11-02 RX ADMIN — Medication 650 MILLIGRAM(S): at 06:26

## 2018-11-02 RX ADMIN — ALBUTEROL 2.5 MILLIGRAM(S): 90 AEROSOL, METERED ORAL at 00:49

## 2018-11-02 RX ADMIN — ENOXAPARIN SODIUM 40 MILLIGRAM(S): 100 INJECTION SUBCUTANEOUS at 22:00

## 2018-11-02 RX ADMIN — Medication 100 MILLIGRAM(S): at 14:57

## 2018-11-02 RX ADMIN — Medication 100 MILLIGRAM(S): at 17:35

## 2018-11-02 RX ADMIN — Medication 650 MILLIGRAM(S): at 23:11

## 2018-11-02 RX ADMIN — Medication 100 MILLIGRAM(S): at 06:27

## 2018-11-02 RX ADMIN — SCOPALAMINE 1 PATCH: 1 PATCH, EXTENDED RELEASE TRANSDERMAL at 23:10

## 2018-11-02 RX ADMIN — SENNA PLUS 2 TABLET(S): 8.6 TABLET ORAL at 21:57

## 2018-11-02 RX ADMIN — ALBUTEROL 2.5 MILLIGRAM(S): 90 AEROSOL, METERED ORAL at 11:21

## 2018-11-02 RX ADMIN — Medication 650 MILLIGRAM(S): at 17:35

## 2018-11-02 RX ADMIN — Medication 0.5 MILLIGRAM(S): at 06:26

## 2018-11-02 RX ADMIN — ALBUTEROL 2.5 MILLIGRAM(S): 90 AEROSOL, METERED ORAL at 20:46

## 2018-11-02 RX ADMIN — Medication 650 MILLIGRAM(S): at 23:09

## 2018-11-02 RX ADMIN — Medication 100 MILLIGRAM(S): at 06:26

## 2018-11-02 RX ADMIN — TIOTROPIUM BROMIDE 1 CAPSULE(S): 18 CAPSULE ORAL; RESPIRATORY (INHALATION) at 08:00

## 2018-11-02 RX ADMIN — PANTOPRAZOLE SODIUM 40 MILLIGRAM(S): 20 TABLET, DELAYED RELEASE ORAL at 11:50

## 2018-11-02 RX ADMIN — ALBUTEROL 2.5 MILLIGRAM(S): 90 AEROSOL, METERED ORAL at 07:58

## 2018-11-02 RX ADMIN — ALBUTEROL 2.5 MILLIGRAM(S): 90 AEROSOL, METERED ORAL at 16:20

## 2018-11-02 RX ADMIN — Medication 650 MILLIGRAM(S): at 02:34

## 2018-11-02 RX ADMIN — Medication 100 MILLIGRAM(S): at 10:15

## 2018-11-02 NOTE — PROGRESS NOTE ADULT - SUBJECTIVE AND OBJECTIVE BOX
Patient was seen and examined. Spoke with RN. Chart reviewed.  No events overnight.  Vital Signs Last 24 Hrs  T(F): 98.2 (02 Nov 2018 06:39), Max: 99.2 (01 Nov 2018 13:48)  HR: 73 (02 Nov 2018 06:39) (66 - 83)  BP: 95/54 (02 Nov 2018 06:39) (95/54 - 111/58)  SpO2: 97% (02 Nov 2018 06:53) (97% - 100%)  MEDICATIONS  (STANDING):  acetaminophen   Tablet .. 650 milliGRAM(s) Oral every 6 hours  ALBUTerol    0.083% 2.5 milliGRAM(s) Nebulizer every 4 hours  buDESOnide 160 MICROgram(s)/formoterol 4.5 MICROgram(s) Inhaler 2 Puff(s) Inhalation two times a day  clonazePAM Tablet 0.5 milliGRAM(s) Oral every 12 hours  docusate sodium Liquid 100 milliGRAM(s) Oral two times a day  enoxaparin Injectable 40 milliGRAM(s) SubCutaneous at bedtime  guaiFENesin    Syrup 100 milliGRAM(s) Oral every 4 hours  loratadine 10 milliGRAM(s) Oral daily  pantoprazole   Suspension 40 milliGRAM(s) Oral daily  predniSONE   Tablet 10 milliGRAM(s) Oral daily  scopolamine   Patch 1 Patch Transdermal every 72 hours  senna 2 Tablet(s) Oral at bedtime  simvastatin 10 milliGRAM(s) Oral at bedtime  tiotropium 18 MICROgram(s) Capsule 1 Capsule(s) Inhalation daily        General: comfortable, NAD  Neurology: A&Ox3, nonfocal  Head:  Normocephalic, atraumatic  ENT:  Mucosa moist, no ulcerations  Neck:  Supple, no JVD,   Skin: no breakdowns (as per RN)  Resp: CTA B/L  CV: RRR, S1S2,   GI: Soft, NT, bowel sounds, peg  MS: No edema, + peripheral pulses, FROM all 4 extremity      A/P:  64 yo M with COPD, chronic respiratory failure secondary to COPD was admitted with high CO2 levels of 58. He was placed on BiPap and still retained high CO2 levels, proving BIPAP has failed. this patient will require a NIV on discharge for home use at bedtime, naptime, and periods of shortness of breath.     #Acute hypercapnic rep failure 2/2 COPD exacerbation now resolved.  - multiple pulm nodules  - c/w O2 as needed- on home O2, 2LNC,   -c/w BiPAP at night and PRN IPAP 12, EPAP 6  - albuterol prn  -s/p prednisone taper,   -c/w spiriva, 10 mg daily prednisone, anxiolytic therapy,aspiration precautions.   -symbicort 160/4.5 2 puffs BID.   -daliresp non-formulary Resume upon discharge  -s/p abx  -f/u with Dr. Almonte as outpatient.   -ambulate as tolerated .    DC planning  DVT prophylaxis  Decubitus prevention- all measures as per RN protocol  Please call or text me with any questions or updates

## 2018-11-02 NOTE — PROGRESS NOTE ADULT - SUBJECTIVE AND OBJECTIVE BOX
ALETA SUAZO  63y, Male  Allergy: No Known Allergies      LAST 24-Hr EVENTS:  no complaints, feels at baseline  VITALS:  T(F): 98.2 (11-02-18 @ 06:39), Max: 99.2 (11-01-18 @ 13:48)  HR: 73 (11-02-18 @ 06:39)  BP: 95/54 (11-02-18 @ 06:39) (95/54 - 111/58)  RR: 20 (11-02-18 @ 06:39)  SpO2: 97% (11-02-18 @ 06:53)    PHYSICAL EXAM:    GENERAL: NAD, well-developed  HEAD:  Atraumatic, Normocephalic  NECK: Supple, No JVD  CHEST/LUNG: Clear to auscultation bilaterally; No wheeze  HEART: Regular rate and rhythm; No murmurs, rubs, or gallops  ABDOMEN: Soft, Nontender, Nondistended; Bowel sounds present  EXTREMITIES:  2+ Peripheral Pulses, No clubbing, cyanosis, or edema        TESTS & MEASUREMENTS:    IN: 920 mL / OUT: 600 mL / NET: 320 mL    IN: 1270 mL / OUT: 550 mL / NET: 720 mL                            ABG & VENT SETTINGS: (when applicable)    n.a    RADIOLOGY & ADDITIONAL TESTS:    MEDICATIONS:  MEDICATIONS  (STANDING):  acetaminophen   Tablet .. 650 milliGRAM(s) Oral every 6 hours  ALBUTerol    0.083% 2.5 milliGRAM(s) Nebulizer every 4 hours  buDESOnide 160 MICROgram(s)/formoterol 4.5 MICROgram(s) Inhaler 2 Puff(s) Inhalation two times a day  clonazePAM Tablet 0.5 milliGRAM(s) Oral every 12 hours  docusate sodium Liquid 100 milliGRAM(s) Oral two times a day  enoxaparin Injectable 40 milliGRAM(s) SubCutaneous at bedtime  guaiFENesin    Syrup 100 milliGRAM(s) Oral every 4 hours  loratadine 10 milliGRAM(s) Oral daily  pantoprazole   Suspension 40 milliGRAM(s) Oral daily  predniSONE   Tablet 10 milliGRAM(s) Oral daily  scopolamine   Patch 1 Patch Transdermal every 72 hours  senna 2 Tablet(s) Oral at bedtime  simvastatin 10 milliGRAM(s) Oral at bedtime  tiotropium 18 MICROgram(s) Capsule 1 Capsule(s) Inhalation daily    MEDICATIONS  (PRN):

## 2018-11-02 NOTE — PROGRESS NOTE ADULT - ASSESSMENT
SUBJECTIVE:    Patient is a 63y old Male who presents with a chief complaint of sob, cough and temp 103 (02 Nov 2018 08:16)    Currently admitted to medicine with the primary diagnosis of COPD exacerbation     Today is hospital day 20d. This morning he is resting comfortably in bed and reports no new issues or overnight events.     PAST MEDICAL & SURGICAL HISTORY  PEG (percutaneous endoscopic gastrostomy) status  Other hydrocele  Anxiety disorder, unspecified type  Hypertension, unspecified type  High cholesterol  Chronic obstructive pulmonary disease, unspecified COPD type  PEG (percutaneous endoscopic gastrostomy) status    SOCIAL HISTORY:  Negative for smoking/alcohol/drug use.     ALLERGIES:  No Known Allergies    MEDICATIONS:  STANDING MEDICATIONS  acetaminophen   Tablet .. 650 milliGRAM(s) Oral every 6 hours  ALBUTerol    0.083% 2.5 milliGRAM(s) Nebulizer every 4 hours  buDESOnide 160 MICROgram(s)/formoterol 4.5 MICROgram(s) Inhaler 2 Puff(s) Inhalation two times a day  clonazePAM Tablet 0.5 milliGRAM(s) Oral every 12 hours  docusate sodium Liquid 100 milliGRAM(s) Oral two times a day  enoxaparin Injectable 40 milliGRAM(s) SubCutaneous at bedtime  guaiFENesin    Syrup 100 milliGRAM(s) Oral every 4 hours  loratadine 10 milliGRAM(s) Oral daily  pantoprazole   Suspension 40 milliGRAM(s) Oral daily  predniSONE   Tablet 10 milliGRAM(s) Oral daily  scopolamine   Patch 1 Patch Transdermal every 72 hours  senna 2 Tablet(s) Oral at bedtime  simvastatin 10 milliGRAM(s) Oral at bedtime  tiotropium 18 MICROgram(s) Capsule 1 Capsule(s) Inhalation daily    PRN MEDICATIONS    VITALS:   T(F): 98.2  HR: 73  BP: 95/54  RR: 20  SpO2: 97%    LABS:                        RADIOLOGY:    PHYSICAL EXAM:  GEN: NAD  Pulmonary: No increased WOB, clear to auscultation bilaterally, no wheezes  CV: Regular rate and rhythm  GI: Soft, non-tender  MSK: Full ROM bilaterally, DP 2+ bilaterally, cachectic.   Neuro: AAOx3  Skin: no rashes or lesions    62 yo M with COPD, chronic respiratory failure on BIPAP, HTN, DLD, vocal cord paralysis and dysphagia s/p PEG 5/ 18 in the setting of resistant esophageal candidiasis and inability to swallow, bought in from [employer] for cough productive ,sob and difficulty breathing of 1 day.    #Acute hypercapnic rep failure 2/2 COPD exacerbation now resolved.  - multiple pulm nodules  - c/w O2 as needed- on home O2, 2LNC,   -c/w BiPAP at night and PRN IPAP 12, EPAP 6  - albuterol prn  -s/p prednisone taper,   -c/w spiriva, 10 mg daily prednisone, anxiolytic therapy,aspiration precautions.   -symbicort 160/4.5 2 puffs BID ( patient feels symbicort makes him choke)  -daliresp non-formulary Resume upon discharge, as well as op inhaled nebulizers  -s/p 7 days levofloxacin 4 days cefepime  -f/u with Dr. Almonte as outpatient.   -ambulate as tolerated .  -guaifenescin through PEG tube    # Dysphagia  patient is know to SLP. He continues to have severe dysphagia. Consider ENT Eval as outpatient    #HTN/DLD;  - cw home meds    #anxiety  -clonipin 0.5 BID    DVT/GI ppx   # Increase Jevity 1.2 to 360ml q6h to provide 1728kcal, 79g protein (101% estimated energy needs, 116% estimated protein needs- 18% overall calories from protein- WNL)    FULL CODE  Dispo: Home w home care.

## 2018-11-02 NOTE — CHART NOTE - NSCHARTNOTEFT_GEN_A_CORE
Registered Dietitian Follow-Up     Patient Profile Reviewed                           Yes [x]   No []     Nutrition History Previously Obtained        Yes [x]  No []       Pertinent Subjective Information: Pt. resting in bed during visit- reports feeling "ok" overall. No GI discomfort at this time. Pt. states he's tolerating is PEG feeding good and wants to go home already.      Pertinent Medical Interventions: Acute hypercapnic rep failure 2/2 COPD exacerbation now resolved. Arrangements for home care. dispo pending.      Diet order: Jevity 1.2 at 360ml q8h     Anthropometrics:  - Ht. 170.1cm  - Wt. 57.5kg bed scale weight (not tared) taken by RD today vs 56.9kg on 10/19- relatively stable  - %wt change  - BMI 19.7  - IBW 148lbs      Pertinent Lab Data: no new labs documented      Pertinent Meds: Prednisone, Protonix, Colace, Senna, Simvastatin      Physical Findings:  - Appearance: alert&oriented   - GI function: no symptoms noted, last BM 10/30  - Tubes: PEG  - Oral/Mouth cavity: NPO  - Skin: redness (BS 18)      Nutrition Requirements (from RD note on 10/16)   Weight Used:     Estimated Energy Needs    Continue [x]  Adjust [] 1580-1712kcal  Adjusted Energy Recommendations:   kcal/day        Estimated Protein Needs    Continue [x]  Adjust [] 57-68g  Adjusted Protein Recommendations:   gm/day        Estimated Fluid Needs        Continue [x]  Adjust [] 1ml/kcal or per LIP  Adjusted Fluid Recommendations:   mL/day     Nutrient Intake: Jevity 1.2 1080ml total volume daily- 1296kcal 59g protein (82% estimated energy needs, 100% estimated protein needs)       Nutrition Intervention: enteral and parenteral nutrition    Rec: Increase Jevity 1.2 to 360ml q6h to provide 1728kcal, 79g protein (101% estimated energy needs, 116% estimated protein needs- 18% overall calories from protein- WNL)     Goal/Expected Outcome: In 4 days  enteral nutrition to provide >85% estimated energy needs     Indicator/Monitoring: energy intake, body composition, NFPF

## 2018-11-02 NOTE — PROGRESS NOTE ADULT - ASSESSMENT
s/p acute respiratory failure  chronic hypoxic/hypercapnic respiratory failure  copd  multiple pulmonary nodules  anxiety disorder      low flow oxygen  bipap hs and prn  bronchodilators  prednisone 10mg daily  symbicort 160/4.5 2 puffs q 12 hours  enteric feeds/aspiration precautions  out of bed/incentive spirometry  dvt prophylaxis  antianxiety medications  discharge planning in progress

## 2018-11-03 RX ADMIN — ALBUTEROL 2.5 MILLIGRAM(S): 90 AEROSOL, METERED ORAL at 00:05

## 2018-11-03 RX ADMIN — Medication 0.5 MILLIGRAM(S): at 17:14

## 2018-11-03 RX ADMIN — ALBUTEROL 2.5 MILLIGRAM(S): 90 AEROSOL, METERED ORAL at 03:27

## 2018-11-03 RX ADMIN — Medication 100 MILLIGRAM(S): at 17:13

## 2018-11-03 RX ADMIN — Medication 650 MILLIGRAM(S): at 06:17

## 2018-11-03 RX ADMIN — ALBUTEROL 2.5 MILLIGRAM(S): 90 AEROSOL, METERED ORAL at 11:50

## 2018-11-03 RX ADMIN — Medication 100 MILLIGRAM(S): at 06:16

## 2018-11-03 RX ADMIN — TIOTROPIUM BROMIDE 1 CAPSULE(S): 18 CAPSULE ORAL; RESPIRATORY (INHALATION) at 07:43

## 2018-11-03 RX ADMIN — Medication 10 MILLIGRAM(S): at 06:17

## 2018-11-03 RX ADMIN — SENNA PLUS 2 TABLET(S): 8.6 TABLET ORAL at 21:23

## 2018-11-03 RX ADMIN — Medication 0.5 MILLIGRAM(S): at 06:17

## 2018-11-03 RX ADMIN — Medication 100 MILLIGRAM(S): at 21:24

## 2018-11-03 RX ADMIN — ENOXAPARIN SODIUM 40 MILLIGRAM(S): 100 INJECTION SUBCUTANEOUS at 21:24

## 2018-11-03 RX ADMIN — Medication 100 MILLIGRAM(S): at 13:10

## 2018-11-03 RX ADMIN — SCOPALAMINE 1 PATCH: 1 PATCH, EXTENDED RELEASE TRANSDERMAL at 11:02

## 2018-11-03 RX ADMIN — LORATADINE 10 MILLIGRAM(S): 10 TABLET ORAL at 13:11

## 2018-11-03 RX ADMIN — ALBUTEROL 2.5 MILLIGRAM(S): 90 AEROSOL, METERED ORAL at 20:06

## 2018-11-03 RX ADMIN — SCOPALAMINE 1 PATCH: 1 PATCH, EXTENDED RELEASE TRANSDERMAL at 06:18

## 2018-11-03 RX ADMIN — ALBUTEROL 2.5 MILLIGRAM(S): 90 AEROSOL, METERED ORAL at 07:43

## 2018-11-03 RX ADMIN — Medication 650 MILLIGRAM(S): at 17:14

## 2018-11-03 RX ADMIN — Medication 650 MILLIGRAM(S): at 11:02

## 2018-11-03 RX ADMIN — Medication 100 MILLIGRAM(S): at 17:14

## 2018-11-03 RX ADMIN — Medication 650 MILLIGRAM(S): at 06:28

## 2018-11-03 RX ADMIN — Medication 1 SPRAY(S): at 06:18

## 2018-11-03 RX ADMIN — ALBUTEROL 2.5 MILLIGRAM(S): 90 AEROSOL, METERED ORAL at 15:51

## 2018-11-03 RX ADMIN — Medication 100 MILLIGRAM(S): at 11:02

## 2018-11-03 RX ADMIN — SIMVASTATIN 10 MILLIGRAM(S): 20 TABLET, FILM COATED ORAL at 21:23

## 2018-11-03 NOTE — PROGRESS NOTE ADULT - SUBJECTIVE AND OBJECTIVE BOX
Interval history and ROS:    Denies dyspnea coughing  No pain reported    MEDICATIONS:  acetaminophen   Tablet .. 650 milliGRAM(s) Oral every 6 hours  ALBUTerol    0.083% 2.5 milliGRAM(s) Nebulizer every 4 hours  buDESOnide 160 MICROgram(s)/formoterol 4.5 MICROgram(s) Inhaler 2 Puff(s) Inhalation two times a day  clonazePAM Tablet 0.5 milliGRAM(s) Oral every 12 hours  docusate sodium Liquid 100 milliGRAM(s) Oral two times a day  enoxaparin Injectable 40 milliGRAM(s) SubCutaneous at bedtime  guaiFENesin    Syrup 100 milliGRAM(s) Oral every 4 hours  loratadine 10 milliGRAM(s) Oral daily  pantoprazole   Suspension 40 milliGRAM(s) Oral daily  predniSONE   Tablet 10 milliGRAM(s) Oral daily  scopolamine   Patch 1 Patch Transdermal every 72 hours  senna 2 Tablet(s) Oral at bedtime  simvastatin 10 milliGRAM(s) Oral at bedtime  sodium chloride 0.65% Nasal 1 Spray(s) Both Nostrils every 2 hours PRN  tiotropium 18 MICROgram(s) Capsule 1 Capsule(s) Inhalation daily      VITAL SIGNS:  Vital Signs Last 24 Hrs  T(C): 35.7 (03 Nov 2018 05:11), Max: 37.1 (02 Nov 2018 21:02)  T(F): 96.2 (03 Nov 2018 05:11), Max: 98.8 (02 Nov 2018 21:02)  HR: 61 (03 Nov 2018 05:11) (61 - 98)  BP: 111/53 (03 Nov 2018 05:11) (104/54 - 148/68)  BP(mean): --  RR: 18 (03 Nov 2018 05:11) (18 - 22)  SpO2: 96% (03 Nov 2018 06:50) (96% - 98%)        PHYSICAL EXAM:  Awake and alert, thin, dysphonia  Neck supple, no LN  S1 and S2 no murmur  Lungs diminished breath sounds bilaterally  Abdomen is soft and non tender, PEG  There is no edema  Neurologically non focal

## 2018-11-03 NOTE — PROGRESS NOTE ADULT - ASSESSMENT
IMPRESSION:  62 yo male with COPD s/p acute respiratory failure  Chronic hypoxic/hypercapnic respiratory failure  Multiple pulmonary nodules  Anxiety disorder  Dysphagia s/p PEG    RECOMMENDATIONS:  Continue oxygen via NC to maintain saturation >90%  Continue BiPAP hs and prn  Continue bronchodilators, ICS and prednisone 10mg daily  PEG feeds/aspiration precautions  Out of bed/incentive spirometry  DVT prophylaxis  Antianxiety medications  Discharge planning in progress

## 2018-11-03 NOTE — PROGRESS NOTE ADULT - ASSESSMENT
SUBJECTIVE:    Patient is a 63y old Male who presents with a chief complaint of sob, cough and temp 103 (02 Nov 2018 11:07)    Currently admitted to medicine with the primary diagnosis of COPD exacerbation     Today is hospital day 21d. This morning he is resting comfortably in bed and reports no new issues or overnight events.     PAST MEDICAL & SURGICAL HISTORY  PEG (percutaneous endoscopic gastrostomy) status  Other hydrocele  Anxiety disorder, unspecified type  Hypertension, unspecified type  High cholesterol  Chronic obstructive pulmonary disease, unspecified COPD type  PEG (percutaneous endoscopic gastrostomy) status    SOCIAL HISTORY:  Negative for smoking/alcohol/drug use.     ALLERGIES:  No Known Allergies    MEDICATIONS:  STANDING MEDICATIONS  acetaminophen   Tablet .. 650 milliGRAM(s) Oral every 6 hours  ALBUTerol    0.083% 2.5 milliGRAM(s) Nebulizer every 4 hours  buDESOnide 160 MICROgram(s)/formoterol 4.5 MICROgram(s) Inhaler 2 Puff(s) Inhalation two times a day  clonazePAM Tablet 0.5 milliGRAM(s) Oral every 12 hours  docusate sodium Liquid 100 milliGRAM(s) Oral two times a day  enoxaparin Injectable 40 milliGRAM(s) SubCutaneous at bedtime  guaiFENesin    Syrup 100 milliGRAM(s) Oral every 4 hours  loratadine 10 milliGRAM(s) Oral daily  pantoprazole   Suspension 40 milliGRAM(s) Oral daily  predniSONE   Tablet 10 milliGRAM(s) Oral daily  scopolamine   Patch 1 Patch Transdermal every 72 hours  senna 2 Tablet(s) Oral at bedtime  simvastatin 10 milliGRAM(s) Oral at bedtime  tiotropium 18 MICROgram(s) Capsule 1 Capsule(s) Inhalation daily    PRN MEDICATIONS  sodium chloride 0.65% Nasal 1 Spray(s) Both Nostrils every 2 hours PRN    VITALS:   T(F): 96.2  HR: 61  BP: 111/53  RR: 18  SpO2: 96%    LABS:                        RADIOLOGY:    PHYSICAL EXAM:  GEN: NAD  Pulmonary: No increased WOB, clear to auscultation bilaterally  CV: Regular rate and rhythm  GI: Soft, non-tender  MSK: Full ROM bilaterally, DP 2+ bilaterally  Neuro: AAOx3  Skin: no rashes or lesions    64 yo M with COPD, chronic respiratory failure on BIPAP, HTN, DLD, vocal cord paralysis and dysphagia s/p PEG 5/ 18 in the setting of resistant esophageal candidiasis and inability to swallow, bought in from [employer] for cough productive ,sob and difficulty breathing of 1 day.    #Acute hypercapnic rep failure 2/2 COPD exacerbation now resolved.  - multiple pulm nodules  - c/w O2 as needed- on home O2, 2LNC,   -c/w BiPAP at night and PRN IPAP 12, EPAP 6  - albuterol prn  -s/p prednisone taper,   -c/w spiriva, 10 mg daily prednisone, anxiolytic therapy,aspiration precautions.   -symbicort 160/4.5 2 puffs BID ( patient feels symbicort makes him choke)  -daliresp non-formulary Resume upon discharge, as well as op inhaled nebulizers  -s/p 7 days levofloxacin 4 days cefepime  -f/u with Dr. Almonte as outpatient.   -ambulate as tolerated .  -guaifenescin through PEG tube    # Dysphagia  patient is know to SLP. He continues to have severe dysphagia. Consider ENT Eval as outpatient    #HTN/DLD;  - cw home meds    #anxiety  -clonipin 0.5 BID    DVT/GI ppx   # Increase Jevity 1.2 to 360ml q6h to provide 1728kcal, 79g protein (101% estimated energy needs, 116% estimated protein needs- 18% overall calories from protein- WNL)    FULL CODE  Dispo: Home w home care. SUBJECTIVE:    Patient is a 63y old Male who presents with a chief complaint of sob, cough and temp 103 (02 Nov 2018 11:07)  Currently admitted to medicine with the primary diagnosis of COPD exacerbation   Today is hospital day 21d. This morning he is resting comfortably in bed and reports no new issues or overnight events. Notes chronic cough, no new events overnight. Pt saw home care services yesterday, waiting for placement. PAtient desires to ambulate with assistance.     PAST MEDICAL & SURGICAL HISTORY  PEG (percutaneous endoscopic gastrostomy) status  Other hydrocele  Anxiety disorder, unspecified type  Hypertension, unspecified type  High cholesterol  Chronic obstructive pulmonary disease, unspecified COPD type  PEG (percutaneous endoscopic gastrostomy) status    SOCIAL HISTORY:  Negative for smoking/alcohol/drug use.     ALLERGIES:  No Known Allergies    MEDICATIONS:  STANDING MEDICATIONS  acetaminophen   Tablet .. 650 milliGRAM(s) Oral every 6 hours  ALBUTerol    0.083% 2.5 milliGRAM(s) Nebulizer every 4 hours  buDESOnide 160 MICROgram(s)/formoterol 4.5 MICROgram(s) Inhaler 2 Puff(s) Inhalation two times a day  clonazePAM Tablet 0.5 milliGRAM(s) Oral every 12 hours  docusate sodium Liquid 100 milliGRAM(s) Oral two times a day  enoxaparin Injectable 40 milliGRAM(s) SubCutaneous at bedtime  guaiFENesin    Syrup 100 milliGRAM(s) Oral every 4 hours  loratadine 10 milliGRAM(s) Oral daily  pantoprazole   Suspension 40 milliGRAM(s) Oral daily  predniSONE   Tablet 10 milliGRAM(s) Oral daily  scopolamine   Patch 1 Patch Transdermal every 72 hours  senna 2 Tablet(s) Oral at bedtime  simvastatin 10 milliGRAM(s) Oral at bedtime  tiotropium 18 MICROgram(s) Capsule 1 Capsule(s) Inhalation daily    PRN MEDICATIONS  sodium chloride 0.65% Nasal 1 Spray(s) Both Nostrils every 2 hours PRN    VITALS:   T(F): 96.2  HR: 61  BP: 111/53  RR: 18  SpO2: 96%    LABS:        No need for lab draws.                 RADIOLOGY:    PHYSICAL EXAM:  GEN: NAD  Pulmonary: No increased WOB, clear to auscultation bilaterally Lung sounds decreased on L  CV: Regular rate and rhythm  GI: Soft, non-tender  MSK: Full ROM bilaterally, DP 2+ bilaterally, cachectic,   Neuro: AAOx3  Skin: no rashes or lesions    64 yo M with COPD, chronic respiratory failure on BIPAP, HTN, DLD, vocal cord paralysis and dysphagia s/p PEG 5/ 18 in the setting of resistant esophageal candidiasis and inability to swallow, bought in from [email or web address] for cough productive ,sob and difficulty breathing of 1 day.    #Acute hypercapnic rep failure 2/2 COPD exacerbation now resolved.  - multiple pulm nodules  - c/w O2 as needed- on home O2, 2LNC,   -c/w BiPAP at night and PRN IPAP 12, EPAP 6 outpatient bipap set up.   - albuterol prn  -s/p prednisone taper,   -c/w spiriva, 10 mg daily prednisone, anxiolytic therapy,aspiration precautions.   -symbicort 160/4.5 2 puffs BID ( patient feels symbicort makes him choke)  -daliresp non-formulary Resume upon discharge, as well as op inhaled nebulizers  -s/p 7 days levofloxacin 4 days cefepime  -f/u with Dr. Almonte as outpatient.   -ambulate as tolerated .  -guaifenescin through PEG tube  -feeds increased to 480 TID.     # Dysphagia  patient is know to SLP. He continues to have severe dysphagia. Consider ENT Eval as outpatient    #HTN/DLD;  - cw home meds    #anxiety  -clonipin 0.5 BID    DVT/GI ppx   # Increase Jevity 1.2 to 360ml q6h to provide 1728kcal, 79g protein (101% estimated energy needs, 116% estimated protein needs- 18% overall calories from protein- WNL)--pt does not desire midnight feeds, changed to 480 TID.     FULL CODE  Dispo: Home w home care.

## 2018-11-03 NOTE — PROGRESS NOTE ADULT - ASSESSMENT
SUBJECTIVE:    Patient is a 63y old Male who presents with a chief complaint of sob, cough and temp 103 (02 Nov 2018 11:07)    Currently admitted to medicine with the primary diagnosis of COPD exacerbation     Today is hospital day 21d. This morning he is resting comfortably in bed and reports no new issues or overnight events.     PAST MEDICAL & SURGICAL HISTORY  PEG (percutaneous endoscopic gastrostomy) status  Other hydrocele  Anxiety disorder, unspecified type  Hypertension, unspecified type  High cholesterol  Chronic obstructive pulmonary disease, unspecified COPD type  PEG (percutaneous endoscopic gastrostomy) status    SOCIAL HISTORY:  Negative for smoking/alcohol/drug use.     ALLERGIES:  No Known Allergies    MEDICATIONS:  STANDING MEDICATIONS  acetaminophen   Tablet .. 650 milliGRAM(s) Oral every 6 hours  ALBUTerol    0.083% 2.5 milliGRAM(s) Nebulizer every 4 hours  buDESOnide 160 MICROgram(s)/formoterol 4.5 MICROgram(s) Inhaler 2 Puff(s) Inhalation two times a day  clonazePAM Tablet 0.5 milliGRAM(s) Oral every 12 hours  docusate sodium Liquid 100 milliGRAM(s) Oral two times a day  enoxaparin Injectable 40 milliGRAM(s) SubCutaneous at bedtime  guaiFENesin    Syrup 100 milliGRAM(s) Oral every 4 hours  loratadine 10 milliGRAM(s) Oral daily  pantoprazole   Suspension 40 milliGRAM(s) Oral daily  predniSONE   Tablet 10 milliGRAM(s) Oral daily  scopolamine   Patch 1 Patch Transdermal every 72 hours  senna 2 Tablet(s) Oral at bedtime  simvastatin 10 milliGRAM(s) Oral at bedtime  tiotropium 18 MICROgram(s) Capsule 1 Capsule(s) Inhalation daily    PRN MEDICATIONS  sodium chloride 0.65% Nasal 1 Spray(s) Both Nostrils every 2 hours PRN    VITALS:   T(F): 96.2  HR: 61  BP: 111/53  RR: 18  SpO2: 96%    LABS:                        RADIOLOGY:    PHYSICAL EXAM:  GEN: NAD  Pulmonary: No increased WOB, clear to auscultation bilaterally  CV: Regular rate and rhythm  GI: Soft, non-tender  MSK: Full ROM bilaterally, DP 2+ bilaterally  Neuro: AAOx3  Skin: no rashes or lesions    62 yo M with COPD, chronic respiratory failure on BIPAP, HTN, DLD, vocal cord paralysis and dysphagia s/p PEG 5/ 18 in the setting of resistant esophageal candidiasis and inability to swallow, bought in from [employer] for cough productive ,sob and difficulty breathing of 1 day.    #Acute hypercapnic rep failure 2/2 COPD exacerbation now resolved.  - multiple pulm nodules  - c/w O2 as needed- on home O2, 2LNC,   -c/w BiPAP at night and PRN IPAP 12, EPAP 6  - albuterol prn  -s/p prednisone taper,   -c/w spiriva, 10 mg daily prednisone, anxiolytic therapy,aspiration precautions.   -symbicort 160/4.5 2 puffs BID ( patient feels symbicort makes him choke)  -daliresp non-formulary Resume upon discharge, as well as op inhaled nebulizers  -s/p 7 days levofloxacin 4 days cefepime  -f/u with Dr. Almonte as outpatient.   -ambulate as tolerated .  -guaifenescin through PEG tube    # Dysphagia  patient is know to SLP. He continues to have severe dysphagia. Consider ENT Eval as outpatient    #HTN/DLD;  - cw home meds    #anxiety  -clonipin 0.5 BID    DVT/GI ppx   # Increase Jevity 1.2 to 360ml q6h to provide 1728kcal, 79g protein (101% estimated energy needs, 116% estimated protein needs- 18% overall calories from protein- WNL)    FULL CODE  Dispo: Home w home care.

## 2018-11-04 RX ADMIN — SENNA PLUS 2 TABLET(S): 8.6 TABLET ORAL at 21:05

## 2018-11-04 RX ADMIN — Medication 650 MILLIGRAM(S): at 12:49

## 2018-11-04 RX ADMIN — ENOXAPARIN SODIUM 40 MILLIGRAM(S): 100 INJECTION SUBCUTANEOUS at 21:06

## 2018-11-04 RX ADMIN — ALBUTEROL 2.5 MILLIGRAM(S): 90 AEROSOL, METERED ORAL at 11:56

## 2018-11-04 RX ADMIN — ALBUTEROL 2.5 MILLIGRAM(S): 90 AEROSOL, METERED ORAL at 08:30

## 2018-11-04 RX ADMIN — PANTOPRAZOLE SODIUM 40 MILLIGRAM(S): 20 TABLET, DELAYED RELEASE ORAL at 12:51

## 2018-11-04 RX ADMIN — Medication 650 MILLIGRAM(S): at 18:25

## 2018-11-04 RX ADMIN — Medication 650 MILLIGRAM(S): at 18:17

## 2018-11-04 RX ADMIN — ALBUTEROL 2.5 MILLIGRAM(S): 90 AEROSOL, METERED ORAL at 02:30

## 2018-11-04 RX ADMIN — LORATADINE 10 MILLIGRAM(S): 10 TABLET ORAL at 12:50

## 2018-11-04 RX ADMIN — Medication 100 MILLIGRAM(S): at 18:17

## 2018-11-04 RX ADMIN — ALBUTEROL 2.5 MILLIGRAM(S): 90 AEROSOL, METERED ORAL at 16:56

## 2018-11-04 RX ADMIN — Medication 10 MILLIGRAM(S): at 05:02

## 2018-11-04 RX ADMIN — Medication 650 MILLIGRAM(S): at 05:02

## 2018-11-04 RX ADMIN — SIMVASTATIN 10 MILLIGRAM(S): 20 TABLET, FILM COATED ORAL at 21:05

## 2018-11-04 RX ADMIN — Medication 0.5 MILLIGRAM(S): at 05:02

## 2018-11-04 RX ADMIN — ALBUTEROL 2.5 MILLIGRAM(S): 90 AEROSOL, METERED ORAL at 20:42

## 2018-11-04 RX ADMIN — Medication 100 MILLIGRAM(S): at 21:05

## 2018-11-04 RX ADMIN — Medication 100 MILLIGRAM(S): at 05:03

## 2018-11-04 RX ADMIN — Medication 100 MILLIGRAM(S): at 09:26

## 2018-11-04 RX ADMIN — Medication 0.5 MILLIGRAM(S): at 18:17

## 2018-11-04 RX ADMIN — TIOTROPIUM BROMIDE 1 CAPSULE(S): 18 CAPSULE ORAL; RESPIRATORY (INHALATION) at 08:32

## 2018-11-04 RX ADMIN — SCOPALAMINE 1 PATCH: 1 PATCH, EXTENDED RELEASE TRANSDERMAL at 09:39

## 2018-11-04 RX ADMIN — Medication 100 MILLIGRAM(S): at 18:18

## 2018-11-04 RX ADMIN — Medication 100 MILLIGRAM(S): at 18:19

## 2018-11-04 NOTE — PROGRESS NOTE ADULT - SUBJECTIVE AND OBJECTIVE BOX
Patient was seen and examined. Spoke with RN. Chart reviewed.  No events overnight.  Vital Signs Last 24 Hrs  T(F): 98.2 (04 Nov 2018 06:04), Max: 98.2 (04 Nov 2018 06:04)  HR: 75 (04 Nov 2018 06:04) (57 - 75)  BP: 117/53 (04 Nov 2018 06:04) (103/48 - 117/53)  SpO2: 95% (04 Nov 2018 03:24) (95% - 98%)  MEDICATIONS  (STANDING):  acetaminophen   Tablet .. 650 milliGRAM(s) Oral every 6 hours  ALBUTerol    0.083% 2.5 milliGRAM(s) Nebulizer every 4 hours  buDESOnide 160 MICROgram(s)/formoterol 4.5 MICROgram(s) Inhaler 2 Puff(s) Inhalation two times a day  clonazePAM Tablet 0.5 milliGRAM(s) Oral every 12 hours  docusate sodium Liquid 100 milliGRAM(s) Oral two times a day  enoxaparin Injectable 40 milliGRAM(s) SubCutaneous at bedtime  guaiFENesin    Syrup 100 milliGRAM(s) Oral every 4 hours  loratadine 10 milliGRAM(s) Oral daily  pantoprazole   Suspension 40 milliGRAM(s) Oral daily  predniSONE   Tablet 10 milliGRAM(s) Oral daily  scopolamine   Patch 1 Patch Transdermal every 72 hours  senna 2 Tablet(s) Oral at bedtime  simvastatin 10 milliGRAM(s) Oral at bedtime  tiotropium 18 MICROgram(s) Capsule 1 Capsule(s) Inhalation daily    MEDICATIONS  (PRN):  sodium chloride 0.65% Nasal 1 Spray(s) Both Nostrils every 2 hours PRN Nasal Congestion        General: comfortable, NAD  Neurology: A&Ox3, nonfocal  Head:  Normocephalic, atraumatic  ENT:  Mucosa moist, no ulcerations  Neck:  Supple, no JVD,   Skin: no breakdowns (as per RN)  Resp: CTA B/L  CV: RRR, S1S2,   GI: Soft, NT, bowel sounds, peg  MS: No edema, + peripheral pulses,       A/P:  62 yo M with COPD, chronic respiratory failure secondary to COPD was admitted with high CO2 levels of 58. He was placed on BiPap and still retained high CO2 levels, proving BIPAP has failed. this patient will require a NIV on discharge for home use at bedtime, naptime, and periods of shortness of breath.     #Acute hypercapnic rep failure 2/2 COPD exacerbation now resolved.  - multiple pulm nodules  - c/w O2 as needed- on home O2, 2LNC,   -c/w BiPAP at night and PRN  - albuterol prn  -s/p prednisone taper,   -c/w spiriva, 10 mg daily prednisone, anxiolytic therapy,aspiration precautions.   -symbicort 160/4.5 2 puffs BID.   -daliresp non-formulary Resume upon discharge  -s/p abx  -f/u with Dr. Almonte as outpatient.   -ambulate as tolerated .  DVT prophylaxis  Decubitus prevention- all measures as per RN protocol  Please call or text me with any questions or updates

## 2018-11-04 NOTE — PROGRESS NOTE ADULT - SUBJECTIVE AND OBJECTIVE BOX
Interval history and ROS:    No new complaints  Comfortable  Awaiting DC home care arrangements    MEDICATIONS:  acetaminophen   Tablet .. 650 milliGRAM(s) Oral every 6 hours  ALBUTerol    0.083% 2.5 milliGRAM(s) Nebulizer every 4 hours  buDESOnide 160 MICROgram(s)/formoterol 4.5 MICROgram(s) Inhaler 2 Puff(s) Inhalation two times a day  clonazePAM Tablet 0.5 milliGRAM(s) Oral every 12 hours  docusate sodium Liquid 100 milliGRAM(s) Oral two times a day  enoxaparin Injectable 40 milliGRAM(s) SubCutaneous at bedtime  guaiFENesin    Syrup 100 milliGRAM(s) Oral every 4 hours  loratadine 10 milliGRAM(s) Oral daily  pantoprazole   Suspension 40 milliGRAM(s) Oral daily  predniSONE   Tablet 10 milliGRAM(s) Oral daily  scopolamine   Patch 1 Patch Transdermal every 72 hours  senna 2 Tablet(s) Oral at bedtime  simvastatin 10 milliGRAM(s) Oral at bedtime  sodium chloride 0.65% Nasal 1 Spray(s) Both Nostrils every 2 hours PRN  tiotropium 18 MICROgram(s) Capsule 1 Capsule(s) Inhalation daily      VITAL SIGNS:  Vital Signs Last 24 Hrs  T(C): 36.8 (04 Nov 2018 06:04), Max: 36.8 (04 Nov 2018 06:04)  T(F): 98.2 (04 Nov 2018 06:04), Max: 98.2 (04 Nov 2018 06:04)  HR: 75 (04 Nov 2018 06:04) (57 - 75)  BP: 117/53 (04 Nov 2018 06:04) (103/48 - 117/53)  BP(mean): --  RR: 20 (04 Nov 2018 06:04) (20 - 20)  SpO2: 95% (04 Nov 2018 03:24) (95% - 98%)        PHYSICAL EXAM:  Awake and alert NAD  Neck supple, no LN  S1 and S2 no murmur  Lungs prolonged expiratory  Abdomen is soft and non tender, PEG  There is no edema  Neurologically non focal

## 2018-11-05 RX ORDER — CLONAZEPAM 1 MG
0.25 TABLET ORAL ONCE
Qty: 0 | Refills: 0 | Status: DISCONTINUED | OUTPATIENT
Start: 2018-11-05 | End: 2018-11-05

## 2018-11-05 RX ORDER — SODIUM CHLORIDE 9 MG/ML
1000 INJECTION INTRAMUSCULAR; INTRAVENOUS; SUBCUTANEOUS
Qty: 0 | Refills: 0 | Status: DISCONTINUED | OUTPATIENT
Start: 2018-11-05 | End: 2018-11-07

## 2018-11-05 RX ADMIN — PANTOPRAZOLE SODIUM 40 MILLIGRAM(S): 20 TABLET, DELAYED RELEASE ORAL at 11:09

## 2018-11-05 RX ADMIN — Medication 0.25 MILLIGRAM(S): at 23:18

## 2018-11-05 RX ADMIN — ENOXAPARIN SODIUM 40 MILLIGRAM(S): 100 INJECTION SUBCUTANEOUS at 22:25

## 2018-11-05 RX ADMIN — Medication 650 MILLIGRAM(S): at 11:09

## 2018-11-05 RX ADMIN — ALBUTEROL 2.5 MILLIGRAM(S): 90 AEROSOL, METERED ORAL at 00:15

## 2018-11-05 RX ADMIN — Medication 650 MILLIGRAM(S): at 23:18

## 2018-11-05 RX ADMIN — TIOTROPIUM BROMIDE 1 CAPSULE(S): 18 CAPSULE ORAL; RESPIRATORY (INHALATION) at 09:10

## 2018-11-05 RX ADMIN — Medication 10 MILLIGRAM(S): at 05:23

## 2018-11-05 RX ADMIN — Medication 100 MILLIGRAM(S): at 23:18

## 2018-11-05 RX ADMIN — ALBUTEROL 2.5 MILLIGRAM(S): 90 AEROSOL, METERED ORAL at 17:34

## 2018-11-05 RX ADMIN — ALBUTEROL 2.5 MILLIGRAM(S): 90 AEROSOL, METERED ORAL at 04:41

## 2018-11-05 RX ADMIN — Medication 100 MILLIGRAM(S): at 05:23

## 2018-11-05 RX ADMIN — ALBUTEROL 2.5 MILLIGRAM(S): 90 AEROSOL, METERED ORAL at 20:51

## 2018-11-05 RX ADMIN — Medication 650 MILLIGRAM(S): at 05:24

## 2018-11-05 RX ADMIN — Medication 100 MILLIGRAM(S): at 11:09

## 2018-11-05 RX ADMIN — Medication 0.5 MILLIGRAM(S): at 05:24

## 2018-11-05 RX ADMIN — ALBUTEROL 2.5 MILLIGRAM(S): 90 AEROSOL, METERED ORAL at 12:30

## 2018-11-05 RX ADMIN — Medication 650 MILLIGRAM(S): at 06:32

## 2018-11-05 RX ADMIN — LORATADINE 10 MILLIGRAM(S): 10 TABLET ORAL at 11:09

## 2018-11-05 RX ADMIN — ALBUTEROL 2.5 MILLIGRAM(S): 90 AEROSOL, METERED ORAL at 09:12

## 2018-11-05 NOTE — CONSULT NOTE ADULT - ASSESSMENT
64 yo M with COPD, chronic respiratory failure on BIPAP, HTN, DLD, vocal cord paralysis and dysphagia s/p PEG 4/ 18 in [facility] in the setting of resistant esophageal candidiasis and inability to swallow. Patient bought in from [employer] ,for cough productive ,sob and difficulty breathing of 1 day. Patient was treated for acute resp failure secondary to COPD exacerbation. Patient don BIPAP and 2 L oxygen at home. He reports that his PEG tube stopped working today and this happened in the past and then resolved.     # PEG tube malfunction/ Tried to flush with no success  PEG tube pulled and tried to reinsert one at the bedside, but lost track , and a lot of hypertrophied tissue around entry site   Will Place another PEG tube this week if we get medical and Pulmonary risk stratification   Meanwhile please place an NG tube and start enteral feeding   If risk stratification obtained tomorrow will consider PEG on wednesday   Please get Blood work : CBC, BMP, INR 62 yo M with COPD, chronic respiratory failure on BIPAP, HTN, DLD, vocal cord paralysis and dysphagia s/p PEG 4/ 18 in [facility] in the setting of resistant esophageal candidiasis and inability to swallow. Patient bought in from [employer] ,for cough productive ,sob and difficulty breathing of 1 day. Patient was treated for acute resp failure secondary to COPD exacerbation. Patient don BIPAP and 2 L oxygen at home. He reports that his PEG tube stopped working today and this happened in the past and then resolved.     # PEG tube malfunction  The peg was lavaged without success: this was a 14 gauge tube that was completely nonfunctional.  We discussed removing the peg and reinserting another peg, and the risk of not being able to reinsert the peg.  The tube was then  removed, and another could not be placed in the lumen.  Plan:  Will Place another PEG tube this week   Please obtain medical and Pulmonary clearance and recommendation: will likely need to be intubated for the procedure.  Meanwhile please place an NG tube and start enteral feeding   If risk stratification obtained tomorrow will consider PEG on Wednesday   Please get Blood work : CBC, BMP, INR, EKG CXR

## 2018-11-05 NOTE — PROGRESS NOTE ADULT - ASSESSMENT
ADMISSION SUMMARY  62 yo M with COPD, chronic respiratory failure on BIPAP, HTN, DLD, vocal cord paralysis and dysphagia s/p PEG 5/ 18 in the setting of resistant esophageal candidiasis and inability to swallow, bought in from [employer] for cough productive ,sob and difficulty breathing of 1 day.    ASSESSMENT & PLAN  # Acute on chronic hypoxic hypercapnic respiratory failure 2/2 COPD exacerbation - resolved  -On home O2 2L NC, continue as needed  -Multiple pulm nodules  -c/w BiPAP at night and PRN IPAP 12, EPAP 6 outpatient bipap set up.   -albuterol prn  -s/p prednisone taper,   -c/w spiriva, 10 mg daily prednisone, anxiolytic therapy,aspiration precautions.   -symbicort 160/4.5 2 puffs BID ( patient feels symbicort makes him choke)  -daliresp non-formulary Resume upon discharge, as well as op inhaled nebulizers  -s/p 7 days levofloxacin 4 days cefepime  -f/u with Dr. Almonte as outpatient.   -ambulate as tolerated .  -guaifenescin through PEG tube    # Dysphagia  -Patient is know to SLP. He continues to have severe dysphagia. Consider ENT Eval as outpatient  -S/p PEG in May 2018 - states he had one problem with it earlier where it was clogged and evaluated but it resolved on its own. Does not recall whether it was inserted by GI or IR, inserted at [facility]  -F/u GI consult regarding PEG tube malfunction    # HTN/DLD  - cw home meds    # Anxiety  -Clonopin 0.5 BID    # DVT/GI ppx   # Increase Jevity 1.2 to 360ml q6h to provide 1728kcal, 79g protein (101% estimated energy needs, 116% estimated protein needs- 18% overall calories from protein- WNL)--pt does not desire midnight feeds, changed to 480 TID - resume once PEG tube functioning  -Lovenox 40 mg daily    # Disposition  -D/c planning for home with home care   -Code status: FULL CODE

## 2018-11-05 NOTE — CONSULT NOTE ADULT - SUBJECTIVE AND OBJECTIVE BOX
We were asked to see the patient for PEG tube malfunction    GI HPI:  62 yo M with COPD, chronic respiratory failure on BIPAP, HTN, DLD, vocal cord paralysis and dysphagia s/p PEG 4/ 18 in [facility] in the setting of resistant esophageal candidiasis and inability to swallow. Patient bought in from [employer] ,for cough productive ,sob and difficulty breathing of 1 day. Patient was treated for acute resp failure secondary to COPD exacerbation. Patient don BIPAP and 2 L oxygen at home. He reports that his PEG tube stopped working today and this happened in the past and then resolved.       PAST MEDICAL & SURGICAL HISTORY  PEG (percutaneous endoscopic gastrostomy) status  Other hydrocele  Anxiety disorder, unspecified type  Hypertension, unspecified type  High cholesterol  Chronic obstructive pulmonary disease, unspecified COPD type  PEG (percutaneous endoscopic gastrostomy) status        SOCIAL HISTORY:  smoker: non smoker  Alcohol: Non alcoholic  Drug: Denies use of drugs   FAMILY HISTORY:  FAMILY HISTORY:  No pertinent family history in first degree relatives      ALLERGIES:  No Known Allergies      MEDICATIONS:  MEDICATIONS  (STANDING):  acetaminophen   Tablet .. 650 milliGRAM(s) Oral every 6 hours  ALBUTerol    0.083% 2.5 milliGRAM(s) Nebulizer every 4 hours  buDESOnide 160 MICROgram(s)/formoterol 4.5 MICROgram(s) Inhaler 2 Puff(s) Inhalation two times a day  clonazePAM Tablet 0.5 milliGRAM(s) Oral every 12 hours  docusate sodium Liquid 100 milliGRAM(s) Oral two times a day  enoxaparin Injectable 40 milliGRAM(s) SubCutaneous at bedtime  guaiFENesin    Syrup 100 milliGRAM(s) Oral every 4 hours  loratadine 10 milliGRAM(s) Oral daily  pantoprazole   Suspension 40 milliGRAM(s) Oral daily  predniSONE   Tablet 10 milliGRAM(s) Oral daily  scopolamine   Patch 1 Patch Transdermal every 72 hours  senna 2 Tablet(s) Oral at bedtime  simvastatin 10 milliGRAM(s) Oral at bedtime  tiotropium 18 MICROgram(s) Capsule 1 Capsule(s) Inhalation daily    MEDICATIONS  (PRN):  sodium chloride 0.65% Nasal 1 Spray(s) Both Nostrils every 2 hours PRN Nasal Congestion      HOME MEDICATIONS:  Home Medications:  vitamin A &amp; D topical cream: Apply topically to affected area 2 times a day legs (17 Jun 2018 21:00)      ROS:     REVIEW OF SYSTEMS  General:  No fevers  Eyes:  No reported pain   ENT:  No sore throat   NECK: No stiffness   CV:  No chest pain   Resp:  No shortness of breath  GI:  See HPI  :  No dysuria  Muscle:  No aches or weakness  Neuro:  No tingling  Endocrine:  No polyuria  Heme:  No ecchymosis   All other review of systems is negative unless indicated above.         VITALS:   T(F): 97 (11-05 @ 12:36), Max: 98.8 (11-02 @ 21:02)  HR: 70 (11-05 @ 12:36) (53 - 98)  BP: 116/68 (11-05 @ 12:36) (99/54 - 148/68)  BP(mean): --  RR: 20 (11-05 @ 12:36) (18 - 22)  SpO2: 98% (11-04 @ 22:50) (95% - 98%)    I&O's Summary    04 Nov 2018 07:01  -  05 Nov 2018 07:00  --------------------------------------------------------  IN: 0 mL / OUT: 1250 mL / NET: -1250 mL        PHYSICAL EXAM:  GENERAL:  Appears in no distress  HEENT:  Conjunctivae  anicteric  CHEST:  Full & symmetric excursion  HEART:  N S1, S2  ABDOMEN:  Soft, non tender, non-distended, normoactive bowel sounds, PEG tube not flushing, site shows hypertrophic tissue   EXTREMITIES  no  edema  SKIN:  No rash  NEURO:  Alert, oriented            RADIOLOGY:    No imaging
ALETA SUAZO  MRN-[id number]    HISTORY OF PRESENT ILLNESS:    62 yo M with COPD, chronic respiratory failure on BIPAP ad outpt, HTN, DLD, vocal cord paralysis and dysphagia s/p PEG  in the setting of resistant esophageal candidiasis and inability to swallow. Pt bought in from [employer] ,for cough productive ,sob and difficulty breathing of 1 day.      In ER he  was tachyonic, temp of 103 ,given  respiratory  treatment *3 and was placed on BIPAP  Pt given iv steroids, abx as well as ivf in ed  states he feels much better and wants to be discharged  Pt follows Dr Almonte of pulmonary as out pt      PMH/PSH:  PAST MEDICAL & SURGICAL HISTORY:  CRF  PEG (percutaneous endoscopic gastrostomy) status  Other hydrocele  Anxiety disorder, unspecified type  Hypertension, unspecified type  High cholesterol  Chronic obstructive pulmonary disease, unspecified COPD type  PEG (percutaneous endoscopic gastrostomy) status    ALLERGIES:  Allergies    No Known Allergies    Intolerances      SOCIAL HABITS:  ex smoker    FAMILY HISTORY:   FAMILY HISTORY:  No pertinent family history in first degree relatives      REVIEW OF SYSTEM:  Elements of review of systems are negative or non-applicable except as noted above in HPI section.       HOME MEDICATIONS:  acetaminophen 325 mg oral tablet  albuterol 2.5 mg/3 mL (0.083%) inhalation solution  ALPRAZolam 1 mg oral tablet  Balmex Adult Care 11.3% topical cream  Brovana 15 mcg/2 mL inhalation solution  docusate sodium 10 mg/mL oral liquid  Flovent  mcg/inh inhalation aerosol  loratadine 10 mg oral tablet  Mucinex  Norvasc 10 mg oral tablet  nystatin 100,000 units/g topical cream  omeprazole 20 mg oral delayed release capsule  predniSONE 10 mg oral tablet  roflumilast 500 mcg oral tablet  senna oral tablet  simvastatin 10 mg oral tablet  vitamin A &amp; D topical cream    MEDICATIONS:  MEDICATIONS  (STANDING):  acetaminophen   Tablet .. 650 milliGRAM(s) Oral every 6 hours  ALBUTerol/ipratropium for Nebulization 3 milliLiter(s) Nebulizer every 6 hours  amLODIPine   Tablet 10 milliGRAM(s) Oral daily  buDESOnide 160 MICROgram(s)/formoterol 4.5 MICROgram(s) Inhaler 2 Puff(s) Inhalation two times a day  cefepime   IVPB      cefepime   IVPB 2000 milliGRAM(s) IV Intermittent every 8 hours  docusate sodium Liquid 100 milliGRAM(s) Oral two times a day  enoxaparin Injectable 40 milliGRAM(s) SubCutaneous at bedtime  levoFLOXacin IVPB      levoFLOXacin IVPB 750 milliGRAM(s) IV Intermittent every 24 hours  loratadine 10 milliGRAM(s) Oral daily  methylPREDNISolone sodium succinate Injectable 40 milliGRAM(s) IV Push two times a day  pantoprazole    Tablet 40 milliGRAM(s) Oral before breakfast  senna 2 Tablet(s) Oral at bedtime  simvastatin 10 milliGRAM(s) Oral at bedtime    MEDICATIONS  (PRN):  ALPRAZolam 0.25 milliGRAM(s) Oral two times a day PRN Anxiety  guaiFENesin    Syrup 200 milliGRAM(s) Oral every 6 hours PRN Cough        VITALS:   Vital Signs Last 24 Hrs  T(C): 36.9 (15 Oct 2018 06:54), Max: 36.9 (14 Oct 2018 15:04)  T(F): 98.5 (15 Oct 2018 06:54), Max: 98.5 (14 Oct 2018 15:04)  HR: 89 (15 Oct 2018 06:54) (89 - 98)  BP: 153/66 (15 Oct 2018 06:54) (98/54 - 153/66)  BP(mean): --  RR: 20 (15 Oct 2018 06:54) (18 - 20)  SpO2: 99% (15 Oct 2018 06:54) (98% - 99%)        PHYSICAL EXAM:    GENERAL: NAD on niv  HEAD:  Atraumatic, Normocephalic  NECK: Supple, No JVD  CHEST/LUNG: distant breath sounds  HEART: Regular rate and rhythm; No murmurs, rubs, or gallops  ABDOMEN: Soft, Nontender, Nondistended; Bowel sounds present  EXTREMITIES:  Good peripheral Pulses, No clubbing, cyanosis, or edema      LABS:                        8.9    13.83 )-----------( 266      ( 15 Oct 2018 08:53 )             28.8     10-15    142  |  100  |  26<H>  ----------------------------<  85  4.2   |  26  |  0.8    Ca    9.5      15 Oct 2018 08:53  Mg     2.0     10-15    TPro  7.6  /  Alb  4.1  /  TBili  <0.2  /  DBili  x   /  AST  15  /  ALT  15  /  AlkPhos  78  10-15    LIVER FUNCTIONS - ( 15 Oct 2018 08:53 )  Alb: 4.1 g/dL / Pro: 7.6 g/dL / ALK PHOS: 78 U/L / ALT: 15 U/L / AST: 15 U/L / GGT: x           CARDIAC MARKERS ( 15 Oct 2018 08:53 )  x     / <0.01 ng/mL / x     / x     / 6.7 ng/mL      PT/INR - ( 13 Oct 2018 20:40 )   PT: 12.20 sec;   INR: 1.06 ratio         PTT - ( 13 Oct 2018 20:40 )  PTT:29.0 sec    Culture - Blood (collected 10-13-18 @ 20:40)  Source: .Blood Blood-Peripheral  Preliminary Report (10-15-18 @ 07:00):    No growth to date.    Culture - Blood (collected 10-13-18 @ 20:40)  Source: .Blood Blood-Peripheral  Preliminary Report (10-15-18 @ 07:00):    No growth to date.      Urinalysis Basic - ( 14 Oct 2018 01:00 )    Color: Yellow / Appearance: Cloudy / S.015 / pH: x  Gluc: x / Ketone: Negative  / Bili: Negative / Urobili: 0.2 mg/dL   Blood: x / Protein: Negative mg/dL / Nitrite: Negative   Leuk Esterase: Negative / RBC: x / WBC x   Sq Epi: x / Non Sq Epi: Occasional /HPF / Bacteria: x          ABG & VENT SETTINGS (when applicable)  ABG - ( 14 Oct 2018 06:48 )  pH, Arterial: 7.46  pH, Blood: x     /  pCO2: 42    /  pO2: 77    / HCO3: 29    / Base Excess: 4.9   /  SaO2: 95                    DIAGNOSTIC STUDIES:    < from: Xray Chest 1 View- PORTABLE-Routine (10.15.18 @ 06:45) >  Skeleton/soft tissues: Stable.    Impression:      No radiographic evidence of acute cardiopulmonary disease.    < end of copied text >
HPI:  64 yo M with COPD, chronic respiratory failure on BIPAP, HTN, DLD, vocal cord paralysis and dysphagia s/p PEG 5/ 18 in the setting of resistant esophageal candidiasis and inability to swallow. Today bought in from [employer] ,for cough productive ,sob and difficulty breathing of 1 day.  he has a recent admission in 6/18 for COPD ex too.    In ER he  was tachyonic, temp of 103 ,given  respiratory  treatment *3 and was placed on BIPAP  and admitted for COPD excerabtion    also given IV antibiotics (14 Oct 2018 06:19)      PAST MEDICAL & SURGICAL HISTORY:  PEG (percutaneous endoscopic gastrostomy) status  Other hydrocele  Anxiety disorder, unspecified type  Hypertension, unspecified type  High cholesterol  Chronic obstructive pulmonary disease, unspecified COPD type  PEG (percutaneous endoscopic gastrostomy) status      Hospital Course:    TODAY'S SUBJECTIVE & REVIEW OF SYMPTOMS:     Constitutional WNL   Cardio WNL   Resp sob   GI WNL  Heme WNL  Endo WNL  Skin WNL  MSK Weakness  Neuro WNL  Cognitive WNL  Psych WNL      MEDICATIONS  (STANDING):  acetaminophen   Tablet .. 650 milliGRAM(s) Oral every 6 hours  ALPRAZolam 1 milliGRAM(s) Oral every 8 hours  amLODIPine   Tablet 10 milliGRAM(s) Oral daily  benzocaine 15 mG/menthol 3.6 mG Lozenge 1 Lozenge Oral three times a day  buDESOnide 160 MICROgram(s)/formoterol 4.5 MICROgram(s) Inhaler 2 Puff(s) Inhalation two times a day  docusate sodium Liquid 100 milliGRAM(s) Oral two times a day  enoxaparin Injectable 40 milliGRAM(s) SubCutaneous at bedtime  levoFLOXacin  Tablet 750 milliGRAM(s) Oral every 24 hours  loratadine 10 milliGRAM(s) Oral daily  pantoprazole    Tablet 40 milliGRAM(s) Oral before breakfast  predniSONE   Tablet 60 milliGRAM(s) Oral daily  senna 2 Tablet(s) Oral at bedtime  simvastatin 10 milliGRAM(s) Oral at bedtime    MEDICATIONS  (PRN):  ALBUTerol    0.083% 2.5 milliGRAM(s) Nebulizer every 4 hours PRN Shortness of Breath and/or Wheezing  guaiFENesin    Syrup 200 milliGRAM(s) Oral every 6 hours PRN Cough      FAMILY HISTORY:  No pertinent family history in first degree relatives      Allergies    No Known Allergies    Intolerances        SOCIAL HISTORY:    [  ] Etoh  [  ] Smoking  [  ] Substance abuse     Home Environment:  [  ] Home Alone  [  ] Lives with Family  [  ] Home Health Aid    Dwelling:  [  ] Apartment  [  ] Private House  [  ] Adult Home  [  ] Skilled Nursing Facility      [x  ] Short Term  [  ] Long Term  [  ] Stairs       Elevator [  ]    FUNCTIONAL STATUS PTA: (Check all that apply)  Ambulation: [   ]Independent    [ x ] Dependent     [  ] Non-Ambulatory  Assistive Device: [  ] SA Cane  [  ]  Q Cane  [x  ] Walker  [  ]  Wheelchair  ADL : [  ] Independent  [x  ]  Dependent       Vital Signs Last 24 Hrs  T(C): 36.4 (16 Oct 2018 12:39), Max: 36.4 (16 Oct 2018 12:39)  T(F): 97.6 (16 Oct 2018 12:39), Max: 97.6 (16 Oct 2018 12:39)  HR: 101 (16 Oct 2018 12:39) (72 - 111)  BP: 115/65 (16 Oct 2018 12:39) (109/55 - 129/66)  BP(mean): --  RR: 20 (16 Oct 2018 12:39) (20 - 20)  SpO2: 98% (16 Oct 2018 00:15) (98% - 98%)      PHYSICAL EXAM: Alert & Oriented X3  GENERAL: NAD, well-groomed, well-developed  HEAD:  Atraumatic, Normocephalic  CHEST/LUNG: decreased bs lung bases  HEART: S1S2+  ABDOMEN: Soft, Nontender  EXTREMITIES:  no calf tenderness    NERVOUS SYSTEM:  Cranial Nerves 2-12 intact [  ] Abnormal  [  ]  ROM: WFL all extremities [ x ]  Abnormal [  ]  Motor Strength: WFL all extremities  [  ]  Abnormal [ x ]4/5 all ext  Sensation: intact to light touch [x  ] Abnormal [  ]  Reflexes: Symmetric [  ]  Abnormal [  ]    FUNCTIONAL STATUS:  Bed Mobility: Independent [  ]  Supervision [  ]  Needs Assistance [ x ]  N/A [  ]  Transfers: Independent [  ]  Supervision [  ]  Needs Assistance [ x ]  N/A [  ]   Ambulation: Independent [  ]  Supervision [  ]  Needs Assistance [  ]  N/A [  ]  ADL: Independent [  ] Requires Assistance [  ] N/A [  ]      LABS:                        8.9    13.83 )-----------( 266      ( 15 Oct 2018 08:53 )             28.8     10-15    142  |  100  |  26<H>  ----------------------------<  85  4.2   |  26  |  0.8    Ca    9.5      15 Oct 2018 08:53  Mg     2.0     10-15    TPro  7.6  /  Alb  4.1  /  TBili  <0.2  /  DBili  x   /  AST  15  /  ALT  15  /  AlkPhos  78  10-15          RADIOLOGY & ADDITIONAL STUDIES:    Assesment:
NEPHROLOGY CONSULTATION NOTE    64 yo wm with pmh as below, very well known to for many years, with end stage COPD with multiple exacerbations, anxiety, htn, vocal card injury and dysphagia s/p PEG admitted with ARF from SNF, now improved.  Spoke with patient and niece at bedside in detail.  Pt quite anxious.  C/o cough, sob, no fever, no cp, weakness and inability to transfer.      PAST MEDICAL & SURGICAL HISTORY:  PEG (percutaneous endoscopic gastrostomy) status  Other hydrocele  Anxiety disorder, unspecified type  Hypertension, unspecified type  High cholesterol  Chronic obstructive pulmonary disease, unspecified COPD type  PEG (percutaneous endoscopic gastrostomy) status    Allergies:  No Known Allergies    Home Medications Reviewed    SOCIAL HISTORY:  Denies ETOH,Smoking,   FAMILY HISTORY:  No pertinent family history in first degree relatives        REVIEW OF SYSTEMS:  All other review of systems is negative unless indicated above.    PHYSICAL EXAM:  NAD  frail  poor dentition  O2 via NC  tremors  decreased bs bl  distant hs  soft  peg  no cvat  no edema    Hospital Medications:   MEDICATIONS  (STANDING):  acetaminophen   Tablet .. 650 milliGRAM(s) Oral every 6 hours  ALPRAZolam 1 milliGRAM(s) Oral every 6 hours  amLODIPine   Tablet 10 milliGRAM(s) Oral daily  benzocaine 15 mG/menthol 3.6 mG Lozenge 1 Lozenge Oral three times a day  buDESOnide 160 MICROgram(s)/formoterol 4.5 MICROgram(s) Inhaler 2 Puff(s) Inhalation two times a day  docusate sodium Liquid 100 milliGRAM(s) Oral two times a day  enoxaparin Injectable 40 milliGRAM(s) SubCutaneous at bedtime  levoFLOXacin  Tablet 750 milliGRAM(s) Oral every 24 hours  loratadine 10 milliGRAM(s) Oral daily  pantoprazole    Tablet 40 milliGRAM(s) Oral before breakfast  predniSONE   Tablet 60 milliGRAM(s) Oral daily  senna 2 Tablet(s) Oral at bedtime  simvastatin 10 milliGRAM(s) Oral at bedtime        VITALS:  T(F): 96.1 (10-17-18 @ 12:45), Max: 98.7 (10-16-18 @ 20:21)  HR: 97 (10-17-18 @ 12:45)  BP: 106/62 (10-17-18 @ 12:45)  RR: 20 (10-17-18 @ 12:45)  SpO2: --  Wt(kg): --    10-15 @ 07:  -  10-16 @ 07:00  --------------------------------------------------------  IN: 440 mL / OUT: 0 mL / NET: 440 mL    10 @ 07:01  -  10-17 @ 16:09  --------------------------------------------------------  IN: 0 mL / OUT: 200 mL / NET: -200 mL          LABS:            Urine Studies:  Urinalysis Basic - ( 14 Oct 2018 01:00 )    Color: Yellow / Appearance: Cloudy / S.015 / pH:   Gluc:  / Ketone: Negative  / Bili: Negative / Urobili: 0.2 mg/dL   Blood:  / Protein: Negative mg/dL / Nitrite: Negative   Leuk Esterase: Negative / RBC:  / WBC    Sq Epi:  / Non Sq Epi: Occasional /HPF / Bacteria:           RADIOLOGY & ADDITIONAL STUDIES:

## 2018-11-05 NOTE — CHART NOTE - NSCHARTNOTEFT_GEN_A_CORE
Registered Dietitian Follow-Up     Patient Profile Reviewed                           Yes [x]   No []     Nutrition History Previously Obtained        Yes [x]  No []       Pertinent Subjective Information:       Pertinent Medical Interventions:     Diet order:      Anthropometrics:  - Ht.   - Wt.  - %wt change  - BMI  - IBW     Pertinent Lab Data:     Pertinent Meds:     Physical Findings:  - Appearance:  - GI function:  - Tubes:  - Oral/Mouth cavity:  - Skin:     Nutrition Requirements  Weight Used:     Estimated Energy Needs    Continue []  Adjust []  Adjusted Energy Recommendations:   kcal/day        Estimated Protein Needs    Continue []  Adjust []  Adjusted Protein Recommendations:   gm/day        Estimated Fluid Needs        Continue []  Adjust []  Adjusted Fluid Recommendations:   mL/day     Nutrient Intake:        [] Previous Nutrition Diagnosis:            [] Ongoing          [] Resolved    [] No active nutrition diagnosis identified at this time     Nutrition Diagnostic #1  Problem:  Etiology:  Statement:     Nutrition Diagnostic #2  Problem:  Etiology:  Statement:     Nutrition Intervention      Goal/Expected Outcome:     Indicator/Monitoring: Registered Dietitian Follow-Up     Patient Profile Reviewed                           Yes [x]   No []     Nutrition History Previously Obtained        Yes [x]  No []       Pertinent Subjective Information: Pt. dozing off during visit. Spoke to RN who reports pt. PEG tube clogged and feeding on hold since this morning.       Pertinent Medical Interventions: Acute hypercapnic rep failure 2/2 COPD exacerbation now resolved. Dispo planning in progress.      Diet order: Jevity 1.2 at 480ml q8h- on hold      Anthropometrics:  - Ht. 170.1cm  - Wt. 56kg on 11/4 vs. 56.9k on 10/19- relatively stable   - %wt change   - BMI 19.7  - IBW 148lbs      Pertinent Lab Data: no new labs documented       Pertinent Meds: Prednisone, Protonix      Physical Findings:  - Appearance: dozing off during visit  - GI function: no symptoms noted,  last BM 11/1  - Tubes: PEG- per RN tube is clogged, last feeding at 6am today  - Oral/Mouth cavity: NPO  - Skin: ecchymosis (19)      Nutrition Requirements (from RD note on 10/  Weight Used:     Estimated Energy Needs    Continue [x]  Adjust [] 1580-1712kcal  Adjusted Energy Recommendations:   kcal/day        Estimated Protein Needs    Continue [x]  Adjust [] 57-68g  Adjusted Protein Recommendations:   gm/day        Estimated Fluid Needs        Continue [x]  Adjust [] 1ml/kcal or per LIP  Adjusted Fluid Recommendations:   mL/day     Nutrient Intake: Jevity 1.2 at 480ml q8h- on hold since this morning- 1728kcal, 79g protein (101% estimated energy needs, 116% estimated protein needs- 18% overall calories from protein- WNL)        [] Previous Nutrition Diagnosis: Less than optimal enteral nutrition infusion            [x] Ongoing          [] Resolved         Nutrition Intervention: enteral and parenteral nutrition    Rec: When medically feasible provide Jevity 1.2 to 360ml q6h to provide 1728kcal, 79g protein (101% estimated energy needs, 116% estimated protein needs- 18% overall calories from protein- WNL)    Goal/Expected Outcome: In 3 days enteral nutrition to provide >85% estimated energy needs, but not exceed 105%     Indicator/Monitoring: energy intake, body composition, NFPF Registered Dietitian Follow-Up     Patient Profile Reviewed                           Yes [x]   No []     Nutrition History Previously Obtained        Yes [x]  No []       Pertinent Subjective Information: Pt. dozing off during visit. Spoke to RN who reports pt. PEG tube clogged and feeding on hold since this morning.       Pertinent Medical Interventions: Acute hypercapnic rep failure 2/2 COPD exacerbation now resolved. Dispo planning in progress.      Diet order: Jevity 1.2 at 480ml q8h- on hold      Anthropometrics:  - Ht. 170.1cm  - Wt. 56kg on 11/4 vs. 56.9k on 10/19- relatively stable   - %wt change   - BMI 19.7  - IBW 148lbs      Pertinent Lab Data: no new labs documented       Pertinent Meds: Prednisone, Protonix      Physical Findings:  - Appearance: dozing off during visit  - GI function: no symptoms noted,  last BM 11/1- not on bowel regimen   - Tubes: PEG- per RN tube is clogged, last feeding at 6am today  - Oral/Mouth cavity: NPO  - Skin: ecchymosis (19)      Nutrition Requirements (from RD note on 10/  Weight Used:     Estimated Energy Needs    Continue [x]  Adjust [] 1580-1712kcal  Adjusted Energy Recommendations:   kcal/day        Estimated Protein Needs    Continue [x]  Adjust [] 57-68g  Adjusted Protein Recommendations:   gm/day        Estimated Fluid Needs        Continue [x]  Adjust [] 1ml/kcal or per LIP  Adjusted Fluid Recommendations:   mL/day     Nutrient Intake: Jevity 1.2 at 480ml q8h- on hold since this morning- 1728kcal, 79g protein (101% estimated energy needs, 116% estimated protein needs- 18% overall calories from protein- WNL)        [] Previous Nutrition Diagnosis: Less than optimal enteral nutrition infusion            [x] Ongoing          [] Resolved         Nutrition Intervention: enteral and parenteral nutrition, nutrition related medication management     Rec: When medically feasible provide Jevity 1.2 to 360ml q6h to provide 1728kcal, 79g protein (101% estimated energy needs, 116% estimated protein needs- 18% overall calories from protein- WNL). At least 1 BM in 3 days.     Goal/Expected Outcome: In 3 days enteral nutrition to provide >85% estimated energy needs, but not exceed 105%     Indicator/Monitoring: energy intake, body composition, NFPF

## 2018-11-05 NOTE — PROGRESS NOTE ADULT - SUBJECTIVE AND OBJECTIVE BOX
Patient was seen and examined. Spoke with RN. Chart reviewed.  No events overnight.  Vital Signs Last 24 Hrs  T(F): 96.1 (05 Nov 2018 05:15), Max: 97.9 (04 Nov 2018 13:31)  HR: 53 (05 Nov 2018 05:15) (53 - 78)  BP: 102/48 (05 Nov 2018 05:15) (99/54 - 115/57)  SpO2: 98% (04 Nov 2018 22:50) (98% - 98%)  MEDICATIONS  (STANDING):  acetaminophen   Tablet .. 650 milliGRAM(s) Oral every 6 hours  ALBUTerol    0.083% 2.5 milliGRAM(s) Nebulizer every 4 hours  buDESOnide 160 MICROgram(s)/formoterol 4.5 MICROgram(s) Inhaler 2 Puff(s) Inhalation two times a day  clonazePAM Tablet 0.5 milliGRAM(s) Oral every 12 hours  docusate sodium Liquid 100 milliGRAM(s) Oral two times a day  enoxaparin Injectable 40 milliGRAM(s) SubCutaneous at bedtime  guaiFENesin    Syrup 100 milliGRAM(s) Oral every 4 hours  loratadine 10 milliGRAM(s) Oral daily  pantoprazole   Suspension 40 milliGRAM(s) Oral daily  predniSONE   Tablet 10 milliGRAM(s) Oral daily  scopolamine   Patch 1 Patch Transdermal every 72 hours  senna 2 Tablet(s) Oral at bedtime  simvastatin 10 milliGRAM(s) Oral at bedtime  tiotropium 18 MICROgram(s) Capsule 1 Capsule(s) Inhalation daily    MEDICATIONS  (PRN):  sodium chloride 0.65% Nasal 1 Spray(s) Both Nostrils every 2 hours PRN Nasal Congestion        General: comfortable, NAD  Neurology: A&Ox3, nonfocal  Head:  Normocephalic, atraumatic  ENT:  Mucosa moist, no ulcerations  Neck:  Supple, no JVD,   Skin: no breakdowns (as per RN)  Resp: CTA B/L  CV: RRR, S1S2,   GI: Soft, NT, bowel sounds, peg  MS: No edema, + peripheral pulses, FROM all 4 extremity      A/P:  64 yo M with COPD, chronic respiratory failure secondary to COPD was admitted with high CO2 level. He was placed on BiPap and still retained high CO2 levels, proving BIPAP has failed. this patient will require a NIV on discharge for home use at bedtime, naptime, and periods of shortness of breath.     #Acute hypercapnic rep failure 2/2 COPD exacerbation now resolved.  - multiple pulm nodules  - c/w O2 as needed- on home O2, 2LNC,   -c/w BiPAP at night and PRN  - albuterol prn  -s/p prednisone taper,   -c/w spiriva, 10 mg daily prednisone, anxiolytic therapy,aspiration precautions.   -symbicort 160/4.5 2 puffs BID.   -daliresp non-formulary Resume upon discharge  -s/p abx  -f/u with Dr. Almonte as outpatient.   -ambulate as tolerated .    DVT prophylaxis  Decubitus prevention- all measures as per RN protocol  Please call or text me with any questions or updates

## 2018-11-05 NOTE — PROGRESS NOTE ADULT - SUBJECTIVE AND OBJECTIVE BOX
ALETA SUAZO  63y, Male  Allergy: No Known Allergies      LAST 24-Hr EVENTS:  respiratory status at baseline  no inc cough or sob  c/o peg tube malfunction    VITALS:  T(F): 97 (11-05-18 @ 12:36), Max: 97 (11-05-18 @ 12:36)  HR: 70 (11-05-18 @ 12:36)  BP: 116/68 (11-05-18 @ 12:36) (102/48 - 116/68)  RR: 20 (11-05-18 @ 12:36)  SpO2: 98% (11-04-18 @ 22:50)    PHYSICAL EXAM:    GENERAL: NAD  NECK: Supple, No JVD  CHEST/LUNG: CTA b/l  HEART: Regular rate and rhythm  ABDOMEN: Soft, Nontender, Nondistended peg tube  EXTREMITIES:  No clubbing, edema absent        TESTS & MEASUREMENTS:    IN: 0 mL / OUT: 1025 mL / NET: -1025 mL    IN: 0 mL / OUT: 1250 mL / NET: -1250 mL                          MEDICATIONS:  MEDICATIONS  (STANDING):  acetaminophen   Tablet .. 650 milliGRAM(s) Oral every 6 hours  ALBUTerol    0.083% 2.5 milliGRAM(s) Nebulizer every 4 hours  buDESOnide 160 MICROgram(s)/formoterol 4.5 MICROgram(s) Inhaler 2 Puff(s) Inhalation two times a day  clonazePAM Tablet 0.5 milliGRAM(s) Oral every 12 hours  docusate sodium Liquid 100 milliGRAM(s) Oral two times a day  enoxaparin Injectable 40 milliGRAM(s) SubCutaneous at bedtime  guaiFENesin    Syrup 100 milliGRAM(s) Oral every 4 hours  loratadine 10 milliGRAM(s) Oral daily  pantoprazole   Suspension 40 milliGRAM(s) Oral daily  predniSONE   Tablet 10 milliGRAM(s) Oral daily  scopolamine   Patch 1 Patch Transdermal every 72 hours  senna 2 Tablet(s) Oral at bedtime  simvastatin 10 milliGRAM(s) Oral at bedtime  sodium chloride 0.9%. 1000 milliLiter(s) (75 mL/Hr) IV Continuous <Continuous>  tiotropium 18 MICROgram(s) Capsule 1 Capsule(s) Inhalation daily    MEDICATIONS  (PRN):  sodium chloride 0.65% Nasal 1 Spray(s) Both Nostrils every 2 hours PRN Nasal Congestion

## 2018-11-05 NOTE — PROGRESS NOTE ADULT - ASSESSMENT
IMPRESSION:  62 yo male with COPD s/p acute respiratory failure  Chronic hypoxic/hypercapnic respiratory failure  Multiple pulmonary nodules  Anxiety disorder  Dysphagia s/p PEG now with peg tube malfunction    RECOMMENDATIONS:  Continue oxygen via NC to maintain saturation >90%  Continue BiPAP hs and prn  Continue bronchodilators, ICS and prednisone 10mg daily  PEG feeds/aspiration precautions  Out of bed  Anxiolytics prn  DVT prophylaxis  Discharge planning in progress  pt to follow up with Dr Almonte as outpt

## 2018-11-05 NOTE — PROGRESS NOTE ADULT - SUBJECTIVE AND OBJECTIVE BOX
ALETA SUAZO 63y Male  MRN#: [id number]     SUBJECTIVE  Patient is a 63y old Male who presents with a chief complaint of sob, cough and temp 103 (05 Nov 2018 08:07)  Currently admitted to medicine with the primary diagnosis of COPD exacerbation    Today is hospital day 23d, and this morning he is feeling fine, reports no overnight events. States he is frustrated at the length of stay. Otherwise denies chest pain, SOB, palpitations, fevers, chills, abdominal pain. Tolerating PO, no dysuria, no diarrhea.     PEG tube clogged during the day. Unable to flush or pull back residual - denies pain at the site.     OBJECTIVE  PAST MEDICAL & SURGICAL HISTORY  PEG (percutaneous endoscopic gastrostomy) status  Other hydrocele  Anxiety disorder, unspecified type  Hypertension, unspecified type  High cholesterol  Chronic obstructive pulmonary disease, unspecified COPD type  PEG (percutaneous endoscopic gastrostomy) status    ALLERGIES:  No Known Allergies    MEDICATIONS:  STANDING MEDICATIONS  acetaminophen   Tablet .. 650 milliGRAM(s) Oral every 6 hours  ALBUTerol    0.083% 2.5 milliGRAM(s) Nebulizer every 4 hours  buDESOnide 160 MICROgram(s)/formoterol 4.5 MICROgram(s) Inhaler 2 Puff(s) Inhalation two times a day  clonazePAM Tablet 0.5 milliGRAM(s) Oral every 12 hours  docusate sodium Liquid 100 milliGRAM(s) Oral two times a day  enoxaparin Injectable 40 milliGRAM(s) SubCutaneous at bedtime  guaiFENesin    Syrup 100 milliGRAM(s) Oral every 4 hours  loratadine 10 milliGRAM(s) Oral daily  pantoprazole   Suspension 40 milliGRAM(s) Oral daily  predniSONE   Tablet 10 milliGRAM(s) Oral daily  scopolamine   Patch 1 Patch Transdermal every 72 hours  senna 2 Tablet(s) Oral at bedtime  simvastatin 10 milliGRAM(s) Oral at bedtime  tiotropium 18 MICROgram(s) Capsule 1 Capsule(s) Inhalation daily    PRN MEDICATIONS  sodium chloride 0.65% Nasal 1 Spray(s) Both Nostrils every 2 hours PRN      VITAL SIGNS: Last 24 Hours  T(C): 36.1 (05 Nov 2018 12:36), Max: 36.4 (04 Nov 2018 20:20)  T(F): 97 (05 Nov 2018 12:36), Max: 97.6 (04 Nov 2018 20:20)  HR: 70 (05 Nov 2018 12:36) (53 - 70)  BP: 116/68 (05 Nov 2018 12:36) (99/54 - 116/68)  BP(mean): --  RR: 20 (05 Nov 2018 12:36) (18 - 20)  SpO2: 98% (04 Nov 2018 22:50) (98% - 98%)    LABS:                        RADIOLOGY:      PHYSICAL EXAM:    GENERAL: NAD, thin, AAOx3  HEENT:  Atraumatic, Normocephalic. EOMI, PERRLA, conjunctiva and sclera clear, No JVD. Whispering voice  PULMONARY: Clear to auscultation bilaterally; No wheezing, rhonchi, rales  CARDIOVASCULAR: Regular rate and rhythm; No murmurs, rubs, or gallops  GASTROINTESTINAL: Soft, Nontender, Nondistended; Bowel sounds present. PEG site without extensive erythema, exudates, or tenderness  MUSCULOSKELETAL:  2+ Peripheral Pulses, No clubbing, cyanosis, or edema  NEUROLOGY: non-focal  SKIN: No rashes or lesions

## 2018-11-06 LAB
ANION GAP SERPL CALC-SCNC: 11 MMOL/L — SIGNIFICANT CHANGE UP (ref 7–14)
APTT BLD: 32.7 SEC — SIGNIFICANT CHANGE UP (ref 27–39.2)
BUN SERPL-MCNC: 17 MG/DL — SIGNIFICANT CHANGE UP (ref 10–20)
CALCIUM SERPL-MCNC: 8.9 MG/DL — SIGNIFICANT CHANGE UP (ref 8.5–10.1)
CHLORIDE SERPL-SCNC: 100 MMOL/L — SIGNIFICANT CHANGE UP (ref 98–110)
CO2 SERPL-SCNC: 31 MMOL/L — SIGNIFICANT CHANGE UP (ref 17–32)
CREAT SERPL-MCNC: 0.8 MG/DL — SIGNIFICANT CHANGE UP (ref 0.7–1.5)
GLUCOSE SERPL-MCNC: 101 MG/DL — HIGH (ref 70–99)
HCT VFR BLD CALC: 30 % — LOW (ref 42–52)
HGB BLD-MCNC: 9.3 G/DL — LOW (ref 14–18)
INR BLD: 1.12 RATIO — SIGNIFICANT CHANGE UP (ref 0.65–1.3)
MCHC RBC-ENTMCNC: 28 PG — SIGNIFICANT CHANGE UP (ref 27–31)
MCHC RBC-ENTMCNC: 31 G/DL — LOW (ref 32–37)
MCV RBC AUTO: 90.4 FL — SIGNIFICANT CHANGE UP (ref 80–94)
NRBC # BLD: 0 /100 WBCS — SIGNIFICANT CHANGE UP (ref 0–0)
PLATELET # BLD AUTO: 226 K/UL — SIGNIFICANT CHANGE UP (ref 130–400)
POTASSIUM SERPL-MCNC: 4.2 MMOL/L — SIGNIFICANT CHANGE UP (ref 3.5–5)
POTASSIUM SERPL-SCNC: 4.2 MMOL/L — SIGNIFICANT CHANGE UP (ref 3.5–5)
PROTHROM AB SERPL-ACNC: 12.9 SEC — HIGH (ref 9.95–12.87)
RBC # BLD: 3.32 M/UL — LOW (ref 4.7–6.1)
RBC # FLD: 15.4 % — HIGH (ref 11.5–14.5)
SODIUM SERPL-SCNC: 142 MMOL/L — SIGNIFICANT CHANGE UP (ref 135–146)
WBC # BLD: 6.24 K/UL — SIGNIFICANT CHANGE UP (ref 4.8–10.8)
WBC # FLD AUTO: 6.24 K/UL — SIGNIFICANT CHANGE UP (ref 4.8–10.8)

## 2018-11-06 RX ORDER — CEFAZOLIN SODIUM 1 G
1000 VIAL (EA) INJECTION ONCE
Qty: 0 | Refills: 0 | Status: COMPLETED | OUTPATIENT
Start: 2018-11-06 | End: 2018-11-07

## 2018-11-06 RX ADMIN — SIMVASTATIN 10 MILLIGRAM(S): 20 TABLET, FILM COATED ORAL at 22:57

## 2018-11-06 RX ADMIN — Medication 100 MILLIGRAM(S): at 06:12

## 2018-11-06 RX ADMIN — Medication 0.5 MILLIGRAM(S): at 06:11

## 2018-11-06 RX ADMIN — Medication 100 MILLIGRAM(S): at 17:14

## 2018-11-06 RX ADMIN — Medication 650 MILLIGRAM(S): at 17:14

## 2018-11-06 RX ADMIN — LORATADINE 10 MILLIGRAM(S): 10 TABLET ORAL at 11:25

## 2018-11-06 RX ADMIN — ALBUTEROL 2.5 MILLIGRAM(S): 90 AEROSOL, METERED ORAL at 11:36

## 2018-11-06 RX ADMIN — SCOPALAMINE 1 PATCH: 1 PATCH, EXTENDED RELEASE TRANSDERMAL at 11:24

## 2018-11-06 RX ADMIN — Medication 100 MILLIGRAM(S): at 22:58

## 2018-11-06 RX ADMIN — Medication 650 MILLIGRAM(S): at 11:27

## 2018-11-06 RX ADMIN — Medication 650 MILLIGRAM(S): at 06:10

## 2018-11-06 RX ADMIN — Medication 0.5 MILLIGRAM(S): at 17:16

## 2018-11-06 RX ADMIN — ALBUTEROL 2.5 MILLIGRAM(S): 90 AEROSOL, METERED ORAL at 02:18

## 2018-11-06 RX ADMIN — ALBUTEROL 2.5 MILLIGRAM(S): 90 AEROSOL, METERED ORAL at 16:12

## 2018-11-06 RX ADMIN — Medication 100 MILLIGRAM(S): at 10:17

## 2018-11-06 RX ADMIN — Medication 100 MILLIGRAM(S): at 14:40

## 2018-11-06 RX ADMIN — TIOTROPIUM BROMIDE 1 CAPSULE(S): 18 CAPSULE ORAL; RESPIRATORY (INHALATION) at 07:44

## 2018-11-06 RX ADMIN — Medication 650 MILLIGRAM(S): at 23:04

## 2018-11-06 RX ADMIN — PANTOPRAZOLE SODIUM 40 MILLIGRAM(S): 20 TABLET, DELAYED RELEASE ORAL at 11:25

## 2018-11-06 RX ADMIN — Medication 10 MILLIGRAM(S): at 06:13

## 2018-11-06 RX ADMIN — ALBUTEROL 2.5 MILLIGRAM(S): 90 AEROSOL, METERED ORAL at 07:47

## 2018-11-06 RX ADMIN — ALBUTEROL 2.5 MILLIGRAM(S): 90 AEROSOL, METERED ORAL at 19:16

## 2018-11-06 RX ADMIN — SENNA PLUS 2 TABLET(S): 8.6 TABLET ORAL at 22:58

## 2018-11-06 NOTE — PROGRESS NOTE ADULT - SUBJECTIVE AND OBJECTIVE BOX
We are following the patient for PEG tube malfunction      GI HPI Today:  Patient NG tube feeding. He was asking when the procedure will be done because he wants to go home. Explained to patient that we are waiting for the pulmonary clearance and then will schedule the procedure for tomorrow if we get the clearence today     PAST MEDICAL & SURGICAL HISTORY  PEG (percutaneous endoscopic gastrostomy) status  Other hydrocele  Anxiety disorder, unspecified type  Hypertension, unspecified type  High cholesterol  Chronic obstructive pulmonary disease, unspecified COPD type  PEG (percutaneous endoscopic gastrostomy) status      ALLERGIES:  No Known Allergies      MEDICATIONS:  MEDICATIONS  (STANDING):  acetaminophen   Tablet .. 650 milliGRAM(s) Oral every 6 hours  ALBUTerol    0.083% 2.5 milliGRAM(s) Nebulizer every 4 hours  buDESOnide 160 MICROgram(s)/formoterol 4.5 MICROgram(s) Inhaler 2 Puff(s) Inhalation two times a day  clonazePAM Tablet 0.5 milliGRAM(s) Oral every 12 hours  docusate sodium Liquid 100 milliGRAM(s) Oral two times a day  enoxaparin Injectable 40 milliGRAM(s) SubCutaneous at bedtime  guaiFENesin    Syrup 100 milliGRAM(s) Oral every 4 hours  loratadine 10 milliGRAM(s) Oral daily  pantoprazole   Suspension 40 milliGRAM(s) Oral daily  predniSONE   Tablet 10 milliGRAM(s) Oral daily  scopolamine   Patch 1 Patch Transdermal every 72 hours  senna 2 Tablet(s) Oral at bedtime  simvastatin 10 milliGRAM(s) Oral at bedtime  sodium chloride 0.9%. 1000 milliLiter(s) (75 mL/Hr) IV Continuous <Continuous>  tiotropium 18 MICROgram(s) Capsule 1 Capsule(s) Inhalation daily    MEDICATIONS  (PRN):  sodium chloride 0.65% Nasal 1 Spray(s) Both Nostrils every 2 hours PRN Nasal Congestion      REVIEW OF SYSTEMS  General:  No fevers  Eyes:  No reported pain   ENT:  No sore throat   NECK: No stiffness   CV:  No chest pain   Resp:  No shortness of breath  GI:  See HPI  :  No dysuria  Muscle:  No aches or weakness  Neuro:  No tingling  Endocrine:  No polyuria  Heme:  No ecchymosis   All other review of systems is negative unless indicated above.       VITALS:   T(F): 98.4 (11-06 @ 05:00), Max: 98.4 (11-06 @ 05:00)  HR: 74 (11-06 @ 05:00) (53 - 80)  BP: 123/58 (11-06 @ 05:00) (99/54 - 127/58)  BP(mean): --  RR: 18 (11-06 @ 05:00) (18 - 20)  SpO2: 98% (11-05 @ 21:37) (95% - 98%)        PHYSICAL EXAM:  GENERAL:  Appears in no distress  HEENT:  Conjunctivae Anicteric   CHEST:  Full & symmetric excursion  HEART:  NS1, S2,   ABDOMEN:  Soft, non-tender, non-distended, normoactive bowel sounds,  no masses   EXTREMITIES  no edema  SKIN:  No rash  NEURO:  Alert, oriented         Blood Work :                        9.3    6.24  )-----------( 226      ( 06 Nov 2018 07:10 )             30.0     PT/INR - ( 06 Nov 2018 07:10 )  INR: 1.12          PTT - ( 06 Nov 2018 07:10 )  PTT:32.7   11-06    142  |  100  |  17  ----------------------------<  101<H>  4.2   |  31  |  0.8    Ca    8.9      06 Nov 2018 07:10      CBC -  ( 06 Nov 2018 07:10 )  Hemoglobin : 9.3

## 2018-11-06 NOTE — PROGRESS NOTE ADULT - SUBJECTIVE AND OBJECTIVE BOX
ALETA SUAZO 63y Male  MRN#: [id number]       SUBJECTIVE  Patient is a 63y old Male who presents with a chief complaint of sob, cough and temp 103 (06 Nov 2018 09:53)  Currently admitted to medicine with the primary diagnosis of COPD exacerbation  Hospital course has been complicated by _______.     Today is hospital day 24d, and this morning he is _________ and reports ________ overnight events.     OBJECTIVE  PAST MEDICAL & SURGICAL HISTORY  PEG (percutaneous endoscopic gastrostomy) status  Other hydrocele  Anxiety disorder, unspecified type  Hypertension, unspecified type  High cholesterol  Chronic obstructive pulmonary disease, unspecified COPD type  PEG (percutaneous endoscopic gastrostomy) status    ALLERGIES:  No Known Allergies    MEDICATIONS:  STANDING MEDICATIONS  acetaminophen   Tablet .. 650 milliGRAM(s) Oral every 6 hours  ALBUTerol    0.083% 2.5 milliGRAM(s) Nebulizer every 4 hours  buDESOnide 160 MICROgram(s)/formoterol 4.5 MICROgram(s) Inhaler 2 Puff(s) Inhalation two times a day  clonazePAM Tablet 0.5 milliGRAM(s) Oral every 12 hours  docusate sodium Liquid 100 milliGRAM(s) Oral two times a day  enoxaparin Injectable 40 milliGRAM(s) SubCutaneous at bedtime  guaiFENesin    Syrup 100 milliGRAM(s) Oral every 4 hours  loratadine 10 milliGRAM(s) Oral daily  pantoprazole   Suspension 40 milliGRAM(s) Oral daily  predniSONE   Tablet 10 milliGRAM(s) Oral daily  scopolamine   Patch 1 Patch Transdermal every 72 hours  senna 2 Tablet(s) Oral at bedtime  simvastatin 10 milliGRAM(s) Oral at bedtime  sodium chloride 0.9%. 1000 milliLiter(s) IV Continuous <Continuous>  tiotropium 18 MICROgram(s) Capsule 1 Capsule(s) Inhalation daily    PRN MEDICATIONS  sodium chloride 0.65% Nasal 1 Spray(s) Both Nostrils every 2 hours PRN      VITAL SIGNS: Last 24 Hours  T(C): 36.9 (06 Nov 2018 05:00), Max: 36.9 (06 Nov 2018 05:00)  T(F): 98.4 (06 Nov 2018 05:00), Max: 98.4 (06 Nov 2018 05:00)  HR: 74 (06 Nov 2018 05:00) (64 - 80)  BP: 123/58 (06 Nov 2018 05:00) (116/53 - 127/58)  BP(mean): --  RR: 18 (06 Nov 2018 05:00) (18 - 20)  SpO2: 98% (05 Nov 2018 21:37) (98% - 98%)    LABS:                        9.3    6.24  )-----------( 226      ( 06 Nov 2018 07:10 )             30.0     11-06    142  |  100  |  17  ----------------------------<  101<H>  4.2   |  31  |  0.8    Ca    8.9      06 Nov 2018 07:10      PT/INR - ( 06 Nov 2018 07:10 )   PT: 12.90 sec;   INR: 1.12 ratio         PTT - ( 06 Nov 2018 07:10 )  PTT:32.7 sec              RADIOLOGY:      PHYSICAL EXAM:  GENERAL: NAD, thin, AAOx3  HEENT:  Atraumatic, Normocephalic. EOMI, PERRLA, conjunctiva and sclera clear, No JVD. Whispering voice  PULMONARY: Clear to auscultation bilaterally; No wheezing, rhonchi, rales  CARDIOVASCULAR: Regular rate and rhythm; No murmurs, rubs, or gallops  GASTROINTESTINAL: Soft, Nontender, Nondistended; Bowel sounds present. PEG site without extensive erythema, exudates, or tenderness  MUSCULOSKELETAL:  2+ Peripheral Pulses, No clubbing, cyanosis, or edema  NEUROLOGY: non-focal  SKIN: No rashes or lesions ALETA SUAZO 63y Male  MRN#: [id number]     SUBJECTIVE  Patient is a 63y old Male who presents with a chief complaint of sob, cough and temp 103 (06 Nov 2018 09:53)  Currently admitted to medicine with the primary diagnosis of COPD exacerbation    Today is hospital day 24d, and this morning he is very frustrated regarding his situation with home placement as well as PEG tube malfunction. Denies nausea/vomiting, problems with NG tube, chest pain, SOB, abdominal pain.     OBJECTIVE  PAST MEDICAL & SURGICAL HISTORY  PEG (percutaneous endoscopic gastrostomy) status  Other hydrocele  Anxiety disorder, unspecified type  Hypertension, unspecified type  High cholesterol  Chronic obstructive pulmonary disease, unspecified COPD type  PEG (percutaneous endoscopic gastrostomy) status    ALLERGIES:  No Known Allergies    MEDICATIONS:  STANDING MEDICATIONS  acetaminophen   Tablet .. 650 milliGRAM(s) Oral every 6 hours  ALBUTerol    0.083% 2.5 milliGRAM(s) Nebulizer every 4 hours  buDESOnide 160 MICROgram(s)/formoterol 4.5 MICROgram(s) Inhaler 2 Puff(s) Inhalation two times a day  clonazePAM Tablet 0.5 milliGRAM(s) Oral every 12 hours  docusate sodium Liquid 100 milliGRAM(s) Oral two times a day  enoxaparin Injectable 40 milliGRAM(s) SubCutaneous at bedtime  guaiFENesin    Syrup 100 milliGRAM(s) Oral every 4 hours  loratadine 10 milliGRAM(s) Oral daily  pantoprazole   Suspension 40 milliGRAM(s) Oral daily  predniSONE   Tablet 10 milliGRAM(s) Oral daily  scopolamine   Patch 1 Patch Transdermal every 72 hours  senna 2 Tablet(s) Oral at bedtime  simvastatin 10 milliGRAM(s) Oral at bedtime  sodium chloride 0.9%. 1000 milliLiter(s) IV Continuous <Continuous>  tiotropium 18 MICROgram(s) Capsule 1 Capsule(s) Inhalation daily    PRN MEDICATIONS  sodium chloride 0.65% Nasal 1 Spray(s) Both Nostrils every 2 hours PRN      VITAL SIGNS: Last 24 Hours  T(C): 36.9 (06 Nov 2018 05:00), Max: 36.9 (06 Nov 2018 05:00)  T(F): 98.4 (06 Nov 2018 05:00), Max: 98.4 (06 Nov 2018 05:00)  HR: 74 (06 Nov 2018 05:00) (64 - 80)  BP: 123/58 (06 Nov 2018 05:00) (116/53 - 127/58)  BP(mean): --  RR: 18 (06 Nov 2018 05:00) (18 - 20)  SpO2: 98% (05 Nov 2018 21:37) (98% - 98%)    LABS:                        9.3    6.24  )-----------( 226      ( 06 Nov 2018 07:10 )             30.0     11-06    142  |  100  |  17  ----------------------------<  101<H>  4.2   |  31  |  0.8    Ca    8.9      06 Nov 2018 07:10      PT/INR - ( 06 Nov 2018 07:10 )   PT: 12.90 sec;   INR: 1.12 ratio         PTT - ( 06 Nov 2018 07:10 )  PTT:32.7 sec              RADIOLOGY:      PHYSICAL EXAM:  GENERAL: NAD, thin, AAOx3  HEENT:  Atraumatic, Normocephalic. EOMI, PERRLA, conjunctiva and sclera clear, No JVD. Whispering voice  PULMONARY: Clear to auscultation bilaterally; No wheezing, rhonchi, rales  CARDIOVASCULAR: Regular rate and rhythm; No murmurs, rubs, or gallops  GASTROINTESTINAL: Soft, Nontender, Nondistended; Bowel sounds present. PEG site without extensive erythema, exudates, or tenderness  MUSCULOSKELETAL:  2+ Peripheral Pulses, No clubbing, cyanosis, or edema  NEUROLOGY: non-focal  SKIN: No rashes or lesions

## 2018-11-06 NOTE — PRE-ANESTHESIA EVALUATION ADULT - NSANTHOSAYNRD_GEN_A_CORE
No. BEBA screening performed.  STOP BANG Legend: 0-2 = LOW Risk; 3-4 = INTERMEDIATE Risk; 5-8 = HIGH Risk

## 2018-11-06 NOTE — PROGRESS NOTE ADULT - SUBJECTIVE AND OBJECTIVE BOX
Patient was seen and examined. Spoke with RN. Chart reviewed.  No events overnight.  pt comfortable in bed - no new complaints  Has NGT   reviewed case with house staff      Vital Signs Last 24 Hrs  T(F): 98.4 (06 Nov 2018 05:00), Max: 98.4 (06 Nov 2018 05:00)  HR: 74 (06 Nov 2018 05:00) (64 - 80)  BP: 123/58 (06 Nov 2018 05:00) (116/53 - 127/58)  SpO2: 98% (05 Nov 2018 21:37) (98% - 98%)  MEDICATIONS  (STANDING):  acetaminophen   Tablet .. 650 milliGRAM(s) Oral every 6 hours  ALBUTerol    0.083% 2.5 milliGRAM(s) Nebulizer every 4 hours  buDESOnide 160 MICROgram(s)/formoterol 4.5 MICROgram(s) Inhaler 2 Puff(s) Inhalation two times a day  clonazePAM Tablet 0.5 milliGRAM(s) Oral every 12 hours  docusate sodium Liquid 100 milliGRAM(s) Oral two times a day  enoxaparin Injectable 40 milliGRAM(s) SubCutaneous at bedtime  guaiFENesin    Syrup 100 milliGRAM(s) Oral every 4 hours  loratadine 10 milliGRAM(s) Oral daily  pantoprazole   Suspension 40 milliGRAM(s) Oral daily  predniSONE   Tablet 10 milliGRAM(s) Oral daily  scopolamine   Patch 1 Patch Transdermal every 72 hours  senna 2 Tablet(s) Oral at bedtime  simvastatin 10 milliGRAM(s) Oral at bedtime  sodium chloride 0.9%. 1000 milliLiter(s) (75 mL/Hr) IV Continuous <Continuous>  tiotropium 18 MICROgram(s) Capsule 1 Capsule(s) Inhalation daily    MEDICATIONS  (PRN):  sodium chloride 0.65% Nasal 1 Spray(s) Both Nostrils every 2 hours PRN Nasal Congestion    Labs:                        9.3    6.24  )-----------( 226      ( 06 Nov 2018 07:10 )             30.0     06 Nov 2018 07:10    142    |  100    |  17     ----------------------------<  101    4.2     |  31     |  0.8      Ca    8.9        06 Nov 2018 07:10      PT/INR - ( 06 Nov 2018 07:10 )   PT: 12.90 sec;   INR: 1.12 ratio         PTT - ( 06 Nov 2018 07:10 )  PTT:32.7 sec      General: comfortable, NAD  Neurology: A&Ox3, nonfocal  Head:  Normocephalic, atraumatic  ENT:  Mucosa moist, no ulcerations - ngt  Neck:  Supple, no JVD,   Skin: no breakdowns (as per RN)  Resp: CTA B/L  CV: RRR, S1S2,   GI: Soft, NT, bowel sounds  MS: No edema, + peripheral pulses, FROM all 4 extremity      A/P:  64 yo M with COPD, chronic respiratory failure on BIPAP, HTN, DLD, vocal cord paralysis and dysphagia s/p PEG 5/ 18 in the setting of resistant esophageal candidiasis and inability to swallow, bought in from [place] for cough productive ,sob and difficulty breathing of 1 day.    ASSESSMENT & PLAN  # Acute on chronic hypoxic hypercapnic respiratory failure 2/2 COPD exacerbation - resolved  -On home O2 2L NC, continue as needed  -Multiple pulm nodules  -c/w BiPAP at night and PRN IPAP 12, EPAP 6 outpatient bipap set up.   -albuterol prn  -s/p prednisone taper,   -c/w spiriva, 10 mg daily prednisone, anxiolytic therapy,aspiration precautions.   -symbicort 160/4.5 2 puffs BID ( patient feels symbicort makes him choke)  -daliresp non-formulary Resume upon discharge, as well as op inhaled nebulizers  -s/p 7 days levofloxacin 4 days cefepime  -f/u with Dr. Almonte as outpatient.   -ambulate as tolerated .  -guaifenescin through PEG tube    # Dysphagia  -Patient is know to SLP. He continues to have severe dysphagia. Consider ENT Eval as outpatient  -S/p PEG in May 2018 - states he had one problem with it earlier where it was clogged and evaluated but it resolved on its own. Does not recall whether it was inserted by GI or IR, inserted at [facility]   - PEG set up for 11/6/17 - medically optimal for procedure - pulm also called to eval    # HTN/DLD  - cw home meds    # Anxiety  -Clonopin 0.5 BID    # DVT/GI ppx   # Increase Jevity 1.2 to 360ml q6h to provide 1728kcal, 79g protein (101% estimated energy needs, 116% estimated protein needs- 18% overall calories from protein- WNL)--pt does not desire midnight feeds, changed to 480 TID - resume once PEG tube functioning  -Lovenox 40 mg daily    # Disposition  -D/c planning for home with home care       DVT prophylaxis  Decubitus prevention- all measures as per RN protocol  Please call or text me with any questions or updates

## 2018-11-06 NOTE — PROGRESS NOTE ADULT - ASSESSMENT
IMPRESSION:  62 yo male with COPD s/p acute respiratory failure  Chronic hypoxic/hypercapnic respiratory failure  Multiple pulmonary nodules  Anxiety disorder  Dysphagia s/p PEG now with peg tube malfunction    RECOMMENDATIONS:  Continue oxygen via NC to maintain saturation >90%  Continue BiPAP hs and prn  Continue bronchodilators, ICS and prednisone 10mg daily  Out of bed  Anxiolytics prn  DVT prophylaxis  pt stable from the p[ulmonary standpoint to undergo planned peg tube replacement, cont bipap prn  pt to follow up with Dr Almonte as outpt

## 2018-11-06 NOTE — PROGRESS NOTE ADULT - SUBJECTIVE AND OBJECTIVE BOX
ALETA SUAZO  63y, Male  Allergy: No Known Allergies      LAST 24-Hr EVENTS:  pt seen examined  planned for peg tube replacement with gi scheduled tomorrow    VITALS:  T(F): 98.4 (11-06-18 @ 05:00), Max: 98.4 (11-06-18 @ 05:00)  HR: 74 (11-06-18 @ 05:00)  BP: 123/58 (11-06-18 @ 05:00) (116/53 - 127/58)  RR: 18 (11-06-18 @ 05:00)  SpO2: 98% (11-05-18 @ 21:37)    PHYSICAL EXAM:    GENERAL: NAD  NECK: Supple, No JVD  CHEST/LUNG: CTA b/l  HEART: Regular rate and rhythm  ABDOMEN: Soft, Nontender, Nondistended  EXTREMITIES:  No clubbing, edema absent        TESTS & MEASUREMENTS:    IN: 0 mL / OUT: 1250 mL / NET: -1250 mL                            9.3    6.24  )-----------( 226      ( 06 Nov 2018 07:10 )             30.0     PT/INR - ( 06 Nov 2018 07:10 )   PT: 12.90 sec;   INR: 1.12 ratio         PTT - ( 06 Nov 2018 07:10 )  PTT:32.7 sec  11-06    142  |  100  |  17  ----------------------------<  101<H>  4.2   |  31  |  0.8    Ca    8.9      06 Nov 2018 07:10        MEDICATIONS:  MEDICATIONS  (STANDING):  acetaminophen   Tablet .. 650 milliGRAM(s) Oral every 6 hours  ALBUTerol    0.083% 2.5 milliGRAM(s) Nebulizer every 4 hours  buDESOnide 160 MICROgram(s)/formoterol 4.5 MICROgram(s) Inhaler 2 Puff(s) Inhalation two times a day  clonazePAM Tablet 0.5 milliGRAM(s) Oral every 12 hours  docusate sodium Liquid 100 milliGRAM(s) Oral two times a day  enoxaparin Injectable 40 milliGRAM(s) SubCutaneous at bedtime  guaiFENesin    Syrup 100 milliGRAM(s) Oral every 4 hours  loratadine 10 milliGRAM(s) Oral daily  pantoprazole   Suspension 40 milliGRAM(s) Oral daily  predniSONE   Tablet 10 milliGRAM(s) Oral daily  scopolamine   Patch 1 Patch Transdermal every 72 hours  senna 2 Tablet(s) Oral at bedtime  simvastatin 10 milliGRAM(s) Oral at bedtime  sodium chloride 0.9%. 1000 milliLiter(s) (75 mL/Hr) IV Continuous <Continuous>  tiotropium 18 MICROgram(s) Capsule 1 Capsule(s) Inhalation daily    MEDICATIONS  (PRN):  sodium chloride 0.65% Nasal 1 Spray(s) Both Nostrils every 2 hours PRN Nasal Congestion

## 2018-11-06 NOTE — PROGRESS NOTE ADULT - ASSESSMENT
ADMISSION SUMMARY  62 yo M with COPD, chronic respiratory failure on BIPAP, HTN, DLD, vocal cord paralysis and dysphagia s/p PEG 5/ 18 in the setting of resistant esophageal candidiasis and inability to swallow, bought in from [employer] for cough productive ,sob and difficulty breathing of 1 day.    ASSESSMENT & PLAN  # PEG tube malfunction  -S/p PEG in May 2018 at [facility] - states he had one problem with it earlier where it was clogged and evaluated but it resolved on its own  -Appreciate GI consult appreciated - unable to replace tubing at bedside, plan is for replacement tomorrow if cleared by pulm  -NPO past midnight, hold AM DVT ppx  -NG tube in interim    # Acute on chronic hypoxic hypercapnic respiratory failure 2/2 COPD exacerbation - resolved  -On home O2 2L NC, continue as needed. BIPAP at night and PRN (settings IPAP 12, EPAP 6 - outpatient set up)  -S/p prednisone taper, s/p 7 days Levaquin and 4 days cefepime  -Continue pulm recs: Spiriva, Symbicort, daily 10 mg prednisone, anxiolytic therapy, aspiration precautions, ambulate as tolerated  -Multiple pulm nodules. Will follow up with Dr. Almonte on discharge  -Mucinex through NG tube  -Will need pulmonary risk stratification and recommendation for intubation for PEG tube replacement - f/u pulm    # Dysphagia  -Patient is know to SLP. He continues to have severe dysphagia. Consider ENT Eval as outpatient    # HTN/DLD  - cw home meds    # Anxiety  -Clonopin 0.5 BID    # GI ppx   -Jevity 1.2 to 360ml q6h to provide 1728kcal, 79g protein (101% estimated energy needs, 116% estimated protein needs- 18% overall calories from protein- WNL)--pt does not desire midnight feeds, changed to 480 TID - through NGT  -NPO past midnight if pulm clearance obtained    # DVT ppx  -Lovenox 40 mg daily - hold in AM for PEG tube placement    # Disposition  -D/c planning for home with home care once PEG replaced   -Code status: FULL CODE

## 2018-11-06 NOTE — PROGRESS NOTE ADULT - ASSESSMENT
64 yo M with COPD, chronic respiratory failure on BIPAP, HTN, DLD, vocal cord paralysis and dysphagia s/p PEG 4/ 18 in [facility] in the setting of resistant esophageal candidiasis and inability to swallow. Patient bought in from [employer] ,for cough productive ,sob and difficulty breathing of 1 day. Patient was treated for acute resp failure secondary to COPD exacerbation. Patient don BIPAP and 2 L oxygen at home. He reports that his PEG tube stopped working today and this happened in the past and then resolved.     # PEG tube malfunction  The peg was lavaged without success: this was a 14 gauge tube that was completely nonfunctional.  We discussed removing the peg and reinserting another peg, and the risk of not being able to reinsert the peg.  The tube was then  removed, and another could not be placed in the lumen.  Plan:  Will Place another PEG tube tomorrow if patient gets pulmonary clearance today   Please obtain  Pulmonary clearance and recommendation: will likely need to be intubated for the procedure.  Continue enteral feeding meanwhile   If risk stratification obtained today will consider PEG tomorrow   Please keep npo after midnight   Please hold morning dose of lovenox for possible procedure tomorrow

## 2018-11-06 NOTE — PROGRESS NOTE ADULT - ATTENDING COMMENTS
Pt seen and examined with Dr Car Rolon.  Pt had a dysfunctional 14 Fr PEG removed recently and a replacement could not be placed percutaneously due to a narrow tract.  Pulmonary and Medicine assessment were requested and obtained.  On exam today he appears comfortable from the respiratory standpoint and although he has distant breath sounds he does not have rales, rhonchi or wheezes.  I will ask anesthesia to see him for risk assessment and advise regarding PEG placement which I will tentatively place on the schedule pending their approval.

## 2018-11-07 LAB
ANION GAP SERPL CALC-SCNC: 9 MMOL/L — SIGNIFICANT CHANGE UP (ref 7–14)
BUN SERPL-MCNC: 17 MG/DL — SIGNIFICANT CHANGE UP (ref 10–20)
CALCIUM SERPL-MCNC: 9 MG/DL — SIGNIFICANT CHANGE UP (ref 8.5–10.1)
CHLORIDE SERPL-SCNC: 99 MMOL/L — SIGNIFICANT CHANGE UP (ref 98–110)
CO2 SERPL-SCNC: 32 MMOL/L — SIGNIFICANT CHANGE UP (ref 17–32)
CREAT SERPL-MCNC: 0.7 MG/DL — SIGNIFICANT CHANGE UP (ref 0.7–1.5)
GLUCOSE SERPL-MCNC: 74 MG/DL — SIGNIFICANT CHANGE UP (ref 70–99)
HCT VFR BLD CALC: 29 % — LOW (ref 42–52)
HGB BLD-MCNC: 9.2 G/DL — LOW (ref 14–18)
MCHC RBC-ENTMCNC: 28.3 PG — SIGNIFICANT CHANGE UP (ref 27–31)
MCHC RBC-ENTMCNC: 31.7 G/DL — LOW (ref 32–37)
MCV RBC AUTO: 89.2 FL — SIGNIFICANT CHANGE UP (ref 80–94)
NRBC # BLD: 0 /100 WBCS — SIGNIFICANT CHANGE UP (ref 0–0)
PLATELET # BLD AUTO: 238 K/UL — SIGNIFICANT CHANGE UP (ref 130–400)
POTASSIUM SERPL-MCNC: 4.3 MMOL/L — SIGNIFICANT CHANGE UP (ref 3.5–5)
POTASSIUM SERPL-SCNC: 4.3 MMOL/L — SIGNIFICANT CHANGE UP (ref 3.5–5)
RBC # BLD: 3.25 M/UL — LOW (ref 4.7–6.1)
RBC # FLD: 15.2 % — HIGH (ref 11.5–14.5)
SODIUM SERPL-SCNC: 140 MMOL/L — SIGNIFICANT CHANGE UP (ref 135–146)
WBC # BLD: 6.87 K/UL — SIGNIFICANT CHANGE UP (ref 4.8–10.8)
WBC # FLD AUTO: 6.87 K/UL — SIGNIFICANT CHANGE UP (ref 4.8–10.8)

## 2018-11-07 RX ORDER — SODIUM CHLORIDE 9 MG/ML
1000 INJECTION INTRAMUSCULAR; INTRAVENOUS; SUBCUTANEOUS
Qty: 0 | Refills: 0 | Status: DISCONTINUED | OUTPATIENT
Start: 2018-11-07 | End: 2018-11-09

## 2018-11-07 RX ADMIN — Medication 100 MILLIGRAM(S): at 14:08

## 2018-11-07 RX ADMIN — Medication 100 MILLIGRAM(S): at 17:13

## 2018-11-07 RX ADMIN — PANTOPRAZOLE SODIUM 40 MILLIGRAM(S): 20 TABLET, DELAYED RELEASE ORAL at 11:50

## 2018-11-07 RX ADMIN — LORATADINE 10 MILLIGRAM(S): 10 TABLET ORAL at 11:51

## 2018-11-07 RX ADMIN — Medication 0.5 MILLIGRAM(S): at 17:12

## 2018-11-07 RX ADMIN — Medication 325 MILLIGRAM(S): at 11:51

## 2018-11-07 RX ADMIN — ALBUTEROL 2.5 MILLIGRAM(S): 90 AEROSOL, METERED ORAL at 16:02

## 2018-11-07 RX ADMIN — Medication 650 MILLIGRAM(S): at 17:14

## 2018-11-07 RX ADMIN — SCOPALAMINE 1 PATCH: 1 PATCH, EXTENDED RELEASE TRANSDERMAL at 23:48

## 2018-11-07 RX ADMIN — ENOXAPARIN SODIUM 40 MILLIGRAM(S): 100 INJECTION SUBCUTANEOUS at 21:22

## 2018-11-07 RX ADMIN — ALBUTEROL 2.5 MILLIGRAM(S): 90 AEROSOL, METERED ORAL at 07:58

## 2018-11-07 RX ADMIN — Medication 100 MILLIGRAM(S): at 05:52

## 2018-11-07 RX ADMIN — ALBUTEROL 2.5 MILLIGRAM(S): 90 AEROSOL, METERED ORAL at 02:44

## 2018-11-07 RX ADMIN — Medication 0.5 MILLIGRAM(S): at 05:52

## 2018-11-07 RX ADMIN — SIMVASTATIN 10 MILLIGRAM(S): 20 TABLET, FILM COATED ORAL at 21:23

## 2018-11-07 RX ADMIN — SODIUM CHLORIDE 75 MILLILITER(S): 9 INJECTION INTRAMUSCULAR; INTRAVENOUS; SUBCUTANEOUS at 05:52

## 2018-11-07 RX ADMIN — Medication 100 MILLIGRAM(S): at 21:22

## 2018-11-07 RX ADMIN — Medication 10 MILLIGRAM(S): at 05:52

## 2018-11-07 RX ADMIN — TIOTROPIUM BROMIDE 1 CAPSULE(S): 18 CAPSULE ORAL; RESPIRATORY (INHALATION) at 07:57

## 2018-11-07 RX ADMIN — Medication 100 MILLIGRAM(S): at 08:48

## 2018-11-07 RX ADMIN — ALBUTEROL 2.5 MILLIGRAM(S): 90 AEROSOL, METERED ORAL at 20:16

## 2018-11-07 RX ADMIN — ALBUTEROL 2.5 MILLIGRAM(S): 90 AEROSOL, METERED ORAL at 11:50

## 2018-11-07 RX ADMIN — SENNA PLUS 2 TABLET(S): 8.6 TABLET ORAL at 21:23

## 2018-11-07 RX ADMIN — SCOPALAMINE 1 PATCH: 1 PATCH, EXTENDED RELEASE TRANSDERMAL at 07:51

## 2018-11-07 RX ADMIN — BUDESONIDE AND FORMOTEROL FUMARATE DIHYDRATE 2 PUFF(S): 160; 4.5 AEROSOL RESPIRATORY (INHALATION) at 21:22

## 2018-11-07 RX ADMIN — Medication 650 MILLIGRAM(S): at 05:52

## 2018-11-07 NOTE — PROGRESS NOTE ADULT - SUBJECTIVE AND OBJECTIVE BOX
Patient was seen and examined. Spoke with RN. Chart reviewed.  No events overnight.  Vital Signs Last 24 Hrs  T(F): 96.4 (07 Nov 2018 06:06), Max: 98.7 (06 Nov 2018 20:34)  HR: 53 (07 Nov 2018 06:06) (53 - 81)  BP: 9/49 (07 Nov 2018 06:06) (9/49 - 117/53)  SpO2: 98% (06 Nov 2018 22:30) (98% - 98%)  MEDICATIONS  (STANDING):  acetaminophen   Tablet .. 650 milliGRAM(s) Oral every 6 hours  ALBUTerol    0.083% 2.5 milliGRAM(s) Nebulizer every 4 hours  buDESOnide 160 MICROgram(s)/formoterol 4.5 MICROgram(s) Inhaler 2 Puff(s) Inhalation two times a day  ceFAZolin   IVPB 1000 milliGRAM(s) IV Intermittent once  clonazePAM Tablet 0.5 milliGRAM(s) Oral every 12 hours  docusate sodium Liquid 100 milliGRAM(s) Oral two times a day  enoxaparin Injectable 40 milliGRAM(s) SubCutaneous at bedtime  guaiFENesin    Syrup 100 milliGRAM(s) Oral every 4 hours  loratadine 10 milliGRAM(s) Oral daily  pantoprazole   Suspension 40 milliGRAM(s) Oral daily  predniSONE   Tablet 10 milliGRAM(s) Oral daily  scopolamine   Patch 1 Patch Transdermal every 72 hours  senna 2 Tablet(s) Oral at bedtime  simvastatin 10 milliGRAM(s) Oral at bedtime  sodium chloride 0.9%. 1000 milliLiter(s) (75 mL/Hr) IV Continuous <Continuous>  tiotropium 18 MICROgram(s) Capsule 1 Capsule(s) Inhalation daily    MEDICATIONS  (PRN):  sodium chloride 0.65% Nasal 1 Spray(s) Both Nostrils every 2 hours PRN Nasal Congestion    Labs:                        9.2    6.87  )-----------( 238      ( 06 Nov 2018 23:30 )             29.0                         9.3    6.24  )-----------( 226      ( 06 Nov 2018 07:10 )             30.0     06 Nov 2018 23:30    140    |  99     |  17     ----------------------------<  74     4.3     |  32     |  0.7    06 Nov 2018 07:10    142    |  100    |  17     ----------------------------<  101    4.2     |  31     |  0.8      Ca    9.0        06 Nov 2018 23:30  Ca    8.9        06 Nov 2018 07:10      PT/INR - ( 06 Nov 2018 07:10 )   PT: 12.90 sec;   INR: 1.12 ratio         PTT - ( 06 Nov 2018 07:10 )  PTT:32.7 sec      General: comfortable, NAD  Neurology: A&Ox3, nonfocal  Head:  Normocephalic, atraumatic  ENT:  Mucosa moist, no ulcerations  Neck:  Supple, no JVD,   Skin: no breakdowns (as per RN)  Resp: CTA B/L  CV: RRR, S1S2,   GI: Soft, NT, bowel sounds  MS: No edema, + peripheral pulses, FROM all 4 extremity      A/P:  62 yo M with COPD, chronic respiratory failure on BIPAP, HTN, DLD, vocal cord paralysis and dysphagia s/p PEG 5/ 18 in the setting of resistant esophageal candidiasis and inability to swallow, bought in from [employer] for cough productive ,sob and difficulty breathing of 1 day.    ASSESSMENT & PLAN  # Acute on chronic hypoxic hypercapnic respiratory failure 2/2 COPD exacerbation - resolved  -On home O2 2L NC, continue as needed  -Multiple pulm nodules  -c/w BiPAP at night and PRN IPAP 12, EPAP 6 outpatient bipap set up.   -albuterol prn  -s/p prednisone taper,   -c/w spiriva, 10 mg daily prednisone, anxiolytic therapy,aspiration precautions.   -symbicort 160/4.5 2 puffs BID ( patient feels symbicort makes him choke)  -daliresp non-formulary Resume upon discharge, as well as op inhaled nebulizers  -s/p 7 days levofloxacin 4 days cefepime  -f/u with Dr. Almonte as outpatient.   -ambulate as tolerated .  -guaifenescin through PEG tube    # Dysphagia  -   - PEG replacement today    # HTN/DLD  - cw home meds    # Anxiety  -Clonopin 0.5 BID    # DVT/GI ppx   # Increase Jevity 1.2 to 360ml q6h to provide 1728kcal, 79g protein (101% estimated energy needs, 116% estimated protein needs- 18% overall calories from protein- WNL)--pt does not desire midnight feeds, changed to 480 TID - resume once PEG tube functioning  -Lovenox 40 mg daily    # Disposition  -D/c planning for home with home care     DVT prophylaxis  Decubitus prevention- all measures as per RN protocol  Please call or text me with any questions or updates

## 2018-11-07 NOTE — PROGRESS NOTE ADULT - SUBJECTIVE AND OBJECTIVE BOX
ALETA SUAZO  63y, Male  Allergy: No Known Allergies      LAST 24-Hr EVENTS:  no complaints; seen in endoscopy recovery area  VITALS:  T(F): 96.4 (11-07-18 @ 06:06), Max: 98.7 (11-06-18 @ 20:34)  HR: 61 (11-07-18 @ 10:26)  BP: 115/58 (11-07-18 @ 10:26) (9/49 - 117/53)  RR: 18 (11-07-18 @ 10:26)  SpO2: 100% (11-07-18 @ 10:26)on low flow oxygen    PHYSICAL EXAM:    GENERAL: NAD, well-developed  HEAD:  Atraumatic, Normocephalic  NECK: Supple, No JVD  CHEST/LUNG: Clear to auscultation bilaterally; No wheeze  HEART: Regular rate and rhythm; No murmurs, rubs, or gallops  ABDOMEN: Soft, Nontender, Nondistended; Bowel sounds present  EXTREMITIES:  2+ Peripheral Pulses, No clubbing, cyanosis, or edema        TESTS & MEASUREMENTS:    IN: 1310 mL / OUT: 0 mL / NET: 1310 mL                            9.2    6.87  )-----------( 238      ( 06 Nov 2018 23:30 )             29.0     PT/INR - ( 06 Nov 2018 07:10 )   PT: 12.90 sec;   INR: 1.12 ratio         PTT - ( 06 Nov 2018 07:10 )  PTT:32.7 sec  11-06    140  |  99  |  17  ----------------------------<  74  4.3   |  32  |  0.7    Ca    9.0      06 Nov 2018 23:30                  ABG & VENT SETTINGS: (when applicable)    n/a    RADIOLOGY & ADDITIONAL TESTS:    MEDICATIONS:  MEDICATIONS  (STANDING):  acetaminophen   Tablet .. 650 milliGRAM(s) Oral every 6 hours  ALBUTerol    0.083% 2.5 milliGRAM(s) Nebulizer every 4 hours  buDESOnide 160 MICROgram(s)/formoterol 4.5 MICROgram(s) Inhaler 2 Puff(s) Inhalation two times a day  clonazePAM Tablet 0.5 milliGRAM(s) Oral every 12 hours  docusate sodium Liquid 100 milliGRAM(s) Oral two times a day  enoxaparin Injectable 40 milliGRAM(s) SubCutaneous at bedtime  guaiFENesin    Syrup 100 milliGRAM(s) Oral every 4 hours  loratadine 10 milliGRAM(s) Oral daily  pantoprazole   Suspension 40 milliGRAM(s) Oral daily  predniSONE   Tablet 10 milliGRAM(s) Oral daily  scopolamine   Patch 1 Patch Transdermal every 72 hours  senna 2 Tablet(s) Oral at bedtime  simvastatin 10 milliGRAM(s) Oral at bedtime  sodium chloride 0.9%. 1000 milliLiter(s) (75 mL/Hr) IV Continuous <Continuous>  tiotropium 18 MICROgram(s) Capsule 1 Capsule(s) Inhalation daily    MEDICATIONS  (PRN):  sodium chloride 0.65% Nasal 1 Spray(s) Both Nostrils every 2 hours PRN Nasal Congestion

## 2018-11-07 NOTE — PROGRESS NOTE ADULT - ASSESSMENT
ADMISSION SUMMARY  64 yo M with COPD, chronic respiratory failure on BIPAP, HTN, DLD, vocal cord paralysis and dysphagia s/p PEG 5/ 18 in the setting of resistant esophageal candidiasis and inability to swallow, bought in from [employer] for cough productive, SOB and difficulty breathing of 1 day.    ASSESSMENT & PLAN  # PEG tube malfunction  -S/p PEG in May 2018 at [facility] - states he had one problem with it earlier where it was clogged and evaluated but it resolved on its own  -Appreciate GI consult appreciated - unable to replace tubing at bedside, plan is for replacement today, Ancef 1g IV pre-procedure  -NG tube in interim  -F/u GI recs post-procedure    # Acute on chronic hypoxic hypercapnic respiratory failure 2/2 COPD exacerbation - resolved  -On home O2 2L NC, continue as needed. BIPAP at night and PRN (settings IPAP 12, EPAP 6 - outpatient set up)  -S/p prednisone taper, s/p 7 days Levaquin and 4 days cefepime  -Continue pulm recs: Spiriva, Symbicort, daily 10 mg prednisone, anxiolytic therapy, aspiration precautions, ambulate as tolerated  -Multiple pulm nodules. Will follow up with Dr. Almonte on discharge  -Mucinex through NG tube, scopolamine and loratadine for secretions    # Dysphagia  -Patient is know to SLP. He continues to have severe dysphagia. Consider ENT Eval as outpatient    # DLD  - Continue simvastatin 10 mg qHS    # Anxiety  -Clonopin 0.5 mg BID    # GI ppx   -Jevity 1.2 to 360ml q6h to provide 1728kcal, 79g protein (101% estimated energy needs, 116% estimated protein needs- 18% overall calories from protein- WNL)--pt does not desire midnight feeds, changed to 480 TID - through NGT until PEG placed  -NPO for procedure    # DVT ppx  -Lovenox 40 mg daily - held last dose for PEG tube placement    # Disposition  -D/c planning for home with home care once PEG replaced  -Code status: FULL CODE

## 2018-11-07 NOTE — PROGRESS NOTE ADULT - SUBJECTIVE AND OBJECTIVE BOX
ALETA SUAZO 63y Male  MRN#: [id number]     SUBJECTIVE  Patient is a 63y old Male who presents with a chief complaint of sob, cough and temp 103 (07 Nov 2018 08:42)  Currently admitted to medicine with the primary diagnosis of COPD exacerbation    Hospital course has been complicated by PEG tube malfunction.    Today is hospital day 25d, and this morning he is feeling fine. Aware and agreeable for PEG tube placement today. Denies fevers, chills, chest pain, SOB, abdominal pain, nausea/vomiting.      OBJECTIVE  PAST MEDICAL & SURGICAL HISTORY  PEG (percutaneous endoscopic gastrostomy) status  Other hydrocele  Anxiety disorder, unspecified type  Hypertension, unspecified type  High cholesterol  Chronic obstructive pulmonary disease, unspecified COPD type  PEG (percutaneous endoscopic gastrostomy) status    ALLERGIES:  No Known Allergies    MEDICATIONS:  STANDING MEDICATIONS  acetaminophen   Tablet .. 650 milliGRAM(s) Oral every 6 hours  ALBUTerol    0.083% 2.5 milliGRAM(s) Nebulizer every 4 hours  buDESOnide 160 MICROgram(s)/formoterol 4.5 MICROgram(s) Inhaler 2 Puff(s) Inhalation two times a day  clonazePAM Tablet 0.5 milliGRAM(s) Oral every 12 hours  docusate sodium Liquid 100 milliGRAM(s) Oral two times a day  enoxaparin Injectable 40 milliGRAM(s) SubCutaneous at bedtime  guaiFENesin    Syrup 100 milliGRAM(s) Oral every 4 hours  loratadine 10 milliGRAM(s) Oral daily  pantoprazole   Suspension 40 milliGRAM(s) Oral daily  predniSONE   Tablet 10 milliGRAM(s) Oral daily  scopolamine   Patch 1 Patch Transdermal every 72 hours  senna 2 Tablet(s) Oral at bedtime  simvastatin 10 milliGRAM(s) Oral at bedtime  sodium chloride 0.9%. 1000 milliLiter(s) IV Continuous <Continuous>  tiotropium 18 MICROgram(s) Capsule 1 Capsule(s) Inhalation daily    PRN MEDICATIONS  sodium chloride 0.65% Nasal 1 Spray(s) Both Nostrils every 2 hours PRN      VITAL SIGNS: Last 24 Hours  T(C): 35.8 (07 Nov 2018 06:06), Max: 37.1 (06 Nov 2018 20:34)  T(F): 96.4 (07 Nov 2018 06:06), Max: 98.7 (06 Nov 2018 20:34)  HR: 53 (07 Nov 2018 06:06) (53 - 81)  BP: 9/49 (07 Nov 2018 06:06) (9/49 - 117/53)  BP(mean): --  RR: 18 (06 Nov 2018 20:34) (18 - 18)  SpO2: 98% (06 Nov 2018 22:30) (98% - 98%)    LABS:                        9.2    6.87  )-----------( 238      ( 06 Nov 2018 23:30 )             29.0     11-06    140  |  99  |  17  ----------------------------<  74  4.3   |  32  |  0.7    Ca    9.0      06 Nov 2018 23:30      PT/INR - ( 06 Nov 2018 07:10 )   PT: 12.90 sec;   INR: 1.12 ratio         PTT - ( 06 Nov 2018 07:10 )  PTT:32.7 sec              RADIOLOGY:      PHYSICAL EXAM:  GENERAL: NAD, thin, AAOx3  HEENT:  Atraumatic, Normocephalic. EOMI, PERRLA, conjunctiva and sclera clear, No JVD. Whispering voice  PULMONARY: Clear to auscultation bilaterally; No wheezing, rhonchi, rales  CARDIOVASCULAR: Regular rate and rhythm; No murmurs, rubs, or gallops  GASTROINTESTINAL: Soft, Nontender, Nondistended; Bowel sounds present. PEG site without extensive erythema, exudates, or tenderness  MUSCULOSKELETAL:  2+ Peripheral Pulses, No clubbing, cyanosis, or edema  NEUROLOGY: non-focal  SKIN: No rashes or lesions

## 2018-11-07 NOTE — PROGRESS NOTE ADULT - ASSESSMENT
chronic hypoxic respiratory failure  end stage copd  multiple pulmonary nodules  anxiety disorder  s/p peg replacement      low flow oxygen  bipap hs and prn  bronchodilators  inhaled corticosteroid  prednisone 10mg daily  out of bed/incentive spirometry  anxiolytic therapy  resume enteric feeds via peg when okay with gi  maintain npo status/aspiration precautions  outpatient management of lung nodules although as discussed with dr duffy recently, he is likely not a candidate for any real work up  resume/continue discharge planning efforts

## 2018-11-08 RX ADMIN — Medication 100 MILLIGRAM(S): at 10:36

## 2018-11-08 RX ADMIN — TIOTROPIUM BROMIDE 1 CAPSULE(S): 18 CAPSULE ORAL; RESPIRATORY (INHALATION) at 08:05

## 2018-11-08 RX ADMIN — LORATADINE 10 MILLIGRAM(S): 10 TABLET ORAL at 13:14

## 2018-11-08 RX ADMIN — ENOXAPARIN SODIUM 40 MILLIGRAM(S): 100 INJECTION SUBCUTANEOUS at 21:18

## 2018-11-08 RX ADMIN — Medication 100 MILLIGRAM(S): at 17:59

## 2018-11-08 RX ADMIN — PANTOPRAZOLE SODIUM 40 MILLIGRAM(S): 20 TABLET, DELAYED RELEASE ORAL at 13:14

## 2018-11-08 RX ADMIN — Medication 0.5 MILLIGRAM(S): at 17:58

## 2018-11-08 RX ADMIN — ALBUTEROL 2.5 MILLIGRAM(S): 90 AEROSOL, METERED ORAL at 03:31

## 2018-11-08 RX ADMIN — ALBUTEROL 2.5 MILLIGRAM(S): 90 AEROSOL, METERED ORAL at 00:39

## 2018-11-08 RX ADMIN — Medication 10 MILLIGRAM(S): at 05:36

## 2018-11-08 RX ADMIN — Medication 100 MILLIGRAM(S): at 05:36

## 2018-11-08 RX ADMIN — ALBUTEROL 2.5 MILLIGRAM(S): 90 AEROSOL, METERED ORAL at 11:51

## 2018-11-08 RX ADMIN — Medication 100 MILLIGRAM(S): at 21:17

## 2018-11-08 RX ADMIN — Medication 650 MILLIGRAM(S): at 23:32

## 2018-11-08 RX ADMIN — SCOPALAMINE 1 PATCH: 1 PATCH, EXTENDED RELEASE TRANSDERMAL at 21:20

## 2018-11-08 RX ADMIN — SODIUM CHLORIDE 75 MILLILITER(S): 9 INJECTION INTRAMUSCULAR; INTRAVENOUS; SUBCUTANEOUS at 21:19

## 2018-11-08 RX ADMIN — SIMVASTATIN 10 MILLIGRAM(S): 20 TABLET, FILM COATED ORAL at 21:18

## 2018-11-08 RX ADMIN — Medication 1 SPRAY(S): at 21:19

## 2018-11-08 RX ADMIN — Medication 650 MILLIGRAM(S): at 05:36

## 2018-11-08 RX ADMIN — ALBUTEROL 2.5 MILLIGRAM(S): 90 AEROSOL, METERED ORAL at 20:25

## 2018-11-08 RX ADMIN — Medication 100 MILLIGRAM(S): at 14:47

## 2018-11-08 RX ADMIN — ALBUTEROL 2.5 MILLIGRAM(S): 90 AEROSOL, METERED ORAL at 16:03

## 2018-11-08 RX ADMIN — SODIUM CHLORIDE 75 MILLILITER(S): 9 INJECTION INTRAMUSCULAR; INTRAVENOUS; SUBCUTANEOUS at 05:40

## 2018-11-08 RX ADMIN — SENNA PLUS 2 TABLET(S): 8.6 TABLET ORAL at 21:18

## 2018-11-08 RX ADMIN — Medication 650 MILLIGRAM(S): at 13:15

## 2018-11-08 RX ADMIN — Medication 650 MILLIGRAM(S): at 17:57

## 2018-11-08 RX ADMIN — ALBUTEROL 2.5 MILLIGRAM(S): 90 AEROSOL, METERED ORAL at 08:06

## 2018-11-08 RX ADMIN — Medication 0.5 MILLIGRAM(S): at 05:40

## 2018-11-08 NOTE — PROGRESS NOTE ADULT - SUBJECTIVE AND OBJECTIVE BOX
Patient was seen and examined. Spoke with RN. Chart reviewed.  No events overnight.  Vital Signs Last 24 Hrs  T(F): 97.9 (08 Nov 2018 05:44), Max: 97.9 (08 Nov 2018 05:44)  HR: 66 (08 Nov 2018 05:44) (52 - 113)  BP: 129/59 (08 Nov 2018 05:44) (9/49 - 129/59)  SpO2: 100% (07 Nov 2018 10:26) (100% - 100%)  MEDICATIONS  (STANDING):  acetaminophen   Tablet .. 650 milliGRAM(s) Oral every 6 hours  ALBUTerol    0.083% 2.5 milliGRAM(s) Nebulizer every 4 hours  buDESOnide 160 MICROgram(s)/formoterol 4.5 MICROgram(s) Inhaler 2 Puff(s) Inhalation two times a day  clonazePAM Tablet 0.5 milliGRAM(s) Oral every 12 hours  docusate sodium Liquid 100 milliGRAM(s) Oral two times a day  enoxaparin Injectable 40 milliGRAM(s) SubCutaneous at bedtime  guaiFENesin    Syrup 100 milliGRAM(s) Oral every 4 hours  loratadine 10 milliGRAM(s) Oral daily  pantoprazole   Suspension 40 milliGRAM(s) Oral daily  predniSONE   Tablet 10 milliGRAM(s) Oral daily  scopolamine   Patch 1 Patch Transdermal every 72 hours  senna 2 Tablet(s) Oral at bedtime  simvastatin 10 milliGRAM(s) Oral at bedtime  sodium chloride 0.9%. 1000 milliLiter(s) (75 mL/Hr) IV Continuous <Continuous>  tiotropium 18 MICROgram(s) Capsule 1 Capsule(s) Inhalation daily    MEDICATIONS  (PRN):  sodium chloride 0.65% Nasal 1 Spray(s) Both Nostrils every 2 hours PRN Nasal Congestion    Labs:                        9.2    6.87  )-----------( 238      ( 06 Nov 2018 23:30 )             29.0     06 Nov 2018 23:30    140    |  99     |  17     ----------------------------<  74     4.3     |  32     |  0.7      Ca    9.0        06 Nov 2018 23:30            General: comfortable, NAD  Neurology: A&Ox3, nonfocal  Head:  Normocephalic, atraumatic  ENT:  Mucosa moist, no ulcerations  Neck:  Supple, no JVD,   Skin: no breakdowns (as per RN)  Resp: CTA B/L  CV: RRR, S1S2,   GI: Soft, NT, bowel sounds, peg  MS: No edema, + peripheral pulses,       A/P:  62 yo M with COPD, chronic respiratory failure on BIPAP, HTN, DLD, vocal cord paralysis and dysphagia s/p PEG 5/ 18 in the setting of resistant esophageal candidiasis and inability to swallow, bought in from [facility] for cough productive ,sob and difficulty breathing of 1 day.    ASSESSMENT & PLAN  # Acute on chronic hypoxic hypercapnic respiratory failure 2/2 COPD exacerbation - resolved  -On home O2 2L NC, continue as needed  -Multiple pulm nodules  -c/w BiPAP at night and PRN IPAP 12, EPAP 6 outpatient bipap set up.   -albuterol prn  -s/p prednisone taper,   -c/w spiriva, 10 mg daily prednisone, anxiolytic therapy,aspiration precautions.   -symbicort 160/4.5 2 puffs BID ( patient feels symbicort makes him choke)  -daliresp non-formulary Resume upon discharge, as well as op inhaled nebulizers  -s/p 7 days levofloxacin 4 days cefepime  -f/u with Dr. Almonte as outpatient.   -ambulate as tolerated .  -guaifenescin through PEG tube    # Dysphagia  -   - PEG replacement yesterday    # HTN/DLD  - cw home meds    # Anxiety  -Clonopin 0.5 BID    # DVT/GI ppx   # Increase Jevity 1.2 to 360ml q6h to provide 1728kcal, 79g protein (101% estimated energy needs, 116% estimated protein needs- 18% overall calories from protein- WNL)--pt does not desire midnight feeds, changed to 480 TID - resume once PEG tube functioning  -Lovenox 40 mg daily    # Disposition  -D/c planning     DVT prophylaxis  Decubitus prevention- all measures as per RN protocol  Please call or text me with any questions or updates

## 2018-11-08 NOTE — PROGRESS NOTE ADULT - SUBJECTIVE AND OBJECTIVE BOX
THIS IS A DRAFT    Patient seen and examined earlier today.    Case discussed with primary team assigned.    Complete documentation to follow.    For any question, please contact me:  Dr. Teodora Dumont  Office: [phone] ALETA SUAZO  63y, Male  Allergy: No Known Allergies      LAST 24-Hr EVENTS:  pt seene examined  laying comfortable in bed  s/p peg tube    VITALS:  T(F): 98.3 (11-08-18 @ 13:50), Max: 98.3 (11-08-18 @ 13:50)  HR: 60 (11-08-18 @ 13:50)  BP: 122/57 (11-08-18 @ 13:50) (122/57 - 129/59)  RR: 18 (11-08-18 @ 13:50)  SpO2: --    PHYSICAL EXAM:    GENERAL: NAD  NECK: Supple, No JVD  CHEST/LUNG: CTA b/l  HEART: Regular rate and rhythm  ABDOMEN: Soft, Nontender, Nondistended peg tube  EXTREMITIES:  No clubbing, edema absent        TESTS & MEASUREMENTS:    IN: 1310 mL / OUT: 0 mL / NET: 1310 mL    IN: 0 mL / OUT: 900 mL / NET: -900 mL    IN: 480 mL / OUT: 200 mL / NET: 280 mL                            9.2    6.87  )-----------( 238      ( 06 Nov 2018 23:30 )             29.0       11-06    140  |  99  |  17  ----------------------------<  74  4.3   |  32  |  0.7    Ca    9.0      06 Nov 2018 23:30              MEDICATIONS:  MEDICATIONS  (STANDING):  acetaminophen   Tablet .. 650 milliGRAM(s) Oral every 6 hours  ALBUTerol    0.083% 2.5 milliGRAM(s) Nebulizer every 4 hours  buDESOnide 160 MICROgram(s)/formoterol 4.5 MICROgram(s) Inhaler 2 Puff(s) Inhalation two times a day  docusate sodium Liquid 100 milliGRAM(s) Oral two times a day  enoxaparin Injectable 40 milliGRAM(s) SubCutaneous at bedtime  guaiFENesin    Syrup 100 milliGRAM(s) Oral every 4 hours  loratadine 10 milliGRAM(s) Oral daily  pantoprazole   Suspension 40 milliGRAM(s) Oral daily  predniSONE   Tablet 10 milliGRAM(s) Oral daily  scopolamine   Patch 1 Patch Transdermal every 72 hours  senna 2 Tablet(s) Oral at bedtime  simvastatin 10 milliGRAM(s) Oral at bedtime  sodium chloride 0.9%. 1000 milliLiter(s) (75 mL/Hr) IV Continuous <Continuous>  tiotropium 18 MICROgram(s) Capsule 1 Capsule(s) Inhalation daily    MEDICATIONS  (PRN):  sodium chloride 0.65% Nasal 1 Spray(s) Both Nostrils every 2 hours PRN Nasal Congestion

## 2018-11-08 NOTE — PROGRESS NOTE ADULT - SUBJECTIVE AND OBJECTIVE BOX
We are following the patient for PEG tube plct      GI HPI Today:  Patient feels ok. no complaints   PAST MEDICAL & SURGICAL HISTORY  PEG (percutaneous endoscopic gastrostomy) status  Other hydrocele  Anxiety disorder, unspecified type  Hypertension, unspecified type  High cholesterol  Chronic obstructive pulmonary disease, unspecified COPD type  PEG (percutaneous endoscopic gastrostomy) status      ALLERGIES:  No Known Allergies      MEDICATIONS:  MEDICATIONS  (STANDING):  acetaminophen   Tablet .. 650 milliGRAM(s) Oral every 6 hours  ALBUTerol    0.083% 2.5 milliGRAM(s) Nebulizer every 4 hours  buDESOnide 160 MICROgram(s)/formoterol 4.5 MICROgram(s) Inhaler 2 Puff(s) Inhalation two times a day  clonazePAM Tablet 0.5 milliGRAM(s) Oral every 12 hours  docusate sodium Liquid 100 milliGRAM(s) Oral two times a day  enoxaparin Injectable 40 milliGRAM(s) SubCutaneous at bedtime  guaiFENesin    Syrup 100 milliGRAM(s) Oral every 4 hours  loratadine 10 milliGRAM(s) Oral daily  pantoprazole   Suspension 40 milliGRAM(s) Oral daily  predniSONE   Tablet 10 milliGRAM(s) Oral daily  scopolamine   Patch 1 Patch Transdermal every 72 hours  senna 2 Tablet(s) Oral at bedtime  simvastatin 10 milliGRAM(s) Oral at bedtime  sodium chloride 0.9%. 1000 milliLiter(s) (75 mL/Hr) IV Continuous <Continuous>  tiotropium 18 MICROgram(s) Capsule 1 Capsule(s) Inhalation daily    MEDICATIONS  (PRN):  sodium chloride 0.65% Nasal 1 Spray(s) Both Nostrils every 2 hours PRN Nasal Congestion      REVIEW OF SYSTEMS  General:  No fevers  Eyes:  No reported pain   ENT:  No sore throat   NECK: No stiffness   CV:  No chest pain   Resp:  No shortness of breath  GI:  See HPI  :  No dysuria  Muscle:  No aches or weakness  Neuro:  No tingling  Endocrine:  No polyuria  Heme:  No ecchymosis   All other review of systems is negative unless indicated above.       VITALS:   T(F): 97.9 (11-08 @ 05:44), Max: 98.7 (11-06 @ 20:34)  HR: 66 (11-08 @ 05:44) (52 - 113)  BP: 129/59 (11-08 @ 05:44) (9/49 - 129/59)  BP(mean): --  RR: 18 (11-08 @ 05:44) (18 - 20)  SpO2: 100% (11-07 @ 10:26) (98% - 100%)        PHYSICAL EXAM:  GENERAL:  Appears in no distress  HEENT:  Conjunctivae Anicteric   CHEST:  Full & symmetric excursion  HEART:  NS1, S2,   ABDOMEN:  Soft, non-tender, non-distended, normoactive bowel sounds,  no masses, PEG site clean , external bumper loosen it   EXTREMITIES  no edema  SKIN:  No rash  NEURO:  Alert, oriented         Blood Work :                        9.2    6.87  )-----------( 238      ( 06 Nov 2018 23:30 )             29.0     PT/INR - ( 06 Nov 2018 07:10 )  INR: 1.12          PTT - ( 06 Nov 2018 07:10 )  PTT:32.7   11-06    140  |  99  |  17  ----------------------------<  74  4.3   |  32  |  0.7    Ca    9.0      06 Nov 2018 23:30      CBC -  ( 06 Nov 2018 23:30 )  Hemoglobin : 9.2    CBC -  ( 06 Nov 2018 07:10 )  Hemoglobin : 9.3

## 2018-11-08 NOTE — CHART NOTE - NSCHARTNOTEFT_GEN_A_CORE
Registered Dietitian Follow-Up     Patient Profile Reviewed                           Yes [x]   No []     Nutrition History Previously Obtained        Yes [x]  No []       Pertinent Subjective Information: Pt.      Pertinent Medical Interventions: PEG tube malfunction - resolved. PEG tube replaced yesterday (11/7) without complications - tolerated AM feed without issues. Medically stable for d/c.        Diet order: Jevity 1.2 at 480ml q8h      Anthropometrics:  - Ht. 170cm   - Wt. 56.9kg on 11/7 vs. 56.9kg on 10/190 stable   - %wt change  - BMI 19.7  - IBW 148lbs      Pertinent Lab Data: (11/6) RBC 3.25, Hg 9.2, Hct 29.0     Pertinent Meds: Prednisone, Protonix, Senna, Simvastatin      Physical Findings:  - Appearance: alert&oriented   - GI function: no symptoms noted, last BM   - Tubes: PEG  - Oral/Mouth cavity: NPO  - Skin: incision (BS 19)      Nutrition Requirements (from RD note on 10/19)   Weight Used:     Estimated Energy Needs    Continue [x]  Adjust []  1580-1712kcal  Adjusted Energy Recommendations:   kcal/day        Estimated Protein Needs    Continue [x]  Adjust []  57-68g  Adjusted Protein Recommendations:   gm/day        Estimated Fluid Needs        Continue [x]  Adjust [] 1ml/kcal or per LIP  Adjusted Fluid Recommendations:   mL/day     Nutrient Intake: Jevity 1.2 at 480ml- 1728kcal, 79g protein (101% estimated energy needs, 116% estimated protein needs- 18% overall calories from protein- WNL)        [] Previous Nutrition Diagnosis: Less than optimal enteral nutrition infusion             [] Ongoing          [x] Resolved    [x] No active nutrition diagnosis identified at this time        Nutrition Intervention: enteral and parenteral nutrition     Rec: Continue Jevity 1.2 at 480ml q8h with free H2O flush per LIP.     Goal/Expected Outcome: In 7 days enteral nutrition to provide >85% estimated energy needs, but not exceed 105%     Indicator/Monitoring: energy intake, body composition, NFPF Registered Dietitian Follow-Up     Patient Profile Reviewed                           Yes [x]   No []     Nutrition History Previously Obtained        Yes [x]  No []       Pertinent Subjective Information: Pt. resting in bed during visit, frustrated with prolonged hospital stay. Pt. states he feels ok overall and would like to try PO diet. Will recommend SLP reevaluate pt.      Pertinent Medical Interventions: PEG tube malfunction - resolved. PEG tube replaced yesterday (11/7) without complications - tolerated AM feed without issues. Medically stable for d/c.        Diet order: Jevity 1.2 at 480ml q8h      Anthropometrics:  - Ht. 170cm   - Wt. 56.9kg on 11/7 vs. 56.9kg on 10/190 stable   - %wt change  - BMI 19.7  - IBW 148lbs      Pertinent Lab Data: (11/6) RBC 3.25, Hg 9.2, Hct 29.0     Pertinent Meds: Prednisone, Protonix, Senna, Simvastatin      Physical Findings:  - Appearance: alert&oriented   - GI function: no symptoms noted, last BM   - Tubes: PEG  - Oral/Mouth cavity: NPO  - Skin: incision (BS 19)      Nutrition Requirements (from RD note on 10/19)   Weight Used:     Estimated Energy Needs    Continue [x]  Adjust []  1580-1712kcal  Adjusted Energy Recommendations:   kcal/day        Estimated Protein Needs    Continue [x]  Adjust []  57-68g  Adjusted Protein Recommendations:   gm/day        Estimated Fluid Needs        Continue [x]  Adjust [] 1ml/kcal or per LIP  Adjusted Fluid Recommendations:   mL/day     Nutrient Intake: Jevity 1.2 at 480ml- 1728kcal, 79g protein (101% estimated energy needs, 116% estimated protein needs- 18% overall calories from protein- WNL)        [] Previous Nutrition Diagnosis: Less than optimal enteral nutrition infusion             [] Ongoing          [x] Resolved    [x] No active nutrition diagnosis identified at this time        Nutrition Intervention: enteral and parenteral nutrition     Rec: Continue Jevity 1.2 at 480ml q8h with free H2O flush per LIP.     Goal/Expected Outcome: In 7 days enteral nutrition to provide >85% estimated energy needs, but not exceed 105%     Indicator/Monitoring: energy intake, body composition, NFPF

## 2018-11-08 NOTE — PROGRESS NOTE ADULT - ASSESSMENT
ADMISSION SUMMARY  64 yo M with COPD, chronic respiratory failure on BIPAP, HTN, DLD, vocal cord paralysis and dysphagia s/p PEG 5/ 18 in the setting of resistant esophageal candidiasis and inability to swallow, bought in from [employer] for cough productive, SOB and difficulty breathing of 1 day.    ASSESSMENT & PLAN  # PEG tube malfunction - resolved  -S/p PEG in May 2018 at [facility] - states he had one problem with it earlier where it was clogged and evaluated but it resolved on its own  -Appreciate GI consult - PEG tube replaced yesterday (11/7) without complications - tolerated AM feed without issues  -As per GI please do NOT put gauze between bumper and skin    # Acute on chronic hypoxic hypercapnic respiratory failure 2/2 COPD exacerbation - resolved  -On home O2 2L NC, continue as needed. BIPAP at night and PRN (settings IPAP 12, EPAP 6 - outpatient set up)  -S/p prednisone taper, s/p 7 days Levaquin and 4 days cefepime  -Continue pulm recs: Spiriva, Symbicort, daily 10 mg prednisone, anxiolytic therapy, aspiration precautions, ambulate as tolerated  -Multiple pulm nodules. Will follow up with Dr. Almonte on discharge  -Mucinex through PEG tube, scopolamine and loratadine for secretions    # Dysphagia  -Patient is know to SLP. He continues to have severe dysphagia. Consider ENT Eval as outpatient    # DLD  - Continue simvastatin 10 mg qHS    # Anxiety  -Clonopin 0.5 mg BID    # GI ppx   -Jevity 1.2 to 360ml q6h to provide 1728kcal, 79g protein (101% estimated energy needs, 116% estimated protein needs- 18% overall calories from protein- WNL)--pt does not desire midnight feeds, changed to 480 TID    # DVT ppx  -Lovenox 40 mg daily resume    # Disposition  -D/c planning for home with home care - medically stable  -Code status: FULL CODE

## 2018-11-08 NOTE — PROGRESS NOTE ADULT - ASSESSMENT
62 yo M with COPD, chronic respiratory failure on BIPAP, HTN, DLD, vocal cord paralysis and dysphagia s/p PEG 4/ 18 in [facility] in the setting of resistant esophageal candidiasis and inability to swallow. Patient bought in from [employer] ,for cough productive ,sob and difficulty breathing of 1 day. Patient was treated for acute resp failure secondary to COPD exacerbation. Patient don BIPAP and 2 L oxygen at home. He reports that his PEG tube stopped working today and this happened in the past and then resolved.     # PEG tube malfunction  The peg was lavaged without success: this was a 14 gauge tube that was completely nonfunctional.  We discussed removing the peg and reinserting another peg, and the risk of not being able to reinsert the peg.  The tube was then  removed, and another could not be placed in the lumen.  Patient had a PEG tube placed yesterday   No complications, peg site clean   can start tube feeding   Please wash site with water and soap twice daily   Please DO NOT put gauze between bumper and skin   Please recall as needed 64 yo M with COPD, chronic respiratory failure on BIPAP, HTN, DLD, vocal cord paralysis and dysphagia s/p PEG 4/ 18 in [facility] in the setting of resistant esophageal candidiasis and inability to swallow. Patient bought in from [employer] ,for cough productive ,sob and difficulty breathing of 1 day. Patient was treated for acute resp failure secondary to COPD exacerbation. Patient don BIPAP and 2 L oxygen at home. He reports that his PEG tube stopped working today and this happened in the past and then resolved.     # PEG tube malfunctionPatient had a PEG tube placed yesterday   No complications, peg site clean   can start tube feeding   Please wash site with water and soap twice daily   Please DO NOT put gauze between bumper and skin   Please recall as needed

## 2018-11-08 NOTE — PROGRESS NOTE ADULT - ASSESSMENT
IMPRESSION:  62 yo male with COPD s/p acute respiratory failure  Chronic hypoxic/hypercapnic respiratory failure  Multiple pulmonary nodules  Anxiety disorder  Dysphagia s/p PEG replacement    RECOMMENDATIONS:  oxygen via NC to maintain saturation >90%  BiPAP qhs and prn  Continue bronchodilators, ICS and prednisone 10mg daily  Out of bed-chair  Anxiolytics prn  DVT prophylaxis  pt stable from the pulmonary standpoint  d/c planning in progress  pt to follow up with Dr Almonte as outpt

## 2018-11-08 NOTE — PROGRESS NOTE ADULT - SUBJECTIVE AND OBJECTIVE BOX
ALETA SUAZO 63y Male  MRN#: [id number]     SUBJECTIVE  Patient is a 63y old Male who presents with a chief complaint of sob, cough and temp 103 (08 Nov 2018 07:55)  Currently admitted to medicine with the primary diagnosis of COPD exacerbation    Today is hospital day 26d, and this morning he states he is feeling okay. No overnight events. Able to tolerate tube feed this AM without complications. Denies fevers, chills, chest pain, SOB, abdominal pain, nausea/vomiting.     OBJECTIVE  PAST MEDICAL & SURGICAL HISTORY  PEG (percutaneous endoscopic gastrostomy) status  Other hydrocele  Anxiety disorder, unspecified type  Hypertension, unspecified type  High cholesterol  Chronic obstructive pulmonary disease, unspecified COPD type  PEG (percutaneous endoscopic gastrostomy) status    ALLERGIES:  No Known Allergies    MEDICATIONS:  STANDING MEDICATIONS  acetaminophen   Tablet .. 650 milliGRAM(s) Oral every 6 hours  ALBUTerol    0.083% 2.5 milliGRAM(s) Nebulizer every 4 hours  buDESOnide 160 MICROgram(s)/formoterol 4.5 MICROgram(s) Inhaler 2 Puff(s) Inhalation two times a day  clonazePAM Tablet 0.5 milliGRAM(s) Oral every 12 hours  docusate sodium Liquid 100 milliGRAM(s) Oral two times a day  enoxaparin Injectable 40 milliGRAM(s) SubCutaneous at bedtime  guaiFENesin    Syrup 100 milliGRAM(s) Oral every 4 hours  loratadine 10 milliGRAM(s) Oral daily  pantoprazole   Suspension 40 milliGRAM(s) Oral daily  predniSONE   Tablet 10 milliGRAM(s) Oral daily  scopolamine   Patch 1 Patch Transdermal every 72 hours  senna 2 Tablet(s) Oral at bedtime  simvastatin 10 milliGRAM(s) Oral at bedtime  sodium chloride 0.9%. 1000 milliLiter(s) IV Continuous <Continuous>  tiotropium 18 MICROgram(s) Capsule 1 Capsule(s) Inhalation daily    PRN MEDICATIONS  sodium chloride 0.65% Nasal 1 Spray(s) Both Nostrils every 2 hours PRN      VITAL SIGNS: Last 24 Hours  T(C): 36.6 (08 Nov 2018 05:44), Max: 36.6 (08 Nov 2018 05:44)  T(F): 97.9 (08 Nov 2018 05:44), Max: 97.9 (08 Nov 2018 05:44)  HR: 66 (08 Nov 2018 05:44) (52 - 113)  BP: 129/59 (08 Nov 2018 05:44) (9/49 - 129/59)  BP(mean): --  RR: 18 (08 Nov 2018 05:44) (18 - 18)  SpO2: 100% (07 Nov 2018 10:26) (100% - 100%)    LABS:                        9.2    6.87  )-----------( 238      ( 06 Nov 2018 23:30 )             29.0     11-06    140  |  99  |  17  ----------------------------<  74  4.3   |  32  |  0.7    Ca    9.0      06 Nov 2018 23:30                    RADIOLOGY:      PHYSICAL EXAM:  GENERAL: NAD, thin, AAOx3  HEENT:  Atraumatic, Normocephalic. EOMI, PERRLA, conjunctiva and sclera clear, No JVD. Whispering voice  PULMONARY: Clear to auscultation bilaterally; No wheezing, rhonchi, rales  CARDIOVASCULAR: Regular rate and rhythm; No murmurs, rubs, or gallops  GASTROINTESTINAL: Soft, Nontender, Nondistended; Bowel sounds present. PEG site without erythema, exudates, or tenderness  MUSCULOSKELETAL:  2+ Peripheral Pulses, No clubbing, cyanosis, or edema  NEUROLOGY: non-focal  SKIN: No rashes or lesions

## 2018-11-09 RX ORDER — CLONAZEPAM 1 MG
0.5 TABLET ORAL
Qty: 0 | Refills: 0 | Status: DISCONTINUED | OUTPATIENT
Start: 2018-11-09 | End: 2018-11-13

## 2018-11-09 RX ORDER — CLONAZEPAM 1 MG
0.5 TABLET ORAL ONCE
Qty: 0 | Refills: 0 | Status: DISCONTINUED | OUTPATIENT
Start: 2018-11-09 | End: 2018-11-09

## 2018-11-09 RX ADMIN — Medication 650 MILLIGRAM(S): at 17:49

## 2018-11-09 RX ADMIN — BUDESONIDE AND FORMOTEROL FUMARATE DIHYDRATE 2 PUFF(S): 160; 4.5 AEROSOL RESPIRATORY (INHALATION) at 09:15

## 2018-11-09 RX ADMIN — Medication 650 MILLIGRAM(S): at 14:24

## 2018-11-09 RX ADMIN — Medication 10 MILLIGRAM(S): at 05:58

## 2018-11-09 RX ADMIN — Medication 650 MILLIGRAM(S): at 05:58

## 2018-11-09 RX ADMIN — Medication 100 MILLIGRAM(S): at 14:25

## 2018-11-09 RX ADMIN — ALBUTEROL 2.5 MILLIGRAM(S): 90 AEROSOL, METERED ORAL at 11:19

## 2018-11-09 RX ADMIN — Medication 0.5 MILLIGRAM(S): at 17:49

## 2018-11-09 RX ADMIN — Medication 100 MILLIGRAM(S): at 21:22

## 2018-11-09 RX ADMIN — Medication 650 MILLIGRAM(S): at 23:39

## 2018-11-09 RX ADMIN — ALBUTEROL 2.5 MILLIGRAM(S): 90 AEROSOL, METERED ORAL at 15:21

## 2018-11-09 RX ADMIN — Medication 100 MILLIGRAM(S): at 12:03

## 2018-11-09 RX ADMIN — Medication 100 MILLIGRAM(S): at 05:58

## 2018-11-09 RX ADMIN — ENOXAPARIN SODIUM 40 MILLIGRAM(S): 100 INJECTION SUBCUTANEOUS at 21:23

## 2018-11-09 RX ADMIN — ALBUTEROL 2.5 MILLIGRAM(S): 90 AEROSOL, METERED ORAL at 20:49

## 2018-11-09 RX ADMIN — SCOPALAMINE 1 PATCH: 1 PATCH, EXTENDED RELEASE TRANSDERMAL at 12:07

## 2018-11-09 RX ADMIN — LORATADINE 10 MILLIGRAM(S): 10 TABLET ORAL at 14:25

## 2018-11-09 RX ADMIN — Medication 0.5 MILLIGRAM(S): at 12:03

## 2018-11-09 RX ADMIN — TIOTROPIUM BROMIDE 1 CAPSULE(S): 18 CAPSULE ORAL; RESPIRATORY (INHALATION) at 07:45

## 2018-11-09 RX ADMIN — ALBUTEROL 2.5 MILLIGRAM(S): 90 AEROSOL, METERED ORAL at 06:13

## 2018-11-09 RX ADMIN — Medication 100 MILLIGRAM(S): at 17:49

## 2018-11-09 RX ADMIN — ALBUTEROL 2.5 MILLIGRAM(S): 90 AEROSOL, METERED ORAL at 07:45

## 2018-11-09 RX ADMIN — SIMVASTATIN 10 MILLIGRAM(S): 20 TABLET, FILM COATED ORAL at 21:23

## 2018-11-09 RX ADMIN — PANTOPRAZOLE SODIUM 40 MILLIGRAM(S): 20 TABLET, DELAYED RELEASE ORAL at 12:05

## 2018-11-09 RX ADMIN — Medication 650 MILLIGRAM(S): at 05:59

## 2018-11-09 RX ADMIN — SENNA PLUS 2 TABLET(S): 8.6 TABLET ORAL at 21:23

## 2018-11-09 RX ADMIN — ALBUTEROL 2.5 MILLIGRAM(S): 90 AEROSOL, METERED ORAL at 00:37

## 2018-11-09 RX ADMIN — Medication 650 MILLIGRAM(S): at 12:06

## 2018-11-09 NOTE — PROGRESS NOTE ADULT - ASSESSMENT
ADMISSION SUMMARY  64 yo M with COPD, chronic respiratory failure on BIPAP, HTN, DLD, vocal cord paralysis and dysphagia s/p PEG 5/ 18 in the setting of resistant esophageal candidiasis and inability to swallow, bought in from [employer] for cough productive, SOB and difficulty breathing of 1 day.    ASSESSMENT & PLAN  # PEG tube malfunction - resolved  -S/p PEG in May 2018 at [facility] - states he had one problem with it earlier where it was clogged and evaluated but it resolved on its own  -Appreciate GI consult - PEG tube replaced yesterday (11/7) without complications - tolerating feeds without issues  -As per GI please do NOT put gauze between bumper and skin    # Acute on chronic hypoxic hypercapnic respiratory failure 2/2 COPD exacerbation - resolved  -On home O2 2L NC, continue as needed. BIPAP at night and PRN (settings IPAP 12, EPAP 6 - outpatient set up)  -S/p prednisone taper, s/p 7 days Levaquin and 4 days cefepime  -Continue pulm recs: Spiriva, Symbicort, daily 10 mg prednisone, anxiolytic therapy, aspiration precautions, ambulate as tolerated  -Multiple pulm nodules. Will follow up with Dr. Almonte on discharge  -Mucinex through PEG tube, scopolamine and loratadine for secretions    # Dysphagia  -Patient is know to SLP. He continues to have severe dysphagia. Consider ENT Eval as outpatient    # DLD  -Continue simvastatin 10 mg qHS    # Anxiety  -Clonopin 0.5 mg BID    # GI ppx   -Jevity 1.2 to 360ml q6h to provide 1728kcal, 79g protein (101% estimated energy needs, 116% estimated protein needs- 18% overall calories from protein- WNL)--pt does not desire midnight feeds, changed to 480 TID    # DVT ppx  -Lovenox 40 mg daily resume    # Disposition  -D/c planning for home with home care - medically stable  -Code status: FULL CODE

## 2018-11-09 NOTE — PROGRESS NOTE ADULT - ASSESSMENT
acute on chronic hypoxic and hypercapnic respiratory failure  copd exacerbation  multiple pulmonary nodules  anxiety disorder      continue oxygen per pulse oximetry  bipap hs and prn  check an abg as indicated  continue frequent nebulized bronchodilators  remains on prednisone 10mg daily  monitor for need for iv steroid and antibiotic  continue anxiolytic therapy  gi/dvt prophylaxis  prognosis is guarded in short term, poor in long term

## 2018-11-09 NOTE — PROGRESS NOTE ADULT - SUBJECTIVE AND OBJECTIVE BOX
ALETA SUAZO 63y Male  MRN#: [id number]     SUBJECTIVE  Patient is a 63y old Male who presents with a chief complaint of sob, cough and temp 103 (09 Nov 2018 09:24)  Currently admitted to medicine with the primary diagnosis of COPD exacerbation    Today is hospital day 27d, and this morning he is feeling anxious. No overnight events. Tolerating feeds. Denies fevers, chills, chest pain, abdominal pain, nausea/vomiting. Increased SOB due to anxiety, saturating 98%.     OBJECTIVE  PAST MEDICAL & SURGICAL HISTORY  PEG (percutaneous endoscopic gastrostomy) status  Other hydrocele  Anxiety disorder, unspecified type  Hypertension, unspecified type  High cholesterol  Chronic obstructive pulmonary disease, unspecified COPD type  PEG (percutaneous endoscopic gastrostomy) status    ALLERGIES:  No Known Allergies    MEDICATIONS:  STANDING MEDICATIONS  acetaminophen   Tablet .. 650 milliGRAM(s) Oral every 6 hours  ALBUTerol    0.083% 2.5 milliGRAM(s) Nebulizer every 4 hours  buDESOnide 160 MICROgram(s)/formoterol 4.5 MICROgram(s) Inhaler 2 Puff(s) Inhalation two times a day  clonazePAM Tablet 0.5 milliGRAM(s) Oral two times a day  docusate sodium Liquid 100 milliGRAM(s) Oral two times a day  enoxaparin Injectable 40 milliGRAM(s) SubCutaneous at bedtime  guaiFENesin    Syrup 100 milliGRAM(s) Oral every 4 hours  loratadine 10 milliGRAM(s) Oral daily  pantoprazole   Suspension 40 milliGRAM(s) Oral daily  predniSONE   Tablet 10 milliGRAM(s) Oral daily  scopolamine   Patch 1 Patch Transdermal every 72 hours  senna 2 Tablet(s) Oral at bedtime  simvastatin 10 milliGRAM(s) Oral at bedtime  tiotropium 18 MICROgram(s) Capsule 1 Capsule(s) Inhalation daily    PRN MEDICATIONS  sodium chloride 0.65% Nasal 1 Spray(s) Both Nostrils every 2 hours PRN      VITAL SIGNS: Last 24 Hours  T(C): 35.4 (09 Nov 2018 04:43), Max: 36.8 (08 Nov 2018 13:50)  T(F): 95.8 (09 Nov 2018 04:43), Max: 98.3 (08 Nov 2018 13:50)  HR: 110 (09 Nov 2018 07:39) (50 - 110)  BP: 138/61 (09 Nov 2018 07:39) (113/57 - 169/72)  BP(mean): --  RR: 20 (09 Nov 2018 07:39) (18 - 97)  SpO2: 98% (08 Nov 2018 21:40) (98% - 98%)    LABS:                        RADIOLOGY:      PHYSICAL EXAM:  GENERAL: NAD, thin, AAOx3, anxious appearing  HEENT:  Atraumatic, Normocephalic. EOMI, PERRLA, conjunctiva and sclera clear, No JVD. Whispering voice  PULMONARY: Clear to auscultation bilaterally; No wheezing, rhonchi, rales  CARDIOVASCULAR: Regular rate and rhythm; No murmurs, rubs, or gallops  GASTROINTESTINAL: Soft, Nontender, Nondistended; Bowel sounds present. PEG site without erythema, exudates, or tenderness  MUSCULOSKELETAL:  2+ Peripheral Pulses, No clubbing, cyanosis, or edema  NEUROLOGY: non-focal  SKIN: No rashes or lesions

## 2018-11-09 NOTE — PROGRESS NOTE ADULT - SUBJECTIVE AND OBJECTIVE BOX
Patient was seen and examined. Spoke with RN. Chart reviewed.  Pt is anxious  O2 sat 98%  Vital Signs Last 24 Hrs  T(F): 95.8 (09 Nov 2018 04:43), Max: 98.3 (08 Nov 2018 13:50)  HR: 110 (09 Nov 2018 07:39) (50 - 110)  BP: 138/61 (09 Nov 2018 07:39) (113/57 - 169/72)  SpO2: 98% (08 Nov 2018 21:40) (98% - 98%)  MEDICATIONS  (STANDING):  acetaminophen   Tablet .. 650 milliGRAM(s) Oral every 6 hours  ALBUTerol    0.083% 2.5 milliGRAM(s) Nebulizer every 4 hours  buDESOnide 160 MICROgram(s)/formoterol 4.5 MICROgram(s) Inhaler 2 Puff(s) Inhalation two times a day  docusate sodium Liquid 100 milliGRAM(s) Oral two times a day  enoxaparin Injectable 40 milliGRAM(s) SubCutaneous at bedtime  guaiFENesin    Syrup 100 milliGRAM(s) Oral every 4 hours  loratadine 10 milliGRAM(s) Oral daily  pantoprazole   Suspension 40 milliGRAM(s) Oral daily  predniSONE   Tablet 10 milliGRAM(s) Oral daily  scopolamine   Patch 1 Patch Transdermal every 72 hours  senna 2 Tablet(s) Oral at bedtime  simvastatin 10 milliGRAM(s) Oral at bedtime  sodium chloride 0.9%. 1000 milliLiter(s) (75 mL/Hr) IV Continuous <Continuous>  tiotropium 18 MICROgram(s) Capsule 1 Capsule(s) Inhalation daily    MEDICATIONS  (PRN):  sodium chloride 0.65% Nasal 1 Spray(s) Both Nostrils every 2 hours PRN Nasal Congestion    General: anxious  Neurology:  nonfocal  Head:  Normocephalic, atraumatic  ENT:  Mucosa moist, no ulcerations  Neck:  Supple, no JVD,   Skin: no breakdowns (as per RN)  Resp: CTA B/L  CV: RRR, S1S2,   GI: Soft, NT, bowel sounds, peg  MS: No edema, + peripheral pulses, FROM all 4 extremity      A/P:  64 yo M with COPD, chronic respiratory failure on BIPAP, HTN, DLD, vocal cord paralysis and dysphagia s/p PEG 5/ 18 in the setting of resistant esophageal candidiasis and inability to swallow, bought in from [employer] for cough productive ,sob and difficulty breathing of 1 day.    ASSESSMENT & PLAN  # Acute on chronic hypoxic hypercapnic respiratory failure 2/2 COPD exacerbation - resolved  -On home O2 2L NC, continue as needed  -Multiple pulm nodules  -c/w BiPAP at night and PRN IPAP 12, EPAP 6 outpatient bipap set up.   -albuterol prn  -s/p prednisone taper,   -c/w spiriva, 10 mg daily prednisone, anxiolytic therapy,aspiration precautions.   -symbicort 160/4.5 2 puffs BID ( patient feels symbicort makes him choke)  -daliresp non-formulary Resume upon discharge, as well as op inhaled nebulizers  -s/p 7 days levofloxacin 4 days cefepime  -f/u with Dr. Almonte as outpatient.   -ambulate as tolerated .  -guaifenescin through PEG tube    # Dysphagia  -s/p PEG replacement    # HTN/DLD  - cw home meds    # Anxiety  -Clonopin 0.5 BID    # DVT/GI ppx   # Increase Jevity 1.2 to 360ml q6h to provide 1728kcal, 79g protein (101% estimated energy needs, 116% estimated protein needs- 18% overall calories from protein- WNL)--pt does not desire midnight feeds, changed to 480 TID - resume once PEG tube functioning  -Lovenox 40 mg daily      - anxiolytics as needed    -D/c planning   DVT prophylaxis  Decubitus prevention- all measures as per RN protocol  Please call or text me with any questions or updates

## 2018-11-09 NOTE — PROGRESS NOTE ADULT - SUBJECTIVE AND OBJECTIVE BOX
ALETA SUZAO  63y, Male  Allergy: No Known Allergies      LAST 24-Hr EVENTS:  developed increased dyspnea, cough and sputum in past 24 hours, currently on bipap  VITALS:  T(F): 95.8 (11-09-18 @ 04:43), Max: 98.3 (11-08-18 @ 13:50)  HR: 110 (11-09-18 @ 07:39)  BP: 138/61 (11-09-18 @ 07:39) (113/57 - 169/72)  RR: 20 (11-09-18 @ 07:39)  SpO2: 98% (11-08-18 @ 21:40)    PHYSICAL EXAM:    GENERAL: NAD, well-developed  HEAD:  Atraumatic, Normocephalic  NECK: Supple, No JVD  CHEST/LUNG: Clear to auscultation bilaterally, decreased breath sounds bilaterally; No wheeze  HEART: Regular rate and rhythm; No murmurs, rubs, or gallops  ABDOMEN: Soft, Nontender, Nondistended; Bowel sounds present  EXTREMITIES:  2+ Peripheral Pulses, No clubbing, cyanosis, or edema        TESTS & MEASUREMENTS:    IN: 0 mL / OUT: 900 mL / NET: -900 mL    IN: 2160 mL / OUT: 850 mL / NET: 1310 mL                            ABG & VENT SETTINGS: (when applicable)    n/a    RADIOLOGY & ADDITIONAL TESTS:    MEDICATIONS:  MEDICATIONS  (STANDING):  acetaminophen   Tablet .. 650 milliGRAM(s) Oral every 6 hours  ALBUTerol    0.083% 2.5 milliGRAM(s) Nebulizer every 4 hours  buDESOnide 160 MICROgram(s)/formoterol 4.5 MICROgram(s) Inhaler 2 Puff(s) Inhalation two times a day  clonazePAM Tablet 0.5 milliGRAM(s) Oral two times a day  clonazePAM Tablet 0.5 milliGRAM(s) Oral once  docusate sodium Liquid 100 milliGRAM(s) Oral two times a day  enoxaparin Injectable 40 milliGRAM(s) SubCutaneous at bedtime  guaiFENesin    Syrup 100 milliGRAM(s) Oral every 4 hours  loratadine 10 milliGRAM(s) Oral daily  pantoprazole   Suspension 40 milliGRAM(s) Oral daily  predniSONE   Tablet 10 milliGRAM(s) Oral daily  scopolamine   Patch 1 Patch Transdermal every 72 hours  senna 2 Tablet(s) Oral at bedtime  simvastatin 10 milliGRAM(s) Oral at bedtime  tiotropium 18 MICROgram(s) Capsule 1 Capsule(s) Inhalation daily    MEDICATIONS  (PRN):  sodium chloride 0.65% Nasal 1 Spray(s) Both Nostrils every 2 hours PRN Nasal Congestion

## 2018-11-10 RX ADMIN — ALBUTEROL 2.5 MILLIGRAM(S): 90 AEROSOL, METERED ORAL at 01:14

## 2018-11-10 RX ADMIN — ALBUTEROL 2.5 MILLIGRAM(S): 90 AEROSOL, METERED ORAL at 07:58

## 2018-11-10 RX ADMIN — ALBUTEROL 2.5 MILLIGRAM(S): 90 AEROSOL, METERED ORAL at 19:57

## 2018-11-10 RX ADMIN — ALBUTEROL 2.5 MILLIGRAM(S): 90 AEROSOL, METERED ORAL at 05:26

## 2018-11-10 RX ADMIN — LORATADINE 10 MILLIGRAM(S): 10 TABLET ORAL at 12:49

## 2018-11-10 RX ADMIN — Medication 650 MILLIGRAM(S): at 06:35

## 2018-11-10 RX ADMIN — Medication 100 MILLIGRAM(S): at 05:50

## 2018-11-10 RX ADMIN — Medication 100 MILLIGRAM(S): at 05:48

## 2018-11-10 RX ADMIN — Medication 10 MILLIGRAM(S): at 05:48

## 2018-11-10 RX ADMIN — ALBUTEROL 2.5 MILLIGRAM(S): 90 AEROSOL, METERED ORAL at 15:50

## 2018-11-10 RX ADMIN — Medication 0.5 MILLIGRAM(S): at 05:50

## 2018-11-10 RX ADMIN — TIOTROPIUM BROMIDE 1 CAPSULE(S): 18 CAPSULE ORAL; RESPIRATORY (INHALATION) at 07:58

## 2018-11-10 RX ADMIN — Medication 650 MILLIGRAM(S): at 12:49

## 2018-11-10 RX ADMIN — SCOPALAMINE 1 PATCH: 1 PATCH, EXTENDED RELEASE TRANSDERMAL at 19:03

## 2018-11-10 RX ADMIN — Medication 100 MILLIGRAM(S): at 17:17

## 2018-11-10 RX ADMIN — Medication 100 MILLIGRAM(S): at 14:49

## 2018-11-10 RX ADMIN — SCOPALAMINE 1 PATCH: 1 PATCH, EXTENDED RELEASE TRANSDERMAL at 08:11

## 2018-11-10 RX ADMIN — Medication 650 MILLIGRAM(S): at 23:36

## 2018-11-10 RX ADMIN — PANTOPRAZOLE SODIUM 40 MILLIGRAM(S): 20 TABLET, DELAYED RELEASE ORAL at 12:50

## 2018-11-10 RX ADMIN — ENOXAPARIN SODIUM 40 MILLIGRAM(S): 100 INJECTION SUBCUTANEOUS at 21:05

## 2018-11-10 RX ADMIN — Medication 650 MILLIGRAM(S): at 05:51

## 2018-11-10 RX ADMIN — Medication 0.5 MILLIGRAM(S): at 17:17

## 2018-11-10 RX ADMIN — Medication 100 MILLIGRAM(S): at 21:06

## 2018-11-10 RX ADMIN — SENNA PLUS 2 TABLET(S): 8.6 TABLET ORAL at 21:06

## 2018-11-10 RX ADMIN — SIMVASTATIN 10 MILLIGRAM(S): 20 TABLET, FILM COATED ORAL at 21:05

## 2018-11-10 RX ADMIN — Medication 100 MILLIGRAM(S): at 12:47

## 2018-11-10 RX ADMIN — Medication 650 MILLIGRAM(S): at 00:01

## 2018-11-10 RX ADMIN — Medication 650 MILLIGRAM(S): at 17:17

## 2018-11-10 NOTE — PROGRESS NOTE ADULT - ASSESSMENT
acute on chronic hypoxic and hypercapnic respiratory failure  copd exacerbation  multiple pulmonary nodules  anxiety disorder  hoarseness of voice   plan=================================================================  continue oxygen per pulse oximetry  bipap hs and prn  check an abg as indicated  continue frequent nebulized bronchodilators  remains on prednisone 10mg daily  monitor for need for iv steroid and antibiotic  continue anxiolytic therapy  gi/dvt prophylaxis  prognosis is guarded in short term, poor in long term

## 2018-11-10 NOTE — PROGRESS NOTE ADULT - SUBJECTIVE AND OBJECTIVE BOX
ALETA SUAZO  63y  Male    Patient is a 63y old  Male who presents with a chief complaint of sob, cough and temp 103 (09 Nov 2018 11:21)  hoarseness of voice  ALLERGIES:  No Known Allergies      INTERVAL HPI/OVERNIGHT EVENTS:    VITALS:  T(F): 98.6 (11-10-18 @ 05:29), Max: 98.6 (11-10-18 @ 05:29)  HR: 77 (11-10-18 @ 05:29) (77 - 117)  BP: 117/57 (11-10-18 @ 05:29) (98/52 - 135/64)  RR: 20 (11-10-18 @ 05:29) (20 - 20)  SpO2: --    LABS:        MICROBIOLOGY:    MEDICATION:  clonazePAM Tablet 0.5 milliGRAM(s) Oral two times a day    RADIOLOGY & ADDITIONAL TESTS:

## 2018-11-10 NOTE — PROGRESS NOTE ADULT - ASSESSMENT
acute/chronic hypoxic respiratory failure  copd   multiple pulmonary nodules  dysphagia/vocal cord paralysis  anxiety disorder      low flow oxygen, monitor SpO2 prn  bipap hs and prn  bronchodilators  prednisone 10mg daily  enteric feeds/aspiration precautions  anxiolytic therapy  out of bed/physical therapy  gi/dvt prophylaxis  stable from pulmonary viewpoint to continue discharge planning efforts

## 2018-11-10 NOTE — PROGRESS NOTE ADULT - SUBJECTIVE AND OBJECTIVE BOX
ALETA SUAZO  63y, Male  Allergy: No Known Allergies      LAST 24-Hr EVENTS:  feels better today and back to baseline  VITALS:  T(F): 98.6 (11-10-18 @ 05:29), Max: 98.6 (11-10-18 @ 05:29)  HR: 77 (11-10-18 @ 05:29)  BP: 117/57 (11-10-18 @ 05:29) (98/52 - 135/64)  RR: 20 (11-10-18 @ 05:29)  SpO2: --    PHYSICAL EXAM:    GENERAL: NAD, well-developed  HEAD:  Atraumatic, Normocephalic  NECK: Supple, No JVD  CHEST/LUNG: Clear to auscultation bilaterally; No wheeze  HEART: Regular rate and rhythm; No murmurs, rubs, or gallops  ABDOMEN: Soft, Nontender, Nondistended; Bowel sounds present  EXTREMITIES:  2+ Peripheral Pulses, No clubbing, cyanosis, or edema        TESTS & MEASUREMENTS:    IN: 2160 mL / OUT: 850 mL / NET: 1310 mL    IN: 0 mL / OUT: 400 mL / NET: -400 mL                            ABG & VENT SETTINGS: (when applicable)  n/a      RADIOLOGY & ADDITIONAL TESTS:    MEDICATIONS:  MEDICATIONS  (STANDING):  acetaminophen   Tablet .. 650 milliGRAM(s) Oral every 6 hours  ALBUTerol    0.083% 2.5 milliGRAM(s) Nebulizer every 4 hours  buDESOnide 160 MICROgram(s)/formoterol 4.5 MICROgram(s) Inhaler 2 Puff(s) Inhalation two times a day  clonazePAM Tablet 0.5 milliGRAM(s) Oral two times a day  docusate sodium Liquid 100 milliGRAM(s) Oral two times a day  enoxaparin Injectable 40 milliGRAM(s) SubCutaneous at bedtime  guaiFENesin    Syrup 100 milliGRAM(s) Oral every 4 hours  loratadine 10 milliGRAM(s) Oral daily  pantoprazole   Suspension 40 milliGRAM(s) Oral daily  predniSONE   Tablet 10 milliGRAM(s) Oral daily  scopolamine   Patch 1 Patch Transdermal every 72 hours  senna 2 Tablet(s) Oral at bedtime  simvastatin 10 milliGRAM(s) Oral at bedtime  tiotropium 18 MICROgram(s) Capsule 1 Capsule(s) Inhalation daily    MEDICATIONS  (PRN):  sodium chloride 0.65% Nasal 1 Spray(s) Both Nostrils every 2 hours PRN Nasal Congestion

## 2018-11-11 RX ADMIN — ALBUTEROL 2.5 MILLIGRAM(S): 90 AEROSOL, METERED ORAL at 12:00

## 2018-11-11 RX ADMIN — ENOXAPARIN SODIUM 40 MILLIGRAM(S): 100 INJECTION SUBCUTANEOUS at 21:03

## 2018-11-11 RX ADMIN — Medication 650 MILLIGRAM(S): at 11:55

## 2018-11-11 RX ADMIN — Medication 650 MILLIGRAM(S): at 05:15

## 2018-11-11 RX ADMIN — SCOPALAMINE 1 PATCH: 1 PATCH, EXTENDED RELEASE TRANSDERMAL at 07:52

## 2018-11-11 RX ADMIN — PANTOPRAZOLE SODIUM 40 MILLIGRAM(S): 20 TABLET, DELAYED RELEASE ORAL at 11:35

## 2018-11-11 RX ADMIN — ALBUTEROL 2.5 MILLIGRAM(S): 90 AEROSOL, METERED ORAL at 07:39

## 2018-11-11 RX ADMIN — Medication 650 MILLIGRAM(S): at 23:37

## 2018-11-11 RX ADMIN — SIMVASTATIN 10 MILLIGRAM(S): 20 TABLET, FILM COATED ORAL at 21:03

## 2018-11-11 RX ADMIN — TIOTROPIUM BROMIDE 1 CAPSULE(S): 18 CAPSULE ORAL; RESPIRATORY (INHALATION) at 07:42

## 2018-11-11 RX ADMIN — SENNA PLUS 2 TABLET(S): 8.6 TABLET ORAL at 21:03

## 2018-11-11 RX ADMIN — Medication 650 MILLIGRAM(S): at 17:34

## 2018-11-11 RX ADMIN — ALBUTEROL 2.5 MILLIGRAM(S): 90 AEROSOL, METERED ORAL at 20:18

## 2018-11-11 RX ADMIN — Medication 100 MILLIGRAM(S): at 21:02

## 2018-11-11 RX ADMIN — ALBUTEROL 2.5 MILLIGRAM(S): 90 AEROSOL, METERED ORAL at 03:24

## 2018-11-11 RX ADMIN — Medication 0.5 MILLIGRAM(S): at 17:24

## 2018-11-11 RX ADMIN — LORATADINE 10 MILLIGRAM(S): 10 TABLET ORAL at 11:35

## 2018-11-11 RX ADMIN — Medication 650 MILLIGRAM(S): at 05:45

## 2018-11-11 RX ADMIN — Medication 650 MILLIGRAM(S): at 11:35

## 2018-11-11 RX ADMIN — ALBUTEROL 2.5 MILLIGRAM(S): 90 AEROSOL, METERED ORAL at 00:41

## 2018-11-11 RX ADMIN — Medication 100 MILLIGRAM(S): at 05:15

## 2018-11-11 RX ADMIN — Medication 650 MILLIGRAM(S): at 23:07

## 2018-11-11 RX ADMIN — Medication 0.5 MILLIGRAM(S): at 05:14

## 2018-11-11 RX ADMIN — Medication 100 MILLIGRAM(S): at 09:14

## 2018-11-11 RX ADMIN — Medication 100 MILLIGRAM(S): at 17:24

## 2018-11-11 RX ADMIN — ALBUTEROL 2.5 MILLIGRAM(S): 90 AEROSOL, METERED ORAL at 16:07

## 2018-11-11 RX ADMIN — Medication 10 MILLIGRAM(S): at 05:16

## 2018-11-11 RX ADMIN — Medication 100 MILLIGRAM(S): at 13:09

## 2018-11-11 RX ADMIN — Medication 650 MILLIGRAM(S): at 17:24

## 2018-11-11 NOTE — PROGRESS NOTE ADULT - ASSESSMENT
chronic hypoxic/hypercapnic respiratory failure  copd  multiple pulmonary nodules  anxiety disorder  dysphagia/vocal cord paralysis      oxygen per pulse oximetry  bipap hs and prn  albuterol/tiotropium as ordered  symbicort as ordered  low dose prednisone  enteric feeds/aspiration precautions  clonazepam for anxiety  gi/dvt prophylaxis  discharge planning

## 2018-11-11 NOTE — PROGRESS NOTE ADULT - SUBJECTIVE AND OBJECTIVE BOX
ALETA SUAZO 63y Male  MRN#: [id number]     SUBJECTIVE  Patient is a 63y old Male who presents with a chief complaint of sob, cough and temp 103 (11 Nov 2018 11:14)  Currently admitted to medicine with the primary diagnosis of COPD exacerbation    Today is hospital day 29d. Patient seen and examined in the morning. Overnight patient became anxious, resolved with some reassurance. This morning he was feeling well, a little tired. Aware of d/c planning for Tuesday. Denies current chest pain, SOB, abdominal pain, nausea/vomiting, fevers/chills, tolerating feeds well.     OBJECTIVE  PAST MEDICAL & SURGICAL HISTORY  PEG (percutaneous endoscopic gastrostomy) status  Other hydrocele  Anxiety disorder, unspecified type  Hypertension, unspecified type  High cholesterol  Chronic obstructive pulmonary disease, unspecified COPD type  PEG (percutaneous endoscopic gastrostomy) status    ALLERGIES:  No Known Allergies    MEDICATIONS:  STANDING MEDICATIONS  acetaminophen   Tablet .. 650 milliGRAM(s) Oral every 6 hours  ALBUTerol    0.083% 2.5 milliGRAM(s) Nebulizer every 4 hours  buDESOnide 160 MICROgram(s)/formoterol 4.5 MICROgram(s) Inhaler 2 Puff(s) Inhalation two times a day  clonazePAM Tablet 0.5 milliGRAM(s) Oral two times a day  docusate sodium Liquid 100 milliGRAM(s) Oral two times a day  enoxaparin Injectable 40 milliGRAM(s) SubCutaneous at bedtime  guaiFENesin    Syrup 100 milliGRAM(s) Oral every 4 hours  loratadine 10 milliGRAM(s) Oral daily  pantoprazole   Suspension 40 milliGRAM(s) Oral daily  predniSONE   Tablet 10 milliGRAM(s) Oral daily  scopolamine   Patch 1 Patch Transdermal every 72 hours  senna 2 Tablet(s) Oral at bedtime  simvastatin 10 milliGRAM(s) Oral at bedtime  tiotropium 18 MICROgram(s) Capsule 1 Capsule(s) Inhalation daily    PRN MEDICATIONS  sodium chloride 0.65% Nasal 1 Spray(s) Both Nostrils every 2 hours PRN      VITAL SIGNS: Last 24 Hours  T(C): 36.9 (11 Nov 2018 14:08), Max: 36.9 (11 Nov 2018 14:08)  T(F): 98.4 (11 Nov 2018 14:08), Max: 98.4 (11 Nov 2018 14:08)  HR: 72 (11 Nov 2018 14:08) (72 - 78)  BP: 117/53 (11 Nov 2018 14:08) (96/52 - 117/53)  BP(mean): --  RR: 18 (11 Nov 2018 14:08) (18 - 18)  SpO2: 98% (11 Nov 2018 08:02) (98% - 98%)    LABS:                        RADIOLOGY:      PHYSICAL EXAM:  GENERAL: NAD, thin, AAOx3, sleeping comfortably prior to exam  HEENT:  Atraumatic, Normocephalic. EOMI, PERRLA, conjunctiva and sclera clear, No JVD. Whispering voice  PULMONARY: Clear to auscultation bilaterally; No wheezing  CARDIOVASCULAR: Regular rate and rhythm; No murmurs, rubs, or gallops  GASTROINTESTINAL: Soft, Nontender, Nondistended; Bowel sounds present. PEG site clean without  tenderness  MUSCULOSKELETAL:  2+ Peripheral Pulses, No clubbing, cyanosis, or edema  NEUROLOGY: non-focal  SKIN: No rashes or lesions

## 2018-11-11 NOTE — PROGRESS NOTE ADULT - SUBJECTIVE AND OBJECTIVE BOX
Patient was seen and examined. Spoke with RN. Chart reviewed.  No events overnight.  Vital Signs Last 24 Hrs  T(F): 96.3 (11 Nov 2018 06:18), Max: 98.5 (10 Nov 2018 13:36)  HR: 78 (11 Nov 2018 06:18) (68 - 78)  BP: 96/52 (11 Nov 2018 06:18) (96/52 - 107/62)  SpO2: 98% (10 Nov 2018 22:30) (98% - 98%)  MEDICATIONS  (STANDING):  acetaminophen   Tablet .. 650 milliGRAM(s) Oral every 6 hours  ALBUTerol    0.083% 2.5 milliGRAM(s) Nebulizer every 4 hours  buDESOnide 160 MICROgram(s)/formoterol 4.5 MICROgram(s) Inhaler 2 Puff(s) Inhalation two times a day  clonazePAM Tablet 0.5 milliGRAM(s) Oral two times a day  docusate sodium Liquid 100 milliGRAM(s) Oral two times a day  enoxaparin Injectable 40 milliGRAM(s) SubCutaneous at bedtime  guaiFENesin    Syrup 100 milliGRAM(s) Oral every 4 hours  loratadine 10 milliGRAM(s) Oral daily  pantoprazole   Suspension 40 milliGRAM(s) Oral daily  predniSONE   Tablet 10 milliGRAM(s) Oral daily  scopolamine   Patch 1 Patch Transdermal every 72 hours  senna 2 Tablet(s) Oral at bedtime  simvastatin 10 milliGRAM(s) Oral at bedtime  tiotropium 18 MICROgram(s) Capsule 1 Capsule(s) Inhalation daily    MEDICATIONS  (PRN):  sodium chloride 0.65% Nasal 1 Spray(s) Both Nostrils every 2 hours PRN Nasal Congestion      General: anxious, NAD  Neurology: A&Ox3, nonfocal  Head:  Normocephalic, atraumatic  ENT:  Mucosa moist, no ulcerations  Neck:  Supple, no JVD,   Skin: no breakdowns (as per RN)  Resp: CTA B/L  CV: RRR, S1S2,   GI: Soft, NT, bowel sounds, peg  MS: No edema, + peripheral pulses, FROM all 4 extremity      A/P:   Acute on chronic hypoxic hypercapnic respiratory failure 2/2 COPD exacerbation - resolved  -On home O2 2L NC, continue as needed  -c/w BiPAP at night and PRN  -albuterol prn  -s/p prednisone taper,   -symbicort 160/4.5 2 puffs BID ( patient feels symbicort makes him choke)  -daliresp non-formulary Resume upon discharge, as well as op inhaled nebulizers  -f/u with Dr. Almonte as outpatient.   -ambulate as tolerated .  -guaifenescin through PEG tube      # HTN/DLD  - cw home meds    # Anxiety  -Clonopin 0.5 BID    PEG feeding    - anxiolytics as needed    -D/c planning     DVT prophylaxis  Decubitus prevention- all measures as per RN protocol  Please call or text me with any questions or updates

## 2018-11-11 NOTE — PROGRESS NOTE ADULT - SUBJECTIVE AND OBJECTIVE BOX
ALETA SUAZO  63y, Male  Allergy: No Known Allergies      LAST 24-Hr EVENTS:  no complaints  VITALS:  T(F): 96.3 (11-11-18 @ 06:18), Max: 98.5 (11-10-18 @ 13:36)  HR: 78 (11-11-18 @ 06:18)  BP: 96/52 (11-11-18 @ 06:18) (96/52 - 107/62)  RR: 18 (11-11-18 @ 06:18)  SpO2: 98% (11-11-18 @ 08:02)    PHYSICAL EXAM:    GENERAL: NAD, well-developed  HEAD:  Atraumatic, Normocephalic  NECK: Supple, No JVD  CHEST/LUNG: Clear to auscultation bilaterally; No wheeze  HEART: Regular rate and rhythm; No murmurs, rubs, or gallops  ABDOMEN: Soft, Nontender, Nondistended; Bowel sounds present  EXTREMITIES:  2+ Peripheral Pulses, No clubbing, cyanosis, or edema        TESTS & MEASUREMENTS:    IN: 0 mL / OUT: 400 mL / NET: -400 mL    IN: 2240 mL / OUT: 550 mL / NET: 1690 mL                            ABG & VENT SETTINGS: (when applicable)    n/a    RADIOLOGY & ADDITIONAL TESTS:    MEDICATIONS:  MEDICATIONS  (STANDING):  acetaminophen   Tablet .. 650 milliGRAM(s) Oral every 6 hours  ALBUTerol    0.083% 2.5 milliGRAM(s) Nebulizer every 4 hours  buDESOnide 160 MICROgram(s)/formoterol 4.5 MICROgram(s) Inhaler 2 Puff(s) Inhalation two times a day  clonazePAM Tablet 0.5 milliGRAM(s) Oral two times a day  docusate sodium Liquid 100 milliGRAM(s) Oral two times a day  enoxaparin Injectable 40 milliGRAM(s) SubCutaneous at bedtime  guaiFENesin    Syrup 100 milliGRAM(s) Oral every 4 hours  loratadine 10 milliGRAM(s) Oral daily  pantoprazole   Suspension 40 milliGRAM(s) Oral daily  predniSONE   Tablet 10 milliGRAM(s) Oral daily  scopolamine   Patch 1 Patch Transdermal every 72 hours  senna 2 Tablet(s) Oral at bedtime  simvastatin 10 milliGRAM(s) Oral at bedtime  tiotropium 18 MICROgram(s) Capsule 1 Capsule(s) Inhalation daily    MEDICATIONS  (PRN):  sodium chloride 0.65% Nasal 1 Spray(s) Both Nostrils every 2 hours PRN Nasal Congestion

## 2018-11-11 NOTE — PROGRESS NOTE ADULT - ASSESSMENT
ADMISSION SUMMARY  62 yo M with COPD, chronic respiratory failure on BIPAP, HTN, DLD, vocal cord paralysis and dysphagia s/p PEG 5/ 18 in the setting of resistant esophageal candidiasis and inability to swallow, bought in from [employer] for cough productive, SOB and difficulty breathing of 1 day.    ASSESSMENT & PLAN  # PEG tube malfunction - resolved  -S/p PEG in May 2018 at [facility] - states he had one problem with it earlier where it was clogged and evaluated but it resolved on its own  -Appreciate GI consult - PEG tube replaced yesterday (11/7) without complications - tolerating feeds without issues  -As per GI please do NOT put gauze between bumper and skin    # Acute on chronic hypoxic hypercapnic respiratory failure 2/2 COPD exacerbation - resolved  -On home O2 2L NC, continue as needed. BIPAP at night and PRN (settings IPAP 12, EPAP 6 - outpatient set up)  -S/p prednisone taper, s/p 7 days Levaquin and 4 days cefepime  -Continue pulm recs: Spiriva, Symbicort, daily 10 mg prednisone, anxiolytic therapy, aspiration precautions, ambulate as tolerated  -Multiple pulm nodules. Will follow up with Dr. Almonte on discharge  -Mucinex through PEG tube, scopolamine and loratadine for secretions    # Dysphagia  -Patient is know to SLP. He continues to have severe dysphagia. Consider ENT Eval as outpatient    # DLD  -Continue simvastatin 10 mg qHS    # Anxiety  -Clonopin 0.5 mg BID    # GI ppx   -Jevity 1.2 to 360ml q6h to provide 1728kcal, 79g protein (101% estimated energy needs, 116% estimated protein needs- 18% overall calories from protein- WNL)--pt does not desire midnight feeds --> 480 TID    # DVT ppx  -Lovenox 40 mg daily resume    # Disposition  -D/c planning for home with home care - medically stable. Plan is for Tuesday 11/13  -Code status: FULL CODE

## 2018-11-12 RX ORDER — SODIUM CHLORIDE 0.65 %
0 AEROSOL, SPRAY (ML) NASAL
Qty: 0 | Refills: 0 | DISCHARGE
Start: 2018-11-12

## 2018-11-12 RX ADMIN — ALBUTEROL 2.5 MILLIGRAM(S): 90 AEROSOL, METERED ORAL at 00:39

## 2018-11-12 RX ADMIN — Medication 650 MILLIGRAM(S): at 00:10

## 2018-11-12 RX ADMIN — ALBUTEROL 2.5 MILLIGRAM(S): 90 AEROSOL, METERED ORAL at 12:15

## 2018-11-12 RX ADMIN — ALBUTEROL 2.5 MILLIGRAM(S): 90 AEROSOL, METERED ORAL at 16:59

## 2018-11-12 RX ADMIN — BUDESONIDE AND FORMOTEROL FUMARATE DIHYDRATE 2 PUFF(S): 160; 4.5 AEROSOL RESPIRATORY (INHALATION) at 22:54

## 2018-11-12 RX ADMIN — ENOXAPARIN SODIUM 40 MILLIGRAM(S): 100 INJECTION SUBCUTANEOUS at 21:27

## 2018-11-12 RX ADMIN — SCOPALAMINE 1 PATCH: 1 PATCH, EXTENDED RELEASE TRANSDERMAL at 12:21

## 2018-11-12 RX ADMIN — Medication 650 MILLIGRAM(S): at 14:20

## 2018-11-12 RX ADMIN — Medication 10 MILLIGRAM(S): at 05:22

## 2018-11-12 RX ADMIN — SIMVASTATIN 10 MILLIGRAM(S): 20 TABLET, FILM COATED ORAL at 21:28

## 2018-11-12 RX ADMIN — Medication 100 MILLIGRAM(S): at 12:27

## 2018-11-12 RX ADMIN — ALBUTEROL 2.5 MILLIGRAM(S): 90 AEROSOL, METERED ORAL at 20:06

## 2018-11-12 RX ADMIN — Medication 100 MILLIGRAM(S): at 17:37

## 2018-11-12 RX ADMIN — Medication 100 MILLIGRAM(S): at 05:22

## 2018-11-12 RX ADMIN — Medication 0.5 MILLIGRAM(S): at 17:37

## 2018-11-12 RX ADMIN — ALBUTEROL 2.5 MILLIGRAM(S): 90 AEROSOL, METERED ORAL at 08:33

## 2018-11-12 RX ADMIN — SCOPALAMINE 1 PATCH: 1 PATCH, EXTENDED RELEASE TRANSDERMAL at 12:24

## 2018-11-12 RX ADMIN — Medication 650 MILLIGRAM(S): at 12:27

## 2018-11-12 RX ADMIN — ALBUTEROL 2.5 MILLIGRAM(S): 90 AEROSOL, METERED ORAL at 03:52

## 2018-11-12 RX ADMIN — Medication 100 MILLIGRAM(S): at 21:29

## 2018-11-12 RX ADMIN — PANTOPRAZOLE SODIUM 40 MILLIGRAM(S): 20 TABLET, DELAYED RELEASE ORAL at 12:28

## 2018-11-12 RX ADMIN — SENNA PLUS 2 TABLET(S): 8.6 TABLET ORAL at 21:28

## 2018-11-12 RX ADMIN — Medication 650 MILLIGRAM(S): at 05:35

## 2018-11-12 RX ADMIN — Medication 100 MILLIGRAM(S): at 17:38

## 2018-11-12 RX ADMIN — TIOTROPIUM BROMIDE 1 CAPSULE(S): 18 CAPSULE ORAL; RESPIRATORY (INHALATION) at 08:30

## 2018-11-12 RX ADMIN — Medication 650 MILLIGRAM(S): at 17:37

## 2018-11-12 RX ADMIN — Medication 0.5 MILLIGRAM(S): at 05:23

## 2018-11-12 RX ADMIN — LORATADINE 10 MILLIGRAM(S): 10 TABLET ORAL at 12:26

## 2018-11-12 RX ADMIN — Medication 650 MILLIGRAM(S): at 05:22

## 2018-11-12 NOTE — PROGRESS NOTE ADULT - SUBJECTIVE AND OBJECTIVE BOX
DAILY PROGRESS NOTE  ===========================================================  Patient Information:   Hospital Day:</ALETA SUAZO  /  63y  /  Male  /b> 30d /  MRN#: [id number]  Working / Admitting Diagnosis:  1. SOB 2/2 COPD    |:::::::::::::::::::::::::::::| SUBJECTIVE |:::::::::::::::::::::::::::::::|  OVERNIGHT EVENTS:   Briefly,  63M admitted with chief complaint of sob, cough and temp 103 2/2 COPD exacrbation  No acute events noted.  Denies chest pain / SOB / palpitations / Nausea / Vomiting / constipation / diarrhea or abdominal pain.   ROS otherwise negative.    Past Medical History:   PEG (percutaneous endoscopic gastrostomy) status  Other hydrocele  Anxiety disorder, unspecified type  Hypertension, unspecified type  High cholesterol  Chronic obstructive pulmonary disease, unspecified COPD type  PEG (percutaneous endoscopic gastrostomy) status    ALLERGIES:  No Known Allergies    HOME MEDICATIONS:  vitamin A &amp; D topical cream: Apply topically to affected area 2 times a day legs (17 Jun 2018 21:00)    |:::::::::::::::::::::::::::| OBJECTIVE |:::::::::::::::::::::::::::|    VITAL SIGNS: Last 24 Hours  T(C): 36.4 (12 Nov 2018 05:00), Max: 36.9 (11 Nov 2018 14:08)  T(F): 97.5 (12 Nov 2018 05:00), Max: 98.4 (11 Nov 2018 14:08)  HR: 68 (12 Nov 2018 05:00) (63 - 72)  BP: 112/56 (12 Nov 2018 05:00) (97/55 - 117/53)  BP(mean): 81 (12 Nov 2018 05:00) (81 - 81)  RR: 97 (12 Nov 2018 07:43) (18 - 97)    11-11-18 @ 07:01  -  11-12-18 @ 07:00  --------------------------------------------------------  IN: 560 mL / OUT: 900 mL / NET: -340 mL    PHYSICAL EXAM:  GENERAL:   Awake, alert; NAD.  HEENT:  Head NC/AT; Conjunctivae pink, Sclera anicteric & non-injected; Oral mucosa moist.  NECK:   Supple.  CARDIO:   RRR; S1 & S2 audible  RESP:  No respiratory distress or accessory muscle use. CTAB  GI:   Soft/ NT/ND / No guarding; No rebound tenderness.  EXT:   Without any cyanosis, clubbing, rash, lesions or edema.     INPATIENT MEDICATIONS:  acetaminophen   Tablet .. 650 milliGRAM(s) Oral every 6 hours  ALBUTerol    0.083% 2.5 milliGRAM(s) Nebulizer every 4 hours  buDESOnide 160 MICROgram(s)/formoterol 4.5 MICROgram(s) Inhaler 2 Puff(s) Inhalation two times a day  clonazePAM Tablet 0.5 milliGRAM(s) Oral two times a day  docusate sodium Liquid 100 milliGRAM(s) Oral two times a day  enoxaparin Injectable 40 milliGRAM(s) SubCutaneous at bedtime  guaiFENesin    Syrup 100 milliGRAM(s) Oral every 4 hours  loratadine 10 milliGRAM(s) Oral daily  pantoprazole   Suspension 40 milliGRAM(s) Oral daily  predniSONE   Tablet 10 milliGRAM(s) Oral daily  scopolamine   Patch 1 Patch Transdermal every 72 hours  senna 2 Tablet(s) Oral at bedtime  simvastatin 10 milliGRAM(s) Oral at bedtime  tiotropium 18 MICROgram(s) Capsule 1 Capsule(s) Inhalation daily    PRN MEDICATIONS  sodium chloride 0.65% Nasal 1 Spray(s) Both Nostrils every 2 hours PRN    ------------------------------------------------------------------------------------------------------------

## 2018-11-12 NOTE — PROGRESS NOTE ADULT - SUBJECTIVE AND OBJECTIVE BOX
Patient was seen and examined. Spoke with RN. Chart reviewed.  No events overnight.  Vital Signs Last 24 Hrs  T(F): 97.5 (12 Nov 2018 05:00), Max: 98.4 (11 Nov 2018 14:08)  HR: 68 (12 Nov 2018 05:00) (63 - 72)  BP: 112/56 (12 Nov 2018 05:00) (97/55 - 117/53)  SpO2: --  MEDICATIONS  (STANDING):  acetaminophen   Tablet .. 650 milliGRAM(s) Oral every 6 hours  ALBUTerol    0.083% 2.5 milliGRAM(s) Nebulizer every 4 hours  buDESOnide 160 MICROgram(s)/formoterol 4.5 MICROgram(s) Inhaler 2 Puff(s) Inhalation two times a day  clonazePAM Tablet 0.5 milliGRAM(s) Oral two times a day  docusate sodium Liquid 100 milliGRAM(s) Oral two times a day  enoxaparin Injectable 40 milliGRAM(s) SubCutaneous at bedtime  guaiFENesin    Syrup 100 milliGRAM(s) Oral every 4 hours  loratadine 10 milliGRAM(s) Oral daily  pantoprazole   Suspension 40 milliGRAM(s) Oral daily  predniSONE   Tablet 10 milliGRAM(s) Oral daily  scopolamine   Patch 1 Patch Transdermal every 72 hours  senna 2 Tablet(s) Oral at bedtime  simvastatin 10 milliGRAM(s) Oral at bedtime  tiotropium 18 MICROgram(s) Capsule 1 Capsule(s) Inhalation daily    MEDICATIONS  (PRN):  sodium chloride 0.65% Nasal 1 Spray(s) Both Nostrils every 2 hours PRN Nasal Congestion      General: anxious  Neurology: A&Ox3, nonfocal  Head:  Normocephalic, atraumatic  ENT:  Mucosa moist, no ulcerations  Neck:  Supple, no JVD,   Resp: rhonchi  CV: RRR, S1S2,   GI: Soft, NT, bowel sounds  MS: No edema, + peripheral pulses,       A/P:   Acute on chronic hypoxic hypercapnic respiratory failure 2/2 COPD exacerbation - resolved  -On home O2 2L NC, continue as needed  -c/w BiPAP at night and PRN  -albuterol prn  -s/p prednisone taper,   -symbicort 160/4.5 2 puffs BID ( patient feels symbicort makes him choke)  -daliresp non-formulary Resume upon discharge, as well as op inhaled nebulizers  -f/u with Dr. Almonte as outpatient.   -ambulate as tolerated .  -guaifenescin through PEG tube      # HTN/DLD  - cw home meds    # Anxiety  -Klonopin 0.5 BID    PEG feeding    - anxiolytics as needed    -D/c planning   DVT prophylaxis  Decubitus prevention- all measures as per RN protocol  Please call or text me with any questions or updates

## 2018-11-12 NOTE — PROGRESS NOTE ADULT - SUBJECTIVE AND OBJECTIVE BOX
ALETA SUAZO  63y, Male  Allergy: No Known Allergies      LAST 24-Hr EVENTS:  no new complaints  comfortable in bed    VITALS:  T(F): 97.5 (11-12-18 @ 05:00), Max: 98.4 (11-11-18 @ 14:08)  HR: 68 (11-12-18 @ 05:00)  BP: 112/56 (11-12-18 @ 05:00) (97/55 - 117/53)  RR: 97 (11-12-18 @ 07:43)  SpO2: --    PHYSICAL EXAM:    GENERAL: NAD  NECK: Supple, No JVD  CHEST/LUNG: CTA b/l  HEART: Regular rate and rhythm  ABDOMEN: Soft, Nontender, Nondistended peg tube  EXTREMITIES:  No clubbing, edema absent        TESTS & MEASUREMENTS:    IN: 2240 mL / OUT: 550 mL / NET: 1690 mL    IN: 560 mL / OUT: 900 mL / NET: -340 mL          MEDICATIONS:  MEDICATIONS  (STANDING):  acetaminophen   Tablet .. 650 milliGRAM(s) Oral every 6 hours  ALBUTerol    0.083% 2.5 milliGRAM(s) Nebulizer every 4 hours  buDESOnide 160 MICROgram(s)/formoterol 4.5 MICROgram(s) Inhaler 2 Puff(s) Inhalation two times a day  clonazePAM Tablet 0.5 milliGRAM(s) Oral two times a day  docusate sodium Liquid 100 milliGRAM(s) Oral two times a day  enoxaparin Injectable 40 milliGRAM(s) SubCutaneous at bedtime  guaiFENesin    Syrup 100 milliGRAM(s) Oral every 4 hours  loratadine 10 milliGRAM(s) Oral daily  pantoprazole   Suspension 40 milliGRAM(s) Oral daily  predniSONE   Tablet 10 milliGRAM(s) Oral daily  scopolamine   Patch 1 Patch Transdermal every 72 hours  senna 2 Tablet(s) Oral at bedtime  simvastatin 10 milliGRAM(s) Oral at bedtime  tiotropium 18 MICROgram(s) Capsule 1 Capsule(s) Inhalation daily    MEDICATIONS  (PRN):  sodium chloride 0.65% Nasal 1 Spray(s) Both Nostrils every 2 hours PRN Nasal Congestion

## 2018-11-12 NOTE — PROGRESS NOTE ADULT - ASSESSMENT
A/P:  62 yo M with COPD, chronic respiratory failure on BIPAP, HTN, DLD, vocal cord paralysis and dysphagia s/p PEG 5/ 18 in the setting of resistant esophageal candidiasis and inability to swallow, bought in from [employer] for cough productive, SOB and difficulty breathing of 1 day.    # Acute on chronic hypoxic hypercapnic respiratory failure 2/2 COPD exacerbation - resolved  - On home O2 2L NC, continue as needed.   - BIPAP at night and PRN (settings IPAP 12, EPAP 6 - outpatient set up)  - S/p prednisone taper, s/p 7 days Levaquin and 4 days cefepime  - Continue pulm recs: Spiriva, Symbicort, Daily 10 mg prednisone, anxiolytic therapy, aspiration precautions  - Multiple pulm nodules. Will follow up with Dr. Almonte on discharge  - Mucinex through PEG tube, scopolamine and loratadine for secretions    # PEG tube malfunction - resolved  -S/p PEG in May 2018 at [facility]  -Appreciate GI consult - PEG tube replaced (11/7) without complications - tolerating feeds without issues  -As per GI please do NOT put gauze between bumper and skin    # Dysphagia and vocal cord paralysis - Stable. outpatient ENT   # DLD - Simvastatin 10 mg qHS  # Anxiety -Clonopin 0.5 mg BID  # GI ppx   # DVT ppx -Lovenox 40 mg daily resume  # Disposition -D/c planning for home with home care - medically stable. Plan is for Tuesday 11/13  # Code status - FULL CODE

## 2018-11-12 NOTE — PROGRESS NOTE ADULT - REASON FOR ADMISSION
sob, cough and temp 103

## 2018-11-12 NOTE — PROGRESS NOTE ADULT - ASSESSMENT
Chronic hypoxic respiratory failure  COPD- end stage  multiple pulmonary nodules  Anxiety disorder  s/p peg replacement      low flow oxygen  bipap hs and prn  bronchodilators  inhaled corticosteroid,- could d/c on discharge and resume brovana  prednisone 10mg daily  out of bed/incentive spirometry  anxiolytic therapy  aspiration precautions  outpatient management of lung nodules although as discussed with dr duffy  discharge planning   d/w primary team

## 2018-11-13 VITALS
TEMPERATURE: 99 F | HEART RATE: 74 BPM | RESPIRATION RATE: 18 BRPM | DIASTOLIC BLOOD PRESSURE: 53 MMHG | SYSTOLIC BLOOD PRESSURE: 112 MMHG

## 2018-11-13 RX ADMIN — Medication 650 MILLIGRAM(S): at 12:32

## 2018-11-13 RX ADMIN — ALBUTEROL 2.5 MILLIGRAM(S): 90 AEROSOL, METERED ORAL at 03:45

## 2018-11-13 RX ADMIN — Medication 100 MILLIGRAM(S): at 05:23

## 2018-11-13 RX ADMIN — ALBUTEROL 2.5 MILLIGRAM(S): 90 AEROSOL, METERED ORAL at 08:00

## 2018-11-13 RX ADMIN — TIOTROPIUM BROMIDE 1 CAPSULE(S): 18 CAPSULE ORAL; RESPIRATORY (INHALATION) at 07:58

## 2018-11-13 RX ADMIN — Medication 100 MILLIGRAM(S): at 12:31

## 2018-11-13 RX ADMIN — ALBUTEROL 2.5 MILLIGRAM(S): 90 AEROSOL, METERED ORAL at 00:05

## 2018-11-13 RX ADMIN — ALBUTEROL 2.5 MILLIGRAM(S): 90 AEROSOL, METERED ORAL at 12:23

## 2018-11-13 RX ADMIN — SCOPALAMINE 1 PATCH: 1 PATCH, EXTENDED RELEASE TRANSDERMAL at 08:16

## 2018-11-13 RX ADMIN — Medication 650 MILLIGRAM(S): at 05:23

## 2018-11-13 RX ADMIN — Medication 10 MILLIGRAM(S): at 05:22

## 2018-11-13 RX ADMIN — LORATADINE 10 MILLIGRAM(S): 10 TABLET ORAL at 12:31

## 2018-11-13 RX ADMIN — Medication 0.5 MILLIGRAM(S): at 05:23

## 2018-11-13 RX ADMIN — Medication 100 MILLIGRAM(S): at 05:22

## 2018-11-13 RX ADMIN — Medication 650 MILLIGRAM(S): at 08:15

## 2018-11-13 NOTE — PROGRESS NOTE ADULT - PROVIDER SPECIALTY LIST ADULT
Internal Medicine
Nephrology
Pulmonology
Internal Medicine
Pulmonology
Gastroenterology
Gastroenterology

## 2018-11-13 NOTE — PROGRESS NOTE ADULT - ASSESSMENT
Chronic hypoxic respiratory failure  COPD- end stage  Multiple pulmonary nodules  Anxiety disorder  s/p peg replacement      low flow oxygen  bipap qhs and prn  bronchodilators  inhaled corticosteroid,- could d/c on discharge and resume brovana  prednisone 10mg daily  out of bed/incentive spirometry  anxiolytic therapy  aspiration precautions  outpatient management of lung nodules with primary pulmonologist dr duffy  discharge planning   dvt px

## 2018-11-13 NOTE — DIETITIAN INITIAL EVALUATION ADULT. - ENERGY NEEDS
HPI: Chest Pain


Time Seen by Provider: 18 19:48


Chief Complaint (Nursing): Chest Pain


Chief Complaint (Provider): Chest Pain


History Per: Patient


History/Exam Limitations: no limitations


Onset/Duration Of Symptoms: Days (x3)


Current Symptoms Are (Timing): Gone Now


Additional Complaint(s): 


23 year old  female presents to ED with complaints of chest pain, p

alpitations, nausea, and intermittent shortness of breath for 3 days. She denies

any fever, chills, cough, and vomiting. At present, patient reports resolution 

of symptoms. 





PCP: Dr. James Isidro





Past Medical History


Reviewed: Historical Data, Nursing Documentation, Vital Signs


Vital Signs: 





                                Last Vital Signs











Temp  98.5 F   18 19:38


 


Pulse  77   18 19:38


 


Resp  20   18 19:38


 


BP  128/76   18 19:38


 


Pulse Ox  100   18 19:38














- Medical History


PMH: Gall Bladder Disease ( gallstones)


   Denies: Chronic Kidney Disease





- Surgical History


Surgical History: Cholecystectomy (18),  (x2)





- Family History


Family History: States: Unknown Family Hx





- Social History


Current smoker - smoking cessation education provided: No


Alcohol: None


Drugs: Denies





- Immunization History


Hx Tetanus Toxoid Vaccination: Yes


Hx Influenza Vaccination: Yes


Hx Pneumococcal Vaccination: Yes





- Home Medications


Home Medications: 


                                Ambulatory Orders











 Medication  Instructions  Recorded


 


Amoxicillin/Clavulanate [Augmentin 1 tab PO BID 10/13/18





875 MG-125 MG]  


 


Dicyclomine [Bentyl] 20 mg PO TID PRN #12 tab 10/13/18


 


Famotidine [Pepcid AC] 10 mg PO QN #30 tablet 10/13/18














- Allergies


Allergies/Adverse Reactions: 


                                    Allergies











Allergy/AdvReac Type Severity Reaction Status Date / Time


 


latex Allergy  RASH Verified 10/13/18 12:37














Review of Systems


ROS Statement: Except As Marked, All Systems Reviewed And Found Negative


Constitutional: Negative for: Fever, Chills


Cardiovascular: Positive for: Chest Pain, Palpitations


Respiratory: Positive for: Shortness of Breath.  Negative for: Cough


Gastrointestinal: Positive for: Nausea.  Negative for: Vomiting





Physical Exam





- Reviewed


Nursing Documentation Reviewed: Yes


Vital Signs Reviewed: Yes





- Physical Exam


Appears: Positive for: Non-toxic, No Acute Distress


Head Exam: Positive for: ATRAUMATIC, NORMAL INSPECTION, NORMOCEPHALIC


Skin: Positive for: Normal Color


Eye Exam: Positive for: Normal appearance, EOMI, PERRL


ENT: Positive for: Normal ENT Inspection


Neck: Positive for: Normal


Cardiovascular/Chest: Positive for: Regular Rate, Rhythm, Chest Non Tender


Respiratory: Positive for: Normal Breath Sounds.  Negative for: Wheezing, 

Respiratory Distress


Gastrointestinal/Abdominal: Positive for: Normal Exam, Soft.  Negative for: 

Tenderness


Extremity: Positive for: Normal ROM (upper/lower).  Negative for: Pedal Edema, 

Calf Tenderness


Neurologic/Psych: Positive for: Alert, Oriented.  Negative for: Motor/Sensory 

Deficits





- Laboratory Results


Result Diagrams: 


                                 18 20:30





                                 18 20:30





- ECG


O2 Sat by Pulse Oximetry: 100 (RA)


Pulse Ox Interpretation: Normal





Medical Decision Making


Medical Decision Making: 


Initial Impression: 23 year old  female with chest pain and 

palpitations.


Initial Plan:


* EKG


* Labs





Time:  0


--Labs reviewed: no significant clinical abnormality. Upon provider 

reevaluation, patient is medically stable and remains asymptomatic in ED. 

Patient will be discharged home and advised to follow up with PCP for a Holter 

monitor study. Counseling was provided and all questions were answered regarding

diagnosis. There is agreement to discharge plan. Return if symptoms persist or 

worsen.





Clinical Impression: Palpitations





------------

--------------------------------------------------------------------------------


----------------------------


Scribe Attestation:


Documented by Sonya Ponce, acting as a scribe for Reginaldo Donald MD.


Provider Scribe Attestation:


All medical record entries made by the Scribe were at my direction and 

personally dictated by me. I have reviewed the chart and agree that the record 

accurately reflects my personal performance of the history, physical exam, 

medical decision making, and the department course for this patient. I have also

personally directed, reviewed, and agree with the discharge instructions and 

disposition.





Disposition





- Clinical Impression


Clinical Impression: 


 Palpitations








- Patient ED Disposition


Is Patient to be Admitted: No


Counseled Patient/Family Regarding: Studies Performed, Diagnosis, Need For 

Followup





- Disposition


Disposition: Routine/Home


Disposition Time: 21:40


Condition: STABLE


Instructions:  Palpitations


Forms:  [facility] (English)


Print Language: Irish total kcal = 2546-9315 kcal/day (30-35 kcal/kg CBW d/t stage II pressure ulcer and malnutrition with wt loss).  total protein = 66-79 g/day (1.25-1.5 g/kg CBW d/t stage II pressure ulcer).   total fluid = per LIP.

## 2018-11-13 NOTE — PROGRESS NOTE ADULT - SUBJECTIVE AND OBJECTIVE BOX
ALETA SUAZO  63y, Male  Allergy: No Known Allergies      LAST 24-Hr EVENTS:  no ovenight events  respiratory status at baseline  no inc cough or sob    VITALS:  T(F): 98.5 (11-13-18 @ 04:39), Max: 98.5 (11-13-18 @ 04:39)  HR: 66 (11-13-18 @ 04:39)  BP: 118/56 (11-13-18 @ 04:39) (112/53 - 118/56)  RR: 18 (11-13-18 @ 04:39)  SpO2: 97% (11-12-18 @ 20:59)    PHYSICAL EXAM:    GENERAL: NAD  NECK: Supple, No JVD  CHEST/LUNG: CTA b/l  HEART: Regular rate and rhythm  ABDOMEN: Soft, Nontender, Nondistended peg tube  EXTREMITIES:  No clubbing, edema absent        TESTS & MEASUREMENTS:    IN: 560 mL / OUT: 900 mL / NET: -340 mL    IN: 460 mL / OUT: 350 mL / NET: 110 mL      MEDICATIONS:  MEDICATIONS  (STANDING):  acetaminophen   Tablet .. 650 milliGRAM(s) Oral every 6 hours  ALBUTerol    0.083% 2.5 milliGRAM(s) Nebulizer every 4 hours  buDESOnide 160 MICROgram(s)/formoterol 4.5 MICROgram(s) Inhaler 2 Puff(s) Inhalation two times a day  clonazePAM Tablet 0.5 milliGRAM(s) Oral two times a day  docusate sodium Liquid 100 milliGRAM(s) Oral two times a day  enoxaparin Injectable 40 milliGRAM(s) SubCutaneous at bedtime  guaiFENesin    Syrup 100 milliGRAM(s) Oral every 4 hours  loratadine 10 milliGRAM(s) Oral daily  pantoprazole   Suspension 40 milliGRAM(s) Oral daily  predniSONE   Tablet 10 milliGRAM(s) Oral daily  scopolamine   Patch 1 Patch Transdermal every 72 hours  senna 2 Tablet(s) Oral at bedtime  simvastatin 10 milliGRAM(s) Oral at bedtime  tiotropium 18 MICROgram(s) Capsule 1 Capsule(s) Inhalation daily    MEDICATIONS  (PRN):  sodium chloride 0.65% Nasal 1 Spray(s) Both Nostrils every 2 hours PRN Nasal Congestion

## 2018-11-13 NOTE — CHART NOTE - NSCHARTNOTEFT_GEN_A_CORE
<<<RESIDENT DISCHARGE NOTE>>>     ALETA SUAZO  MRN-[id number]    VITAL SIGNS:  T(F): 98.5 (11-13-18 @ 04:39), Max: 98.5 (11-13-18 @ 04:39)  HR: 66 (11-13-18 @ 04:39)  BP: 118/56 (11-13-18 @ 04:39)  SpO2: 97% (11-12-18 @ 20:59)      PHYSICAL EXAM:  GENERAL: no apparent distress. AOX3.  HEENT: NCAT   LUNGS: No respiratory distress or accessory muscle use. CTAB. On Nasal Canula  HEART: RRR, S1 S2 Audible  ABDOMEN: Soft, nontender, and nondistended.  No gaurding or rigidity.  No hepatosplenomegaly was noted. Feeding tube noted. dressing c/d/i      FINAL DISCHARGE INTERVIEW:  Resident(s) Present: (Name:___Marquis__________), RN Present: (Name:  ____Perri_______)    DISCHARGE MEDICATION RECONCILIATION  reviewed with Attending (Name:_____Sarath_____)    DISPOSITION:   [  ] Home,    [ X ] Home with Visiting Nursing Services,   [    ]  SNF/ NH,    [   ] Acute Rehab (4A),   [   ] Other (Specify:_________)

## 2018-11-15 DIAGNOSIS — R91.8 OTHER NONSPECIFIC ABNORMAL FINDING OF LUNG FIELD: ICD-10-CM

## 2018-11-15 DIAGNOSIS — J44.1 CHRONIC OBSTRUCTIVE PULMONARY DISEASE WITH (ACUTE) EXACERBATION: ICD-10-CM

## 2018-11-15 DIAGNOSIS — E78.00 PURE HYPERCHOLESTEROLEMIA, UNSPECIFIED: ICD-10-CM

## 2018-11-15 DIAGNOSIS — K94.23 GASTROSTOMY MALFUNCTION: ICD-10-CM

## 2018-11-15 DIAGNOSIS — R00.0 TACHYCARDIA, UNSPECIFIED: ICD-10-CM

## 2018-11-15 DIAGNOSIS — J06.9 ACUTE UPPER RESPIRATORY INFECTION, UNSPECIFIED: ICD-10-CM

## 2018-11-15 DIAGNOSIS — J96.21 ACUTE AND CHRONIC RESPIRATORY FAILURE WITH HYPOXIA: ICD-10-CM

## 2018-11-15 DIAGNOSIS — J96.22 ACUTE AND CHRONIC RESPIRATORY FAILURE WITH HYPERCAPNIA: ICD-10-CM

## 2018-11-15 DIAGNOSIS — I10 ESSENTIAL (PRIMARY) HYPERTENSION: ICD-10-CM

## 2018-11-15 DIAGNOSIS — F41.9 ANXIETY DISORDER, UNSPECIFIED: ICD-10-CM

## 2018-11-15 DIAGNOSIS — Z86.79 PERSONAL HISTORY OF OTHER DISEASES OF THE CIRCULATORY SYSTEM: ICD-10-CM

## 2018-11-15 DIAGNOSIS — R13.10 DYSPHAGIA, UNSPECIFIED: ICD-10-CM

## 2018-11-15 DIAGNOSIS — R06.4 HYPERVENTILATION: ICD-10-CM

## 2018-11-15 DIAGNOSIS — Z87.891 PERSONAL HISTORY OF NICOTINE DEPENDENCE: ICD-10-CM

## 2018-11-15 DIAGNOSIS — J38.00 PARALYSIS OF VOCAL CORDS AND LARYNX, UNSPECIFIED: ICD-10-CM

## 2018-11-15 DIAGNOSIS — R06.82 TACHYPNEA, NOT ELSEWHERE CLASSIFIED: ICD-10-CM

## 2018-11-15 PROBLEM — Z93.1 GASTROSTOMY STATUS: Chronic | Status: ACTIVE | Noted: 2018-10-13

## 2018-11-16 ENCOUNTER — APPOINTMENT (OUTPATIENT)
Age: 63
End: 2018-11-16
Payer: MEDICARE

## 2018-11-16 VITALS
SYSTOLIC BLOOD PRESSURE: 130 MMHG | TEMPERATURE: 97 F | HEART RATE: 88 BPM | OXYGEN SATURATION: 97 % | DIASTOLIC BLOOD PRESSURE: 80 MMHG | RESPIRATION RATE: 20 BRPM

## 2018-11-16 DIAGNOSIS — R13.10 DYSPHAGIA, UNSPECIFIED: ICD-10-CM

## 2018-11-16 DIAGNOSIS — J44.9 CHRONIC OBSTRUCTIVE PULMONARY DISEASE, UNSPECIFIED: ICD-10-CM

## 2018-11-16 PROCEDURE — 99348 HOME/RES VST EST LOW MDM 30: CPT

## 2018-11-16 RX ORDER — LORATADINE 5 MG/5 ML
10 SOLUTION, ORAL ORAL DAILY
Refills: 0 | Status: ACTIVE | COMMUNITY

## 2018-11-16 RX ORDER — OMEPRAZOLE 20 MG/1
20 CAPSULE, DELAYED RELEASE ORAL DAILY
Refills: 0 | Status: ACTIVE | COMMUNITY

## 2018-11-16 RX ORDER — DEXTROMETHORPHAN HYDROBROMIDE, GUAIFENESIN, AND PHENYLEPHRINE HYDROCHLORIDE 5; 100; 2.5 MG/5ML; MG/5ML; MG/5ML
SOLUTION ORAL
Refills: 0 | Status: ACTIVE | COMMUNITY

## 2018-11-16 RX ORDER — ROFLUMILAST 500 UG/1
500 TABLET ORAL DAILY
Refills: 0 | Status: ACTIVE | COMMUNITY

## 2018-11-16 RX ORDER — CLONAZEPAM 0.5 MG/1
0.5 TABLET ORAL
Refills: 0 | Status: ACTIVE | COMMUNITY

## 2018-11-16 RX ORDER — SIMVASTATIN 10 MG/1
10 TABLET, FILM COATED ORAL
Refills: 0 | Status: ACTIVE | COMMUNITY

## 2018-11-16 RX ORDER — ALBUTEROL SULFATE 2.5 MG/3ML
(2.5 MG/3ML) SOLUTION RESPIRATORY (INHALATION)
Qty: 1 | Refills: 3 | Status: ACTIVE | COMMUNITY

## 2018-11-16 RX ORDER — FLUTICASONE PROPIONATE 220 MCG
220 AEROSOL WITH ADAPTER (GRAM) INHALATION
Refills: 0 | Status: ACTIVE | COMMUNITY

## 2018-11-16 RX ORDER — SCOPOLAMINE 1 MG/3D
1.5 PATCH, EXTENDED RELEASE TRANSDERMAL
Refills: 0 | Status: ACTIVE | COMMUNITY

## 2018-11-16 RX ORDER — DOCUSATE SODIUM 50 MG/5 ML
LIQUID (ML) ORAL
Refills: 0 | Status: ACTIVE | COMMUNITY

## 2018-11-16 RX ORDER — PREDNISONE 10 MG/1
10 TABLET ORAL DAILY
Refills: 0 | Status: ACTIVE | COMMUNITY

## 2018-11-16 RX ORDER — NYSTATIN 100000 [USP'U]/G
CREAM TOPICAL TWICE DAILY
Refills: 0 | Status: ACTIVE | COMMUNITY

## 2018-11-16 RX ORDER — TIOTROPIUM BROMIDE 18 UG/1
18 CAPSULE ORAL; RESPIRATORY (INHALATION) DAILY
Refills: 0 | Status: ACTIVE | COMMUNITY

## 2018-11-16 RX ORDER — ACETAMINOPHEN 500 MG/1
TABLET ORAL
Refills: 0 | Status: ACTIVE | COMMUNITY

## 2018-11-16 RX ORDER — AMLODIPINE BESYLATE 10 MG/1
10 TABLET ORAL DAILY
Refills: 0 | Status: ACTIVE | COMMUNITY

## 2018-11-16 NOTE — HISTORY OF PRESENT ILLNESS
[FreeTextEntry1] : CC: Patient seen at home s/p recent admission for COPD exacerbation, patient see at home to follow up on SOB. patient is temporarily unable to leave home as it requires a considerable and taxing effort , exhibits and unsteady gait and requires assistive devices to leave home.\par  [de-identified] : Patient is a 62 y/o M  seen at home with a PMH of  COPD, chronic respiratory failure on BIPAP, HTN, CRF, DLD, vocal cord paralysis and dysphagia s/p PEG 5/18 .He was admitted for Acute hypercapnic and hypoxic respiratory failure 2/2 COPD Exacerbation and found stable for discharge by the inpatient team. He was discharged home with [facility] services and 24 hour HHA. On arrival to home he is in no acute distress, denies chest pain , SOB, cough , fever and palpitations, using 02 via NC 2l as ordered.

## 2018-11-16 NOTE — PHYSICAL EXAM
[No Acute Distress] : no acute distress [Well-Appearing] : well-appearing [PERRL] : pupils equal round and reactive to light [No JVD] : no jugular venous distention [No Respiratory Distress] : no respiratory distress  [Clear to Auscultation] : lungs were clear to auscultation bilaterally [No Accessory Muscle Use] : no accessory muscle use [Normal Rate] : normal rate  [Regular Rhythm] : with a regular rhythm [Normal S1, S2] : normal S1 and S2 [Pedal Pulses Present] : the pedal pulses are present [No Edema] : there was no peripheral edema [Soft] : abdomen soft [Non Tender] : non-tender [No Joint Swelling] : no joint swelling [No Rash] : no rash [No Focal Deficits] : no focal deficits [Normal Affect] : the affect was normal [Alert and Oriented x3] : oriented to person, place, and time [Normal Insight/Judgement] : insight and judgment were intact [de-identified] : peg tube in place [de-identified] : uses walker

## 2018-11-16 NOTE — PLAN
[FreeTextEntry1] : COPD – c/w supplemental oxygen 2l \par                c/w BIPAP at night and PRN \par                c/w prednisone 10mg daily , bronchodilators , inhaled corticosteroids \par                Follow up with Dr Box pulmonary \par Dysphagia – peg tube in place , tolerating feeds without issue\par                      Aspiration precautions.\par \par

## 2019-01-01 NOTE — DIETITIAN INITIAL EVALUATION ADULT. - ENTERAL KCAL
INFANT IS BEING DISCHARGED TODAY. INFANT IS BREAST FEEDING AT LEAST 10 MINIS
ON EACH BREAST.  DISCUSSED WITH MOM THAT INFANT NEEDS TO BREAST FEED AT LEAST
EVERY 3 HRS. 3983

## 2019-04-05 NOTE — ED CLERICAL - NSCLERICAL TASK_GEN_ALL_ED
Case Management Discharge Note    Final Note: Plan ishome at discharge. Pt denies discharge needs. Reports she has transportation home.     Destination      No service has been selected for the patient.      Durable Medical Equipment      No service has been selected for the patient.      Dialysis/Infusion      No service has been selected for the patient.      Home Medical Care      No service has been selected for the patient.      Therapy      No service has been selected for the patient.      Community Resources      No service has been selected for the patient.             Final Discharge Disposition Code: 01 - home or self-care   Pre-Hospital Care Report (PCR)

## 2020-01-07 NOTE — ED PROVIDER NOTE - NS ED MD DISPO ISOLATION TYPES
None Rotation Flap Text: The defect edges were debeveled with a #15 scalpel blade.  Given the location of the defect, shape of the defect and the proximity to free margins a rotation flap was deemed most appropriate.  Using a sterile surgical marker, an appropriate rotation flap was drawn incorporating the defect and placing the expected incisions within the relaxed skin tension lines where possible.    The area thus outlined was incised deep to adipose tissue with a #15 scalpel blade.  The skin margins were undermined to an appropriate distance in all directions utilizing iris scissors.

## 2020-02-21 ENCOUNTER — INPATIENT (INPATIENT)
Facility: HOSPITAL | Age: 65
LOS: 2 days | Discharge: HOSPICE | End: 2020-02-24
Attending: INTERNAL MEDICINE | Admitting: INTERNAL MEDICINE
Payer: MEDICARE

## 2020-02-21 VITALS
OXYGEN SATURATION: 97 % | SYSTOLIC BLOOD PRESSURE: 133 MMHG | TEMPERATURE: 98 F | DIASTOLIC BLOOD PRESSURE: 58 MMHG | RESPIRATION RATE: 18 BRPM | HEART RATE: 110 BPM

## 2020-02-21 DIAGNOSIS — Z93.1 GASTROSTOMY STATUS: Chronic | ICD-10-CM

## 2020-02-21 LAB
ALBUMIN SERPL ELPH-MCNC: 3.3 G/DL — LOW (ref 3.5–5.2)
ALP SERPL-CCNC: 108 U/L — SIGNIFICANT CHANGE UP (ref 30–115)
ALT FLD-CCNC: 10 U/L — SIGNIFICANT CHANGE UP (ref 0–41)
ANION GAP SERPL CALC-SCNC: 13 MMOL/L — SIGNIFICANT CHANGE UP (ref 7–14)
AST SERPL-CCNC: 15 U/L — SIGNIFICANT CHANGE UP (ref 0–41)
BASE EXCESS BLDV CALC-SCNC: 8.7 MMOL/L — HIGH (ref -2–2)
BILIRUB SERPL-MCNC: <0.2 MG/DL — SIGNIFICANT CHANGE UP (ref 0.2–1.2)
BUN SERPL-MCNC: 19 MG/DL — SIGNIFICANT CHANGE UP (ref 10–20)
CA-I SERPL-SCNC: 1.2 MMOL/L — SIGNIFICANT CHANGE UP (ref 1.12–1.3)
CALCIUM SERPL-MCNC: 9.1 MG/DL — SIGNIFICANT CHANGE UP (ref 8.5–10.1)
CHLORIDE SERPL-SCNC: 96 MMOL/L — LOW (ref 98–110)
CO2 SERPL-SCNC: 30 MMOL/L — SIGNIFICANT CHANGE UP (ref 17–32)
CREAT SERPL-MCNC: 0.7 MG/DL — SIGNIFICANT CHANGE UP (ref 0.7–1.5)
FLU A RESULT: NEGATIVE — SIGNIFICANT CHANGE UP
FLU A RESULT: NEGATIVE — SIGNIFICANT CHANGE UP
FLUAV AG NPH QL: NEGATIVE — SIGNIFICANT CHANGE UP
FLUBV AG NPH QL: NEGATIVE — SIGNIFICANT CHANGE UP
GAS PNL BLDV: 142 MMOL/L — SIGNIFICANT CHANGE UP (ref 136–145)
GAS PNL BLDV: SIGNIFICANT CHANGE UP
GLUCOSE SERPL-MCNC: 95 MG/DL — SIGNIFICANT CHANGE UP (ref 70–99)
HCO3 BLDV-SCNC: 36 MMOL/L — HIGH (ref 22–29)
HCT VFR BLD CALC: 30.9 % — LOW (ref 42–52)
HCT VFR BLDA CALC: 32.2 % — LOW (ref 34–44)
HGB BLD CALC-MCNC: 10.5 G/DL — LOW (ref 14–18)
HGB BLD-MCNC: 9.5 G/DL — LOW (ref 14–18)
LACTATE BLDV-MCNC: 2.1 MMOL/L — HIGH (ref 0.5–1.6)
MCHC RBC-ENTMCNC: 28.4 PG — SIGNIFICANT CHANGE UP (ref 27–31)
MCHC RBC-ENTMCNC: 30.7 G/DL — LOW (ref 32–37)
MCV RBC AUTO: 92.5 FL — SIGNIFICANT CHANGE UP (ref 80–94)
NRBC # BLD: 0 /100 WBCS — SIGNIFICANT CHANGE UP (ref 0–0)
PCO2 BLDV: 60 MMHG — HIGH (ref 41–51)
PH BLDV: 7.38 — SIGNIFICANT CHANGE UP (ref 7.26–7.43)
PLATELET # BLD AUTO: 592 K/UL — HIGH (ref 130–400)
PO2 BLDV: 26 MMHG — SIGNIFICANT CHANGE UP (ref 20–40)
POTASSIUM BLDV-SCNC: 4.4 MMOL/L — SIGNIFICANT CHANGE UP (ref 3.3–5.6)
POTASSIUM SERPL-MCNC: 4.7 MMOL/L — SIGNIFICANT CHANGE UP (ref 3.5–5)
POTASSIUM SERPL-SCNC: 4.7 MMOL/L — SIGNIFICANT CHANGE UP (ref 3.5–5)
PROT SERPL-MCNC: 6.7 G/DL — SIGNIFICANT CHANGE UP (ref 6–8)
RBC # BLD: 3.34 M/UL — LOW (ref 4.7–6.1)
RBC # FLD: 14.3 % — SIGNIFICANT CHANGE UP (ref 11.5–14.5)
RSV RESULT: NEGATIVE — SIGNIFICANT CHANGE UP
RSV RNA RESP QL NAA+PROBE: NEGATIVE — SIGNIFICANT CHANGE UP
SAO2 % BLDV: 42 % — SIGNIFICANT CHANGE UP
SODIUM SERPL-SCNC: 139 MMOL/L — SIGNIFICANT CHANGE UP (ref 135–146)
TROPONIN T SERPL-MCNC: 0.02 NG/ML — HIGH
WBC # BLD: 16.33 K/UL — HIGH (ref 4.8–10.8)
WBC # FLD AUTO: 16.33 K/UL — HIGH (ref 4.8–10.8)

## 2020-02-21 PROCEDURE — 99285 EMERGENCY DEPT VISIT HI MDM: CPT | Mod: GC

## 2020-02-21 PROCEDURE — 71045 X-RAY EXAM CHEST 1 VIEW: CPT | Mod: 26

## 2020-02-21 PROCEDURE — 93010 ELECTROCARDIOGRAM REPORT: CPT

## 2020-02-21 PROCEDURE — 71260 CT THORAX DX C+: CPT | Mod: 26

## 2020-02-21 RX ORDER — CEFTRIAXONE 500 MG/1
1000 INJECTION, POWDER, FOR SOLUTION INTRAMUSCULAR; INTRAVENOUS ONCE
Refills: 0 | Status: COMPLETED | OUTPATIENT
Start: 2020-02-21 | End: 2020-02-21

## 2020-02-21 RX ORDER — AZITHROMYCIN 500 MG/1
500 TABLET, FILM COATED ORAL ONCE
Refills: 0 | Status: COMPLETED | OUTPATIENT
Start: 2020-02-21 | End: 2020-02-21

## 2020-02-21 RX ORDER — IPRATROPIUM/ALBUTEROL SULFATE 18-103MCG
0 AEROSOL WITH ADAPTER (GRAM) INHALATION
Qty: 0 | Refills: 0 | DISCHARGE

## 2020-02-21 RX ORDER — ENOXAPARIN SODIUM 100 MG/ML
40 INJECTION SUBCUTANEOUS DAILY
Refills: 0 | Status: DISCONTINUED | OUTPATIENT
Start: 2020-02-21 | End: 2020-02-24

## 2020-02-21 RX ORDER — ALPRAZOLAM 0.25 MG
1 TABLET ORAL
Refills: 0 | Status: DISCONTINUED | OUTPATIENT
Start: 2020-02-21 | End: 2020-02-24

## 2020-02-21 RX ORDER — ROBINUL 0.2 MG/ML
1 INJECTION INTRAMUSCULAR; INTRAVENOUS
Refills: 0 | Status: DISCONTINUED | OUTPATIENT
Start: 2020-02-21 | End: 2020-02-24

## 2020-02-21 RX ORDER — IPRATROPIUM/ALBUTEROL SULFATE 18-103MCG
3 AEROSOL WITH ADAPTER (GRAM) INHALATION
Refills: 0 | Status: COMPLETED | OUTPATIENT
Start: 2020-02-21 | End: 2020-02-21

## 2020-02-21 RX ORDER — ALPRAZOLAM 0.25 MG
1 TABLET ORAL
Qty: 0 | Refills: 0 | DISCHARGE

## 2020-02-21 RX ORDER — CHLORHEXIDINE GLUCONATE 213 G/1000ML
1 SOLUTION TOPICAL
Refills: 0 | Status: DISCONTINUED | OUTPATIENT
Start: 2020-02-21 | End: 2020-02-24

## 2020-02-21 RX ORDER — MAGNESIUM SULFATE 500 MG/ML
2 VIAL (ML) INJECTION ONCE
Refills: 0 | Status: COMPLETED | OUTPATIENT
Start: 2020-02-21 | End: 2020-02-21

## 2020-02-21 RX ORDER — ROBINUL 0.2 MG/ML
1 INJECTION INTRAMUSCULAR; INTRAVENOUS
Qty: 0 | Refills: 0 | DISCHARGE

## 2020-02-21 RX ORDER — SODIUM CHLORIDE 0.65 %
1 AEROSOL, SPRAY (ML) NASAL
Refills: 0 | Status: DISCONTINUED | OUTPATIENT
Start: 2020-02-21 | End: 2020-02-24

## 2020-02-21 RX ORDER — IPRATROPIUM/ALBUTEROL SULFATE 18-103MCG
3 AEROSOL WITH ADAPTER (GRAM) INHALATION EVERY 6 HOURS
Refills: 0 | Status: DISCONTINUED | OUTPATIENT
Start: 2020-02-21 | End: 2020-02-24

## 2020-02-21 RX ADMIN — Medication 3 MILLILITER(S): at 11:00

## 2020-02-21 RX ADMIN — CEFTRIAXONE 100 MILLIGRAM(S): 500 INJECTION, POWDER, FOR SOLUTION INTRAMUSCULAR; INTRAVENOUS at 12:38

## 2020-02-21 RX ADMIN — ENOXAPARIN SODIUM 40 MILLIGRAM(S): 100 INJECTION SUBCUTANEOUS at 18:30

## 2020-02-21 RX ADMIN — Medication 1 SPRAY(S): at 18:29

## 2020-02-21 RX ADMIN — Medication 1 MILLIGRAM(S): at 23:15

## 2020-02-21 RX ADMIN — Medication 125 MILLIGRAM(S): at 11:06

## 2020-02-21 RX ADMIN — Medication 3 MILLILITER(S): at 11:17

## 2020-02-21 RX ADMIN — Medication 3 MILLILITER(S): at 11:29

## 2020-02-21 RX ADMIN — Medication 50 GRAM(S): at 11:04

## 2020-02-21 RX ADMIN — Medication 0.5 MILLIGRAM(S): at 18:30

## 2020-02-21 RX ADMIN — Medication 60 MILLIGRAM(S): at 18:39

## 2020-02-21 RX ADMIN — Medication 110 MILLIGRAM(S): at 18:31

## 2020-02-21 RX ADMIN — AZITHROMYCIN 255 MILLIGRAM(S): 500 TABLET, FILM COATED ORAL at 14:03

## 2020-02-21 RX ADMIN — ROBINUL 1 MILLIGRAM(S): 0.2 INJECTION INTRAMUSCULAR; INTRAVENOUS at 18:31

## 2020-02-21 NOTE — H&P ADULT - NSHPPHYSICALEXAM_GEN_ALL_CORE
General: NAD, cachetic, appears older than stated age  HENT: NCAT, EOMI  Pulm: CTAB, no accessory muscle use, no respiratory distress  CVS: Tachycardic  GI: Cachectic, non tender, PEG tube with no surrounding erythema  Extremities: no edema  Neuro: AAOx3, vocal cord paralysis

## 2020-02-21 NOTE — ED ADULT TRIAGE NOTE - CHIEF COMPLAINT QUOTE
shortness of breath and chest pain, patient on 4L nc home O2. patient has Peg tube in place, patient takes Jevity 1.2 every 5-6 hours

## 2020-02-21 NOTE — ED PROVIDER NOTE - NS ED ROS FT
Constitutional: See HPI.  Eyes: No visual changes, eye pain or discharge. No Photophobia  ENMT: No hearing changes, pain, discharge or infections. No neck pain or stiffness. No limited ROM  Cardiac: see hpi   Respiratory: see hpi .   GI: No nausea, vomiting, diarrhea or abdominal pain.  : No dysuria, frequency or burning. No Discharge  MS: No myalgia, muscle weakness, joint pain or back pain.  Neuro: No headache or weakness. No LOC.  Skin: No skin rash.  Except as documented in the HPI, all other systems are negative.

## 2020-02-21 NOTE — ED PROVIDER NOTE - PHYSICAL EXAMINATION
VITAL SIGNS: I have reviewed nursing notes and confirm.  CONSTITUTIONAL: appears stated age, cachetic appearing  SKIN: Warm dry, normal skin turgor  HEAD: NCAT  EYES: EOMI, PERRLA, no scleral icterus  ENT: Moist mucous membranes, normal pharynx   NECK: Supple; non tender. Full ROM. No cervical LAD  CARD: tachycardic, no murmurs, rubs or gallops  RESP: diffuse exp wheezes bilaterally   ABD: soft, + BS, non-tender, non-distended, no rebound or guarding.   EXT: Full ROM, no bony tenderness, no pedal edema, no calf tenderness  NEURO: non-focal, a/o x3   PSYCH: Cooperative, appropriate.

## 2020-02-21 NOTE — H&P ADULT - ASSESSMENT
65 yo M with COPD on 4L o2, chronic respiratory failure on BIPAP, HTN, DLD, vocal cord paralysis and dysphagia s/p PEG presents with shortness of breath, CXR showed known large chest mass, never biopsied    Shortness of breath, likely COPD exacerbation with chronic respiratory failure: Patient sat'ing at 100% on 4L, which is his home O2. No wheezing at this time. VBG showed pH 7.38 with pCo2 60, doubt PNA (no fever, leukocytosis likely due to chronic steroids)  - Solumedrol  - Doxycycline  - Duonebs  - Wean O2    Large chest mass: Per patient, was told about it 6 months ago in ED at [facility], spoke with his outpatient provider who together decided to hold off on biopsy; patient admits to 70 lb weight loss over the past 12 months, states its due to his dysphagia  - Pulm consult  - CT chest  - Pt states he is willing to due to biopsy if pulmonology agrees due to performance status    Dysphagia: Pt states he is allowed pureed food at home  - Speech and swallow consult  - Jevity 1.2    Vocal cord paralysis: Pt states he takes sublingual atropine and glycopyrrolate at home  - Glycopyrrolate    HTN/DLD;  - Per patient, off meds now    Anxiety:  - Xanax 1g q6    DVT ppx; lovenox  Dispo: From home, has aid

## 2020-02-21 NOTE — H&P ADULT - ATTENDING COMMENTS
64M PMH: Chronic Respiratory Failure 2/2 COPD on 4L o2, BIPAP, HTN, DLD, vocal cord paralysis and dysphagia s/p PEG, Metastatic Lung CA for which mutual decision with involved parties per pulmonary have decided not to pursue biopsy. He presents with dyspnea.    Dyspnea 2/2 Metastatic Lung Cancer worsening and COPD Exarc     Vital Signs Last 24 Hrs  T(C): 36.4 (22 Feb 2020 14:37), Max: 36.4 (22 Feb 2020 14:37)  T(F): 97.6 (22 Feb 2020 14:37), Max: 97.6 (22 Feb 2020 14:37)  HR: 86 (22 Feb 2020 14:37) (78 - 88)  BP: 130/60 (22 Feb 2020 14:37) (130/60 - 147/67)  RR: 18 (22 Feb 2020 14:37) (18 - 18)  SpO2: 98% (21 Feb 2020 23:00) (98% - 98%)    MEDICATIONS  (STANDING):  albuterol/ipratropium for Nebulization 3 milliLiter(s) Nebulizer every 6 hours  ALPRAZolam 1 milliGRAM(s) Oral four times a day  chlorhexidine 4% Liquid 1 Application(s) Topical <User Schedule>  doxycycline IVPB 100 milliGRAM(s) IV Intermittent every 12 hours  enoxaparin Injectable 40 milliGRAM(s) SubCutaneous daily  glycopyrrolate 1 milliGRAM(s) Oral two times a day  influenza   Vaccine 0.5 milliLiter(s) IntraMuscular once  methylPREDNISolone sodium succinate Injectable 60 milliGRAM(s) IV Push two times a day  sodium chloride 0.65% Nasal 1 Spray(s) Both Nostrils two times a day    Gen: NAD  CV: NL S1, S2  Resp: Decreased BS bilateral   GI: PEG in place working otherwise WNL   Ext: No e/c/c   MS: AOx3                        9.4    16.72 )-----------( 566      ( 22 Feb 2020 06:35 )             29.4     02-22    140  |  97<L>  |  26<H>  ----------------------------<  211<H>  4.6   |  24  |  0.8    Ca    9.3      22 Feb 2020 06:35  Mg     2.2     02-22    TPro  6.5  /  Alb  3.4<L>  /  TBili  <0.2  /  DBili  x   /  AST  13  /  ALT  10  /  AlkPhos  107  02-22    CT Chest IV Constrast:     IMPRESSION:  1. Left upper lobe 10.5 x 10.4 x 8.4 cm lung mass, invading the left major fissure into left lower lobe, left parietal pleura and medial mediastinal pleura, with direct invasion into the mediastinum, encasing and narrowing patent left main pulmonary artery. Lung cancer suspected.  2. Lingula and nodular airspace opacities measuring up to 1 cm, which may be neoplastic or inflammatory/infectious in etiology. Peripheral left lower lobe airspace consolidation, possibly infectious/inflammatory in etiology.  3. Partially imaged bilateral hydronephrosis.    CXR: Lt Lung Mass    Assessment and Plan:    Dyspnea 2/2 Acute on Chronic COPD Exarc with Suspected PNA vs Bronchitis   - Solumedrol 60mg IV q8h  - Duonebs  - BiPAP qhs and day as needed   - Doxycycline 100mg po bid   - Add Cefepime 1g IV q12h  - Oxygen supplementation keep sats 92  - NO PE  - Burn Consult for Decubitus Ulcers evaluation (leukocytosis)   - Resume all other home meds  - Pulmonary eval noted   - Check TTE  - Serial trops if negative d/c tele    GI/DVT proph   Dietary eval   Puree Diet after swallow done 64M PMH: Chronic Respiratory Failure 2/2 COPD on 4L o2, BIPAP, HTN, DLD, vocal cord paralysis and dysphagia s/p PEG, Metastatic Lung CA for which mutual decision with involved parties per pulmonary have decided not to pursue biopsy. He presents with dyspnea.    Dyspnea 2/2 Metastatic Lung Cancer worsening and COPD Exarc and Suspected Gram Negative PNA     Vital Signs Last 24 Hrs  T(C): 36.4 (22 Feb 2020 14:37), Max: 36.4 (22 Feb 2020 14:37)  T(F): 97.6 (22 Feb 2020 14:37), Max: 97.6 (22 Feb 2020 14:37)  HR: 86 (22 Feb 2020 14:37) (78 - 88)  BP: 130/60 (22 Feb 2020 14:37) (130/60 - 147/67)  RR: 18 (22 Feb 2020 14:37) (18 - 18)  SpO2: 98% (21 Feb 2020 23:00) (98% - 98%)    MEDICATIONS  (STANDING):  albuterol/ipratropium for Nebulization 3 milliLiter(s) Nebulizer every 6 hours  ALPRAZolam 1 milliGRAM(s) Oral four times a day  chlorhexidine 4% Liquid 1 Application(s) Topical <User Schedule>  doxycycline IVPB 100 milliGRAM(s) IV Intermittent every 12 hours  enoxaparin Injectable 40 milliGRAM(s) SubCutaneous daily  glycopyrrolate 1 milliGRAM(s) Oral two times a day  influenza   Vaccine 0.5 milliLiter(s) IntraMuscular once  methylPREDNISolone sodium succinate Injectable 60 milliGRAM(s) IV Push two times a day  sodium chloride 0.65% Nasal 1 Spray(s) Both Nostrils two times a day    Gen: NAD  CV: NL S1, S2  Resp: Decreased BS bilateral   GI: PEG in place working otherwise WNL   Ext: No e/c/c   MS: AOx3                        9.4    16.72 )-----------( 566      ( 22 Feb 2020 06:35 )             29.4     02-22    140  |  97<L>  |  26<H>  ----------------------------<  211<H>  4.6   |  24  |  0.8    Ca    9.3      22 Feb 2020 06:35  Mg     2.2     02-22    TPro  6.5  /  Alb  3.4<L>  /  TBili  <0.2  /  DBili  x   /  AST  13  /  ALT  10  /  AlkPhos  107  02-22    CT Chest IV Constrast:     IMPRESSION:  1. Left upper lobe 10.5 x 10.4 x 8.4 cm lung mass, invading the left major fissure into left lower lobe, left parietal pleura and medial mediastinal pleura, with direct invasion into the mediastinum, encasing and narrowing patent left main pulmonary artery. Lung cancer suspected.  2. Lingula and nodular airspace opacities measuring up to 1 cm, which may be neoplastic or inflammatory/infectious in etiology. Peripheral left lower lobe airspace consolidation, possibly infectious/inflammatory in etiology.  3. Partially imaged bilateral hydronephrosis.    CXR: Lt Lung Mass    Assessment and Plan:    Dyspnea 2/2 Acute on Chronic COPD Exarc with Suspected GN PNA   - Solumedrol 60mg IV q8h  - Duonebs  - BiPAP qhs and day as needed   - Doxycycline 100mg po bid   - Add Cefepime 1g IV q12h  - Oxygen supplementation keep sats 92  - NO PE  - Burn Consult for Decubitus Ulcers evaluation (leukocytosis)   - Resume all other home meds  - Pulmonary eval noted   - Check TTE  - Serial trops if negative d/c tele    GI/DVT proph   Dietary eval   Puree Diet after swallow done

## 2020-02-21 NOTE — ED ADULT NURSE NOTE - NSIMPLEMENTINTERV_GEN_ALL_ED
Implemented All Fall Risk Interventions:  [place] to call system. Call bell, personal items and telephone within reach. Instruct patient to call for assistance. Room bathroom lighting operational. Non-slip footwear when patient is off stretcher. Physically safe environment: no spills, clutter or unnecessary equipment. Stretcher in lowest position, wheels locked, appropriate side rails in place. Provide visual cue, wrist band, yellow gown, etc. Monitor gait and stability. Monitor for mental status changes and reorient to person, place, and time. Review medications for side effects contributing to fall risk. Reinforce activity limits and safety measures with patient and family.

## 2020-02-21 NOTE — ED PROVIDER NOTE - CARE PLAN
Principal Discharge DX:	COPD exacerbation  Secondary Diagnosis:	PNA (pneumonia)  Secondary Diagnosis:	Lung mass

## 2020-02-21 NOTE — ED PROVIDER NOTE - ATTENDING CONTRIBUTION TO CARE
63 Y/O M HTN, DLD, END STAGE COPD ON CONTINUOUS OXYGEN, PT REPORTS HE IS ON CONTINUOUS BIPAP AT HOME, VOCAL CORD INJURY DURING PEG TUBE PLACEMENT, ANXIETY, 65 Y/O M HTN, DLD, END STAGE COPD ON CONTINUOUS OXYGEN, PT REPORTS HE IS ON CONTINUOUS BIPAP AT HOME, VOCAL CORD INJURY DURING PEG TUBE PLACEMENT, ANXIETY, C/O SOB SINCE THIS AM. NO CHANGE IN CHRONIC COUGH. NO FEVER, CHILLS. NO CP. NO LEG EDEMA OR PAIN. VITALS NOTED. ALERT OX3 THIN CHRONICALLY ILL. + HOARSE VOICE. NCAT PERRL. EOMI. OP NORMAL. MMM. NECK SUPPLE. LUNGS WITH RHONCHI AND WHEEZING B/L. RRR. S1S2. ABD- SOFT NONTENDER. NO LEG EDEMA. CN 2-12 INTACT. NEURO EXAM NONFOCAL.

## 2020-02-21 NOTE — ED PROVIDER NOTE - OBJECTIVE STATEMENT
64 yo M with COPD, chronic respiratory failure on BIPAP, HTN, CRF, DLD, vocal cord paralysis and dysphagia s/p PEG 5/18 p/w shortness of breath since 330 am in the morning, patient was already up taking his medications, states he takes nebulizer treatment Q4 hours at home, states he has had this before consistent with his COPD, he had magnesium, solumedrol and duonebs at that time w/ relief, denies any worsening leg swelling, no fever, chills, no nausea/vomiting/diarrhea, no worsening cough or sputum production. denies chest pain.

## 2020-02-21 NOTE — H&P ADULT - NSICDXPASTMEDICALHX_GEN_ALL_CORE_FT
PAST MEDICAL HISTORY:  Anxiety disorder, unspecified type     Chronic obstructive pulmonary disease, unspecified COPD type     High cholesterol     Hypertension, unspecified type     Other hydrocele     PEG (percutaneous endoscopic gastrostomy) status

## 2020-02-21 NOTE — ED PROVIDER NOTE - CLINICAL SUMMARY MEDICAL DECISION MAKING FREE TEXT BOX
63 Y/O M END STAGE COPD WITH SOB. PT WITH COPD EXACERBATION. CXR WITH LUNG MASS, PT AWARE. PT TREATED FOR COPD AND IMPROVED CLINICALLY WHILE IN THE ED.

## 2020-02-21 NOTE — H&P ADULT - NSHPLABSRESULTS_GEN_ALL_CORE
< from: Xray Chest 1 View-PORTABLE IMMEDIATE (02.21.20 @ 11:47) >    New left upper lobe mass. CT is recommended for further evaluation.    < end of copied text >                          9.5    16.33 )-----------( 592      ( 21 Feb 2020 10:40 )             30.9   02-21    139  |  96<L>  |  19  ----------------------------<  95  4.7   |  30  |  0.7    Ca    9.1      21 Feb 2020 10:40    TPro  6.7  /  Alb  3.3<L>  /  TBili  <0.2  /  DBili  x   /  AST  15  /  ALT  10  /  AlkPhos  108  02-21

## 2020-02-21 NOTE — H&P ADULT - HISTORY OF PRESENT ILLNESS
64 yo M with COPD, chronic respiratory failure on BIPAP, HTN, DLD, vocal cord paralysis and dysphagia s/p PEG presents for SOB. Patient said shortness of breath ebgan 3:30 this morning, used his duonebs with no relief so he came to the ED. The SOB began suddenly, denies worsening of his chronic cough or phlegm production, denies fevers, chills or rigors, states he believes it is similar to previous exacerbations.  In ED, patient never required bipap. He was afebrile, HR 110s, WBC 16 but on chronic steroids, VBG showed pH 7.38 with pCo2 60, treated with duonebs and steroids with mild subjectiev improvement however SpO2 100% on his baseline 4L.  Of note, CXR showed large NATE mass. Per patient, he was told about this 6 months ago when he went to [facility]. He discussed with his outpatient provider, and at that time they decided not to biopsy due to his performance status.

## 2020-02-21 NOTE — ED PROVIDER NOTE - PROGRESS NOTE DETAILS
patient states similar episode happened in the past which alleviated with Duonebs, magnesium and steroids. will give medications and reassess. discussed with patient, he already knows he has a mass and his doctors are aware, states he was told he was supposed to get a biopsy but told he was not strong enough for it. Patient aware that this may be cancer. patient also noted left sided testicular swelling, POCUS with left sided hydrocele, no active testicular pain. Patient has already seen urology as outpatient for same issue

## 2020-02-22 LAB
ALBUMIN SERPL ELPH-MCNC: 3.4 G/DL — LOW (ref 3.5–5.2)
ALP SERPL-CCNC: 107 U/L — SIGNIFICANT CHANGE UP (ref 30–115)
ALT FLD-CCNC: 10 U/L — SIGNIFICANT CHANGE UP (ref 0–41)
ANION GAP SERPL CALC-SCNC: 19 MMOL/L — HIGH (ref 7–14)
AST SERPL-CCNC: 13 U/L — SIGNIFICANT CHANGE UP (ref 0–41)
BASOPHILS # BLD AUTO: 0.02 K/UL — SIGNIFICANT CHANGE UP (ref 0–0.2)
BASOPHILS NFR BLD AUTO: 0.1 % — SIGNIFICANT CHANGE UP (ref 0–1)
BILIRUB SERPL-MCNC: <0.2 MG/DL — SIGNIFICANT CHANGE UP (ref 0.2–1.2)
BUN SERPL-MCNC: 26 MG/DL — HIGH (ref 10–20)
CALCIUM SERPL-MCNC: 9.3 MG/DL — SIGNIFICANT CHANGE UP (ref 8.5–10.1)
CHLORIDE SERPL-SCNC: 97 MMOL/L — LOW (ref 98–110)
CO2 SERPL-SCNC: 24 MMOL/L — SIGNIFICANT CHANGE UP (ref 17–32)
CREAT SERPL-MCNC: 0.8 MG/DL — SIGNIFICANT CHANGE UP (ref 0.7–1.5)
EOSINOPHIL # BLD AUTO: 0 K/UL — SIGNIFICANT CHANGE UP (ref 0–0.7)
EOSINOPHIL NFR BLD AUTO: 0 % — SIGNIFICANT CHANGE UP (ref 0–8)
GLUCOSE BLDC GLUCOMTR-MCNC: 158 MG/DL — HIGH (ref 70–99)
GLUCOSE SERPL-MCNC: 211 MG/DL — HIGH (ref 70–99)
HCT VFR BLD CALC: 29.4 % — LOW (ref 42–52)
HGB BLD-MCNC: 9.4 G/DL — LOW (ref 14–18)
IMM GRANULOCYTES NFR BLD AUTO: 0.5 % — HIGH (ref 0.1–0.3)
LYMPHOCYTES # BLD AUTO: 0.36 K/UL — LOW (ref 1.2–3.4)
LYMPHOCYTES # BLD AUTO: 2.2 % — LOW (ref 20.5–51.1)
MAGNESIUM SERPL-MCNC: 2.2 MG/DL — SIGNIFICANT CHANGE UP (ref 1.8–2.4)
MCHC RBC-ENTMCNC: 29.9 PG — SIGNIFICANT CHANGE UP (ref 27–31)
MCHC RBC-ENTMCNC: 32 G/DL — SIGNIFICANT CHANGE UP (ref 32–37)
MCV RBC AUTO: 93.6 FL — SIGNIFICANT CHANGE UP (ref 80–94)
MONOCYTES # BLD AUTO: 0.23 K/UL — SIGNIFICANT CHANGE UP (ref 0.1–0.6)
MONOCYTES NFR BLD AUTO: 1.4 % — LOW (ref 1.7–9.3)
NEUTROPHILS # BLD AUTO: 16.02 K/UL — HIGH (ref 1.4–6.5)
NEUTROPHILS NFR BLD AUTO: 95.8 % — HIGH (ref 42.2–75.2)
NRBC # BLD: 0 /100 WBCS — SIGNIFICANT CHANGE UP (ref 0–0)
PLATELET # BLD AUTO: 566 K/UL — HIGH (ref 130–400)
POTASSIUM SERPL-MCNC: 4.6 MMOL/L — SIGNIFICANT CHANGE UP (ref 3.5–5)
POTASSIUM SERPL-SCNC: 4.6 MMOL/L — SIGNIFICANT CHANGE UP (ref 3.5–5)
PROT SERPL-MCNC: 6.5 G/DL — SIGNIFICANT CHANGE UP (ref 6–8)
RBC # BLD: 3.14 M/UL — LOW (ref 4.7–6.1)
RBC # FLD: 14.2 % — SIGNIFICANT CHANGE UP (ref 11.5–14.5)
SODIUM SERPL-SCNC: 140 MMOL/L — SIGNIFICANT CHANGE UP (ref 135–146)
TROPONIN T SERPL-MCNC: <0.01 NG/ML — SIGNIFICANT CHANGE UP
WBC # BLD: 16.72 K/UL — HIGH (ref 4.8–10.8)
WBC # FLD AUTO: 16.72 K/UL — HIGH (ref 4.8–10.8)

## 2020-02-22 PROCEDURE — 99223 1ST HOSP IP/OBS HIGH 75: CPT | Mod: GV

## 2020-02-22 RX ORDER — CEFEPIME 1 G/1
INJECTION, POWDER, FOR SOLUTION INTRAMUSCULAR; INTRAVENOUS
Refills: 0 | Status: DISCONTINUED | OUTPATIENT
Start: 2020-02-22 | End: 2020-02-24

## 2020-02-22 RX ORDER — CEFEPIME 1 G/1
500 INJECTION, POWDER, FOR SOLUTION INTRAMUSCULAR; INTRAVENOUS EVERY 12 HOURS
Refills: 0 | Status: DISCONTINUED | OUTPATIENT
Start: 2020-02-23 | End: 2020-02-24

## 2020-02-22 RX ORDER — INFLUENZA VIRUS VACCINE 15; 15; 15; 15 UG/.5ML; UG/.5ML; UG/.5ML; UG/.5ML
0.5 SUSPENSION INTRAMUSCULAR ONCE
Refills: 0 | Status: DISCONTINUED | OUTPATIENT
Start: 2020-02-22 | End: 2020-02-24

## 2020-02-22 RX ORDER — CEFEPIME 1 G/1
500 INJECTION, POWDER, FOR SOLUTION INTRAMUSCULAR; INTRAVENOUS ONCE
Refills: 0 | Status: DISCONTINUED | OUTPATIENT
Start: 2020-02-22 | End: 2020-02-24

## 2020-02-22 RX ADMIN — Medication 1 MILLIGRAM(S): at 17:43

## 2020-02-22 RX ADMIN — Medication 1 MILLIGRAM(S): at 11:28

## 2020-02-22 RX ADMIN — Medication 60 MILLIGRAM(S): at 17:57

## 2020-02-22 RX ADMIN — Medication 1 MILLIGRAM(S): at 05:19

## 2020-02-22 RX ADMIN — Medication 110 MILLIGRAM(S): at 17:42

## 2020-02-22 RX ADMIN — Medication 1 SPRAY(S): at 17:58

## 2020-02-22 RX ADMIN — ROBINUL 1 MILLIGRAM(S): 0.2 INJECTION INTRAMUSCULAR; INTRAVENOUS at 17:57

## 2020-02-22 RX ADMIN — Medication 3 MILLILITER(S): at 19:41

## 2020-02-22 RX ADMIN — ENOXAPARIN SODIUM 40 MILLIGRAM(S): 100 INJECTION SUBCUTANEOUS at 11:29

## 2020-02-22 RX ADMIN — Medication 110 MILLIGRAM(S): at 05:19

## 2020-02-22 RX ADMIN — ROBINUL 1 MILLIGRAM(S): 0.2 INJECTION INTRAMUSCULAR; INTRAVENOUS at 05:22

## 2020-02-22 RX ADMIN — Medication 60 MILLIGRAM(S): at 05:20

## 2020-02-22 RX ADMIN — Medication 3 MILLILITER(S): at 09:04

## 2020-02-22 RX ADMIN — Medication 3 MILLILITER(S): at 13:14

## 2020-02-22 RX ADMIN — Medication 1 MILLIGRAM(S): at 23:42

## 2020-02-22 RX ADMIN — Medication 3 MILLILITER(S): at 00:03

## 2020-02-22 RX ADMIN — Medication 1 SPRAY(S): at 05:21

## 2020-02-22 NOTE — CONSULT NOTE ADULT - SUBJECTIVE AND OBJECTIVE BOX
ALETA SUAZO  MRN-[id number]      HISTORY OF PRESENT ILLNESS:  Patient is a 64y old  Male who presents with a chief complaint of pleuritic type chest pain along with some increase in baseline dyspnea; he denies increase in cough or sputum; no fever, chills, sweats; has been having progressive weight loss in spite of enteric feeds; he has home oxygen and AVAPS; he is usually followed by my colleague dr akira duffy for pulmonary issues; he has a known left lung mass that has not been worked up because of his current chronic medical issues there is no benefit to the patient, he is not a candidate for any cancer therapy other than palliation which he already receives      PMH/PSH:  PAST MEDICAL & SURGICAL HISTORY:  PEG (percutaneous endoscopic gastrostomy) status  Other hydrocele  Anxiety disorder, unspecified type  Hypertension, unspecified type  High cholesterol  Chronic obstructive pulmonary disease, unspecified COPD type  PEG (percutaneous endoscopic gastrostomy) status    ALLERGIES:  Allergies    No Known Allergies    Intolerances      SOCIAL HABITS: former smoker  FAMILY HISTORY:   FAMILY HISTORY:  FHx: pancreatic cancer: brother      REVIEW OF SYSTEM:  Elements of review of systems are negative or non-applicable except as noted above in HPI section.       HOME MEDICATIONS:  albuterol-ipratropium  glycopyrrolate 1 mg oral tablet  sodium chloride 0.65% nasal spray  Xanax 1 mg oral tablet    MEDICATIONS:  MEDICATIONS  (STANDING):  albuterol/ipratropium for Nebulization 3 milliLiter(s) Nebulizer every 6 hours  ALPRAZolam 1 milliGRAM(s) Oral four times a day  chlorhexidine 4% Liquid 1 Application(s) Topical <User Schedule>  doxycycline IVPB 100 milliGRAM(s) IV Intermittent every 12 hours  enoxaparin Injectable 40 milliGRAM(s) SubCutaneous daily  glycopyrrolate 1 milliGRAM(s) Oral two times a day  influenza   Vaccine 0.5 milliLiter(s) IntraMuscular once  methylPREDNISolone sodium succinate Injectable 60 milliGRAM(s) IV Push two times a day  sodium chloride 0.65% Nasal 1 Spray(s) Both Nostrils two times a day    MEDICATIONS  (PRN):        VITALS:   Vital Signs Last 24 Hrs  T(C): 36.3 (22 Feb 2020 05:20), Max: 37 (21 Feb 2020 15:37)  T(F): 97.4 (22 Feb 2020 05:20), Max: 98.6 (21 Feb 2020 15:37)  HR: 88 (22 Feb 2020 05:20) (78 - 110)  BP: 134/61 (22 Feb 2020 05:20) (128/71 - 147/67)  BP(mean): --  RR: 18 (22 Feb 2020 05:20) (16 - 18)  SpO2: 98% (21 Feb 2020 23:00) (98% - 100%)        PHYSICAL EXAM:    GENERAL: NAD, well-developed, but cachectic  HEAD:  Atraumatic, Normocephalic  NECK: Supple, No JVD  CHEST/LUNG: Clear to auscultation, decreased breath sounds bilaterally; No wheeze  HEART: Regular rate and rhythm; No murmurs, rubs, or gallops  ABDOMEN: Soft, Nontender, Nondistended; Bowel sounds present; peg tube present  EXTREMITIES:  2+ Peripheral Pulses, No clubbing, cyanosis, or edema        LABS:                        9.4    16.72 )-----------( 566      ( 22 Feb 2020 06:35 )             29.4     02-22    140  |  97<L>  |  26<H>  ----------------------------<  211<H>  4.6   |  24  |  0.8    Ca    9.3      22 Feb 2020 06:35  Mg     2.2     02-22    TPro  6.5  /  Alb  3.4<L>  /  TBili  <0.2  /  DBili  x   /  AST  13  /  ALT  10  /  AlkPhos  107  02-22    LIVER FUNCTIONS - ( 22 Feb 2020 06:35 )  Alb: 3.4 g/dL / Pro: 6.5 g/dL / ALK PHOS: 107 U/L / ALT: 10 U/L / AST: 13 U/L / GGT: x           CARDIAC MARKERS ( 22 Feb 2020 06:35 )  x     / <0.01 ng/mL / x     / x     / x      CARDIAC MARKERS ( 21 Feb 2020 10:40 )  x     / 0.02 ng/mL / x     / x     / x                    ABG & VENT SETTINGS (when applicable)    n/a    DIAGNOSTIC STUDIES:    < from: Xray Chest 1 View-PORTABLE IMMEDIATE (02.21.20 @ 11:47) >  Impression:      New left upper lobe mass. CT is recommended for further evaluation.    Dr. Chantell Jiang discussed preliminary findings with ROSA STEVENS on 2/21/2020 12:52 PM with readback.    < end of copied text >    < from: CT Chest w/ IV Cont (02.21.20 @ 18:12) >  IMPRESSION:    1. Left upper lobe 10.5 x 10.4 x 8.4 cm lung mass, invading the left major fissure into left lower lobe, left parietal pleura and medial mediastinal pleura, with direct invasion into the mediastinum, encasing and narrowing patent left main pulmonary artery. Lung cancer suspected.    2. Lingula and nodular airspace opacities measuring up to 1 cm, which may be neoplastic or inflammatory/infectious in etiology. Peripheral left lower lobe airspace consolidation, possibly infectious/inflammatory in etiology.    3. Partially imaged bilateral hydronephrosis.      < end of copied text >

## 2020-02-22 NOTE — SWALLOW BEDSIDE ASSESSMENT ADULT - COMMENTS
Past VFSS/ MBS study performed 6/ 2018 indicated: Severe to profound pharyngeal dysphagia w/o epiglottic inversion, decreased laryngeal elevation and absent laryngeal excursion. Recommendations for NPO, non-oral means of nutrition and hydration.

## 2020-02-22 NOTE — SBIRT NOTE ADULT - NSSBIRTALCPOSREINDET_GEN_A_CORE
Pt indicates no current alcohol use. LMSW provided positive reinforcement of pt's abstinence from alcohol use.

## 2020-02-22 NOTE — SWALLOW BEDSIDE ASSESSMENT ADULT - NS SPL SWALLOW CLINIC TRIAL FT
Suspected pharyngeal dysphagia with puree/ honey thick liquids. Pt. is not appropriate for a PO diet at this time 2/2 significant h/o dysphagia.

## 2020-02-22 NOTE — SWALLOW BEDSIDE ASSESSMENT ADULT - SLP PERTINENT HISTORY OF CURRENT PROBLEM
64 yo Male patient admitted to ED for SOB. PMHx of COPD, chronic respiratory failure on BIPAP, HTN, DLD, vocal cord paralysis and dysphagia s/p PEG. Pt. known to SLP service from previous admissions. previous h/o dysphagia, PEG, COPD, and L vocal cord paralysis. PEG was originally placed at [facility] 2/2 poor po intake. VFSS done in June 2018 which revealed severe pharyngeal dysphagia and recommendations for NPO.

## 2020-02-22 NOTE — CONSULT NOTE ADULT - ASSESSMENT
acute on chronic hypoxic and hypercapnic respiratory failure  copd stage D with exacerbation  possible pneumonia  chest pain with breathing  lung mass, ct scan evidence for metastatic lung cancer of which the patient is aware  anxiety disorder      agree with the present management  oxygen per pulse oximetry  AVAPS/BiPAP hs and prn  bronchodilators  steroid  antibiotic  mutual decision has been made by the patient and his health care team not to pursue a tissue diagnosis of the lung mass  analgesia/anxiolytic therapy  enteric feeds/aspiration precautions/dysphagia diet(recreational)  dvt prophylaxis  keep comfortable  to resume hospice care on discharge      thank you for the courtesy of this consultation, will follow with you

## 2020-02-22 NOTE — SBIRT NOTE ADULT - NSSBIRTDRGPOSREINDET_GEN_A_CORE
Pt indicates no current recreational drug use. LMSW provided positive reinforcement of pt's abstinence from recreational drug use.

## 2020-02-23 LAB
ANION GAP SERPL CALC-SCNC: 11 MMOL/L — SIGNIFICANT CHANGE UP (ref 7–14)
ANION GAP SERPL CALC-SCNC: 16 MMOL/L — HIGH (ref 7–14)
BASOPHILS # BLD AUTO: 0.01 K/UL — SIGNIFICANT CHANGE UP (ref 0–0.2)
BASOPHILS NFR BLD AUTO: 0 % — SIGNIFICANT CHANGE UP (ref 0–1)
BUN SERPL-MCNC: 33 MG/DL — HIGH (ref 10–20)
BUN SERPL-MCNC: 36 MG/DL — HIGH (ref 10–20)
CALCIUM SERPL-MCNC: 9.3 MG/DL — SIGNIFICANT CHANGE UP (ref 8.5–10.1)
CALCIUM SERPL-MCNC: 9.5 MG/DL — SIGNIFICANT CHANGE UP (ref 8.5–10.1)
CHLORIDE SERPL-SCNC: 101 MMOL/L — SIGNIFICANT CHANGE UP (ref 98–110)
CHLORIDE SERPL-SCNC: 97 MMOL/L — LOW (ref 98–110)
CO2 SERPL-SCNC: 28 MMOL/L — SIGNIFICANT CHANGE UP (ref 17–32)
CO2 SERPL-SCNC: 30 MMOL/L — SIGNIFICANT CHANGE UP (ref 17–32)
CREAT SERPL-MCNC: 0.8 MG/DL — SIGNIFICANT CHANGE UP (ref 0.7–1.5)
CREAT SERPL-MCNC: 0.9 MG/DL — SIGNIFICANT CHANGE UP (ref 0.7–1.5)
EOSINOPHIL # BLD AUTO: 0 K/UL — SIGNIFICANT CHANGE UP (ref 0–0.7)
EOSINOPHIL NFR BLD AUTO: 0 % — SIGNIFICANT CHANGE UP (ref 0–8)
GLUCOSE BLDC GLUCOMTR-MCNC: 116 MG/DL — HIGH (ref 70–99)
GLUCOSE BLDC GLUCOMTR-MCNC: 262 MG/DL — HIGH (ref 70–99)
GLUCOSE BLDC GLUCOMTR-MCNC: 308 MG/DL — HIGH (ref 70–99)
GLUCOSE BLDC GLUCOMTR-MCNC: 67 MG/DL — LOW (ref 70–99)
GLUCOSE SERPL-MCNC: 208 MG/DL — HIGH (ref 70–99)
GLUCOSE SERPL-MCNC: 51 MG/DL — LOW (ref 70–99)
HCT VFR BLD CALC: 29.2 % — LOW (ref 42–52)
HCV AB S/CO SERPL IA: 7.32 S/CO — HIGH (ref 0–0.99)
HCV AB SERPL-IMP: REACTIVE
HGB BLD-MCNC: 8.9 G/DL — LOW (ref 14–18)
IMM GRANULOCYTES NFR BLD AUTO: 0.6 % — HIGH (ref 0.1–0.3)
LYMPHOCYTES # BLD AUTO: 0.72 K/UL — LOW (ref 1.2–3.4)
LYMPHOCYTES # BLD AUTO: 3.4 % — LOW (ref 20.5–51.1)
MAGNESIUM SERPL-MCNC: 2.1 MG/DL — SIGNIFICANT CHANGE UP (ref 1.8–2.4)
MCHC RBC-ENTMCNC: 28.2 PG — SIGNIFICANT CHANGE UP (ref 27–31)
MCHC RBC-ENTMCNC: 30.5 G/DL — LOW (ref 32–37)
MCV RBC AUTO: 92.4 FL — SIGNIFICANT CHANGE UP (ref 80–94)
MONOCYTES # BLD AUTO: 0.74 K/UL — HIGH (ref 0.1–0.6)
MONOCYTES NFR BLD AUTO: 3.5 % — SIGNIFICANT CHANGE UP (ref 1.7–9.3)
NEUTROPHILS # BLD AUTO: 19.38 K/UL — HIGH (ref 1.4–6.5)
NEUTROPHILS NFR BLD AUTO: 92.5 % — HIGH (ref 42.2–75.2)
NRBC # BLD: 0 /100 WBCS — SIGNIFICANT CHANGE UP (ref 0–0)
PLATELET # BLD AUTO: 601 K/UL — HIGH (ref 130–400)
POTASSIUM SERPL-MCNC: 4.6 MMOL/L — SIGNIFICANT CHANGE UP (ref 3.5–5)
POTASSIUM SERPL-MCNC: 5.3 MMOL/L — HIGH (ref 3.5–5)
POTASSIUM SERPL-SCNC: 4.6 MMOL/L — SIGNIFICANT CHANGE UP (ref 3.5–5)
POTASSIUM SERPL-SCNC: 5.3 MMOL/L — HIGH (ref 3.5–5)
RBC # BLD: 3.16 M/UL — LOW (ref 4.7–6.1)
RBC # FLD: 14.2 % — SIGNIFICANT CHANGE UP (ref 11.5–14.5)
SODIUM SERPL-SCNC: 141 MMOL/L — SIGNIFICANT CHANGE UP (ref 135–146)
SODIUM SERPL-SCNC: 142 MMOL/L — SIGNIFICANT CHANGE UP (ref 135–146)
WBC # BLD: 20.97 K/UL — HIGH (ref 4.8–10.8)
WBC # FLD AUTO: 20.97 K/UL — HIGH (ref 4.8–10.8)

## 2020-02-23 PROCEDURE — 99233 SBSQ HOSP IP/OBS HIGH 50: CPT | Mod: GV

## 2020-02-23 RX ADMIN — Medication 60 MILLIGRAM(S): at 17:46

## 2020-02-23 RX ADMIN — Medication 3 MILLILITER(S): at 07:42

## 2020-02-23 RX ADMIN — Medication 1 MILLIGRAM(S): at 05:01

## 2020-02-23 RX ADMIN — Medication 3 MILLILITER(S): at 19:40

## 2020-02-23 RX ADMIN — Medication 110 MILLIGRAM(S): at 05:03

## 2020-02-23 RX ADMIN — Medication 1 SPRAY(S): at 05:06

## 2020-02-23 RX ADMIN — CEFEPIME 100 MILLIGRAM(S): 1 INJECTION, POWDER, FOR SOLUTION INTRAMUSCULAR; INTRAVENOUS at 05:02

## 2020-02-23 RX ADMIN — ENOXAPARIN SODIUM 40 MILLIGRAM(S): 100 INJECTION SUBCUTANEOUS at 11:07

## 2020-02-23 RX ADMIN — Medication 3 MILLILITER(S): at 13:18

## 2020-02-23 RX ADMIN — Medication 60 MILLIGRAM(S): at 05:06

## 2020-02-23 RX ADMIN — CHLORHEXIDINE GLUCONATE 1 APPLICATION(S): 213 SOLUTION TOPICAL at 05:04

## 2020-02-23 RX ADMIN — ROBINUL 1 MILLIGRAM(S): 0.2 INJECTION INTRAMUSCULAR; INTRAVENOUS at 05:07

## 2020-02-23 RX ADMIN — CEFEPIME 100 MILLIGRAM(S): 1 INJECTION, POWDER, FOR SOLUTION INTRAMUSCULAR; INTRAVENOUS at 17:45

## 2020-02-23 RX ADMIN — Medication 1 MILLIGRAM(S): at 11:07

## 2020-02-23 RX ADMIN — ROBINUL 1 MILLIGRAM(S): 0.2 INJECTION INTRAMUSCULAR; INTRAVENOUS at 17:46

## 2020-02-23 RX ADMIN — Medication 110 MILLIGRAM(S): at 17:45

## 2020-02-23 RX ADMIN — Medication 1 MILLIGRAM(S): at 17:45

## 2020-02-23 RX ADMIN — Medication 1 MILLIGRAM(S): at 23:12

## 2020-02-23 RX ADMIN — Medication 1 SPRAY(S): at 17:46

## 2020-02-23 NOTE — DIETITIAN INITIAL EVALUATION ADULT. - PHYSICAL APPEARANCE
BMI 15.7  IBW: 67.2 kg UBW: 168# x 1 year ago. Ecchymosis. Observed mild muscle wasting to temporal region, mild fat wasting to triceps region . No edema/underweight

## 2020-02-23 NOTE — PROGRESS NOTE ADULT - SUBJECTIVE AND OBJECTIVE BOX
ALETA SUAZO  64y, Male  Allergy: No Known Allergies      LAST 24-Hr EVENTS:  feels better, no chest pain, breathing better  VITALS:  T(F): 96.1 (02-23-20 @ 04:45), Max: 97.6 (02-22-20 @ 14:37)  HR: 89 (02-23-20 @ 04:45)  BP: 137/60 (02-23-20 @ 04:45) (130/60 - 137/60)  RR: 18 (02-23-20 @ 04:45)  SpO2: --    PHYSICAL EXAM:    GENERAL: NAD, well-developed, cachectic  HEAD:  Atraumatic, Normocephalic  NECK: Supple, No JVD  CHEST/LUNG: Clear to auscultation bilaterally; No wheeze  HEART: Regular rate and rhythm; No murmurs, rubs, or gallops  ABDOMEN: Soft, Nontender, Nondistended; Bowel sounds present  EXTREMITIES:  2+ Peripheral Pulses, No clubbing, cyanosis, or edema        TESTS & MEASUREMENTS:    IN: 2582 mL / OUT: 0 mL / NET: 2582 mL    IN: 0 mL / OUT: 1 mL / NET: -1 mL                            8.9    20.97 )-----------( 601      ( 23 Feb 2020 04:50 )             29.2       02-23    141  |  97<L>  |  33<H>  ----------------------------<  208<H>  5.3<H>   |  28  |  0.9    Ca    9.5      23 Feb 2020 04:50  Mg     2.1     02-23    TPro  6.5  /  Alb  3.4<L>  /  TBili  <0.2  /  DBili  x   /  AST  13  /  ALT  10  /  AlkPhos  107  02-22    LIVER FUNCTIONS - ( 22 Feb 2020 06:35 )  Alb: 3.4 g/dL / Pro: 6.5 g/dL / ALK PHOS: 107 U/L / ALT: 10 U/L / AST: 13 U/L / GGT: x           CARDIAC MARKERS ( 22 Feb 2020 06:35 )  x     / <0.01 ng/mL / x     / x     / x                ABG & VENT SETTINGS: (when applicable)    n/a    RADIOLOGY & ADDITIONAL TESTS:    MEDICATIONS:  MEDICATIONS  (STANDING):  albuterol/ipratropium for Nebulization 3 milliLiter(s) Nebulizer every 6 hours  ALPRAZolam 1 milliGRAM(s) Oral four times a day  cefepime   IVPB      cefepime   IVPB 500 milliGRAM(s) IV Intermittent once  cefepime   IVPB 500 milliGRAM(s) IV Intermittent every 12 hours  chlorhexidine 4% Liquid 1 Application(s) Topical <User Schedule>  doxycycline IVPB 100 milliGRAM(s) IV Intermittent every 12 hours  enoxaparin Injectable 40 milliGRAM(s) SubCutaneous daily  glycopyrrolate 1 milliGRAM(s) Oral two times a day  influenza   Vaccine 0.5 milliLiter(s) IntraMuscular once  methylPREDNISolone sodium succinate Injectable 60 milliGRAM(s) IV Push two times a day  sodium chloride 0.65% Nasal 1 Spray(s) Both Nostrils two times a day    MEDICATIONS  (PRN):

## 2020-02-23 NOTE — DIETITIAN INITIAL EVALUATION ADULT. - MALNUTRITION
Severe PCM of chronic illness RT inadequate enteral infusion vs. acuity of illness AEB 40% weight loss in 1 year, mild muscle wasting to temporal region, mild fat wasting to triceps region. GOAL: pt to meet % of needs and gain 1-2 lbs during LOS

## 2020-02-23 NOTE — DIETITIAN INITIAL EVALUATION ADULT. - OTHER INFO
Pt adm for COPD exacerbation. Dysphagia per SLP, suspected pharyngeal dysphagia with puree/ honey thick liquids. Pt. is not appropriate for a PO diet at this time 2/2 significant h/o dysphagia. Vocal cord paralysis: from procedure two years ago at [facility]. Lung mass.

## 2020-02-23 NOTE — DIETITIAN INITIAL EVALUATION ADULT. - ADD RECOMMEND
Change TF to Jevity 1.2 @ 360 ml q6hr via PEG (this provides 1728 kcals, 79 gms protein, 1166 ml free water, 2640 mg K+) Flushes per LIP. Correct hyperkalemia. Change TF to Jevity 1.2 @ 360 ml q6hr via PEG (this provides 1728 kcals, 79 gms protein, 1166 ml free water, 2640 mg K+) Flushes per LIP. Correct hyperkalemia. Reconsult SLP eval.

## 2020-02-23 NOTE — DIETITIAN INITIAL EVALUATION ADULT. - ENERGY NEEDS
calorie needs: calorie needs: 1436 - 1795 kcals/day (MSJ x 1.2 - 1.5 AF for PCM)  protein needs: 59 - 72 gms/day (1.3 - 1.6 gm/kg for PCM, preserve lean body mass)  fluid needs: 1ml/kcal or per LIP

## 2020-02-23 NOTE — CHART NOTE - NSCHARTNOTEFT_GEN_A_CORE
Upon Nutritional Assessment by the Registered Dietitian your patient was determined to meet criteria / has evidence of the following diagnosis/diagnoses:          [ ]  Mild Protein Calorie Malnutrition        [ ]  Moderate Protein Calorie Malnutrition        [x] Severe Protein Calorie Malnutrition        [ ] Unspecified Protein Calorie Malnutrition        [x] Underweight / BMI <19        [ ] Morbid Obesity / BMI > 40      Findings as based on:  •  Comprehensive nutrition assessment and consultation  •  Food acceptance and intake status from observations by staff    Indicators:   40% weight loss in 1 year  mild muscle wasting to temporal region  mild fat wasting to triceps region  Ht: 170.18 cm  Wt: 45.4 kg  BMI 15.7     Treatment:    The following diet has been recommended: Change TF to Jevity 1.2 @ 360 ml q6hr via PEG (this provides 1728 kcals, 79 gms protein, 1166 ml free water, 2640 mg K+) Flushes per LIP. Correct hyperkalemia. Reconsult SLP eval.      PROVIDER Section:     By signing this assessment you are acknowledging and agree with the diagnosis/diagnoses assigned by the Registered Dietician    Comments:

## 2020-02-23 NOTE — PROGRESS NOTE ADULT - ASSESSMENT
acute on chronic hypoxic and hypercapnic respiratory failure, improved  stage d copd exacerbation  pneumonia  chest pain with breathing, improved  lung mass/suspected advanced lung cancer  anxiety disorder      low flow oxygen alternating with AVAPS/BiPAP hs and prn(has both at home)  bronchodilators  iv to po steroids, can discontinue solumedrol and begin prednisone 40mg daily  continue course of doxycycline and discontinue cefepime on discharge, increased wbc likely due to steroids  aspiration precautions, speech pathology evaluation appreciated, currently npo and continue enteric feeds via peg  analgesia/anxiolytic therapy  discharge planning okay from pulmonary viewpoiont

## 2020-02-23 NOTE — PROGRESS NOTE ADULT - ASSESSMENT
63 yo M with COPD on 4L o2, chronic respiratory failure on BIPAP, HTN, DLD, vocal cord paralysis and dysphagia s/p PEG presents with shortness of breath, CXR showed known large chest mass, never biopsied    #COPD exacerbation- chronic respiratory failure  -on bipap faint wheezes on exam on avsps at home  -getting doxy and cefipime, for possible pna although no cough, nop fever solumedrol, duonebs,  -seen by pulm -agree with current treatment     #lung mass-Per patient, was told about it 6 months ago in ED at [facility], spoke with his outpatient provider who together decided to hold off on biopsy; patient admits to 70 lb weight loss over the past 12 months, states its due to his dysphagia  -pulm agree no benefit in work up pt to be d/sherry home back to hospice    #Dysphagia  -per speach and swallow not pt not appropite for PO diet at this time 2/2 to dysphagia    #Vocal cord paralysis: from prcedure two years ago at [facility]  Pt states he takes sublingual atropine and glycopyrrolate at home  - Glycopyrrolate    HTN/DLD;  - Per patient, off meds now    Anxiety:  - Xanax 1g q6    DVT ppx; lovenox  Dispo: From home, has aid 63 yo M with COPD on 4L o2, chronic respiratory failure on BIPAP, HTN, DLD, vocal cord paralysis and dysphagia s/p PEG presents with shortness of breath, CXR showed known large chest mass, never biopsied    #COPD exacerbation- chronic respiratory failure  -on bipap faint wheezes on exam on avsps at home  -getting doxy and cefipime, for possible pna although no cough, nop fever solumedrol, duonebs,  -seen by pulm -agree with current treatment   -flu negative    #lung mass-Per patient, was told about it 6 months ago in ED at [facility], spoke with his outpatient provider who together decided to hold off on biopsy; patient admits to 70 lb weight loss over the past 12 months, states its due to his dysphagia  -pulm agree no benefit in work up pt to be d/sherry home back to hospice  -mass seen on xray and ct-    #Dysphagia  -per speach and swallow not pt not appropite for PO diet at this time 2/2 to dysphagia    #Vocal cord paralysis: from prcedure two years ago at [facility]  Pt states he takes sublingual atropine and glycopyrrolate at home  - Glycopyrrolate    HTN/DLD;  - Per patient, off meds now    Anxiety:  - Xanax 1g q6    DVT ppx; lovenox  Dispo: From home, has aid 65 yo M with COPD on 4L o2, chronic respiratory failure on BIPAP, HTN, DLD, vocal cord paralysis and dysphagia s/p PEG presents with shortness of breath, CXR showed known large chest mass, never biopsied    #COPD exacerbation- chronic respiratory failure  -on bipap faint wheezes on exam on avsps at home  -getting doxy and cefipime, for possible pna although no cough, nop fever solumedrol, duonebs,  -seen by pulm -agree with current treatment   -flu negative    #lung mass-Per patient, was told about it 6 months ago in ED at [facility], spoke with his outpatient provider who together decided to hold off on biopsy; patient admits to 70 lb weight loss over the past 12 months, states its due to his dysphagia  -pulm agree no benefit in work up pt to be d/sherry home back to hospice  -mass seen on xray and ct-    #Dysphagia  -per speach and swallow not pt not appropite for PO diet at this time 2/2 to dysphagia    #Vocal cord paralysis: from prcedure two years ago at [facility]  Pt states he takes sublingual atropine and glycopyrrolate at home  - Glycopyrrolate    HTN/DLD;  - Per patient, off meds now    Anxiety:  - Xanax 1g q6    DVT ppx; lovenox  Dispo: From home, has aid    Attending Attestation    Pt has been seen and examined. Case and Plan discussed at bedside and agree with above documentation as reviewed.  Pt is here for Acute on Chronic Respiratory Failure / Metastatic Lung CA with poor prognosis with suspected Gram Negative PNA   - c/w current care - Solumedrol / Nebs / Cefepime / Doxy / Bipap qhs / Oxygen   - Bedbound with Pressure Ulcers - Burn to eval / Nursing Wound Care / Frequent turning   - GOC Discussion with family tomorrow     Dispo: Acute / f/u GOC discussion

## 2020-02-23 NOTE — PROGRESS NOTE ADULT - SUBJECTIVE AND OBJECTIVE BOX
SUBJECTIVE:    Patient is a 64y old Male who presents with a chief complaint of chest pain (22 Feb 2020 11:43)    Currently admitted to medicine with the primary diagnosis of COPD exacerbation     Today is hospital day 2d. This morning he is resting comfortably in bed and reports no new issues or overnight events.     INTERVAL EVENTS: pt in good spirits on bipap has no complaints no shotness of breath or chest pain  seen by pulm who agrees with current rachid  with mutual decision to not work up lung mass as pt has stage 4 copd and not a candidate for therapy and will resume hospice on d/c    PAST MEDICAL & SURGICAL HISTORY  PEG (percutaneous endoscopic gastrostomy) status  Other hydrocele  Anxiety disorder, unspecified type  Hypertension, unspecified type  High cholesterol  Chronic obstructive pulmonary disease, unspecified COPD type  PEG (percutaneous endoscopic gastrostomy) status      ALLERGIES:  No Known Allergies    MEDICATIONS:  STANDING MEDICATIONS  albuterol/ipratropium for Nebulization 3 milliLiter(s) Nebulizer every 6 hours  ALPRAZolam 1 milliGRAM(s) Oral four times a day  cefepime   IVPB      cefepime   IVPB 500 milliGRAM(s) IV Intermittent once  cefepime   IVPB 500 milliGRAM(s) IV Intermittent every 12 hours  chlorhexidine 4% Liquid 1 Application(s) Topical <User Schedule>  doxycycline IVPB 100 milliGRAM(s) IV Intermittent every 12 hours  enoxaparin Injectable 40 milliGRAM(s) SubCutaneous daily  glycopyrrolate 1 milliGRAM(s) Oral two times a day  influenza   Vaccine 0.5 milliLiter(s) IntraMuscular once  methylPREDNISolone sodium succinate Injectable 60 milliGRAM(s) IV Push two times a day  sodium chloride 0.65% Nasal 1 Spray(s) Both Nostrils two times a day    PRN MEDICATIONS    VITALS:   T(F): 96.1  HR: 89  BP: 137/60  RR: 18  SpO2: --    LABS:                        8.9    20.97 )-----------( 601      ( 23 Feb 2020 04:50 )             29.2     02-23    141  |  97<L>  |  33<H>  ----------------------------<  208<H>  5.3<H>   |  28  |  0.9    Ca    9.5      23 Feb 2020 04:50  Mg     2.1     02-23    TPro  6.5  /  Alb  3.4<L>  /  TBili  <0.2  /  DBili  x   /  AST  13  /  ALT  10  /  AlkPhos  107  02-22      CARDIAC MARKERS ( 22 Feb 2020 06:35 )  x     / <0.01 ng/mL / x     / x     / x      CARDIAC MARKERS ( 21 Feb 2020 10:40 )  x     / 0.02 ng/mL / x     / x     / x          RADIOLOGY:    PHYSICAL EXAM:  GEN: No acute distress/ thin/ frail appearing on bipap/ vocal cord parlysis talks low voice  PULM/CHEST: faint wheezes bilateraly, needs exertion for deep breaths  CVS: Regular rate and rhythm, S1-S2, no murmurs  ABD: Soft, non-tender, non-distended, +BS, thin  EXT: No edema  NEURO: AAOx3    Roque Catheter:

## 2020-02-24 ENCOUNTER — TRANSCRIPTION ENCOUNTER (OUTPATIENT)
Age: 65
End: 2020-02-24

## 2020-02-24 VITALS
HEART RATE: 70 BPM | SYSTOLIC BLOOD PRESSURE: 128 MMHG | RESPIRATION RATE: 18 BRPM | TEMPERATURE: 98 F | DIASTOLIC BLOOD PRESSURE: 60 MMHG

## 2020-02-24 LAB
ALBUMIN SERPL ELPH-MCNC: 3 G/DL — LOW (ref 3.5–5.2)
ALP SERPL-CCNC: 75 U/L — SIGNIFICANT CHANGE UP (ref 30–115)
ALT FLD-CCNC: 8 U/L — SIGNIFICANT CHANGE UP (ref 0–41)
ANION GAP SERPL CALC-SCNC: 12 MMOL/L — SIGNIFICANT CHANGE UP (ref 7–14)
AST SERPL-CCNC: 10 U/L — SIGNIFICANT CHANGE UP (ref 0–41)
BILIRUB SERPL-MCNC: <0.2 MG/DL — SIGNIFICANT CHANGE UP (ref 0.2–1.2)
BUN SERPL-MCNC: 36 MG/DL — HIGH (ref 10–20)
CALCIUM SERPL-MCNC: 9 MG/DL — SIGNIFICANT CHANGE UP (ref 8.5–10.1)
CHLORIDE SERPL-SCNC: 96 MMOL/L — LOW (ref 98–110)
CO2 SERPL-SCNC: 28 MMOL/L — SIGNIFICANT CHANGE UP (ref 17–32)
CREAT SERPL-MCNC: 0.7 MG/DL — SIGNIFICANT CHANGE UP (ref 0.7–1.5)
GLUCOSE BLDC GLUCOMTR-MCNC: 108 MG/DL — HIGH (ref 70–99)
GLUCOSE SERPL-MCNC: 230 MG/DL — HIGH (ref 70–99)
HCT VFR BLD CALC: 26.4 % — LOW (ref 42–52)
HGB BLD-MCNC: 8.2 G/DL — LOW (ref 14–18)
MCHC RBC-ENTMCNC: 28.3 PG — SIGNIFICANT CHANGE UP (ref 27–31)
MCHC RBC-ENTMCNC: 31.1 G/DL — LOW (ref 32–37)
MCV RBC AUTO: 91 FL — SIGNIFICANT CHANGE UP (ref 80–94)
NRBC # BLD: 0 /100 WBCS — SIGNIFICANT CHANGE UP (ref 0–0)
PLATELET # BLD AUTO: 542 K/UL — HIGH (ref 130–400)
POTASSIUM SERPL-MCNC: 4.5 MMOL/L — SIGNIFICANT CHANGE UP (ref 3.5–5)
POTASSIUM SERPL-SCNC: 4.5 MMOL/L — SIGNIFICANT CHANGE UP (ref 3.5–5)
PROT SERPL-MCNC: 5.5 G/DL — LOW (ref 6–8)
RBC # BLD: 2.9 M/UL — LOW (ref 4.7–6.1)
RBC # FLD: 14.4 % — SIGNIFICANT CHANGE UP (ref 11.5–14.5)
SODIUM SERPL-SCNC: 136 MMOL/L — SIGNIFICANT CHANGE UP (ref 135–146)
WBC # BLD: 12.09 K/UL — HIGH (ref 4.8–10.8)
WBC # FLD AUTO: 12.09 K/UL — HIGH (ref 4.8–10.8)

## 2020-02-24 PROCEDURE — 99239 HOSP IP/OBS DSCHRG MGMT >30: CPT

## 2020-02-24 RX ORDER — CIPROFLOXACIN LACTATE 400MG/40ML
1 VIAL (ML) INTRAVENOUS
Qty: 5 | Refills: 0
Start: 2020-02-24 | End: 2020-02-28

## 2020-02-24 RX ORDER — ALBUTEROL 90 UG/1
2 AEROSOL, METERED ORAL
Qty: 0 | Refills: 0 | DISCHARGE

## 2020-02-24 RX ORDER — FLUTICASONE PROPIONATE 220 MCG
1 AEROSOL WITH ADAPTER (GRAM) INHALATION
Qty: 0 | Refills: 0 | DISCHARGE

## 2020-02-24 RX ADMIN — Medication 1 SPRAY(S): at 05:18

## 2020-02-24 RX ADMIN — ROBINUL 1 MILLIGRAM(S): 0.2 INJECTION INTRAMUSCULAR; INTRAVENOUS at 05:19

## 2020-02-24 RX ADMIN — Medication 60 MILLIGRAM(S): at 05:18

## 2020-02-24 RX ADMIN — CEFEPIME 100 MILLIGRAM(S): 1 INJECTION, POWDER, FOR SOLUTION INTRAMUSCULAR; INTRAVENOUS at 05:19

## 2020-02-24 RX ADMIN — Medication 3 MILLILITER(S): at 08:28

## 2020-02-24 RX ADMIN — CHLORHEXIDINE GLUCONATE 1 APPLICATION(S): 213 SOLUTION TOPICAL at 05:17

## 2020-02-24 RX ADMIN — Medication 110 MILLIGRAM(S): at 05:22

## 2020-02-24 RX ADMIN — ENOXAPARIN SODIUM 40 MILLIGRAM(S): 100 INJECTION SUBCUTANEOUS at 10:56

## 2020-02-24 RX ADMIN — Medication 1 MILLIGRAM(S): at 05:24

## 2020-02-24 RX ADMIN — Medication 3 MILLILITER(S): at 13:12

## 2020-02-24 RX ADMIN — Medication 1 MILLIGRAM(S): at 10:56

## 2020-02-24 NOTE — PROGRESS NOTE ADULT - SUBJECTIVE AND OBJECTIVE BOX
Hospital Day:  3d    Subjective:    Patient is a 64y old  Male who presents with a chief complaint of chest pain (23 Feb 2020 12:09)      Past Medical Hx:   PEG (percutaneous endoscopic gastrostomy) status  Other hydrocele  Anxiety disorder, unspecified type  Hypertension, unspecified type  High cholesterol  Chronic obstructive pulmonary disease, unspecified COPD type    Past Sx:  PEG (percutaneous endoscopic gastrostomy) status  No significant past surgical history    Allergies:  No Known Allergies    Current Meds:   Standng Meds:  albuterol/ipratropium for Nebulization 3 milliLiter(s) Nebulizer every 6 hours  ALPRAZolam 1 milliGRAM(s) Oral four times a day  cefepime   IVPB      cefepime   IVPB 500 milliGRAM(s) IV Intermittent once  cefepime   IVPB 500 milliGRAM(s) IV Intermittent every 12 hours  chlorhexidine 4% Liquid 1 Application(s) Topical <User Schedule>  doxycycline IVPB 100 milliGRAM(s) IV Intermittent every 12 hours  enoxaparin Injectable 40 milliGRAM(s) SubCutaneous daily  glycopyrrolate 1 milliGRAM(s) Oral two times a day  influenza   Vaccine 0.5 milliLiter(s) IntraMuscular once  sodium chloride 0.65% Nasal 1 Spray(s) Both Nostrils two times a day    PRN Meds:    HOME MEDICATIONS:  albuterol-ipratropium: inhaled 4 times a day  glycopyrrolate 1 mg oral tablet: 1 tab(s) by gastrostomy tube 2 times a day  sodium chloride 0.65% nasal spray:  nasal   Xanax 1 mg oral tablet: 1 tab(s) orally 4 times a day      Vital Signs:   T(F): 96.3 (02-24-20 @ 04:30), Max: 96.9 (02-23-20 @ 20:04)  HR: 82 (02-24-20 @ 04:30) (82 - 89)  BP: 123/57 (02-24-20 @ 04:30) (114/61 - 142/65)  RR: 18 (02-24-20 @ 04:30) (18 - 20)  SpO2: --      02-23-20 @ 07:01  -  02-24-20 @ 07:00  --------------------------------------------------------  IN: 2398 mL / OUT: 3 mL / NET: 2395 mL        Physical Exam:   GENERAL: NAD  HEENT: NCAT  CHEST/LUNG: CTAB  HEART: Regular rate and rhythm; s1 s2 appreciated, No murmurs, rubs, or gallops  ABDOMEN: Soft, Nontender, Nondistended; Bowel sounds present  EXTREMITIES: No LE edema b/l  NERVOUS SYSTEM:  Alert & Oriented X3        Labs:                         8.2    12.09 )-----------( 542      ( 24 Feb 2020 06:44 )             26.4       24 Feb 2020 06:44    136    |  96     |  36     ----------------------------<  230    4.5     |  28     |  0.7      Ca    9.0        24 Feb 2020 06:44  Mg     2.1       23 Feb 2020 04:50    TPro  5.5    /  Alb  3.0    /  TBili  <0.2   /  DBili  x      /  AST  10     /  ALT  8      /  AlkPhos  75     24 Feb 2020 06:44    Troponin <0.01, CKMB --, CK -- 02-22-20 @ 06:35  Troponin 0.02, CKMB --, CK -- 02-21-20 @ 10:40    Radiology:   None Today    Assessment and Plan:   This is a 64 year old male with PMHx of COPD on 4L NC and BiPAP PRN, HTN, DLD, Vocal Cord Paralysis and dysphagia s/p PEG placement who presented with Shrotness of Breath    #Shortness of Breath secondary to COPD exacerbation   - Continue on Doxycycline and Cefepime; can convert to PO Doxycycline on discharge and will d/c cefepime  - Transition to Prednisone PO tomorrow; received 60mg Solumedrol today  - Continue on NC alternating with AVAPs/BiPAP HS and PRN  - Continue on bronchodilators     #Noted Lung Mass  - Stated that 6 months ago at [facility] it was found in the ED   - Outpatient provider and the patient agreed to hold off on biopsy  - Endorses 70lb weight loss over past year (likely secondary to dysphagia)  - Pulmonary advised to discharge back to home with home hospice    #Dysphagia  -Speech and Swallow advised not appropriate for PO diet  - Will follow up with Speech and Swallow today  - Continue on PEG feed regimen for now     #Vocal Cord Paralysis   - Noted from over 2 years ago when he had a procedure at [facility]    #HTN  - Not on any medications now and BP normotensive    #HLD  - Not on any medications now    #Anxiety:   - Xanax 1mg q6    Activity: As to Hospital Day:  3d    Subjective:    Patient is a 64y old  Male who presents with a chief complaint of chest pain (23 Feb 2020 12:09)    No overnight events. Seen and examined at bedside. Reported no acute complaints. Stated that he wants to go home as he is on home hospice. Remainder of review of systems otherwise negative. Will work on discharging the patient today     Telemetry: sinus 80s with some beats of tachycardia     Past Medical Hx:   PEG (percutaneous endoscopic gastrostomy) status  Other hydrocele  Anxiety disorder, unspecified type  Hypertension, unspecified type  High cholesterol  Chronic obstructive pulmonary disease, unspecified COPD type    Past Sx:  PEG (percutaneous endoscopic gastrostomy) status  No significant past surgical history    Allergies:  No Known Allergies    Current Meds:   Standng Meds:  albuterol/ipratropium for Nebulization 3 milliLiter(s) Nebulizer every 6 hours  ALPRAZolam 1 milliGRAM(s) Oral four times a day  cefepime   IVPB      cefepime   IVPB 500 milliGRAM(s) IV Intermittent once  cefepime   IVPB 500 milliGRAM(s) IV Intermittent every 12 hours  chlorhexidine 4% Liquid 1 Application(s) Topical <User Schedule>  doxycycline IVPB 100 milliGRAM(s) IV Intermittent every 12 hours  enoxaparin Injectable 40 milliGRAM(s) SubCutaneous daily  glycopyrrolate 1 milliGRAM(s) Oral two times a day  influenza   Vaccine 0.5 milliLiter(s) IntraMuscular once  sodium chloride 0.65% Nasal 1 Spray(s) Both Nostrils two times a day    PRN Meds:    HOME MEDICATIONS:  albuterol-ipratropium: inhaled 4 times a day  glycopyrrolate 1 mg oral tablet: 1 tab(s) by gastrostomy tube 2 times a day  sodium chloride 0.65% nasal spray:  nasal   Xanax 1 mg oral tablet: 1 tab(s) orally 4 times a day      Vital Signs:   T(F): 96.3 (02-24-20 @ 04:30), Max: 96.9 (02-23-20 @ 20:04)  HR: 82 (02-24-20 @ 04:30) (82 - 89)  BP: 123/57 (02-24-20 @ 04:30) (114/61 - 142/65)  RR: 18 (02-24-20 @ 04:30) (18 - 20)  SpO2: --      02-23-20 @ 07:01  -  02-24-20 @ 07:00  --------------------------------------------------------  IN: 2398 mL / OUT: 3 mL / NET: 2395 mL        Physical Exam:   GENERAL: NAD, Appearing older than stated age with noted cachexia and bitemporal wasting.   HEENT: NCAT, PERRLA, EOMI  CHEST/LUNG: Breath sounds diminished bilaterally, no wheezing  HEART: Regular rate and rhythm; s1 s2 appreciated, No murmurs, rubs, or gallops  ABDOMEN: Soft, Nontender, Nondistended; Bowel sounds present  EXTREMITIES: No LE edema b/l, Peripheral pulses 2+, No cyanosis, No clubbing  NERVOUS SYSTEM:  Alert & Oriented X3, Non focal    Labs:                         8.2    12.09 )-----------( 542      ( 24 Feb 2020 06:44 )             26.4       24 Feb 2020 06:44    136    |  96     |  36     ----------------------------<  230    4.5     |  28     |  0.7      Ca    9.0        24 Feb 2020 06:44  Mg     2.1       23 Feb 2020 04:50    TPro  5.5    /  Alb  3.0    /  TBili  <0.2   /  DBili  x      /  AST  10     /  ALT  8      /  AlkPhos  75     24 Feb 2020 06:44    Troponin <0.01, CKMB --, CK -- 02-22-20 @ 06:35  Troponin 0.02, CKMB --, CK -- 02-21-20 @ 10:40    Radiology:   None Today    Assessment and Plan:   This is a 64 year old male with PMHx of COPD on 4L NC and BiPAP PRN, HTN, DLD, Vocal Cord Paralysis and dysphagia s/p PEG placement who presented with Shrotness of Breath    #Shortness of Breath secondary to COPD exacerbation   - Continue on Doxycycline and Cefepime; can convert to PO Doxycycline on discharge and will d/c cefepime  - Transition to Prednisone PO tomorrow; received 60mg Solumedrol today  - Continue on NC alternating with AVAPs/BiPAP HS and PRN  - Continue on bronchodilators     #Noted Lung Mass  - Stated that 6 months ago at [facility] it was found in the ED   - Outpatient provider and the patient agreed to hold off on biopsy  - Endorses 70lb weight loss over past year (likely secondary to dysphagia)  - Pulmonary advised to discharge back to home with home hospice    #Dysphagia  -Speech and Swallow advised not appropriate for PO diet  - Will follow up with Speech and Swallow today  - Continue on PEG feed regimen for now     #Vocal Cord Paralysis   - Noted from over 2 years ago when he had a procedure at [facility]  - Continue on glycopyrrolate     #HTN  - Not on any medications now and BP normotensive    #HLD  - Not on any medications now    #Anxiety:   - Xanax 1mg q6    Activity: As tolerated  Diet: PEG feeds only; appears to be on comfort PO feeds at home  DVT ppx: Lovenox  GI ppx: not indicated  Code Status: Full Code? Patient is on hospice on the outside. Attempted to speak with patient regarding GOC but he is only concerned with discharge   DISPO: Discharge home today       FINAL DISCHARGE INTERVIEW:  Resident(s) Present: Rick Harris RN Present: Melva BARRIOS MEDICATION RECONCILIATION  reviewed with Attending Dr. Downey    DISPOSITION:   [  ] Home,    [ x ] Home with Visiting Nursing Services,   [    ]  SNF/ NH,    [   ] Acute Rehab (4A),   [   ] Other (Specify:_________)

## 2020-02-24 NOTE — PROGRESS NOTE ADULT - ASSESSMENT
62 yo M with COPD, chronic respiratory failure on BIPAP, HTN, DLD, vocal cord paralysis and dysphagia s/p PEG presents for SOB. Patient said shortness of breath. 64 yo M with COPD, chronic respiratory failure on BIPAP, HTN, DLD, vocal cord paralysis and dysphagia s/p PEG presents for SOB and backache.       1.	SOB likely due to COPD exacerbation  2.	COPD on home O2  3.	Possible PNA-GNR  4.	Ac on Ch. Hypoxic/Hypercapnic Respiratory failure on BiPAP  5.	Lung mass  6.	Backache  7.	HTN / DL  8.	Hoarseness of voice due to vocal cord paralysis  9.	Dysphagia S/P PEG.         PLAN:    ·	Pt wants to go home under Hospice care. He was on Hospice care at home  ·	Switch him to Levaquin 500 mg po daily for five days  ·	Switch him to Prednisone 40 mg po daily for five days  ·	Pain control  ·	Cont his home meds  ·	D/C home    * Med rec reviewed. Plan of care D/W the pt. Time spent 41 minutes.

## 2020-02-24 NOTE — PROGRESS NOTE ADULT - SUBJECTIVE AND OBJECTIVE BOX
ALETA SUAZO  64y Male    CHIEF COMPLAINT:    Patient is a 64y old  Male who presents with a chief complaint of chest pain (2020 12:09)      INTERVAL HPI/OVERNIGHT EVENTS:    Patient seen and examined.    ROS: All other systems are negative.    Vital Signs:    T(F): 96.3 (20 @ 04:30), Max: 96.9 (20 @ 20:04)  HR: 82 (20 @ 04:30) (82 - 89)  BP: 123/57 (20 @ 04:30) (114/61 - 142/65)  RR: 18 (20 @ 04:30) (18 - 20)  SpO2: --  I&O's Summary    2020 07:01  -  2020 07:00  --------------------------------------------------------  IN: 2398 mL / OUT: 3 mL / NET: 2395 mL      Daily Height in cm: 170.18 (2020 12:25)    Daily Weight in k.1 (2020 04:30)  CAPILLARY BLOOD GLUCOSE      POCT Blood Glucose.: 308 mg/dL (2020 19:16)  POCT Blood Glucose.: 67 mg/dL (2020 17:08)  POCT Blood Glucose.: 262 mg/dL (2020 11:56)  POCT Blood Glucose.: 116 mg/dL (2020 07:53)      PHYSICAL EXAM:    GENERAL:  NAD  SKIN: No rashes or lesions  HENT: Atrumatic. Normocephalic. PERRL. Moist membranes.  NECK: Supple, No JVD. No lymphadenopathy.  PULMONARY: CTA B/L. No wheezing. No rales  CVS: Normal S1, S2. Rate and Rythm are regular. No murmurs.  ABDOMEN/GI: Soft, Nontender, Nondistended; BS present  EXTREMITIES: Peripheral pulses intact. No edema B/L LE.  NEUROLOGIC:  No motor or sensory deficit.  PSYCH: Alert & oriented x 3    Consultant(s) Notes Reviewed:  [x ] YES  [ ] NO  Care Discussed with Consultants/Other Providers [ x] YES  [ ] NO    EKG reviewed  Telemetry reviewed    LABS:                        8.2    12.09 )-----------( 542      ( 2020 06:44 )             26.4         142  |  101  |  36<H>  ----------------------------<  51<L>  4.6   |  30  |  0.8    Ca    9.3      2020 16:49  Mg     2.1               Trop <0.01, CKMB --, CK --, 20 @ 06:35  Trop 0.02, CKMB --, CK --, 20 @ 10:40        RADIOLOGY & ADDITIONAL TESTS:      Imaging or report Personally Reviewed:  [ ] YES  [ ] NO    Medications:  Standing  albuterol/ipratropium for Nebulization 3 milliLiter(s) Nebulizer every 6 hours  ALPRAZolam 1 milliGRAM(s) Oral four times a day  cefepime   IVPB      cefepime   IVPB 500 milliGRAM(s) IV Intermittent once  cefepime   IVPB 500 milliGRAM(s) IV Intermittent every 12 hours  chlorhexidine 4% Liquid 1 Application(s) Topical <User Schedule>  doxycycline IVPB 100 milliGRAM(s) IV Intermittent every 12 hours  enoxaparin Injectable 40 milliGRAM(s) SubCutaneous daily  glycopyrrolate 1 milliGRAM(s) Oral two times a day  influenza   Vaccine 0.5 milliLiter(s) IntraMuscular once  methylPREDNISolone sodium succinate Injectable 60 milliGRAM(s) IV Push two times a day  sodium chloride 0.65% Nasal 1 Spray(s) Both Nostrils two times a day    PRN Meds      Case discussed with resident    Care discussed with pt/family ELIZABETHISABELALETA LUNSFORD  64y Male    CHIEF COMPLAINT:    Patient is a 64y old  Male who presents with a chief complaint of chest pain (2020 12:09)      INTERVAL HPI/OVERNIGHT EVENTS:    Patient seen and examined. Denies sob on O2. C/O backache. Was on Hospice care at home. Wants to go back home under Hospice care.     ROS: All other systems are negative.    Vital Signs:    T(F): 96.3 (20 @ 04:30), Max: 96.9 (20 @ 20:04)  HR: 82 (20 @ 04:30) (82 - 89)  BP: 123/57 (20 @ 04:30) (114/61 - 142/65)  RR: 18 (20 @ 04:30) (18 - 20)  SpO2: --  I&O's Summary    2020 07:01  -  2020 07:00  --------------------------------------------------------  IN: 2398 mL / OUT: 3 mL / NET: 2395 mL      Daily Height in cm: 170.18 (2020 12:25)    Daily Weight in k.1 (2020 04:30)  CAPILLARY BLOOD GLUCOSE      POCT Blood Glucose.: 308 mg/dL (2020 19:16)  POCT Blood Glucose.: 67 mg/dL (2020 17:08)  POCT Blood Glucose.: 262 mg/dL (2020 11:56)  POCT Blood Glucose.: 116 mg/dL (2020 07:53)      PHYSICAL EXAM:    GENERAL:  NAD  SKIN: No rashes or lesions  HENT: Atraumatic Normocephalic. PERRL. Moist membranes.  NECK: Supple, No JVD. No lymphadenopathy.  PULMONARY: Intensity of BS is decreased B/L. No wheezing. No rales  CVS: Normal S1, S2. Rate and Rhythm are regular. No murmurs.  ABDOMEN/GI: Soft, Nontender, Nondistended; BS present  EXTREMITIES: Peripheral pulses intact. No edema B/L LE.  NEUROLOGIC:  No motor or sensory deficit.  PSYCH: Alert & oriented x 3    Consultant(s) Notes Reviewed:  [x ] YES  [ ] NO  Care Discussed with Consultants/Other Providers [ x] YES  [ ] NO    EKG reviewed  Telemetry reviewed    LABS:                        8.2    12.09 )-----------( 542      ( 2020 06:44 )             26.4     02-    142  |  101  |  36<H>  ----------------------------<  51<L>  4.6   |  30  |  0.8    Ca    9.3      2020 16:49  Mg     2.1     -          Trop <0.01, CKMB --, CK --, 20 @ 06:35  Trop 0.02, CKMB --, CK --, 20 @ 10:40        RADIOLOGY & ADDITIONAL TESTS:      Imaging or report Personally Reviewed:  [ ] YES  [ ] NO    Medications:  Standing  albuterol/ipratropium for Nebulization 3 milliLiter(s) Nebulizer every 6 hours  ALPRAZolam 1 milliGRAM(s) Oral four times a day  cefepime   IVPB      cefepime   IVPB 500 milliGRAM(s) IV Intermittent once  cefepime   IVPB 500 milliGRAM(s) IV Intermittent every 12 hours  chlorhexidine 4% Liquid 1 Application(s) Topical <User Schedule>  doxycycline IVPB 100 milliGRAM(s) IV Intermittent every 12 hours  enoxaparin Injectable 40 milliGRAM(s) SubCutaneous daily  glycopyrrolate 1 milliGRAM(s) Oral two times a day  influenza   Vaccine 0.5 milliLiter(s) IntraMuscular once  methylPREDNISolone sodium succinate Injectable 60 milliGRAM(s) IV Push two times a day  sodium chloride 0.65% Nasal 1 Spray(s) Both Nostrils two times a day    PRN Meds      Case discussed with resident    Care discussed with pt/family

## 2020-02-24 NOTE — PROGRESS NOTE ADULT - SUBJECTIVE AND OBJECTIVE BOX
THIS IS A DRAFT    Patient seen and examined earlier today.    Case discussed with primary team.    Complete documentation to follow.    For any question, please contact me:  Dr. Teodora Dumont  Office: [phone] ALETA SUAZO  64y, Male  Allergy: No Known Allergies      LAST 24-Hr EVENTS:  dyspnea improved  wants to go home    VITALS:  T(F): 97.6 (02-24-20 @ 14:15), Max: 97.6 (02-24-20 @ 14:15)  HR: 70 (02-24-20 @ 14:15)  BP: 128/60 (02-24-20 @ 14:15) (123/57 - 128/60)  RR: 18 (02-24-20 @ 14:15)  SpO2: --    PHYSICAL EXAM:    GENERAL: NAD, frail  NECK: Supple, No JVD  CHEST/LUNG: distant bs  HEART: Regular rate and rhythm  ABDOMEN: Soft, Nontender, Nondistended  EXTREMITIES:  No clubbing, edema absent        TESTS & MEASUREMENTS:    IN: 2582 mL / OUT: 0 mL / NET: 2582 mL    IN: 2398 mL / OUT: 3 mL / NET: 2395 mL    IN: 874 mL / OUT: 0 mL / NET: 874 mL                            8.2    12.09 )-----------( 542      ( 24 Feb 2020 06:44 )             26.4       02-24    136  |  96<L>  |  36<H>  ----------------------------<  230<H>  4.5   |  28  |  0.7    Ca    9.0      24 Feb 2020 06:44  Mg     2.1     02-23    TPro  5.5<L>  /  Alb  3.0<L>  /  TBili  <0.2  /  DBili  x   /  AST  10  /  ALT  8   /  AlkPhos  75  02-24    LIVER FUNCTIONS - ( 24 Feb 2020 06:44 )  Alb: 3.0 g/dL / Pro: 5.5 g/dL / ALK PHOS: 75 U/L / ALT: 8 U/L / AST: 10 U/L / GGT: x                       MEDICATIONS:  MEDICATIONS  (STANDING):  albuterol/ipratropium for Nebulization 3 milliLiter(s) Nebulizer every 6 hours  ALPRAZolam 1 milliGRAM(s) Oral four times a day  cefepime   IVPB      cefepime   IVPB 500 milliGRAM(s) IV Intermittent once  cefepime   IVPB 500 milliGRAM(s) IV Intermittent every 12 hours  chlorhexidine 4% Liquid 1 Application(s) Topical <User Schedule>  doxycycline IVPB 100 milliGRAM(s) IV Intermittent every 12 hours  enoxaparin Injectable 40 milliGRAM(s) SubCutaneous daily  glycopyrrolate 1 milliGRAM(s) Oral two times a day  influenza   Vaccine 0.5 milliLiter(s) IntraMuscular once  sodium chloride 0.65% Nasal 1 Spray(s) Both Nostrils two times a day    MEDICATIONS  (PRN):

## 2020-02-24 NOTE — DISCHARGE NOTE PROVIDER - NSDCCPCAREPLAN_GEN_ALL_CORE_FT
PRINCIPAL DISCHARGE DIAGNOSIS  Diagnosis: COPD exacerbation  Assessment and Plan of Treatment: You were treated for a COPD exacerbation. Please follow up with your pulmonary doctor. Please continue to take the levaquin for 5 more days and your prednisone taper as prescribed      SECONDARY DISCHARGE DIAGNOSES  Diagnosis: Lung mass  Assessment and Plan of Treatment: You were found to have a lung mass. Please follow up with your pulmonary doctor to determine if you would like further workup

## 2020-02-24 NOTE — DISCHARGE NOTE PROVIDER - HOSPITAL COURSE
This is a 64 year old male with COPD, chronic respiratory failure on BIPAP, HTN, DLD, vocal cord paralysis and dysphagia s/p PEG presents for SOB. Patient said shortness of breath ebgan 3:30 this morning, used his duonebs with no relief so he came to the ED. The SOB began suddenly, denies worsening of his chronic cough or phlegm production, denies fevers, chills or rigors, states he believes it is similar to previous exacerbations. Patient admitted for COPD exacerbation. He was discharged back to his home hospice on hospital day three. Will complete Levaquin antibiotics and Prednisone taper

## 2020-02-24 NOTE — DISCHARGE NOTE PROVIDER - NSDCMRMEDTOKEN_GEN_ALL_CORE_FT
albuterol-ipratropium: inhaled 4 times a day  Balmex Adult Care 11.3% topical cream: Apply topically to affected area once a day  sacrum   docusate sodium 10 mg/mL oral liquid: 10 milliliter(s) orally 2 times a day  Flovent 110 mcg/inh inhalation aerosol with adapter: 1 inhaler(s) inhaled 2 times a day  glycopyrrolate 1 mg oral tablet: 1 tab(s) by gastrostomy tube 2 times a day  Levaquin 500 mg oral tablet: 1 tab(s) orally once a day   predniSONE 10 mg oral tablet: 4 tab(s) a day for 3 days, then 3 tab(s) a day for 3 days, then 2 tab(s) a day for 3 days, then take one tab daily  ProAir HFA 90 mcg/inh inhalation aerosol: 2 puff(s) inhaled every 6 hours  roflumilast 500 mcg oral tablet: 1 tab(s) orally once a day  senna oral tablet: 2 tab(s) orally once a day (at bedtime)  simvastatin 10 mg oral tablet: 1 tab(s) orally once a day (at bedtime)  sodium chloride 0.65% nasal spray:  nasal   Xanax 1 mg oral tablet: 1 tab(s) orally 4 times a day

## 2020-02-24 NOTE — DISCHARGE NOTE PROVIDER - CARE PROVIDER_API CALL
Brenda Joy)  Medicine  [address] [place] [address]  Phone: [phone]  Fax: [phone]  Follow Up Time:

## 2020-02-24 NOTE — PROGRESS NOTE ADULT - ASSESSMENT
Acute on chronic hypoxic and hypercapnic respiratory failure, improved  End stage Copd  with exacerbation  Pneumonia  Pleurisy improved  Lung mass/suspected advanced lung cancer  Anxiety disorder      low flow oxygen alternating with NIPPV hs and prn (has both at home)  cont bronchodilators  prednisone taper to baseline 10 mg daily  complete abx course 7 days total- discharge on po doxycycline  aspiration precautions  anxilytics prn  dvt/gi px  d/c planning  outpt follow up with primary pulmonologist Dr Neri Almonte

## 2020-02-24 NOTE — DISCHARGE NOTE NURSING/CASE MANAGEMENT/SOCIAL WORK - PATIENT PORTAL LINK FT
You can access the FollowMyHealth Patient Portal offered by [facility] by registering at the following website: [email or web address]. By joining [facility]’s FollowMyHealth portal, you will also be able to view your health information using other applications (apps) compatible with our system.

## 2020-02-26 LAB
HCV RNA FLD QL NAA+PROBE: SIGNIFICANT CHANGE UP
HCV RNA SPEC QL PROBE+SIG AMP: SIGNIFICANT CHANGE UP

## 2020-02-28 DIAGNOSIS — Z74.01 BED CONFINEMENT STATUS: ICD-10-CM

## 2020-02-28 DIAGNOSIS — J96.22 ACUTE AND CHRONIC RESPIRATORY FAILURE WITH HYPERCAPNIA: ICD-10-CM

## 2020-02-28 DIAGNOSIS — F41.9 ANXIETY DISORDER, UNSPECIFIED: ICD-10-CM

## 2020-02-28 DIAGNOSIS — I10 ESSENTIAL (PRIMARY) HYPERTENSION: ICD-10-CM

## 2020-02-28 DIAGNOSIS — J38.00 PARALYSIS OF VOCAL CORDS AND LARYNX, UNSPECIFIED: ICD-10-CM

## 2020-02-28 DIAGNOSIS — F17.210 NICOTINE DEPENDENCE, CIGARETTES, UNCOMPLICATED: ICD-10-CM

## 2020-02-28 DIAGNOSIS — C34.90 MALIGNANT NEOPLASM OF UNSPECIFIED PART OF UNSPECIFIED BRONCHUS OR LUNG: ICD-10-CM

## 2020-02-28 DIAGNOSIS — C79.9 SECONDARY MALIGNANT NEOPLASM OF UNSPECIFIED SITE: ICD-10-CM

## 2020-02-28 DIAGNOSIS — Z99.81 DEPENDENCE ON SUPPLEMENTAL OXYGEN: ICD-10-CM

## 2020-02-28 DIAGNOSIS — Z87.898 PERSONAL HISTORY OF OTHER SPECIFIED CONDITIONS: ICD-10-CM

## 2020-02-28 DIAGNOSIS — R06.02 SHORTNESS OF BREATH: ICD-10-CM

## 2020-02-28 DIAGNOSIS — J15.6 PNEUMONIA DUE TO OTHER GRAM-NEGATIVE BACTERIA: ICD-10-CM

## 2020-02-28 DIAGNOSIS — J44.0 CHRONIC OBSTRUCTIVE PULMONARY DISEASE WITH (ACUTE) LOWER RESPIRATORY INFECTION: ICD-10-CM

## 2020-02-28 DIAGNOSIS — J96.21 ACUTE AND CHRONIC RESPIRATORY FAILURE WITH HYPOXIA: ICD-10-CM

## 2020-02-28 DIAGNOSIS — E78.5 HYPERLIPIDEMIA, UNSPECIFIED: ICD-10-CM

## 2020-02-28 DIAGNOSIS — M54.9 DORSALGIA, UNSPECIFIED: ICD-10-CM

## 2020-02-28 DIAGNOSIS — J44.1 CHRONIC OBSTRUCTIVE PULMONARY DISEASE WITH (ACUTE) EXACERBATION: ICD-10-CM

## 2020-02-28 DIAGNOSIS — E43 UNSPECIFIED SEVERE PROTEIN-CALORIE MALNUTRITION: ICD-10-CM

## 2020-02-28 DIAGNOSIS — R64 CACHEXIA: ICD-10-CM

## 2020-02-28 DIAGNOSIS — Z93.1 GASTROSTOMY STATUS: ICD-10-CM

## 2020-02-28 DIAGNOSIS — R13.10 DYSPHAGIA, UNSPECIFIED: ICD-10-CM

## 2020-05-08 NOTE — PATIENT PROFILE ADULT - RESOURCE/ENVIRONMENTAL CONCERNS
I informed Benny that her STD screening and wet mount were both negative, no treatment necessary. She offers no questions.    none

## 2021-03-14 NOTE — SWALLOW BEDSIDE ASSESSMENT ADULT - ASR SWALLOW REFERRAL
Normal vision: sees adequately in most situations; can see medication labels, newsprint
ENT/2' voice change reportedly X2 weeks (unclear)

## 2021-09-30 NOTE — SWALLOW VFSS/MBS ASSESSMENT ADULT - ASPIRATION AFTER SWALLOW - COUGH
- likely 2/2 necrotising fascitis of foot with OM  - blood cx + on 9/2 and 9/3 for MSSA bacteremia and MSSA bacteremia cleared on 9/7 and 9/8  - MAKAYLA neg for vegetation  - c/w cefazolin 2gm IV q8hrs through 10/5 w/ liquid wash/Mild

## 2021-12-14 NOTE — SWALLOW BEDSIDE ASSESSMENT ADULT - SWALLOW EVAL: CURRENT DIET
NPO Island Pedicle Flap-Requiring Vessel Identification Text: The defect edges were debeveled with a #15 scalpel blade.  Given the location of the defect, shape of the defect and the proximity to free margins an island pedicle advancement flap was deemed most appropriate.  Using a sterile surgical marker, an appropriate advancement flap was drawn, based on the axial vessel mentioned above, incorporating the defect, outlining the appropriate donor tissue and placing the expected incisions within the relaxed skin tension lines where possible.    The area thus outlined was incised deep to adipose tissue with a #15 scalpel blade.  The skin margins were undermined to an appropriate distance in all directions around the primary defect and laterally outward around the island pedicle utilizing iris scissors.  There was minimal undermining beneath the pedicle flap.

## 2022-01-05 NOTE — DIETITIAN INITIAL EVALUATION ADULT. - FACTORS AFF FOOD INTAKE
Pt scheduled for B/l L4-5 TFESI and auth placed.    Pt speaks very low d/t vocal cord issue. Reports he is tolerating PEG tubes. He was on Jevity 1.2 @240ml 3x/day at home. Reports pt saw an SLP at another hospital and was cleared for puree w/ thickened liquids so he was eating ice cream and puddings okay. Pt frustrated he can't eat by mouth now. LBM 2/21. Edentulous. NKFA.

## 2022-02-02 NOTE — DIETITIAN INITIAL EVALUATION ADULT. - NS FNS WEIGHT CHANGE REASON
Add 52 Modifier (Optional): no Medical Necessity Information: It is in your best interest to select a reason for this procedure from the list below. All of these items fulfill various CMS LCD requirements except the new and changing color options. Medical Necessity Clause: This procedure was medically necessary because the lesions that were treated were: Post-Care Instructions: I reviewed with the patient in detail post-care instructions. Patient is to wear sunprotection, and avoid picking at any of the treated lesions. Pt may apply Vaseline to crusted or scabbing areas. Treatment Number (Will Not Render If 0): 0 Detail Level: Detailed Include Z78.9 (Other Specified Conditions Influencing Health Status) As An Associated Diagnosis?: Yes Consent: The patient's consent was obtained including but not limited to risks of crusting, scabbing, blistering, scarring, darker or lighter pigmentary change, recurrence, incomplete removal and infection. unintentional

## 2022-06-23 NOTE — PROGRESS NOTE ADULT - ASSESSMENT
Spoke with pt. Appt made to see Dr. George.   Patient is a 64 yo M with COPD, chronic respiratory failure on BIPAP, HTN, DLD, vocal cord paralysis and dysphagia s/p PEG 5/ 18 in the setting of resistant esophageal candidiasis and inability to swallow. Brought in from [facility] ,for cough productive ,sob and difficulty breathing of 1 day. Received IV antibiotics with steroids and BiPAP with improvement in respiratory status. Patient also suffers from anxiety which contributes to breathing status.     #Acute hypercapnic rep failure    On O2 by NC ? O2 Sat;  Home O2 use?  - COPD ex likely 2/2 viral URI   - Pulm evaluation appreciated; On Symbicort for now and PO Prednisone from tomorrow AM  - BIPAP at night and PRN   - Albuterol PRN   - Lactate trending up at 3.3  - CXR:  No radiographic evidence of acute cardiopulmonary disease. Left upper lobe nodule, grossly unchanged. Emphysema.  Avascular necrosis in the humeral heads.      #HTN/DLD;  - Cont Amlodipine 10mg daily  - Cont with Simvastatin 10mg QHS     #) Anxiety  - continue with  home meds    #DVT ppx; Lovenox subQ   #Diet: PEG feedings   #Activity: OOBTC   #Code: Full Code   #Dispo: Likely d/c in 24-48 hours to NH Patient is a 64 yo M with COPD, chronic respiratory failure on BIPAP, HTN, DLD, vocal cord paralysis and dysphagia s/p PEG 5/ 18 in the setting of resistant esophageal candidiasis and inability to swallow. Brought in from [facility] ,for cough productive ,sob and difficulty breathing of 1 day. Received IV antibiotics with steroids and BiPAP with improvement in respiratory status. Patient also suffers from anxiety which contributes to breathing status.     #Acute hypercapnic rep failure    On O2 by NC ? O2 Sat;  Home O2 use?  Auscultation: Wheezes ?  - COPD ex likely 2/2 viral URI   - Pulm evaluation appreciated; On Symbicort for now and PO Prednisone from tomorrow AM  - BIPAP at night and PRN   - Albuterol PRN   - Lactate trending up at 3.3  - CXR:  No radiographic evidence of acute cardiopulmonary disease. Left upper lobe nodule, grossly unchanged. Emphysema.  Avascular necrosis in the humeral heads.      #HTN/DLD;  - Cont Amlodipine 10mg daily  - Cont with Simvastatin 10mg QHS     #) Anxiety  - continue with  home meds    #DVT ppx; Lovenox subQ   #Diet: PEG feedings   #Activity: OOBTC   #Code: Full Code   #Dispo: Likely d/c in 24-48 hours to NH Patient is a 62 yo M with COPD, chronic respiratory failure on BIPAP, HTN, DLD, vocal cord paralysis and dysphagia s/p PEG 5/ 18 in the setting of resistant esophageal candidiasis and inability to swallow. Brought in from [facility] ,for cough productive ,sob and difficulty breathing of 1 day. Received IV antibiotics with steroids and BiPAP with improvement in respiratory status. Patient also suffers from anxiety which contributes to breathing status.     Acute hypercapnic and hypoxic resp failure 2/2 COPD Exacerbation: improving    On 3L O2 by NC, decrease to 2L for a goal of 88-92% O2 sat  O2 Sat: 100% On  Home O2 (2L)  Auscultation: No wheezes   COPD ex likely 2/2 viral URI   PO Prednisone 60 mg QD for 3 days, day 1  BIPAP at night and PRN   Albuterol PRN   Lactate trending up at 3.3, repeat pending  CXR:  No radiographic evidence of acute cardiopulmonary disease. Left upper lobe nodule, grossly unchanged. Emphysema.  Physiatry Consult appreciated: Bedside PT 3-5x/wk, STR in SNF    HTN/DLD: Stable    Cont Amlodipine 10mg daily  Cont with Simvastatin 10mg QHS     Anxiety: Stable    Continue with  home meds    DVT ppx;     Lovenox subQ     Diet:     PEG feedings     Activity:    OOBTC     Code:     Full Code     Dispo:     Likely d/c in 24-48 hours to NH

## 2022-12-12 NOTE — PROGRESS NOTE ADULT - RALES
bilateral Render Risk Assessment In Note?: no Additional Notes: Patient consent was obtained to proceed with the visit and recommended plan of care after\\ndiscussion of all risks and benefits, including the risks of COVID-19 exposure. Detail Level: Simple

## 2023-02-27 NOTE — CHART NOTE - NSCHARTNOTESELECT_GEN_ALL_CORE
Problem: Knowledge Deficit  Goal: Patient/family/caregiver demonstrates understanding of disease process, treatment plan, medications, and discharge instructions  Description: Complete learning assessment and assess knowledge base    Interventions:  - Provide teaching at level of understanding  - Provide teaching via preferred learning methods  Outcome: Progressing Event Note

## 2024-06-10 NOTE — PHYSICAL THERAPY INITIAL EVALUATION ADULT - GAIT DEVIATIONS NOTED, PT EVAL
decreased weight-shifting ability/Unsteady, increased lateral trunk sway, decreased balance reactions, decreased heel strike, +SOB/decreased stride length/decreased omer/decreased step length Tray set-up, open all containers; meal encouragement

## 2024-12-11 NOTE — PRE-ANESTHESIA EVALUATION ADULT - NSANTHPMHFT_GEN_ALL_CORE
Medicare Wellness Visit  Plan for Preventive Care    A good way for you to stay healthy is to use preventive care.  Medicare covers many services that can help you stay healthy.* The goal of these services is to find any health problems as quickly as possible. Finding problems early can help make them easier to treat.  Your personal plan below lists the services you may need and when they are due.      Health Maintenance Summary       COVID-19 Vaccine (9 - 2024-25 season)  Overdue since 10/2/2024    Traditional Medicare- Medicare Wellness Visit (Yearly)  Due since 12/1/2024    Microalbumin Ratio (Yearly)  Postponed until 6/5/2026    GFR (Yearly)  Order placed this encounter    DTaP/Tdap/Td Vaccine (2 - Td or Tdap)  Next due on 2/12/2029    Shingles Vaccine   Completed    Pneumococcal Vaccine 65+   Completed    Influenza Vaccine   Completed    Meningococcal Vaccine   Aged Out    Hepatitis B Vaccine (For Physician/APC Discussion)   Aged Out    HPV Vaccine   Aged Out             Preventive Care for Women and Men    Heart Screenings (Cardiovascular):  Blood tests are used to check your cholesterol, lipid and triglyceride levels. High levels can increase your risk for heart disease and stroke. High levels can be treated with medications, diet and exercise. Lowering your levels can help keep your heart and blood vessels healthy.  Your provider will order these tests if they are needed.    An ultrasound is done to see if you have an abdominal aortic aneurysm (AAA).  This is an enlargement of one of the main blood vessels that delivers blood to the body.   In the [place], 9,000 deaths are caused by AAA.  You may not even know you have this problem and as many as 1 in 3 people will have a serious problem if it is not treated.  Early diagnosis allows for more effective treatment and cure.  If you have a family history of AAA or are a male age 65-75 who has smoked, you are at higher risk of an AAA.  Your provider can  dysphasia, vocal injury/trauma? -->dysphonia order this test, if needed.    Colorectal Screening:  There are many tests that are used to check for cancer of your colon and rectum. You and your provider should discuss what test is best for you and when to have it done.  Options include:  Screening Colonoscopy: exam of the entire colon, seen through a flexible lighted tube.  Flexible Sigmoidoscopy: exam of the last third (sigmoid portion) of the colon and rectum, seen through a flexible lighted tube.  Cologuard DNA stool test: a sample of your stool is used to screen for cancer and unseen blood in your stool.  Fecal Occult Blood Test: a sample of your stool is studied to find any unseen blood    Flu Shot:  An immunization that helps to prevent influenza (the flu). You should get this every year. The best time to get the shot is in the fall.    Pneumococcal Shot:  Vaccines help prevent pneumococcal disease, which is any type of illness caused by Streptococcus pneumoniae bacteria. There are two kinds of pneumococcal vaccines available in the [place]:   Pneumococcal conjugate vaccines (PCV20 or Inkfaey96®)  Pneumococcal polysaccharide vaccine (PPSV23 or Cirihavib08®)  For those who have never received any pneumococcal conjugate vaccine, CDC recommends PVC20 for adults 65 years or older and adults 19 through 64 years old with certain medical conditions or risk factors.   For those who have previously received PCV13, this should be followed by a dose of PPSV23.     Hepatitis B Shot:  An immunization that helps to protect people from getting Hepatitis B. Hepatitis B is a virus that spreads through contact with infected blood or body fluids. Many people with the virus do not have symptoms.  The virus can lead to serious problems, such as liver disease. Some people are at higher risk than others. Your doctor will tell you if you need this shot.     Diabetes Screening:  A test to measure sugar (glucose) in your blood is called a fasting blood sugar. Fasting means  you cannot have food or drink for at least 8 hours before the test. This test can detect diabetes long before you may notice symptoms.    Glaucoma Screening:  Glaucoma screening is performed by your eye doctor. The test measures the fluid pressure inside your eyes to determine if you have glaucoma.     Hepatitis C Screening:  A blood test to see if you have the hepatitis C virus.  Hepatitis C attacks the liver and is a major cause of chronic liver disease.  Medicare will cover a single screening for all adults born between 1945 & 1965, or high risk patients (people who have injected illegal drugs or people who have had blood transfusions).  High risk patients who continue to inject illegal drugs can be screened for Hepatitis C every year.    Smoking and Tobacco-Use Cessation Counseling:  Tobacco is the single greatest cause of disease and early death in our country today. Medication and counseling together can increase a person’s chance of quitting for good.   Medicare covers two quitting attempts per year, with four counseling sessions per attempt (eight sessions in a 12 month period)    Preventive Screening tests for Women    Screening Mammograms and Breast Exams:  An x-ray of your breasts to check for breast cancer before you or your doctor may be able to feel it.  If breast cancer is found early it can usually be treated with success.    Pelvic Exams and Pap Tests:  An exam to check for cervical and vaginal cancer. A Pap test is a lab test in which cells are taken from your cervix and sent to the lab to look for signs of cervical cancer. If cancer of the cervix is found early, chances for a cure are good. Testing can generally end at age 65, or if a woman has a hysterectomy for a benign condition. Your provider may recommend more frequent testing if certain abnormal results are found.    Bone Mass Measurements:  A painless x-ray of your bone density to see if you are at risk for a broken bone. Bone density  refers to the thickness of bones or how tightly the bone tissue is packed.    Preventive Screening tests for Men    Prostate Screening:  Should you have a prostate cancer test (PSA)?  It is up to you to decide if you want a prostate cancer test. Talk to your clinician to find out if the test is right for you.  Things for you to consider and talk about should include:  Benefits and harms of the test  Your family history  How your race/ethnicity may influence the test  If the test may impact other medical conditions you have  Your values on screenings and treatments    *Medicare pays for many preventive services to keep you healthy. For some of these services, you might have to pay a deductible, coinsurance, and / or copayment.  The amounts vary depending on the type of services you need and the kind of Medicare health plan you have.    For further details on screenings offered by Medicare please visit: [email or web address].gov/coverage/[email or web address]-[email or web address]-services